# Patient Record
Sex: MALE | Race: WHITE | Employment: OTHER | URBAN - NONMETROPOLITAN AREA
[De-identification: names, ages, dates, MRNs, and addresses within clinical notes are randomized per-mention and may not be internally consistent; named-entity substitution may affect disease eponyms.]

---

## 2017-01-03 DIAGNOSIS — I10 ESSENTIAL HYPERTENSION: Primary | ICD-10-CM

## 2017-01-03 DIAGNOSIS — I48.19 PERSISTENT ATRIAL FIBRILLATION (HCC): ICD-10-CM

## 2017-01-03 RX ORDER — FUROSEMIDE 40 MG/1
40 TABLET ORAL DAILY
Qty: 30 TABLET | Refills: 3 | Status: SHIPPED | OUTPATIENT
Start: 2017-01-03 | End: 2017-05-19 | Stop reason: SDUPTHER

## 2017-01-03 RX ORDER — POTASSIUM CHLORIDE 20 MEQ/1
20 TABLET, EXTENDED RELEASE ORAL DAILY
Qty: 30 TABLET | Refills: 5 | Status: SHIPPED | OUTPATIENT
Start: 2017-01-03 | End: 2017-01-10 | Stop reason: ALTCHOICE

## 2017-01-03 RX ORDER — WARFARIN SODIUM 10 MG/1
10 TABLET ORAL DAILY
Qty: 30 TABLET | Refills: 5 | Status: SHIPPED | OUTPATIENT
Start: 2017-01-03 | End: 2017-09-05 | Stop reason: SDUPTHER

## 2017-01-03 RX ORDER — DIGOXIN 250 MCG
250 TABLET ORAL DAILY
Qty: 30 TABLET | Refills: 3 | Status: SHIPPED | OUTPATIENT
Start: 2017-01-03 | End: 2017-05-19 | Stop reason: SDUPTHER

## 2017-01-03 RX ORDER — LISINOPRIL 5 MG/1
5 TABLET ORAL 2 TIMES DAILY
Qty: 60 TABLET | Refills: 5 | Status: SHIPPED | OUTPATIENT
Start: 2017-01-03 | End: 2017-01-10

## 2017-01-05 ENCOUNTER — ANTI-COAG VISIT (OUTPATIENT)
Dept: CARDIOLOGY | Age: 62
End: 2017-01-05
Payer: MEDICAID

## 2017-01-05 DIAGNOSIS — I48.19 PERSISTENT ATRIAL FIBRILLATION (HCC): Primary | ICD-10-CM

## 2017-01-05 LAB
INTERNATIONAL NORMALIZATION RATIO, POC: 2
PROTHROMBIN TIME, POC: NORMAL

## 2017-01-05 PROCEDURE — 85610 PROTHROMBIN TIME: CPT | Performed by: CLINICAL NURSE SPECIALIST

## 2017-01-10 ENCOUNTER — OFFICE VISIT (OUTPATIENT)
Dept: CARDIOLOGY | Age: 62
End: 2017-01-10
Payer: MEDICAID

## 2017-01-10 ENCOUNTER — OFFICE VISIT (OUTPATIENT)
Dept: PRIMARY CARE CLINIC | Age: 62
End: 2017-01-10
Payer: MEDICAID

## 2017-01-10 VITALS
HEART RATE: 74 BPM | SYSTOLIC BLOOD PRESSURE: 132 MMHG | HEIGHT: 70 IN | WEIGHT: 240 LBS | BODY MASS INDEX: 34.36 KG/M2 | DIASTOLIC BLOOD PRESSURE: 82 MMHG

## 2017-01-10 VITALS
HEART RATE: 62 BPM | WEIGHT: 244 LBS | RESPIRATION RATE: 18 BRPM | OXYGEN SATURATION: 99 % | BODY MASS INDEX: 34.16 KG/M2 | HEIGHT: 71 IN | SYSTOLIC BLOOD PRESSURE: 140 MMHG | DIASTOLIC BLOOD PRESSURE: 76 MMHG

## 2017-01-10 DIAGNOSIS — I10 ESSENTIAL HYPERTENSION: ICD-10-CM

## 2017-01-10 DIAGNOSIS — I42.8 NON-ISCHEMIC CARDIOMYOPATHY (HCC): Primary | ICD-10-CM

## 2017-01-10 DIAGNOSIS — I48.19 PERSISTENT ATRIAL FIBRILLATION (HCC): Primary | ICD-10-CM

## 2017-01-10 DIAGNOSIS — I42.8 NON-ISCHEMIC CARDIOMYOPATHY (HCC): ICD-10-CM

## 2017-01-10 PROCEDURE — 99212 OFFICE O/P EST SF 10 MIN: CPT | Performed by: INTERNAL MEDICINE

## 2017-01-10 PROCEDURE — 99214 OFFICE O/P EST MOD 30 MIN: CPT | Performed by: INTERNAL MEDICINE

## 2017-01-10 RX ORDER — SPIRONOLACTONE 25 MG/1
25 TABLET ORAL DAILY
Qty: 90 TABLET | Refills: 3 | Status: SHIPPED | OUTPATIENT
Start: 2017-01-10 | End: 2018-02-18 | Stop reason: SDUPTHER

## 2017-01-10 RX ORDER — LEVOTHYROXINE SODIUM 0.05 MG/1
50 TABLET ORAL DAILY
Qty: 30 TABLET | Refills: 3 | Status: SHIPPED | OUTPATIENT
Start: 2017-01-10 | End: 2017-04-25 | Stop reason: DRUGHIGH

## 2017-01-10 ASSESSMENT — ENCOUNTER SYMPTOMS
SHORTNESS OF BREATH: 0
NAUSEA: 0
DIARRHEA: 0
VOMITING: 0
CONSTIPATION: 0
BACK PAIN: 0

## 2017-01-17 ENCOUNTER — TELEPHONE (OUTPATIENT)
Dept: CARDIOLOGY | Age: 62
End: 2017-01-17

## 2017-01-17 ENCOUNTER — OFFICE VISIT (OUTPATIENT)
Dept: CARDIOLOGY | Age: 62
End: 2017-01-17
Payer: MEDICAID

## 2017-01-17 VITALS
DIASTOLIC BLOOD PRESSURE: 78 MMHG | BODY MASS INDEX: 34.65 KG/M2 | WEIGHT: 242 LBS | HEART RATE: 60 BPM | HEIGHT: 70 IN | SYSTOLIC BLOOD PRESSURE: 116 MMHG

## 2017-01-17 DIAGNOSIS — I42.8 NON-ISCHEMIC CARDIOMYOPATHY (HCC): ICD-10-CM

## 2017-01-17 DIAGNOSIS — I10 ESSENTIAL HYPERTENSION: ICD-10-CM

## 2017-01-17 DIAGNOSIS — I48.19 PERSISTENT ATRIAL FIBRILLATION (HCC): Primary | ICD-10-CM

## 2017-01-17 PROCEDURE — 99212 OFFICE O/P EST SF 10 MIN: CPT | Performed by: INTERNAL MEDICINE

## 2017-01-24 ENCOUNTER — OFFICE VISIT (OUTPATIENT)
Dept: CARDIOLOGY | Age: 62
End: 2017-01-24
Payer: MEDICAID

## 2017-01-24 VITALS
BODY MASS INDEX: 34.65 KG/M2 | WEIGHT: 242 LBS | HEIGHT: 70 IN | DIASTOLIC BLOOD PRESSURE: 84 MMHG | HEART RATE: 74 BPM | SYSTOLIC BLOOD PRESSURE: 130 MMHG

## 2017-01-24 DIAGNOSIS — I48.19 PERSISTENT ATRIAL FIBRILLATION (HCC): Primary | ICD-10-CM

## 2017-01-24 DIAGNOSIS — I42.8 NON-ISCHEMIC CARDIOMYOPATHY (HCC): ICD-10-CM

## 2017-01-24 DIAGNOSIS — I10 ESSENTIAL HYPERTENSION: ICD-10-CM

## 2017-01-24 PROCEDURE — 99212 OFFICE O/P EST SF 10 MIN: CPT | Performed by: INTERNAL MEDICINE

## 2017-01-31 ENCOUNTER — OFFICE VISIT (OUTPATIENT)
Dept: CARDIOLOGY | Age: 62
End: 2017-01-31
Payer: MEDICAID

## 2017-01-31 VITALS
DIASTOLIC BLOOD PRESSURE: 78 MMHG | BODY MASS INDEX: 34.5 KG/M2 | SYSTOLIC BLOOD PRESSURE: 130 MMHG | HEIGHT: 70 IN | WEIGHT: 241 LBS | HEART RATE: 66 BPM

## 2017-01-31 DIAGNOSIS — I10 ESSENTIAL HYPERTENSION: ICD-10-CM

## 2017-01-31 DIAGNOSIS — I42.8 NON-ISCHEMIC CARDIOMYOPATHY (HCC): Primary | ICD-10-CM

## 2017-01-31 DIAGNOSIS — I48.19 PERSISTENT ATRIAL FIBRILLATION (HCC): ICD-10-CM

## 2017-01-31 PROCEDURE — 99212 OFFICE O/P EST SF 10 MIN: CPT | Performed by: INTERNAL MEDICINE

## 2017-02-07 ENCOUNTER — OFFICE VISIT (OUTPATIENT)
Dept: CARDIOLOGY | Age: 62
End: 2017-02-07
Payer: MEDICAID

## 2017-02-07 VITALS
BODY MASS INDEX: 33.79 KG/M2 | DIASTOLIC BLOOD PRESSURE: 72 MMHG | WEIGHT: 236 LBS | SYSTOLIC BLOOD PRESSURE: 124 MMHG | HEIGHT: 70 IN | HEART RATE: 68 BPM

## 2017-02-07 DIAGNOSIS — I48.19 PERSISTENT ATRIAL FIBRILLATION (HCC): Primary | ICD-10-CM

## 2017-02-07 LAB
INTERNATIONAL NORMALIZATION RATIO, POC: 1.6
PROTHROMBIN TIME, POC: NORMAL

## 2017-02-07 PROCEDURE — 85610 PROTHROMBIN TIME: CPT | Performed by: INTERNAL MEDICINE

## 2017-02-07 PROCEDURE — 99212 OFFICE O/P EST SF 10 MIN: CPT | Performed by: INTERNAL MEDICINE

## 2017-02-08 ENCOUNTER — TELEPHONE (OUTPATIENT)
Dept: CARDIOLOGY | Age: 62
End: 2017-02-08

## 2017-02-14 ENCOUNTER — OFFICE VISIT (OUTPATIENT)
Dept: CARDIOLOGY | Age: 62
End: 2017-02-14
Payer: MEDICAID

## 2017-02-14 VITALS
HEART RATE: 56 BPM | WEIGHT: 240 LBS | SYSTOLIC BLOOD PRESSURE: 106 MMHG | HEIGHT: 70 IN | DIASTOLIC BLOOD PRESSURE: 68 MMHG | BODY MASS INDEX: 34.36 KG/M2

## 2017-02-14 DIAGNOSIS — I10 ESSENTIAL HYPERTENSION: ICD-10-CM

## 2017-02-14 DIAGNOSIS — I48.19 PERSISTENT ATRIAL FIBRILLATION (HCC): Primary | ICD-10-CM

## 2017-02-14 DIAGNOSIS — I42.8 NON-ISCHEMIC CARDIOMYOPATHY (HCC): ICD-10-CM

## 2017-02-14 LAB
INTERNATIONAL NORMALIZATION RATIO, POC: 3.2
PROTHROMBIN TIME, POC: NORMAL

## 2017-02-14 PROCEDURE — 85610 PROTHROMBIN TIME: CPT | Performed by: INTERNAL MEDICINE

## 2017-02-14 PROCEDURE — 99212 OFFICE O/P EST SF 10 MIN: CPT | Performed by: INTERNAL MEDICINE

## 2017-02-17 ENCOUNTER — TELEPHONE (OUTPATIENT)
Dept: CARDIOLOGY | Age: 62
End: 2017-02-17

## 2017-02-21 ENCOUNTER — TELEPHONE (OUTPATIENT)
Dept: CARDIOLOGY | Age: 62
End: 2017-02-21

## 2017-03-08 ENCOUNTER — TELEPHONE (OUTPATIENT)
Dept: CARDIOLOGY | Age: 62
End: 2017-03-08

## 2017-03-08 DIAGNOSIS — I50.21 ACUTE SYSTOLIC CONGESTIVE HEART FAILURE (HCC): Primary | ICD-10-CM

## 2017-03-10 ENCOUNTER — TELEPHONE (OUTPATIENT)
Dept: CARDIOLOGY | Age: 62
End: 2017-03-10

## 2017-03-14 ENCOUNTER — OFFICE VISIT (OUTPATIENT)
Dept: CARDIOLOGY | Age: 62
End: 2017-03-14
Payer: MEDICAID

## 2017-03-14 VITALS
HEIGHT: 70 IN | BODY MASS INDEX: 35.07 KG/M2 | DIASTOLIC BLOOD PRESSURE: 80 MMHG | WEIGHT: 245 LBS | HEART RATE: 68 BPM | SYSTOLIC BLOOD PRESSURE: 130 MMHG

## 2017-03-14 DIAGNOSIS — I48.19 PERSISTENT ATRIAL FIBRILLATION (HCC): Primary | ICD-10-CM

## 2017-03-14 DIAGNOSIS — I42.8 NON-ISCHEMIC CARDIOMYOPATHY (HCC): ICD-10-CM

## 2017-03-14 DIAGNOSIS — I10 ESSENTIAL HYPERTENSION: ICD-10-CM

## 2017-03-14 LAB
INTERNATIONAL NORMALIZATION RATIO, POC: 3.8
PROTHROMBIN TIME, POC: NORMAL

## 2017-03-14 PROCEDURE — 99212 OFFICE O/P EST SF 10 MIN: CPT | Performed by: INTERNAL MEDICINE

## 2017-03-14 PROCEDURE — 85610 PROTHROMBIN TIME: CPT | Performed by: INTERNAL MEDICINE

## 2017-03-14 RX ORDER — CARVEDILOL 12.5 MG/1
12.5 TABLET ORAL 2 TIMES DAILY WITH MEALS
Qty: 60 TABLET | Refills: 3 | Status: SHIPPED | OUTPATIENT
Start: 2017-03-14 | End: 2017-07-25 | Stop reason: SDUPTHER

## 2017-03-20 ENCOUNTER — HOSPITAL ENCOUNTER (OUTPATIENT)
Dept: NON INVASIVE DIAGNOSTICS | Age: 62
Discharge: HOME | End: 2017-03-20
Payer: MEDICAID

## 2017-03-20 DIAGNOSIS — I10 ESSENTIAL HYPERTENSION: ICD-10-CM

## 2017-03-20 DIAGNOSIS — I42.8 NON-ISCHEMIC CARDIOMYOPATHY (HCC): ICD-10-CM

## 2017-03-20 DIAGNOSIS — I48.19 PERSISTENT ATRIAL FIBRILLATION (HCC): ICD-10-CM

## 2017-03-20 LAB
LV EF: 58 %
LVEF MODALITY: NORMAL

## 2017-03-20 PROCEDURE — 93306 TTE W/DOPPLER COMPLETE: CPT

## 2017-03-21 ENCOUNTER — ANTI-COAG VISIT (OUTPATIENT)
Dept: CARDIOLOGY | Age: 62
End: 2017-03-21
Payer: MEDICAID

## 2017-03-21 DIAGNOSIS — I48.19 PERSISTENT ATRIAL FIBRILLATION (HCC): Primary | ICD-10-CM

## 2017-03-21 LAB
INTERNATIONAL NORMALIZATION RATIO, POC: 2.3
PROTHROMBIN TIME, POC: NORMAL

## 2017-03-21 PROCEDURE — 85610 PROTHROMBIN TIME: CPT | Performed by: CLINICAL NURSE SPECIALIST

## 2017-03-24 ENCOUNTER — TELEPHONE (OUTPATIENT)
Dept: CARDIOLOGY | Age: 62
End: 2017-03-24

## 2017-03-28 ENCOUNTER — TELEPHONE (OUTPATIENT)
Dept: CARDIOLOGY | Age: 62
End: 2017-03-28

## 2017-04-10 ENCOUNTER — OFFICE VISIT (OUTPATIENT)
Dept: PRIMARY CARE CLINIC | Age: 62
End: 2017-04-10
Payer: MEDICAID

## 2017-04-10 VITALS
HEART RATE: 51 BPM | DIASTOLIC BLOOD PRESSURE: 76 MMHG | BODY MASS INDEX: 35.5 KG/M2 | SYSTOLIC BLOOD PRESSURE: 120 MMHG | RESPIRATION RATE: 18 BRPM | OXYGEN SATURATION: 97 % | HEIGHT: 70 IN | WEIGHT: 248 LBS

## 2017-04-10 DIAGNOSIS — I42.8 NON-ISCHEMIC CARDIOMYOPATHY (HCC): Primary | ICD-10-CM

## 2017-04-10 DIAGNOSIS — I10 ESSENTIAL HYPERTENSION: ICD-10-CM

## 2017-04-10 DIAGNOSIS — I42.8 NON-ISCHEMIC CARDIOMYOPATHY (HCC): ICD-10-CM

## 2017-04-10 LAB
ALBUMIN SERPL-MCNC: 3.9 G/DL (ref 3.5–5.2)
ALP BLD-CCNC: 63 U/L (ref 40–130)
ALT SERPL-CCNC: 117 U/L (ref 5–41)
ANION GAP SERPL CALCULATED.3IONS-SCNC: 13 MMOL/L (ref 7–19)
AST SERPL-CCNC: 80 U/L (ref 5–40)
BILIRUB SERPL-MCNC: 0.7 MG/DL (ref 0.2–1.2)
BILIRUBIN URINE: NEGATIVE
BLOOD, URINE: NEGATIVE
BUN BLDV-MCNC: 21 MG/DL (ref 8–23)
CALCIUM SERPL-MCNC: 9.1 MG/DL (ref 8.8–10.2)
CHLORIDE BLD-SCNC: 105 MMOL/L (ref 98–111)
CLARITY: CLEAR
CO2: 23 MMOL/L (ref 22–29)
COLOR: YELLOW
CREAT SERPL-MCNC: 0.8 MG/DL (ref 0.5–1.2)
CREATININE URINE: 20.3 MG/DL (ref 4.2–622)
GFR NON-AFRICAN AMERICAN: >60
GLOBULIN: 4 G/DL
GLUCOSE BLD-MCNC: 110 MG/DL (ref 74–109)
GLUCOSE URINE: NEGATIVE MG/DL
HCT VFR BLD CALC: 44.6 % (ref 42–52)
HEMOGLOBIN: 15.7 G/DL (ref 14–18)
KETONES, URINE: NEGATIVE MG/DL
LEUKOCYTE ESTERASE, URINE: NEGATIVE
MCH RBC QN AUTO: 32 PG (ref 27–31)
MCHC RBC AUTO-ENTMCNC: 35.2 G/DL (ref 33–37)
MCV RBC AUTO: 90.8 FL (ref 80–94)
NITRITE, URINE: NEGATIVE
PDW BLD-RTO: 13 % (ref 11.5–14.5)
PH UA: 5
PLATELET # BLD: 97 K/UL (ref 130–400)
PMV BLD AUTO: 11 FL (ref 7.4–10.4)
POTASSIUM SERPL-SCNC: 4.7 MMOL/L (ref 3.5–5)
PROTEIN PROTEIN: <4 MG/DL (ref 15–45)
PROTEIN UA: NEGATIVE MG/DL
RBC # BLD: 4.91 M/UL (ref 4.7–6.1)
SODIUM BLD-SCNC: 141 MMOL/L (ref 136–145)
SPECIFIC GRAVITY UA: 1.01
TOTAL PROTEIN: 7.9 G/DL (ref 6.6–8.7)
TSH SERPL DL<=0.05 MIU/L-ACNC: 7.51 UIU/ML (ref 0.27–4.2)
UROBILINOGEN, URINE: 0.2 E.U./DL
WBC # BLD: 4.6 K/UL (ref 4.8–10.8)

## 2017-04-10 PROCEDURE — 99214 OFFICE O/P EST MOD 30 MIN: CPT | Performed by: INTERNAL MEDICINE

## 2017-04-10 ASSESSMENT — ENCOUNTER SYMPTOMS
DIARRHEA: 0
BACK PAIN: 0
CONSTIPATION: 0
VOMITING: 0
NAUSEA: 0
SHORTNESS OF BREATH: 0

## 2017-04-11 ENCOUNTER — OFFICE VISIT (OUTPATIENT)
Dept: CARDIOLOGY | Age: 62
End: 2017-04-11
Payer: MEDICAID

## 2017-04-11 VITALS
WEIGHT: 240 LBS | HEIGHT: 70 IN | BODY MASS INDEX: 34.36 KG/M2 | DIASTOLIC BLOOD PRESSURE: 82 MMHG | SYSTOLIC BLOOD PRESSURE: 120 MMHG | HEART RATE: 70 BPM

## 2017-04-11 DIAGNOSIS — I42.8 NON-ISCHEMIC CARDIOMYOPATHY (HCC): ICD-10-CM

## 2017-04-11 DIAGNOSIS — I48.19 PERSISTENT ATRIAL FIBRILLATION (HCC): Primary | ICD-10-CM

## 2017-04-11 DIAGNOSIS — I10 ESSENTIAL HYPERTENSION: ICD-10-CM

## 2017-04-11 LAB
INTERNATIONAL NORMALIZATION RATIO, POC: 2.8
PROTHROMBIN TIME, POC: NORMAL

## 2017-04-11 PROCEDURE — 85610 PROTHROMBIN TIME: CPT | Performed by: INTERNAL MEDICINE

## 2017-04-11 PROCEDURE — 99212 OFFICE O/P EST SF 10 MIN: CPT | Performed by: INTERNAL MEDICINE

## 2017-04-12 ENCOUNTER — TELEPHONE (OUTPATIENT)
Dept: PRIMARY CARE CLINIC | Age: 62
End: 2017-04-12

## 2017-04-25 RX ORDER — LEVOTHYROXINE SODIUM 0.07 MG/1
75 TABLET ORAL DAILY
Qty: 30 TABLET | Refills: 3 | Status: SHIPPED | OUTPATIENT
Start: 2017-04-25 | End: 2017-07-11

## 2017-05-16 ENCOUNTER — TELEPHONE (OUTPATIENT)
Dept: CARDIOLOGY | Age: 62
End: 2017-05-16

## 2017-05-19 DIAGNOSIS — I48.19 PERSISTENT ATRIAL FIBRILLATION (HCC): ICD-10-CM

## 2017-05-19 RX ORDER — DIGOXIN 250 MCG
250 TABLET ORAL DAILY
Qty: 30 TABLET | Refills: 5 | Status: SHIPPED | OUTPATIENT
Start: 2017-05-19 | End: 2017-09-05

## 2017-05-19 RX ORDER — FUROSEMIDE 40 MG/1
40 TABLET ORAL DAILY
Qty: 30 TABLET | Refills: 5 | Status: SHIPPED | OUTPATIENT
Start: 2017-05-19 | End: 2017-12-03 | Stop reason: SDUPTHER

## 2017-06-06 ENCOUNTER — TELEPHONE (OUTPATIENT)
Dept: CARDIOLOGY | Age: 62
End: 2017-06-06

## 2017-06-08 DIAGNOSIS — I50.21 ACUTE SYSTOLIC CONGESTIVE HEART FAILURE (HCC): ICD-10-CM

## 2017-06-08 DIAGNOSIS — I42.8 NON-ISCHEMIC CARDIOMYOPATHY (HCC): ICD-10-CM

## 2017-06-08 DIAGNOSIS — I42.8 NON-ISCHEMIC CARDIOMYOPATHY (HCC): Primary | ICD-10-CM

## 2017-06-14 ENCOUNTER — TELEPHONE (OUTPATIENT)
Dept: CARDIOLOGY | Age: 62
End: 2017-06-14

## 2017-07-11 ENCOUNTER — OFFICE VISIT (OUTPATIENT)
Dept: PRIMARY CARE CLINIC | Age: 62
End: 2017-07-11
Payer: MEDICAID

## 2017-07-11 ENCOUNTER — TELEPHONE (OUTPATIENT)
Dept: PRIMARY CARE CLINIC | Age: 62
End: 2017-07-11

## 2017-07-11 VITALS
WEIGHT: 244 LBS | RESPIRATION RATE: 18 BRPM | HEIGHT: 70 IN | HEART RATE: 62 BPM | OXYGEN SATURATION: 97 % | SYSTOLIC BLOOD PRESSURE: 124 MMHG | BODY MASS INDEX: 34.93 KG/M2 | DIASTOLIC BLOOD PRESSURE: 76 MMHG

## 2017-07-11 DIAGNOSIS — E03.9 ACQUIRED HYPOTHYROIDISM: ICD-10-CM

## 2017-07-11 DIAGNOSIS — I42.8 NON-ISCHEMIC CARDIOMYOPATHY (HCC): Primary | ICD-10-CM

## 2017-07-11 DIAGNOSIS — R79.89 ELEVATED TSH: Primary | ICD-10-CM

## 2017-07-11 DIAGNOSIS — I10 ESSENTIAL HYPERTENSION: ICD-10-CM

## 2017-07-11 DIAGNOSIS — I42.8 NON-ISCHEMIC CARDIOMYOPATHY (HCC): ICD-10-CM

## 2017-07-11 LAB
ALBUMIN SERPL-MCNC: 3.8 G/DL (ref 3.5–5.2)
ALP BLD-CCNC: 57 U/L (ref 40–130)
ALT SERPL-CCNC: 76 U/L (ref 5–41)
ANION GAP SERPL CALCULATED.3IONS-SCNC: 19 MMOL/L (ref 7–19)
AST SERPL-CCNC: 66 U/L (ref 5–40)
BILIRUB SERPL-MCNC: 0.6 MG/DL (ref 0.2–1.2)
BUN BLDV-MCNC: 15 MG/DL (ref 8–23)
CALCIUM SERPL-MCNC: 8.8 MG/DL (ref 8.8–10.2)
CHLORIDE BLD-SCNC: 103 MMOL/L (ref 98–111)
CO2: 20 MMOL/L (ref 22–29)
CREAT SERPL-MCNC: 0.8 MG/DL (ref 0.5–1.2)
GFR NON-AFRICAN AMERICAN: >60
GLUCOSE BLD-MCNC: 124 MG/DL (ref 74–109)
POTASSIUM SERPL-SCNC: 5.1 MMOL/L (ref 3.5–5)
SODIUM BLD-SCNC: 142 MMOL/L (ref 136–145)
TOTAL PROTEIN: 8.1 G/DL (ref 6.6–8.7)
TSH SERPL DL<=0.05 MIU/L-ACNC: 4.83 UIU/ML (ref 0.27–4.2)

## 2017-07-11 PROCEDURE — 99214 OFFICE O/P EST MOD 30 MIN: CPT | Performed by: INTERNAL MEDICINE

## 2017-07-11 RX ORDER — LEVOTHYROXINE SODIUM 88 UG/1
88 TABLET ORAL DAILY
Qty: 30 TABLET | Refills: 3 | Status: SHIPPED | OUTPATIENT
Start: 2017-07-11 | End: 2017-12-08 | Stop reason: SDUPTHER

## 2017-07-11 ASSESSMENT — ENCOUNTER SYMPTOMS
BACK PAIN: 0
NAUSEA: 0
SHORTNESS OF BREATH: 0
VOMITING: 0
DIARRHEA: 0
CONSTIPATION: 0

## 2017-07-17 ENCOUNTER — TELEPHONE (OUTPATIENT)
Dept: CARDIOLOGY | Age: 62
End: 2017-07-17

## 2017-07-19 ENCOUNTER — TELEPHONE (OUTPATIENT)
Dept: CARDIOLOGY | Age: 62
End: 2017-07-19

## 2017-07-25 RX ORDER — CARVEDILOL 12.5 MG/1
TABLET ORAL
Qty: 60 TABLET | Refills: 5 | Status: SHIPPED | OUTPATIENT
Start: 2017-07-25 | End: 2018-06-05 | Stop reason: SDUPTHER

## 2017-09-05 ENCOUNTER — OFFICE VISIT (OUTPATIENT)
Dept: CARDIOLOGY | Age: 62
End: 2017-09-05
Payer: MEDICAID

## 2017-09-05 VITALS
BODY MASS INDEX: 35.79 KG/M2 | HEIGHT: 70 IN | DIASTOLIC BLOOD PRESSURE: 70 MMHG | WEIGHT: 250 LBS | HEART RATE: 62 BPM | SYSTOLIC BLOOD PRESSURE: 110 MMHG

## 2017-09-05 DIAGNOSIS — I48.19 PERSISTENT ATRIAL FIBRILLATION (HCC): Primary | ICD-10-CM

## 2017-09-05 DIAGNOSIS — I10 ESSENTIAL HYPERTENSION: ICD-10-CM

## 2017-09-05 DIAGNOSIS — I42.8 NON-ISCHEMIC CARDIOMYOPATHY (HCC): ICD-10-CM

## 2017-09-05 LAB
INTERNATIONAL NORMALIZATION RATIO, POC: 1.1
PROTHROMBIN TIME, POC: NORMAL

## 2017-09-05 PROCEDURE — 85610 PROTHROMBIN TIME: CPT | Performed by: INTERNAL MEDICINE

## 2017-09-05 PROCEDURE — 93000 ELECTROCARDIOGRAM COMPLETE: CPT | Performed by: INTERNAL MEDICINE

## 2017-09-05 PROCEDURE — 99214 OFFICE O/P EST MOD 30 MIN: CPT | Performed by: INTERNAL MEDICINE

## 2017-09-05 RX ORDER — WARFARIN SODIUM 10 MG/1
10 TABLET ORAL DAILY
Qty: 30 TABLET | Refills: 5 | Status: SHIPPED | OUTPATIENT
Start: 2017-09-05 | End: 2018-03-30 | Stop reason: SDUPTHER

## 2017-09-07 ENCOUNTER — ANTI-COAG VISIT (OUTPATIENT)
Dept: CARDIOLOGY | Age: 62
End: 2017-09-07
Payer: MEDICAID

## 2017-09-07 DIAGNOSIS — I48.19 PERSISTENT ATRIAL FIBRILLATION (HCC): Primary | ICD-10-CM

## 2017-09-07 LAB
INTERNATIONAL NORMALIZATION RATIO, POC: 2.1
PROTHROMBIN TIME, POC: NORMAL

## 2017-09-07 PROCEDURE — 85610 PROTHROMBIN TIME: CPT | Performed by: CLINICAL NURSE SPECIALIST

## 2017-09-21 ENCOUNTER — TELEPHONE (OUTPATIENT)
Dept: CARDIOLOGY | Age: 62
End: 2017-09-21

## 2017-12-04 ENCOUNTER — TELEPHONE (OUTPATIENT)
Dept: CARDIOLOGY | Age: 62
End: 2017-12-04

## 2017-12-04 RX ORDER — FUROSEMIDE 40 MG/1
TABLET ORAL
Qty: 90 TABLET | Refills: 3 | Status: SHIPPED | OUTPATIENT
Start: 2017-12-04 | End: 2018-12-17 | Stop reason: SDUPTHER

## 2017-12-05 ENCOUNTER — APPOINTMENT (OUTPATIENT)
Dept: CT IMAGING | Age: 62
End: 2017-12-05
Payer: MEDICAID

## 2017-12-05 ENCOUNTER — HOSPITAL ENCOUNTER (EMERGENCY)
Age: 62
Discharge: HOME | End: 2017-12-05
Attending: EMERGENCY MEDICINE
Payer: MEDICAID

## 2017-12-05 VITALS
BODY MASS INDEX: 35.79 KG/M2 | OXYGEN SATURATION: 94 % | HEIGHT: 70 IN | TEMPERATURE: 98.8 F | DIASTOLIC BLOOD PRESSURE: 77 MMHG | HEART RATE: 63 BPM | RESPIRATION RATE: 17 BRPM | WEIGHT: 250 LBS | SYSTOLIC BLOOD PRESSURE: 116 MMHG

## 2017-12-05 DIAGNOSIS — T22.292A PARTIAL THICKNESS BURN OF MULTIPLE SITES OF LEFT UPPER EXTREMITY, INITIAL ENCOUNTER: Primary | ICD-10-CM

## 2017-12-05 DIAGNOSIS — R55 SYNCOPE AND COLLAPSE: ICD-10-CM

## 2017-12-05 LAB
ALBUMIN SERPL-MCNC: 3.5 G/DL (ref 3.5–5.2)
ALP BLD-CCNC: 57 U/L (ref 40–130)
ALT SERPL-CCNC: 70 U/L (ref 5–41)
ANION GAP SERPL CALCULATED.3IONS-SCNC: 11 MMOL/L (ref 7–19)
AST SERPL-CCNC: 58 U/L (ref 5–40)
BASOPHILS ABSOLUTE: 0 K/UL (ref 0–0.2)
BASOPHILS RELATIVE PERCENT: 0.3 % (ref 0–1)
BILIRUB SERPL-MCNC: 1.5 MG/DL (ref 0.2–1.2)
BUN BLDV-MCNC: 19 MG/DL (ref 8–23)
CALCIUM SERPL-MCNC: 8.5 MG/DL (ref 8.8–10.2)
CHLORIDE BLD-SCNC: 99 MMOL/L (ref 98–111)
CO2: 23 MMOL/L (ref 22–29)
CREAT SERPL-MCNC: 0.7 MG/DL (ref 0.5–1.2)
EOSINOPHILS ABSOLUTE: 0 K/UL (ref 0–0.6)
EOSINOPHILS RELATIVE PERCENT: 0.3 % (ref 0–5)
GFR NON-AFRICAN AMERICAN: >60
GLUCOSE BLD-MCNC: 126 MG/DL (ref 74–109)
HCT VFR BLD CALC: 41.4 % (ref 42–52)
HEMOGLOBIN: 14.3 G/DL (ref 14–18)
LYMPHOCYTES ABSOLUTE: 1.1 K/UL (ref 1.1–4.5)
LYMPHOCYTES RELATIVE PERCENT: 17.9 % (ref 20–40)
MCH RBC QN AUTO: 31.8 PG (ref 27–31)
MCHC RBC AUTO-ENTMCNC: 34.5 G/DL (ref 33–37)
MCV RBC AUTO: 92 FL (ref 80–94)
MONOCYTES ABSOLUTE: 0.8 K/UL (ref 0–0.9)
MONOCYTES RELATIVE PERCENT: 12 % (ref 0–10)
NEUTROPHILS ABSOLUTE: 4.3 K/UL (ref 1.5–7.5)
NEUTROPHILS RELATIVE PERCENT: 69.2 % (ref 50–65)
PDW BLD-RTO: 13.2 % (ref 11.5–14.5)
PERFORMED ON: NORMAL
PLATELET # BLD: 99 K/UL (ref 130–400)
PMV BLD AUTO: 10 FL (ref 9.4–12.4)
POC TROPONIN I: 0.01 NG/ML (ref 0–0.08)
POTASSIUM SERPL-SCNC: 3.9 MMOL/L (ref 3.5–5)
RBC # BLD: 4.5 M/UL (ref 4.7–6.1)
SODIUM BLD-SCNC: 133 MMOL/L (ref 136–145)
TOTAL PROTEIN: 7.7 G/DL (ref 6.6–8.7)
WBC # BLD: 6.3 K/UL (ref 4.8–10.8)

## 2017-12-05 PROCEDURE — 6360000002 HC RX W HCPCS: Performed by: EMERGENCY MEDICINE

## 2017-12-05 PROCEDURE — 80053 COMPREHEN METABOLIC PANEL: CPT

## 2017-12-05 PROCEDURE — 85025 COMPLETE CBC W/AUTO DIFF WBC: CPT

## 2017-12-05 PROCEDURE — 90715 TDAP VACCINE 7 YRS/> IM: CPT | Performed by: EMERGENCY MEDICINE

## 2017-12-05 PROCEDURE — 90471 IMMUNIZATION ADMIN: CPT | Performed by: EMERGENCY MEDICINE

## 2017-12-05 PROCEDURE — 99283 EMERGENCY DEPT VISIT LOW MDM: CPT | Performed by: EMERGENCY MEDICINE

## 2017-12-05 PROCEDURE — 84484 ASSAY OF TROPONIN QUANT: CPT

## 2017-12-05 PROCEDURE — 70450 CT HEAD/BRAIN W/O DYE: CPT

## 2017-12-05 PROCEDURE — 2500000003 HC RX 250 WO HCPCS: Performed by: EMERGENCY MEDICINE

## 2017-12-05 PROCEDURE — 93005 ELECTROCARDIOGRAM TRACING: CPT

## 2017-12-05 PROCEDURE — 36415 COLL VENOUS BLD VENIPUNCTURE: CPT

## 2017-12-05 PROCEDURE — 99284 EMERGENCY DEPT VISIT MOD MDM: CPT

## 2017-12-05 RX ADMIN — SILVER SULFADIAZINE: 10 CREAM TOPICAL at 12:26

## 2017-12-05 RX ADMIN — TETANUS TOXOID, REDUCED DIPHTHERIA TOXOID AND ACELLULAR PERTUSSIS VACCINE, ADSORBED 0.5 ML: 5; 2.5; 8; 8; 2.5 SUSPENSION INTRAMUSCULAR at 12:25
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
## 2017-12-06 DIAGNOSIS — I50.21 ACUTE SYSTOLIC CONGESTIVE HEART FAILURE (HCC): ICD-10-CM

## 2017-12-06 DIAGNOSIS — I42.8 NON-ISCHEMIC CARDIOMYOPATHY (HCC): ICD-10-CM

## 2017-12-07 LAB
EKG P AXIS: 36 DEGREES
EKG P-R INTERVAL: 176 MS
EKG Q-T INTERVAL: 406 MS
EKG QRS DURATION: 86 MS
EKG QTC CALCULATION (BAZETT): 423 MS
EKG T AXIS: -5 DEGREES

## 2017-12-08 ENCOUNTER — OFFICE VISIT (OUTPATIENT)
Dept: PRIMARY CARE CLINIC | Age: 62
End: 2017-12-08
Payer: MEDICAID

## 2017-12-08 VITALS
DIASTOLIC BLOOD PRESSURE: 66 MMHG | RESPIRATION RATE: 18 BRPM | WEIGHT: 266 LBS | HEIGHT: 70 IN | TEMPERATURE: 97.5 F | HEART RATE: 68 BPM | OXYGEN SATURATION: 98 % | SYSTOLIC BLOOD PRESSURE: 100 MMHG | BODY MASS INDEX: 38.08 KG/M2

## 2017-12-08 DIAGNOSIS — R79.89 ELEVATED TSH: ICD-10-CM

## 2017-12-08 DIAGNOSIS — T31.0 BURN (ANY DEGREE) INVOLVING LESS THAN 10% OF BODY SURFACE: Primary | ICD-10-CM

## 2017-12-08 PROCEDURE — 1036F TOBACCO NON-USER: CPT | Performed by: INTERNAL MEDICINE

## 2017-12-08 PROCEDURE — 3017F COLORECTAL CA SCREEN DOC REV: CPT | Performed by: INTERNAL MEDICINE

## 2017-12-08 PROCEDURE — G8428 CUR MEDS NOT DOCUMENT: HCPCS | Performed by: INTERNAL MEDICINE

## 2017-12-08 PROCEDURE — 99213 OFFICE O/P EST LOW 20 MIN: CPT | Performed by: INTERNAL MEDICINE

## 2017-12-08 PROCEDURE — G8484 FLU IMMUNIZE NO ADMIN: HCPCS | Performed by: INTERNAL MEDICINE

## 2017-12-08 PROCEDURE — G8417 CALC BMI ABV UP PARAM F/U: HCPCS | Performed by: INTERNAL MEDICINE

## 2017-12-08 RX ORDER — LEVOTHYROXINE SODIUM 88 UG/1
88 TABLET ORAL DAILY
Qty: 30 TABLET | Refills: 5 | Status: SHIPPED | OUTPATIENT
Start: 2017-12-08 | End: 2018-08-17 | Stop reason: SDUPTHER

## 2017-12-11 ENCOUNTER — OFFICE VISIT (OUTPATIENT)
Dept: PRIMARY CARE CLINIC | Age: 62
End: 2017-12-11
Payer: MEDICAID

## 2017-12-11 VITALS
HEIGHT: 70 IN | OXYGEN SATURATION: 97 % | HEART RATE: 65 BPM | SYSTOLIC BLOOD PRESSURE: 136 MMHG | TEMPERATURE: 98.3 F | DIASTOLIC BLOOD PRESSURE: 74 MMHG | WEIGHT: 257 LBS | BODY MASS INDEX: 36.79 KG/M2

## 2017-12-11 DIAGNOSIS — T31.0 BURN (ANY DEGREE) INVOLVING LESS THAN 10% OF BODY SURFACE: Primary | ICD-10-CM

## 2017-12-11 PROCEDURE — 3017F COLORECTAL CA SCREEN DOC REV: CPT | Performed by: INTERNAL MEDICINE

## 2017-12-11 PROCEDURE — 99213 OFFICE O/P EST LOW 20 MIN: CPT | Performed by: INTERNAL MEDICINE

## 2017-12-11 PROCEDURE — 1036F TOBACCO NON-USER: CPT | Performed by: INTERNAL MEDICINE

## 2017-12-11 PROCEDURE — G8484 FLU IMMUNIZE NO ADMIN: HCPCS | Performed by: INTERNAL MEDICINE

## 2017-12-11 PROCEDURE — G8427 DOCREV CUR MEDS BY ELIG CLIN: HCPCS | Performed by: INTERNAL MEDICINE

## 2017-12-11 PROCEDURE — G8417 CALC BMI ABV UP PARAM F/U: HCPCS | Performed by: INTERNAL MEDICINE

## 2017-12-11 RX ORDER — ACETAMINOPHEN 500 MG
1 TABLET ORAL DAILY
Qty: 30 EACH | Refills: 0 | Status: SHIPPED | OUTPATIENT
Start: 2017-12-11 | End: 2018-10-18 | Stop reason: ALTCHOICE

## 2017-12-11 ASSESSMENT — ENCOUNTER SYMPTOMS: COLOR CHANGE: 1
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
## 2017-12-15 ENCOUNTER — OFFICE VISIT (OUTPATIENT)
Dept: PRIMARY CARE CLINIC | Age: 62
End: 2017-12-15
Payer: MEDICAID

## 2017-12-15 VITALS
SYSTOLIC BLOOD PRESSURE: 130 MMHG | WEIGHT: 265 LBS | BODY MASS INDEX: 37.94 KG/M2 | TEMPERATURE: 96.5 F | HEART RATE: 67 BPM | DIASTOLIC BLOOD PRESSURE: 78 MMHG | OXYGEN SATURATION: 97 % | HEIGHT: 70 IN

## 2017-12-15 DIAGNOSIS — T31.0 BURN (ANY DEGREE) INVOLVING LESS THAN 10% OF BODY SURFACE: Primary | ICD-10-CM

## 2017-12-15 PROCEDURE — G8427 DOCREV CUR MEDS BY ELIG CLIN: HCPCS | Performed by: INTERNAL MEDICINE

## 2017-12-15 PROCEDURE — G8417 CALC BMI ABV UP PARAM F/U: HCPCS | Performed by: INTERNAL MEDICINE

## 2017-12-15 PROCEDURE — G8484 FLU IMMUNIZE NO ADMIN: HCPCS | Performed by: INTERNAL MEDICINE

## 2017-12-15 PROCEDURE — 1036F TOBACCO NON-USER: CPT | Performed by: INTERNAL MEDICINE

## 2017-12-15 PROCEDURE — 3017F COLORECTAL CA SCREEN DOC REV: CPT | Performed by: INTERNAL MEDICINE

## 2017-12-15 PROCEDURE — 99213 OFFICE O/P EST LOW 20 MIN: CPT | Performed by: INTERNAL MEDICINE

## 2017-12-29 ENCOUNTER — OFFICE VISIT (OUTPATIENT)
Dept: PRIMARY CARE CLINIC | Age: 62
End: 2017-12-29
Payer: MEDICAID

## 2017-12-29 VITALS
WEIGHT: 254.4 LBS | TEMPERATURE: 97.8 F | OXYGEN SATURATION: 98 % | HEIGHT: 70 IN | DIASTOLIC BLOOD PRESSURE: 86 MMHG | SYSTOLIC BLOOD PRESSURE: 132 MMHG | BODY MASS INDEX: 36.42 KG/M2 | HEART RATE: 72 BPM

## 2017-12-29 DIAGNOSIS — I48.19 PERSISTENT ATRIAL FIBRILLATION (HCC): ICD-10-CM

## 2017-12-29 DIAGNOSIS — S30.22XA SCROTAL HEMATOMA: ICD-10-CM

## 2017-12-29 DIAGNOSIS — S30.22XA SCROTAL HEMATOMA: Primary | ICD-10-CM

## 2017-12-29 LAB
INR BLD: 1.7 (ref 0.88–1.18)
PROTHROMBIN TIME: 20 SEC (ref 12–14.6)

## 2017-12-29 PROCEDURE — 99213 OFFICE O/P EST LOW 20 MIN: CPT | Performed by: INTERNAL MEDICINE

## 2017-12-29 PROCEDURE — G8427 DOCREV CUR MEDS BY ELIG CLIN: HCPCS | Performed by: INTERNAL MEDICINE

## 2017-12-29 PROCEDURE — 1036F TOBACCO NON-USER: CPT | Performed by: INTERNAL MEDICINE

## 2017-12-29 PROCEDURE — G8417 CALC BMI ABV UP PARAM F/U: HCPCS | Performed by: INTERNAL MEDICINE

## 2017-12-29 PROCEDURE — G8484 FLU IMMUNIZE NO ADMIN: HCPCS | Performed by: INTERNAL MEDICINE

## 2017-12-29 PROCEDURE — 3017F COLORECTAL CA SCREEN DOC REV: CPT | Performed by: INTERNAL MEDICINE

## 2017-12-29 RX ORDER — HYDROCODONE BITARTRATE AND ACETAMINOPHEN 7.5; 325 MG/1; MG/1
1 TABLET ORAL EVERY 6 HOURS PRN
Qty: 25 TABLET | Refills: 0 | Status: SHIPPED | OUTPATIENT
Start: 2017-12-29 | End: 2018-01-03 | Stop reason: SDUPTHER

## 2018-01-02 ENCOUNTER — TELEPHONE (OUTPATIENT)
Dept: PRIMARY CARE CLINIC | Age: 63
End: 2018-01-02

## 2018-01-03 ENCOUNTER — HOSPITAL ENCOUNTER (OUTPATIENT)
Dept: ULTRASOUND IMAGING | Age: 63
Discharge: HOME | End: 2018-01-03
Payer: MEDICAID

## 2018-01-03 ENCOUNTER — OFFICE VISIT (OUTPATIENT)
Dept: UROLOGY | Age: 63
End: 2018-01-03
Payer: MEDICAID

## 2018-01-03 VITALS
HEART RATE: 86 BPM | TEMPERATURE: 96.7 F | HEIGHT: 70 IN | BODY MASS INDEX: 36.51 KG/M2 | DIASTOLIC BLOOD PRESSURE: 86 MMHG | SYSTOLIC BLOOD PRESSURE: 132 MMHG | WEIGHT: 255 LBS

## 2018-01-03 DIAGNOSIS — I48.19 PERSISTENT ATRIAL FIBRILLATION (HCC): ICD-10-CM

## 2018-01-03 DIAGNOSIS — S30.22XA SCROTAL HEMATOMA: ICD-10-CM

## 2018-01-03 DIAGNOSIS — S30.22XA SCROTAL HEMATOMA: Primary | ICD-10-CM

## 2018-01-03 PROCEDURE — G8484 FLU IMMUNIZE NO ADMIN: HCPCS | Performed by: UROLOGY

## 2018-01-03 PROCEDURE — G8417 CALC BMI ABV UP PARAM F/U: HCPCS | Performed by: UROLOGY

## 2018-01-03 PROCEDURE — G8427 DOCREV CUR MEDS BY ELIG CLIN: HCPCS | Performed by: UROLOGY

## 2018-01-03 PROCEDURE — 76870 US EXAM SCROTUM: CPT

## 2018-01-03 PROCEDURE — 99244 OFF/OP CNSLTJ NEW/EST MOD 40: CPT | Performed by: UROLOGY

## 2018-01-03 PROCEDURE — 3017F COLORECTAL CA SCREEN DOC REV: CPT | Performed by: UROLOGY

## 2018-01-03 RX ORDER — DIGOXIN 250 MCG
TABLET ORAL
COMMUNITY
Start: 2017-12-03 | End: 2018-01-03

## 2018-01-03 RX ORDER — HYDROCODONE BITARTRATE AND ACETAMINOPHEN 7.5; 325 MG/1; MG/1
1 TABLET ORAL EVERY 6 HOURS PRN
Qty: 21 TABLET | Refills: 0 | Status: SHIPPED | OUTPATIENT
Start: 2018-01-03 | End: 2018-01-06

## 2018-01-03 ASSESSMENT — ENCOUNTER SYMPTOMS
EYE DISCHARGE: 0
EYE REDNESS: 0
HEARTBURN: 0
SHORTNESS OF BREATH: 0
WHEEZING: 0
NAUSEA: 0

## 2018-01-03 NOTE — PROGRESS NOTES
Negra Hernandez is a 58 y.o. male who presents today   Chief Complaint   Patient presents with    New Patient     I am here today for a mass on my scrotum. Patient comes in with a chief complaint as I have swelling and pain in my scrotum. History patient is a 49-year-old gentleman who has an on anticoagulation with warfarin he says he thinks for the last year for atrial fibrillation. Though he states that when he last follow up with his cardiologist he was in sinus rhythm. He remains anticoagulated and approximate 1 week ago he says he woke up in the morning after sleeping all night with pain and swelling and ecchymosis in his right hemiscrotum. That day this progresses really gotten worse or he had significant swelling attempts filling scrotum and increasing pain. He did have his INR checked which was 1.7 on December 29. [place] [place] He relates the pain as severe this was improved after receiving a prescription for narcotic pain medicine for Dr. Asiya Calles. He denies any fevers chills or difficulty urinating. He denies any trauma he reports this is pretty much spontaneous. Past Medical History:   Diagnosis Date    Ex-cigarette smoker 10/3/2016    Hypertension     Non-ischemic cardiomyopathy ([facility] [address] 10/3/2016    9/28/2016  Echo  EF 25% 10/3/16  Cath  Mild CAD, EF 10%        Past Surgical History:   Procedure Laterality Date    FINGER AMPUTATION Left     partial left thumb amputation following trauma    TONSILLECTOMY         Current Outpatient Prescriptions   Medication Sig Dispense Refill    HYDROcodone-acetaminophen (NORCO) 7.5-325 MG per tablet Take 1 tablet by mouth every 6 hours as needed for Pain for up to 3 days. 21 tablet 0    Gauze Pads & Dressings (TELFA ADHESIVE DRESSING) 3\"X4\" PADS 1 Units by Does not apply route daily 30 each 0    Gauze Bandages (ROLLED GAUZE BANDAGE 3\"X2. 5YD) MISC 1 Units by Does not apply route daily 30 each 0    levothyroxine (LEVOTHROID) 88 MCG tablet Take 1 tablet by mouth and polydipsia. Psychiatric/Behavioral: Negative for depression and suicidal ideas. PHYSICAL EXAM:  /86   Pulse 86   Temp 96.7 °F (35.9 °C) (Temporal)   Ht 5' 10\" (1.778 m)   Wt 255 lb (115.7 kg)   BMI 36.59 kg/m²   Physical Exam   Constitutional: He is oriented to person, place, and time. He appears well-developed and well-nourished. HENT:   Head: Normocephalic and atraumatic. Eyes: Conjunctivae are normal. No scleral icterus. Neck: Normal range of motion. Cardiovascular: Normal rate, regular rhythm and intact distal pulses. Pulmonary/Chest: Effort normal and breath sounds normal. No respiratory distress. Abdominal: Soft. Bowel sounds are normal. He exhibits no distension and no mass. There is no tenderness. Hernia confirmed negative in the right inguinal area and confirmed negative in the left inguinal area. Genitourinary: Rectum normal and prostate normal. Prostate is not enlarged and not tender. Right testis shows swelling and tenderness. Right testis shows no mass. Left testis shows no mass, no swelling and no tenderness. No phimosis. No discharge found. Genitourinary Comments: He has severe and massive swelling of his scrotum with ecchymosis extending up into the infrapubic region and up to his penile skin his penis is buried but he says he can push it out. The ecchymosis also extends down to the perineum. Slightly warm to the touch but not erythematous the skin itself is intact there is no fluctuance or drainage. This is mostly confined to the right hemiscrotum but the whole scrotum is edematous and swollen but less so on the left the right testicle was not palpable   Musculoskeletal: Normal range of motion. He exhibits no edema or tenderness. Lymphadenopathy:     He has no cervical adenopathy. Right: No inguinal adenopathy present. Left: No inguinal adenopathy present. Neurological: He is alert and oriented to person, place, and time.    Skin: Skin is warm improved I did tell him that surgical evacuation at that point is still an option but regardless is can take him some time either way to recover in terms of the swelling. My preference would try to manage this nonsurgical because of the risk of infection, poor wound healing problems etc. need for a drain, and increased pain. he is agreeable with that plan he'll let us know if things acutely get worse or if progress in the short term    - HYDROcodone-acetaminophen (NORCO) 7.5-325 MG per tablet; Take 1 tablet by mouth every 6 hours as needed for Pain for up to 3 days. Dispense: 21 tablet; Refill: 0  - Protime-INR; Future      Orders Placed This Encounter   Procedures    Protime-INR     Standing Status:   Future     Standing Expiration Date:   1/3/2019     Order Specific Question:   Daily Coumadin Dose? Answer:   on coumadin        Return in about 1 week (around 1/10/2018).

## 2018-01-08 DIAGNOSIS — S30.22XA SCROTAL HEMATOMA: ICD-10-CM

## 2018-01-08 LAB
INR BLD: 1.14 (ref 0.88–1.18)
PROTHROMBIN TIME: 14.5 SEC (ref 12–14.6)

## 2018-01-10 ENCOUNTER — OFFICE VISIT (OUTPATIENT)
Dept: UROLOGY | Age: 63
End: 2018-01-10
Payer: MEDICAID

## 2018-01-10 ENCOUNTER — TELEPHONE (OUTPATIENT)
Dept: CARDIOLOGY | Age: 63
End: 2018-01-10

## 2018-01-10 VITALS
SYSTOLIC BLOOD PRESSURE: 118 MMHG | WEIGHT: 250 LBS | HEART RATE: 72 BPM | BODY MASS INDEX: 35.79 KG/M2 | HEIGHT: 70 IN | TEMPERATURE: 96.1 F | DIASTOLIC BLOOD PRESSURE: 76 MMHG

## 2018-01-10 DIAGNOSIS — S30.22XA SCROTAL HEMATOMA: Primary | ICD-10-CM

## 2018-01-10 PROCEDURE — G8417 CALC BMI ABV UP PARAM F/U: HCPCS | Performed by: UROLOGY

## 2018-01-10 PROCEDURE — 99213 OFFICE O/P EST LOW 20 MIN: CPT | Performed by: UROLOGY

## 2018-01-10 PROCEDURE — 1036F TOBACCO NON-USER: CPT | Performed by: UROLOGY

## 2018-01-10 PROCEDURE — 3017F COLORECTAL CA SCREEN DOC REV: CPT | Performed by: UROLOGY

## 2018-01-10 PROCEDURE — G8484 FLU IMMUNIZE NO ADMIN: HCPCS | Performed by: UROLOGY

## 2018-01-10 PROCEDURE — G8427 DOCREV CUR MEDS BY ELIG CLIN: HCPCS | Performed by: UROLOGY

## 2018-01-10 RX ORDER — HYDROCODONE BITARTRATE AND ACETAMINOPHEN 7.5; 325 MG/1; MG/1
TABLET ORAL
COMMUNITY
Start: 2018-01-07 | End: 2018-03-16 | Stop reason: ALTCHOICE

## 2018-01-10 ASSESSMENT — ENCOUNTER SYMPTOMS
EYE DISCHARGE: 0
WHEEZING: 0
NAUSEA: 0
SHORTNESS OF BREATH: 0
EYE REDNESS: 0
HEARTBURN: 0

## 2018-01-10 NOTE — PROGRESS NOTES
pain, frequency, hematuria and urgency. Musculoskeletal: Negative for myalgias and neck pain. Skin: Negative for itching and rash. Neurological: Negative for seizures, loss of consciousness and headaches. Endo/Heme/Allergies: Negative for environmental allergies and polydipsia. Psychiatric/Behavioral: Negative for depression and suicidal ideas. PHYSICAL EXAM:  /76   Pulse 72   Temp 96.1 °F (35.6 °C) (Temporal)   Ht 5' 10\" (1.778 m)   Wt 250 lb (113.4 kg)   BMI 35.87 kg/m²   Physical Exam   Constitutional: He is oriented to person, place, and time. He appears well-developed and well-nourished. HENT:   Head: Normocephalic and atraumatic. Eyes: Conjunctivae are normal. No scleral icterus. Neck: Normal range of motion. Cardiovascular: Normal rate, regular rhythm and intact distal pulses. Pulmonary/Chest: Effort normal and breath sounds normal. No respiratory distress. Abdominal: Soft. Bowel sounds are normal. He exhibits no distension and no mass. There is no tenderness. Hernia confirmed negative in the right inguinal area and confirmed negative in the left inguinal area. Genitourinary: Rectum normal and prostate normal. Prostate is not enlarged and not tender. Right testis shows swelling and tenderness. Right testis shows no mass. Left testis shows no mass, no swelling and no tenderness. No phimosis. No discharge found. Genitourinary Comments: The ecchymosis and skin edema has significantly improved almost resolved. He still has enlarged tense scrotum there is no erythema no crepitus no fluctuance. He has less tenderness as well. Musculoskeletal: Normal range of motion. He exhibits no edema or tenderness. Lymphadenopathy:     He has no cervical adenopathy. Right: No inguinal adenopathy present. Left: No inguinal adenopathy present. Neurological: He is alert and oriented to person, place, and time. Skin: Skin is warm and dry.    Psychiatric: He has a normal mood and affect. His behavior is normal.   Nursing note and vitals reviewed. 1. Scrotal hematoma  This is slightly improved. I told him to check with his doctor see if he can continue to hold the hematoma and he was told his anatomy very slow to resolve and that there is some increased risk for him developing a hydrocele as this resolved but I would avoid surgical treatment at this point and he is agreeable      No orders of the defined types were placed in this encounter. Return in about 1 month (around 2/10/2018) for F/U with Royer [facility] next visit.

## 2018-01-11 ENCOUNTER — OFFICE VISIT (OUTPATIENT)
Dept: PRIMARY CARE CLINIC | Age: 63
End: 2018-01-11
Payer: MEDICAID

## 2018-01-11 VITALS
WEIGHT: 253.8 LBS | HEART RATE: 60 BPM | BODY MASS INDEX: 36.33 KG/M2 | SYSTOLIC BLOOD PRESSURE: 120 MMHG | TEMPERATURE: 97.9 F | OXYGEN SATURATION: 97 % | DIASTOLIC BLOOD PRESSURE: 70 MMHG | HEIGHT: 70 IN

## 2018-01-11 DIAGNOSIS — S30.22XA SCROTAL HEMATOMA: Primary | ICD-10-CM

## 2018-01-11 PROCEDURE — 1036F TOBACCO NON-USER: CPT | Performed by: INTERNAL MEDICINE

## 2018-01-11 PROCEDURE — 99213 OFFICE O/P EST LOW 20 MIN: CPT | Performed by: INTERNAL MEDICINE

## 2018-01-11 PROCEDURE — 3017F COLORECTAL CA SCREEN DOC REV: CPT | Performed by: INTERNAL MEDICINE

## 2018-01-11 PROCEDURE — G8484 FLU IMMUNIZE NO ADMIN: HCPCS | Performed by: INTERNAL MEDICINE

## 2018-01-11 PROCEDURE — G8417 CALC BMI ABV UP PARAM F/U: HCPCS | Performed by: INTERNAL MEDICINE

## 2018-01-11 PROCEDURE — G8427 DOCREV CUR MEDS BY ELIG CLIN: HCPCS | Performed by: INTERNAL MEDICINE

## 2018-01-11 ASSESSMENT — ENCOUNTER SYMPTOMS
ABDOMINAL DISTENTION: 0
SHORTNESS OF BREATH: 0
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
## 2018-02-12 ENCOUNTER — OFFICE VISIT (OUTPATIENT)
Dept: UROLOGY | Age: 63
End: 2018-02-12
Payer: MEDICAID

## 2018-02-12 VITALS — HEIGHT: 68 IN | WEIGHT: 240 LBS | BODY MASS INDEX: 36.37 KG/M2 | TEMPERATURE: 97.6 F

## 2018-02-12 DIAGNOSIS — S30.22XA SCROTAL HEMATOMA: Primary | ICD-10-CM

## 2018-02-12 PROCEDURE — G8427 DOCREV CUR MEDS BY ELIG CLIN: HCPCS | Performed by: PHYSICIAN ASSISTANT

## 2018-02-12 PROCEDURE — 3017F COLORECTAL CA SCREEN DOC REV: CPT | Performed by: PHYSICIAN ASSISTANT

## 2018-02-12 PROCEDURE — 99213 OFFICE O/P EST LOW 20 MIN: CPT | Performed by: PHYSICIAN ASSISTANT

## 2018-02-12 PROCEDURE — G8484 FLU IMMUNIZE NO ADMIN: HCPCS | Performed by: PHYSICIAN ASSISTANT

## 2018-02-12 PROCEDURE — G8417 CALC BMI ABV UP PARAM F/U: HCPCS | Performed by: PHYSICIAN ASSISTANT

## 2018-02-12 PROCEDURE — 1036F TOBACCO NON-USER: CPT | Performed by: PHYSICIAN ASSISTANT

## 2018-02-12 ASSESSMENT — ENCOUNTER SYMPTOMS
NAUSEA: 0
HEARTBURN: 0
SORE THROAT: 0
WHEEZING: 0
DOUBLE VISION: 0
SHORTNESS OF BREATH: 0
BLURRED VISION: 0
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
## 2018-02-16 ENCOUNTER — ANTI-COAG VISIT (OUTPATIENT)
Dept: CARDIOLOGY | Age: 63
End: 2018-02-16
Payer: MEDICAID

## 2018-02-16 DIAGNOSIS — I48.19 PERSISTENT ATRIAL FIBRILLATION (HCC): Primary | ICD-10-CM

## 2018-02-16 LAB
INTERNATIONAL NORMALIZATION RATIO, POC: 2.4
PROTHROMBIN TIME, POC: NORMAL

## 2018-02-16 PROCEDURE — 85610 PROTHROMBIN TIME: CPT | Performed by: CLINICAL NURSE SPECIALIST

## 2018-02-18 RX ORDER — SPIRONOLACTONE 25 MG/1
TABLET ORAL
Qty: 30 TABLET | Refills: 11 | Status: SHIPPED | OUTPATIENT
Start: 2018-02-18 | End: 2022-03-09 | Stop reason: SDUPTHER

## 2018-03-16 ENCOUNTER — OFFICE VISIT (OUTPATIENT)
Dept: CARDIOLOGY | Age: 63
End: 2018-03-16
Payer: MEDICAID

## 2018-03-16 VITALS
WEIGHT: 258 LBS | DIASTOLIC BLOOD PRESSURE: 90 MMHG | SYSTOLIC BLOOD PRESSURE: 138 MMHG | HEIGHT: 70 IN | BODY MASS INDEX: 36.94 KG/M2 | HEART RATE: 60 BPM

## 2018-03-16 DIAGNOSIS — I48.0 PAROXYSMAL ATRIAL FIBRILLATION (HCC): ICD-10-CM

## 2018-03-16 DIAGNOSIS — I42.8 NON-ISCHEMIC CARDIOMYOPATHY (HCC): Primary | ICD-10-CM

## 2018-03-16 LAB
INTERNATIONAL NORMALIZATION RATIO, POC: 2
INTERNATIONAL NORMALIZATION RATIO, POC: NORMAL
PROTHROMBIN TIME, POC: 2

## 2018-03-16 PROCEDURE — 1036F TOBACCO NON-USER: CPT | Performed by: CLINICAL NURSE SPECIALIST

## 2018-03-16 PROCEDURE — 3017F COLORECTAL CA SCREEN DOC REV: CPT | Performed by: CLINICAL NURSE SPECIALIST

## 2018-03-16 PROCEDURE — G8417 CALC BMI ABV UP PARAM F/U: HCPCS | Performed by: CLINICAL NURSE SPECIALIST

## 2018-03-16 PROCEDURE — 99213 OFFICE O/P EST LOW 20 MIN: CPT | Performed by: CLINICAL NURSE SPECIALIST

## 2018-03-16 PROCEDURE — 85610 PROTHROMBIN TIME: CPT | Performed by: CLINICAL NURSE SPECIALIST

## 2018-03-16 PROCEDURE — G8427 DOCREV CUR MEDS BY ELIG CLIN: HCPCS | Performed by: CLINICAL NURSE SPECIALIST

## 2018-03-16 PROCEDURE — G8484 FLU IMMUNIZE NO ADMIN: HCPCS | Performed by: CLINICAL NURSE SPECIALIST

## 2018-03-16 ASSESSMENT — ENCOUNTER SYMPTOMS
BLURRED VISION: 0
ORTHOPNEA: 0
HEARTBURN: 0
NAUSEA: 0
VOMITING: 0
BLOOD IN STOOL: 0
SHORTNESS OF BREATH: 1
COUGH: 0

## 2018-03-16 NOTE — PATIENT INSTRUCTIONS
Followup With Dr. Marlyn Batista 6mo  Continue normal dosing Coumadin and recheck in 1 month    - Weigh daily and report weight gain of 3lbs or more in 24hrs or 5lbs in one week. - Call for increasing shortness of breath or increasing swelling in feet and legs. (This could mean you are retaining too much fluid)  - 2000mg low sodium diet  - Fluid restriction of 1500ml per day (about 6 cups of fluid per day)    Call with any questions or concerns  Follow up with Santy Fonseca, DO for non cardiac problems  Report any new problems  Cardiovascular Fitness-Exercise as tolerated. Strive for 15 minutes of exercise most days of the week. Cardiac / Healthy Diet  Continue current medications as directed  Continue plan of treatment  It is always recommended that you bring your medications bottles with you to each visit - this is for your safety! Patient Education        Heart Failure: Care Instructions  Your Care Instructions    Heart failure occurs when your heart does not pump as much blood as the body needs. Failure does not mean that the heart has stopped pumping but rather that it is not pumping as well as it should. Over time, this causes fluid buildup in your lungs and other parts of your body. Fluid buildup can cause shortness of breath, fatigue, swollen ankles, and other problems. By taking medicines regularly, reducing sodium (salt) in your diet, checking your weight every day, and making lifestyle changes, you can feel better and live longer. Follow-up care is a key part of your treatment and safety. Be sure to make and go to all appointments, and call your doctor if you are having problems. It's also a good idea to know your test results and keep a list of the medicines you take. How can you care for yourself at home? Medicines  ? · Be safe with medicines. Take your medicines exactly as prescribed. Call your doctor if you think you are having a problem with your medicine.    ? · Do not take any vitamins, over-the-counter medicine, or herbal products without talking to your doctor first. Do not take ibuprofen (Advil or Motrin) and naproxen (Aleve) without talking to your doctor first. They could make your heart failure worse. ? · You may be taking some of the following medicine. ¨ Beta-blockers can slow heart rate, decrease blood pressure, and improve your condition. Taking a beta-blocker may lower your chance of needing to be hospitalized. ¨ Angiotensin-converting enzyme inhibitors (ACEIs) reduce the heart's workload, lower blood pressure, and reduce swelling. Taking an ACEI may lower your chance of needing to be hospitalized again. ¨ Angiotensin II receptor blockers (ARBs) work like ACEIs. Your doctor may prescribe them instead of ACEIs. ¨ Diuretics, also called water pills, reduce swelling. ¨ Potassium supplements replace this important mineral, which is sometimes lost with diuretics. ¨ Aspirin and other blood thinners prevent blood clots, which can cause a stroke or heart attack. ? You will get more details on the specific medicines your doctor prescribes. Diet  ? · Your doctor may suggest that you limit sodium to 2,000 milligrams (mg) a day or less. That is less than 1 teaspoon of salt a day, including all the salt you eat in cooking or in packaged foods. People get most of their sodium from processed foods. Fast food and restaurant meals also tend to be very high in sodium. ? · Ask your doctor how much liquid you can drink each day. You may have to limit liquids. ?Weight  ? · Weigh yourself without clothing at the same time each day. Record your weight. Call your doctor if you have a sudden weight gain, such as more than 2 to 3 pounds in a day or 5 pounds in a week. (Your doctor may suggest a different range of weight gain.) A sudden weight gain may mean that your heart failure is getting worse. ? Activity level  ? · Start light exercise (if your doctor says it is okay).  Even if you can only do a small amount, exercise will help you get stronger, have more energy, and manage your weight and your stress. Walking is an easy way to get exercise. Start out by walking a little more than you did before. Bit by bit, increase the amount you walk. ? · When you exercise, watch for signs that your heart is working too hard. You are pushing yourself too hard if you cannot talk while you are exercising. If you become short of breath or dizzy or have chest pain, stop, sit down, and rest.   ? · If you feel \"wiped out\" the day after you exercise, walk slower or for a shorter distance until you can work up to a better pace. ? · Get enough rest at night. Sleeping with 1 or 2 pillows under your upper body and head may help you breathe easier. ? Lifestyle changes  ? · Do not smoke. Smoking can make a heart condition worse. If you need help quitting, talk to your doctor about stop-smoking programs and medicines. These can increase your chances of quitting for good. Quitting smoking may be the most important step you can take to protect your heart. ? · Limit alcohol to 2 drinks a day for men and 1 drink a day for women. Too much alcohol can cause health problems. ? · Avoid getting sick from colds and the flu. Get a pneumococcal vaccine shot. If you have had one before, ask your doctor whether you need another dose. Get a flu shot each year. If you must be around people with colds or the flu, wash your hands often. When should you call for help? Call 911 if you have symptoms of sudden heart failure such as:  ? · You have severe trouble breathing. ? · You cough up pink, foamy mucus. ? · You have a new irregular or rapid heartbeat. ?Call your doctor now or seek immediate medical care if:  ? · You have new or increased shortness of breath. ? · You are dizzy or lightheaded, or you feel like you may faint. ? · You have sudden weight gain, such as more than 2 to 3 pounds in a day or 5 pounds in a week.  (Your doctor

## 2018-03-16 NOTE — PROGRESS NOTES
Never Used      Comment: light smoking history - socially    Alcohol use Yes      Current Outpatient Prescriptions   Medication Sig Dispense Refill    spironolactone (ALDACTONE) 25 MG tablet TAKE ONE TABLET BY MOUTH ONCE DAILY 30 tablet 11    Gauze Pads & Dressings (TELFA ADHESIVE DRESSING) 3\"X4\" PADS 1 Units by Does not apply route daily 30 each 0    Gauze Bandages (ROLLED GAUZE BANDAGE 3\"X2. 5YD) MISC 1 Units by Does not apply route daily 30 each 0    levothyroxine (LEVOTHROID) 88 MCG tablet Take 1 tablet by mouth daily 30 tablet 5    sacubitril-valsartan (ENTRESTO)  MG per tablet Take 1 tablet by mouth 2 times daily 60 tablet 5    furosemide (LASIX) 40 MG tablet TAKE ONE TABLET BY MOUTH ONCE DAILY 90 tablet 3    warfarin (COUMADIN) 10 MG tablet Take 1 tablet by mouth daily As instructed 30 tablet 5    carvedilol (COREG) 12.5 MG tablet TAKE ONE TABLET BY MOUTH TWICE DAILY WITH MEALS 60 tablet 5     No current facility-administered medications for this visit. Allergies: Patient has no known allergies. Review of Systems  Review of Systems   Constitutional: Positive for malaise/fatigue. Negative for chills and fever. HENT: Negative for hearing loss. Eyes: Negative for blurred vision. Respiratory: Positive for shortness of breath (chronic). Negative for cough. Cardiovascular: Negative for chest pain, palpitations, orthopnea, leg swelling and PND. Gastrointestinal: Negative for blood in stool, heartburn, melena, nausea and vomiting. Musculoskeletal: Negative for falls and myalgias. Skin: Negative for rash. Neurological: Negative for dizziness, sensory change, speech change and focal weakness. Endo/Heme/Allergies: Does not bruise/bleed easily. Psychiatric/Behavioral: Negative for depression. The patient is not nervous/anxious.         Objective  Vital Signs - BP (!) 138/90   Pulse 60   Ht 5' 10\" (1.778 m)   Wt 258 lb (117 kg)   BMI 37.02 kg/m²   Physical Exam 6 cups of fluid per day)    Call with any questions or concerns  Follow up with Toby Nguyễn DO for non cardiac problems  Report any new problems  Cardiovascular Fitness-Exercise as tolerated. Strive for 15 minutes of exercise most days of the week. Cardiac / Healthy Diet  Continue current medications as directed  Continue plan of treatment  It is always recommended that you bring your medications bottles with you to each visit - this is for your safety!        ALLISON Gutierrez

## 2018-03-30 DIAGNOSIS — I48.19 PERSISTENT ATRIAL FIBRILLATION (HCC): ICD-10-CM

## 2018-03-30 RX ORDER — WARFARIN SODIUM 10 MG/1
TABLET ORAL
Qty: 90 TABLET | Refills: 3 | Status: SHIPPED | OUTPATIENT
Start: 2018-03-30 | End: 2019-05-20 | Stop reason: SDUPTHER

## 2018-04-19 ENCOUNTER — TELEPHONE (OUTPATIENT)
Dept: CARDIOLOGY | Age: 63
End: 2018-04-19

## 2018-04-20 ENCOUNTER — OFFICE VISIT (OUTPATIENT)
Dept: UROLOGY | Age: 63
End: 2018-04-20
Payer: MEDICAID

## 2018-04-20 ENCOUNTER — ANTI-COAG VISIT (OUTPATIENT)
Dept: CARDIOLOGY | Age: 63
End: 2018-04-20
Payer: MEDICAID

## 2018-04-20 DIAGNOSIS — I48.0 PAROXYSMAL ATRIAL FIBRILLATION (HCC): Primary | ICD-10-CM

## 2018-04-20 DIAGNOSIS — S30.22XA SCROTAL HEMATOMA: Primary | ICD-10-CM

## 2018-04-20 LAB
INTERNATIONAL NORMALIZATION RATIO, POC: 2.5
PROTHROMBIN TIME, POC: NORMAL

## 2018-04-20 PROCEDURE — 3017F COLORECTAL CA SCREEN DOC REV: CPT | Performed by: PHYSICIAN ASSISTANT

## 2018-04-20 PROCEDURE — G8427 DOCREV CUR MEDS BY ELIG CLIN: HCPCS | Performed by: PHYSICIAN ASSISTANT

## 2018-04-20 PROCEDURE — 1036F TOBACCO NON-USER: CPT | Performed by: PHYSICIAN ASSISTANT

## 2018-04-20 PROCEDURE — G8417 CALC BMI ABV UP PARAM F/U: HCPCS | Performed by: PHYSICIAN ASSISTANT

## 2018-04-20 PROCEDURE — 85610 PROTHROMBIN TIME: CPT | Performed by: CLINICAL NURSE SPECIALIST

## 2018-04-20 PROCEDURE — 99213 OFFICE O/P EST LOW 20 MIN: CPT | Performed by: PHYSICIAN ASSISTANT

## 2018-04-20 ASSESSMENT — ENCOUNTER SYMPTOMS
SINUS PAIN: 0
EYE PAIN: 0
VOMITING: 0
BLURRED VISION: 0
ABDOMINAL PAIN: 0
SHORTNESS OF BREATH: 0
BACK PAIN: 0
WHEEZING: 0

## 2018-04-24 ENCOUNTER — OFFICE VISIT (OUTPATIENT)
Dept: PRIMARY CARE CLINIC | Age: 63
End: 2018-04-24
Payer: MEDICAID

## 2018-04-24 VITALS
DIASTOLIC BLOOD PRESSURE: 74 MMHG | BODY MASS INDEX: 36.88 KG/M2 | HEART RATE: 63 BPM | SYSTOLIC BLOOD PRESSURE: 126 MMHG | WEIGHT: 257 LBS | OXYGEN SATURATION: 97 % | TEMPERATURE: 96.8 F

## 2018-04-24 DIAGNOSIS — I48.19 PERSISTENT ATRIAL FIBRILLATION (HCC): ICD-10-CM

## 2018-04-24 DIAGNOSIS — I10 ESSENTIAL HYPERTENSION: Primary | ICD-10-CM

## 2018-04-24 DIAGNOSIS — I10 ESSENTIAL HYPERTENSION: ICD-10-CM

## 2018-04-24 DIAGNOSIS — I42.8 NON-ISCHEMIC CARDIOMYOPATHY (HCC): ICD-10-CM

## 2018-04-24 DIAGNOSIS — E03.9 ACQUIRED HYPOTHYROIDISM: ICD-10-CM

## 2018-04-24 LAB
ALBUMIN SERPL-MCNC: 3.9 G/DL (ref 3.5–5.2)
ALP BLD-CCNC: 73 U/L (ref 40–130)
ALT SERPL-CCNC: 56 U/L (ref 5–41)
ANION GAP SERPL CALCULATED.3IONS-SCNC: 17 MMOL/L (ref 7–19)
AST SERPL-CCNC: 59 U/L (ref 5–40)
BILIRUB SERPL-MCNC: 0.5 MG/DL (ref 0.2–1.2)
BUN BLDV-MCNC: 19 MG/DL (ref 8–23)
CALCIUM SERPL-MCNC: 9.1 MG/DL (ref 8.8–10.2)
CHLORIDE BLD-SCNC: 104 MMOL/L (ref 98–111)
CO2: 21 MMOL/L (ref 22–29)
CREAT SERPL-MCNC: 0.8 MG/DL (ref 0.5–1.2)
GFR NON-AFRICAN AMERICAN: >60
GLUCOSE BLD-MCNC: 97 MG/DL (ref 74–109)
HCT VFR BLD CALC: 45.6 % (ref 42–52)
HEMOGLOBIN: 14.7 G/DL (ref 14–18)
MCH RBC QN AUTO: 28.9 PG (ref 27–31)
MCHC RBC AUTO-ENTMCNC: 32.2 G/DL (ref 33–37)
MCV RBC AUTO: 89.8 FL (ref 80–94)
PDW BLD-RTO: 15 % (ref 11.5–14.5)
PLATELET # BLD: 172 K/UL (ref 130–400)
PMV BLD AUTO: 9.5 FL (ref 9.4–12.4)
POTASSIUM SERPL-SCNC: 4.7 MMOL/L (ref 3.5–5)
RBC # BLD: 5.08 M/UL (ref 4.7–6.1)
SODIUM BLD-SCNC: 142 MMOL/L (ref 136–145)
TOTAL PROTEIN: 8.4 G/DL (ref 6.6–8.7)
TSH SERPL DL<=0.05 MIU/L-ACNC: 3.09 UIU/ML (ref 0.27–4.2)
WBC # BLD: 5.1 K/UL (ref 4.8–10.8)

## 2018-04-24 PROCEDURE — G8417 CALC BMI ABV UP PARAM F/U: HCPCS | Performed by: INTERNAL MEDICINE

## 2018-04-24 PROCEDURE — 3017F COLORECTAL CA SCREEN DOC REV: CPT | Performed by: INTERNAL MEDICINE

## 2018-04-24 PROCEDURE — G8427 DOCREV CUR MEDS BY ELIG CLIN: HCPCS | Performed by: INTERNAL MEDICINE

## 2018-04-24 PROCEDURE — 1036F TOBACCO NON-USER: CPT | Performed by: INTERNAL MEDICINE

## 2018-04-24 PROCEDURE — 99214 OFFICE O/P EST MOD 30 MIN: CPT | Performed by: INTERNAL MEDICINE

## 2018-04-24 ASSESSMENT — ENCOUNTER SYMPTOMS
VOMITING: 0
SHORTNESS OF BREATH: 0
DIARRHEA: 0
COUGH: 1
SINUS PAIN: 0
SINUS PRESSURE: 0
CONSTIPATION: 0
NAUSEA: 0
BACK PAIN: 0

## 2018-04-24 ASSESSMENT — PATIENT HEALTH QUESTIONNAIRE - PHQ9
SUM OF ALL RESPONSES TO PHQ QUESTIONS 1-9: 0
2. FEELING DOWN, DEPRESSED OR HOPELESS: 0
SUM OF ALL RESPONSES TO PHQ9 QUESTIONS 1 & 2: 0
1. LITTLE INTEREST OR PLEASURE IN DOING THINGS: 0

## 2018-05-25 DIAGNOSIS — I50.21 ACUTE SYSTOLIC CONGESTIVE HEART FAILURE (HCC): ICD-10-CM

## 2018-05-25 DIAGNOSIS — I42.8 NON-ISCHEMIC CARDIOMYOPATHY (HCC): ICD-10-CM

## 2018-05-25 RX ORDER — SACUBITRIL AND VALSARTAN 97; 103 MG/1; MG/1
TABLET, FILM COATED ORAL
Qty: 180 TABLET | Refills: 3 | Status: SHIPPED | OUTPATIENT
Start: 2018-05-25 | End: 2019-06-26 | Stop reason: SDUPTHER

## 2018-06-06 RX ORDER — CARVEDILOL 12.5 MG/1
TABLET ORAL
Qty: 180 TABLET | Refills: 3 | Status: SHIPPED | OUTPATIENT
Start: 2018-06-06 | End: 2019-07-17 | Stop reason: SDUPTHER

## 2018-07-23 ENCOUNTER — TELEPHONE (OUTPATIENT)
Dept: CARDIOLOGY | Age: 63
End: 2018-07-23

## 2018-07-24 ENCOUNTER — TELEPHONE (OUTPATIENT)
Dept: CARDIOLOGY | Age: 63
End: 2018-07-24

## 2018-07-24 NOTE — TELEPHONE ENCOUNTER
Left msg on pt phone in regards to Suresh Cantu being out of office on 10-. Pt appt is needing to be rescheduled w [facility]  If pt does not call back today 7-24-18 will try to contact pt on 7-25-18    rey

## 2018-07-25 ENCOUNTER — TELEPHONE (OUTPATIENT)
Dept: CARDIOLOGY | Age: 63
End: 2018-07-25

## 2018-08-17 DIAGNOSIS — R79.89 ELEVATED TSH: ICD-10-CM

## 2018-08-21 RX ORDER — LEVOTHYROXINE SODIUM 88 UG/1
TABLET ORAL
Qty: 30 TABLET | Refills: 5 | Status: SHIPPED | OUTPATIENT
Start: 2018-08-21 | End: 2022-03-10 | Stop reason: SDUPTHER

## 2018-10-18 ENCOUNTER — OFFICE VISIT (OUTPATIENT)
Dept: CARDIOLOGY | Age: 63
End: 2018-10-18
Payer: MEDICAID

## 2018-10-18 VITALS
BODY MASS INDEX: 37.8 KG/M2 | HEART RATE: 55 BPM | SYSTOLIC BLOOD PRESSURE: 138 MMHG | WEIGHT: 264 LBS | DIASTOLIC BLOOD PRESSURE: 88 MMHG | HEIGHT: 70 IN

## 2018-10-18 DIAGNOSIS — I10 ESSENTIAL HYPERTENSION: Primary | ICD-10-CM

## 2018-10-18 DIAGNOSIS — I42.8 NON-ISCHEMIC CARDIOMYOPATHY (HCC): ICD-10-CM

## 2018-10-18 DIAGNOSIS — I48.0 PAROXYSMAL ATRIAL FIBRILLATION (HCC): ICD-10-CM

## 2018-10-18 LAB
INTERNATIONAL NORMALIZATION RATIO, POC: 1.4
PROTHROMBIN TIME, POC: ABNORMAL

## 2018-10-18 PROCEDURE — G8417 CALC BMI ABV UP PARAM F/U: HCPCS | Performed by: CLINICAL NURSE SPECIALIST

## 2018-10-18 PROCEDURE — 85610 PROTHROMBIN TIME: CPT | Performed by: CLINICAL NURSE SPECIALIST

## 2018-10-18 PROCEDURE — 3017F COLORECTAL CA SCREEN DOC REV: CPT | Performed by: CLINICAL NURSE SPECIALIST

## 2018-10-18 PROCEDURE — 1036F TOBACCO NON-USER: CPT | Performed by: CLINICAL NURSE SPECIALIST

## 2018-10-18 PROCEDURE — G8484 FLU IMMUNIZE NO ADMIN: HCPCS | Performed by: CLINICAL NURSE SPECIALIST

## 2018-10-18 PROCEDURE — 93000 ELECTROCARDIOGRAM COMPLETE: CPT | Performed by: CLINICAL NURSE SPECIALIST

## 2018-10-18 PROCEDURE — G8427 DOCREV CUR MEDS BY ELIG CLIN: HCPCS | Performed by: CLINICAL NURSE SPECIALIST

## 2018-10-18 PROCEDURE — 99213 OFFICE O/P EST LOW 20 MIN: CPT | Performed by: CLINICAL NURSE SPECIALIST

## 2018-10-18 NOTE — PATIENT INSTRUCTIONS
Continue same coumadin and recheck in about 4 weeks  Follow up in 6 mos With Dr. Catalina Nichols   Call with any questions or concerns  Follow up with Kaitlynn Farias, DO for non cardiac problems  Report any new problems  Cardiovascular Fitness-Exercise as tolerated. Strive for 30 minutes of exercise most days of the week. Cardiac / Healthy Diet  Continue current medications as directed  Continue plan of treatment  It is always recommended that you bring your medications bottles with you to each visit - this is for your safety!

## 2018-10-18 NOTE — PROGRESS NOTES
disturbance, or myalgia. Skin - no color change or rash. No pallor. No new surgical incision. Neurologic - no speech difficulty, facial asymmetry or lateralizing weakness. No seizures, presyncope, syncope, or significant dizziness. Hematologic - no easy bruising or excessive bleeding. Psychiatric - no severe anxiety or insomnia. No confusion. All other review of systems are negative. Objective  Vital Signs - /88   Pulse 55   Ht 5' 10\" (1.778 m)   Wt 264 lb (119.7 kg)   BMI 37.88 kg/m²   General - Rambo is alert, cooperative, and pleasant. Well groomed. No acute distress. Body habitus is obese. HEENT - The head is normocephalic. No circumoral cyanosis. Dentition is normal.   EYES -  No Xanthelasma, no arcus senilis, no conjunctival hemorrhages or discharge. Neck - Supple, without increased jugular venous pressures. No carotid bruits. No mass. Respiratory - Lungs are clear bilaterally. No wheezes or rales. Normal effort without use of accessory muscles. Cardiovascular - Heart has regular rhythm and rate. No murmurs, rubs or gallops. + pedal pulses and no varicosities. Abdominal -  Soft, nontender, nondistended. Bowel sounds are intact. Extremities - No clubbing, cyanosis, or  edema. Musculoskeletal -  No clubbing . No Osler's nodes. Gait normal .  No kyphosis or scoliosis. Skin -  no statis ulcers or dermatitis. Neurological - No focal signs are identified. Oriented to person, place and time. Psychiatric -  Appropriate affect and mood. Assessment:     Diagnosis Orders   1. Essential hypertension  EKG 12 lead   2. Paroxysmal atrial fibrillation (HCC)  POCT INR   3.  Non-ischemic cardiomyopathy ([facility] [address]       Data:  BP Readings from Last 3 Encounters:   10/18/18 138/88   04/24/18 126/74   03/16/18 (!) 138/90    Pulse Readings from Last 3 Encounters:   10/18/18 55   04/24/18 63   03/16/18 60        EKG today shows sinus bradycardia with a rate of

## 2018-11-15 ENCOUNTER — ANTI-COAG VISIT (OUTPATIENT)
Dept: CARDIOLOGY | Age: 63
End: 2018-11-15
Payer: MEDICAID

## 2018-11-15 DIAGNOSIS — I48.0 PAROXYSMAL ATRIAL FIBRILLATION (HCC): Primary | ICD-10-CM

## 2018-11-15 LAB
INTERNATIONAL NORMALIZATION RATIO, POC: 3
PROTHROMBIN TIME, POC: NORMAL

## 2018-11-15 PROCEDURE — 85610 PROTHROMBIN TIME: CPT | Performed by: CLINICAL NURSE SPECIALIST

## 2018-12-17 RX ORDER — FUROSEMIDE 40 MG/1
TABLET ORAL
Qty: 30 TABLET | Refills: 11 | Status: SHIPPED | OUTPATIENT
Start: 2018-12-17 | End: 2020-04-10

## 2018-12-28 ENCOUNTER — ANTI-COAG VISIT (OUTPATIENT)
Dept: CARDIOLOGY | Age: 63
End: 2018-12-28
Payer: MEDICAID

## 2018-12-28 DIAGNOSIS — I48.0 PAROXYSMAL ATRIAL FIBRILLATION (HCC): Primary | ICD-10-CM

## 2018-12-28 LAB
INTERNATIONAL NORMALIZATION RATIO, POC: 1.6
PROTHROMBIN TIME, POC: NORMAL

## 2018-12-28 PROCEDURE — 85610 PROTHROMBIN TIME: CPT | Performed by: CLINICAL NURSE SPECIALIST

## 2018-12-31 ENCOUNTER — TELEPHONE (OUTPATIENT)
Dept: CARDIOLOGY | Age: 63
End: 2018-12-31

## 2019-03-26 RX ORDER — SPIRONOLACTONE 25 MG/1
TABLET ORAL
Qty: 30 TABLET | Refills: 11 | OUTPATIENT
Start: 2019-03-26

## 2019-05-07 ENCOUNTER — TELEPHONE (OUTPATIENT)
Dept: CARDIOLOGY | Age: 64
End: 2019-05-07

## 2019-05-08 ENCOUNTER — TELEPHONE (OUTPATIENT)
Dept: CARDIOLOGY | Age: 64
End: 2019-05-08

## 2019-05-09 ENCOUNTER — TELEPHONE (OUTPATIENT)
Dept: CARDIOLOGY | Age: 64
End: 2019-05-09

## 2019-05-09 NOTE — TELEPHONE ENCOUNTER
left msg on pt phone in regards to prov being out of office 6-11, have attempted to contact pt 3 different times on rescheduling.  Went on to rescheduling w Dinora Rodarte for same day but different time if pt calls back and wants to reshedule he can w np for different day

## 2019-05-20 DIAGNOSIS — I48.19 PERSISTENT ATRIAL FIBRILLATION (HCC): ICD-10-CM

## 2019-05-20 RX ORDER — WARFARIN SODIUM 10 MG/1
TABLET ORAL
Qty: 30 TABLET | Refills: 11 | Status: SHIPPED | OUTPATIENT
Start: 2019-05-20 | End: 2020-07-02

## 2019-06-11 ENCOUNTER — OFFICE VISIT (OUTPATIENT)
Dept: CARDIOLOGY | Age: 64
End: 2019-06-11
Payer: MEDICAID

## 2019-06-11 VITALS
BODY MASS INDEX: 35.93 KG/M2 | WEIGHT: 251 LBS | SYSTOLIC BLOOD PRESSURE: 152 MMHG | HEIGHT: 70 IN | DIASTOLIC BLOOD PRESSURE: 90 MMHG

## 2019-06-11 DIAGNOSIS — I42.8 NON-ISCHEMIC CARDIOMYOPATHY (HCC): Primary | ICD-10-CM

## 2019-06-11 DIAGNOSIS — I48.0 PAROXYSMAL ATRIAL FIBRILLATION (HCC): ICD-10-CM

## 2019-06-11 DIAGNOSIS — I10 ESSENTIAL HYPERTENSION: ICD-10-CM

## 2019-06-11 LAB
INTERNATIONAL NORMALIZATION RATIO, POC: 3.1
PROTHROMBIN TIME, POC: ABNORMAL

## 2019-06-11 PROCEDURE — G8417 CALC BMI ABV UP PARAM F/U: HCPCS | Performed by: CLINICAL NURSE SPECIALIST

## 2019-06-11 PROCEDURE — 1036F TOBACCO NON-USER: CPT | Performed by: CLINICAL NURSE SPECIALIST

## 2019-06-11 PROCEDURE — G8427 DOCREV CUR MEDS BY ELIG CLIN: HCPCS | Performed by: CLINICAL NURSE SPECIALIST

## 2019-06-11 PROCEDURE — 85610 PROTHROMBIN TIME: CPT | Performed by: CLINICAL NURSE SPECIALIST

## 2019-06-11 PROCEDURE — 3017F COLORECTAL CA SCREEN DOC REV: CPT | Performed by: CLINICAL NURSE SPECIALIST

## 2019-06-11 PROCEDURE — 99214 OFFICE O/P EST MOD 30 MIN: CPT | Performed by: CLINICAL NURSE SPECIALIST

## 2019-06-11 NOTE — PATIENT INSTRUCTIONS
Take 5 mg tonight, then resume normal dosing and recheck in 1 month  Monitor BP at home- goal is < 130/80  Follow up in 4 weeks for INR and BP   Call with any questions or concerns  Follow up with Maggie Cook, DO for non cardiac problems  Report any new problems  Cardiovascular Fitness-Exercise as tolerated. Strive for 30 minutes of exercise most days of the week. Cardiac / Healthy Diet  Continue current medications as directed  Continue plan of treatment  It is always recommended that you bring your medications bottles with you to each visit - this is for your safety!

## 2019-06-26 DIAGNOSIS — I42.8 NON-ISCHEMIC CARDIOMYOPATHY (HCC): ICD-10-CM

## 2019-06-26 DIAGNOSIS — I50.21 ACUTE SYSTOLIC CONGESTIVE HEART FAILURE (HCC): ICD-10-CM

## 2019-06-26 RX ORDER — SACUBITRIL AND VALSARTAN 97; 103 MG/1; MG/1
TABLET, FILM COATED ORAL
Qty: 180 TABLET | Refills: 1 | Status: SHIPPED | OUTPATIENT
Start: 2019-06-26 | End: 2020-02-27

## 2019-07-16 ENCOUNTER — TELEPHONE (OUTPATIENT)
Dept: CARDIOLOGY | Age: 64
End: 2019-07-16

## 2019-07-17 ENCOUNTER — OFFICE VISIT (OUTPATIENT)
Dept: CARDIOLOGY | Age: 64
End: 2019-07-17
Payer: MEDICAID

## 2019-07-17 VITALS
DIASTOLIC BLOOD PRESSURE: 88 MMHG | WEIGHT: 249 LBS | BODY MASS INDEX: 35.65 KG/M2 | HEIGHT: 70 IN | SYSTOLIC BLOOD PRESSURE: 132 MMHG | HEART RATE: 60 BPM

## 2019-07-17 DIAGNOSIS — I42.8 NON-ISCHEMIC CARDIOMYOPATHY (HCC): ICD-10-CM

## 2019-07-17 DIAGNOSIS — I48.0 PAROXYSMAL ATRIAL FIBRILLATION (HCC): Primary | ICD-10-CM

## 2019-07-17 DIAGNOSIS — I10 ESSENTIAL HYPERTENSION: ICD-10-CM

## 2019-07-17 LAB
INTERNATIONAL NORMALIZATION RATIO, POC: 1.8
PROTHROMBIN TIME, POC: ABNORMAL

## 2019-07-17 PROCEDURE — G8427 DOCREV CUR MEDS BY ELIG CLIN: HCPCS | Performed by: CLINICAL NURSE SPECIALIST

## 2019-07-17 PROCEDURE — 3017F COLORECTAL CA SCREEN DOC REV: CPT | Performed by: CLINICAL NURSE SPECIALIST

## 2019-07-17 PROCEDURE — 99213 OFFICE O/P EST LOW 20 MIN: CPT | Performed by: CLINICAL NURSE SPECIALIST

## 2019-07-17 PROCEDURE — 85610 PROTHROMBIN TIME: CPT | Performed by: CLINICAL NURSE SPECIALIST

## 2019-07-17 PROCEDURE — G8417 CALC BMI ABV UP PARAM F/U: HCPCS | Performed by: CLINICAL NURSE SPECIALIST

## 2019-07-17 PROCEDURE — 1036F TOBACCO NON-USER: CPT | Performed by: CLINICAL NURSE SPECIALIST

## 2019-07-17 RX ORDER — CARVEDILOL 12.5 MG/1
TABLET ORAL
Qty: 60 TABLET | Refills: 11 | Status: SHIPPED | OUTPATIENT
Start: 2019-07-17 | End: 2020-08-17

## 2019-07-17 NOTE — PATIENT INSTRUCTIONS
Take 15 mg today only then resume regular dosing. Recheck in 1 month  Follow up in 6 mos With Dr. Calzada Catching   Call with any questions or concerns  Follow up with Cruz Kumar, DO for non cardiac problems  Report any new problems  Cardiovascular Fitness-Exercise as tolerated. Strive for 30 minutes of exercise most days of the week. Cardiac / Healthy Diet  Continue current medications as directed  Continue plan of treatment  It is always recommended that you bring your medications bottles with you to each visit - this is for your safety!

## 2019-07-17 NOTE — PROGRESS NOTES
[address] Lesa Patria [address] [place] [address]  Phone: [phone]  Fax: [phone]    OFFICE VISIT:  2019    Alhajis Enedina - : 1955    Reason For Visit:  Hemalatha Newton is a 61 y.o. male who is here for 1 Month Follow-Up (FU for BP, PT/INR check.); Hypertension; and Atrial Fibrillation  History of ischemic cardiomyopathy with improvement of LV dysfunction. Routine office visit in  patient was hypertensive. States taking medications as appropriate. Anticoagulated on Coumadin and dose was adjusted elevated INR. He returns today for INR and blood pressure recheck    States overall he is doing well. He has not checked his blood pressure at home. He is taking his medications as directed    Subjective  Rambo denies exertional chest pain, shortness of breath, orthopnea, paroxysmal nocturnal dyspnea, syncope, presyncope, arrhythmia, edema and fatigue. The patient denies numbness or weakness to suggest cerebrovascular accident or transient ischemic attack. Mary Gusman DO is PCP.   Marcolb Mcconnell [facility] the [facility]:    Patient Active Problem List    Diagnosis Date Noted    Acute renal failure ([address] 10/02/2016     Priority: High    CKD (chronic kidney disease), stage III ([address] 10/02/2016     Priority: Medium    Shortness of breath 2016     Priority: Medium    Essential hypertension 2016     Priority: Medium    Hypothyroidism 2016     Priority: Medium    Paroxysmal atrial fibrillation ([address] 2018    Hypokalemia 10/04/2016    Non-ischemic cardiomyopathy ([address] 10/03/2016    Ex-cigarette smoker 10/03/2016    Obesity (BMI 35.0-39.9 without comorbidity) 2016     Past Medical History:   Diagnosis Date    Ex-cigarette smoker 10/3/2016    Hypertension     Non-ischemic cardiomyopathy ([address] 10/3/2016    2016  Echo  EF 25% 10/3/16  Cath  Mild CAD, EF 10%     Paroxysmal atrial fibrillation (HCC) 10/18/18 138/88    Pulse Readings from Last 3 Encounters:   07/17/19 60   10/18/18 55   04/24/18 63        Wt Readings from Last 3 Encounters:   07/17/19 249 lb (112.9 kg)   06/11/19 251 lb (113.9 kg)   10/18/18 264 lb (119.7 kg)     Lab Results   Component Value Date    INR 1.8 07/17/2019    INR 3.1 06/11/2019    INR 1.6 12/28/2018    PROTIME 2.0 03/16/2018    PROTIME 14.5 01/08/2018    PROTIME 20.0 (H) 12/29/2017     Improved blood pressure. Currently on Entresto, beta-blocker Aldactone and Lasix. Anticoagulated on Coumadin  INR subtherapeutic today-we will have him take extra today and resume regular dosing. Historically has been doing okay on 70mg weekly   States taking medications as prescribed  Stable cardiovascular status. No evidence of overt heart failure, angina or dysrhythmia. Plan  Take 15 mg today only then resume regular dosing. Recheck in 1 month  Follow up in 6 mos With Dr. Uma Winn   Call with any questions or concerns  Follow up with Oriana Dunlap DO for non cardiac problems  Report any new problems  Cardiovascular Fitness-Exercise as tolerated. Strive for 30 minutes of exercise most days of the week. Cardiac / Healthy Diet  Continue current medications as directed  Continue plan of treatment  It is always recommended that you bring your medications bottles with you to each visit - this is for your safety! ALLISON Joseph    EMR dragon/transcription disclaimer: Much of this encounter note is electronic transcription/translation of spoken language to printed tach. Electronic translation of spoken language may be erroneous, or at times, nonsensical words or phrases may be inadvertently transcribed.  Although, I have reviewed the note for such errors, some may still exist.

## 2019-08-14 ENCOUNTER — TELEPHONE (OUTPATIENT)
Dept: CARDIOLOGY | Age: 64
End: 2019-08-14

## 2019-08-15 ENCOUNTER — ANTI-COAG VISIT (OUTPATIENT)
Dept: CARDIOLOGY | Age: 64
End: 2019-08-15

## 2019-08-15 ENCOUNTER — TELEPHONE (OUTPATIENT)
Dept: CARDIOLOGY | Age: 64
End: 2019-08-15

## 2019-08-15 DIAGNOSIS — I48.0 PAROXYSMAL ATRIAL FIBRILLATION (HCC): Primary | ICD-10-CM

## 2019-08-15 LAB
INTERNATIONAL NORMALIZATION RATIO, POC: 3.1
PROTHROMBIN TIME, POC: NORMAL

## 2019-08-15 PROCEDURE — 85610 PROTHROMBIN TIME: CPT | Performed by: CLINICAL NURSE SPECIALIST

## 2020-02-27 RX ORDER — SACUBITRIL AND VALSARTAN 97; 103 MG/1; MG/1
TABLET, FILM COATED ORAL
Qty: 180 TABLET | Refills: 0 | Status: SHIPPED | OUTPATIENT
Start: 2020-02-27 | End: 2020-07-02

## 2020-03-10 ENCOUNTER — OFFICE VISIT (OUTPATIENT)
Dept: CARDIOLOGY | Age: 65
End: 2020-03-10
Payer: MEDICARE

## 2020-03-10 VITALS
DIASTOLIC BLOOD PRESSURE: 78 MMHG | HEIGHT: 70 IN | BODY MASS INDEX: 36.36 KG/M2 | OXYGEN SATURATION: 98 % | HEART RATE: 62 BPM | SYSTOLIC BLOOD PRESSURE: 132 MMHG | WEIGHT: 254 LBS

## 2020-03-10 LAB
INTERNATIONAL NORMALIZATION RATIO, POC: 1.4
PROTHROMBIN TIME, POC: NORMAL

## 2020-03-10 PROCEDURE — 99213 OFFICE O/P EST LOW 20 MIN: CPT | Performed by: CLINICAL NURSE SPECIALIST

## 2020-03-10 PROCEDURE — 1036F TOBACCO NON-USER: CPT | Performed by: CLINICAL NURSE SPECIALIST

## 2020-03-10 PROCEDURE — G8417 CALC BMI ABV UP PARAM F/U: HCPCS | Performed by: CLINICAL NURSE SPECIALIST

## 2020-03-10 PROCEDURE — 3017F COLORECTAL CA SCREEN DOC REV: CPT | Performed by: CLINICAL NURSE SPECIALIST

## 2020-03-10 PROCEDURE — 93000 ELECTROCARDIOGRAM COMPLETE: CPT | Performed by: CLINICAL NURSE SPECIALIST

## 2020-03-10 PROCEDURE — G8427 DOCREV CUR MEDS BY ELIG CLIN: HCPCS | Performed by: CLINICAL NURSE SPECIALIST

## 2020-03-10 PROCEDURE — 85610 PROTHROMBIN TIME: CPT | Performed by: CLINICAL NURSE SPECIALIST

## 2020-03-10 PROCEDURE — G8484 FLU IMMUNIZE NO ADMIN: HCPCS | Performed by: CLINICAL NURSE SPECIALIST

## 2020-03-10 ASSESSMENT — ENCOUNTER SYMPTOMS
COUGH: 0
WHEEZING: 0
EYE REDNESS: 0
FACIAL SWELLING: 0
NAUSEA: 0
CHEST TIGHTNESS: 0
SHORTNESS OF BREATH: 0
VOMITING: 0
ABDOMINAL PAIN: 0

## 2020-03-10 NOTE — PROGRESS NOTES
[facility], [address], [address] [facility]  Phone: [phone]  Fax: [phone]    OFFICE VISIT:  3/10/2020    Jemima Fraser - : 1955    Reason For Visit:  Emily Goldsmith is a 59 y.o. male who is here for 6 Month Follow-Up (Nocardiac promblems at this time)       Diagnosis Orders   1. Paroxysmal atrial fibrillation (HCC)     2. Essential hypertension     3. Non-ischemic cardiomyopathy ([address]       HPI  Patient is here for follow-up with history of paroxysmal atrial fibrillation, hypertension, nonischemic cardiomyopathy. He denies any chest pain, unusual dyspnea, orthopnea, PND, edema, palpitations, sudden weight gain. He has not been to our office since August to have his Coumadin checked     Josey Ellison DO is PCP.   Jemima Fraser has the [facility]:    Patient Active Problem List    Diagnosis Date Noted    Acute renal failure ([address] 10/02/2016     Priority: High    CKD (chronic kidney disease), stage III ([address] 10/02/2016     Priority: Medium    Shortness of breath 2016     Priority: Medium    Essential hypertension 2016     Priority: Medium    Hypothyroidism 2016     Priority: Medium    Paroxysmal atrial fibrillation ([address] 2018    Hypokalemia 10/04/2016    Non-ischemic cardiomyopathy ([address] 10/03/2016    Ex-cigarette smoker 10/03/2016    Obesity (BMI 35.0-39.9 without comorbidity) 2016     Past Medical History:   Diagnosis Date    Ex-cigarette smoker 10/3/2016    Hypertension     Non-ischemic cardiomyopathy ([address] 10/3/2016    2016  Echo  EF 25% 10/3/16  Cath  Mild CAD, EF 10%     Paroxysmal atrial fibrillation ([address] 3/16/2018     Past Surgical History:   Procedure Laterality Date    FINGER AMPUTATION Left     partial left thumb amputation following trauma    TONSILLECTOMY       Family History   Problem Relation Age of Onset    High Blood Pressure Mother     Cancer weakness and light-headedness. Hematological: Does not bruise/bleed easily. Psychiatric/Behavioral: Negative for agitation. The patient is not nervous/anxious. Objective  Vital Signs - /78   Pulse 62   Ht 5' 10\" (1.778 m)   Wt 254 lb (115.2 kg)   SpO2 98%   BMI 36.45 kg/m²    Wt Readings from Last 3 Encounters:   03/10/20 254 lb (115.2 kg)   07/17/19 249 lb (112.9 kg)   06/11/19 251 lb (113.9 kg)      Physical Exam  Vitals signs and nursing note reviewed. Constitutional:       General: He is not in acute distress. Appearance: Normal appearance. He is well-developed. He is not diaphoretic. HENT:      Head: Normocephalic and atraumatic. Right Ear: Hearing and external ear normal.      Left Ear: Hearing and external ear normal.      Nose: Nose normal.   Eyes:      General:         Right eye: No discharge. Left eye: No discharge. Pupils: Pupils are equal, round, and reactive to light. Neck:      Musculoskeletal: Neck supple. No muscular tenderness. Thyroid: No thyromegaly. Vascular: No carotid bruit or JVD. Trachea: No tracheal deviation. Cardiovascular:      Rate and Rhythm: Normal rate. Rhythm irregular. Heart sounds: Normal heart sounds. No murmur. No friction rub. No gallop. Comments: No carotid bruit  Pulmonary:      Effort: Pulmonary effort is normal. No respiratory distress. Breath sounds: Normal breath sounds. No wheezing or rales. Abdominal:      Palpations: Abdomen is soft. Tenderness: There is no abdominal tenderness. Musculoskeletal:         General: No swelling or deformity. Comments: Normal gait and station   Skin:     General: Skin is warm and dry. Findings: No rash. Neurological:      General: No focal deficit present. Mental Status: He is alert and oriented to person, place, and time. Cranial Nerves: No cranial nerve deficit.    Psychiatric:         Mood and Affect: Mood normal. Behavior: Behavior normal.         Judgment: Judgment normal.       Data:  Echo 2017  Summary   Normal left ventricular size with preserved LV function and an estimated   ejection fraction of approximately 55-60%. No evidence of left ventricular mass or thrombus noted. E/A flow reversal noted. Suggestive of diastolic dysfunction. Mild concentric left ventricular hypertrophy. EKG shows normal sinus rhythm rate 64 with frequent PACs  Assessment:     Diagnosis Orders   1. Paroxysmal atrial fibrillation (HCC)     2. Essential hypertension     3. Non-ischemic cardiomyopathy (HCC)       Paroxysmal atrial fibrillation- maintaining sinus rhythm today per EKG. He has adequate rate control with carvedilol and is anticoagulated with warfarin. INR today 1.4. He is not compliant with his INR testing and it has been about 6 months since his last INR check. We discussed the potential for bleeding issues and also inadequate protection from stroke when Coumadin is not therapeutic. We discussed options to Coumadin such as Xarelto and Eliquis that do not involve blood testing. Patient may be a better fit for one of these medications. I have asked him to contact his insurance company to check on coverage and to call the office if he would like to make a change. See Coumadin instructions below    Hypertension-well-controlled on current regimen    Nonischemic cardiomyopathy-patient appears euvolemic on guideline directed medical therapy. He does not weigh on a regular basis. Encouraged him to do so and to report weight gain of 3 pounds or more in 24 hours or 5 pounds in a week. Low sodium diet and fluid restrictions of 48 ounces daily encouraged    Stable cardiovascular status. No evidence of overt heart failure,angina or dysrhythmia.      Plan    Orders Placed This Encounter   Procedures    POCT INR    EKG 12 lead     Order Specific Question:   Reason for Exam?     Answer:   Irregular heart rate     Return in about

## 2020-03-19 ENCOUNTER — ANTI-COAG VISIT (OUTPATIENT)
Dept: CARDIOLOGY | Age: 65
End: 2020-03-19
Payer: MEDICARE

## 2020-03-19 LAB
INTERNATIONAL NORMALIZATION RATIO, POC: 4.8
PROTHROMBIN TIME, POC: NORMAL

## 2020-03-19 PROCEDURE — 85610 PROTHROMBIN TIME: CPT | Performed by: NURSE PRACTITIONER

## 2020-04-02 ENCOUNTER — ANTI-COAG VISIT (OUTPATIENT)
Dept: CARDIOLOGY | Age: 65
End: 2020-04-02
Payer: MEDICARE

## 2020-04-02 LAB
INTERNATIONAL NORMALIZATION RATIO, POC: 4.2
PROTHROMBIN TIME, POC: NORMAL

## 2020-04-02 PROCEDURE — 85610 PROTHROMBIN TIME: CPT | Performed by: NURSE PRACTITIONER

## 2020-04-10 RX ORDER — FUROSEMIDE 40 MG/1
TABLET ORAL
Qty: 30 TABLET | Refills: 0 | Status: SHIPPED | OUTPATIENT
Start: 2020-04-10 | End: 2022-03-09 | Stop reason: SDUPTHER

## 2020-05-01 ENCOUNTER — ANTI-COAG VISIT (OUTPATIENT)
Dept: CARDIOLOGY | Age: 65
End: 2020-05-01
Payer: MEDICARE

## 2020-05-01 LAB
INTERNATIONAL NORMALIZATION RATIO, POC: 1.6
PROTHROMBIN TIME, POC: NORMAL

## 2020-05-01 PROCEDURE — 85610 PROTHROMBIN TIME: CPT | Performed by: CLINICAL NURSE SPECIALIST

## 2020-05-15 ENCOUNTER — ANTI-COAG VISIT (OUTPATIENT)
Dept: CARDIOLOGY | Age: 65
End: 2020-05-15
Payer: MEDICARE

## 2020-05-15 LAB
INTERNATIONAL NORMALIZATION RATIO, POC: 2.3
PROTHROMBIN TIME, POC: NORMAL

## 2020-05-15 PROCEDURE — 85610 PROTHROMBIN TIME: CPT | Performed by: CLINICAL NURSE SPECIALIST

## 2020-06-26 ENCOUNTER — ANTI-COAG VISIT (OUTPATIENT)
Dept: CARDIOLOGY | Age: 65
End: 2020-06-26
Payer: MEDICARE

## 2020-06-26 LAB
INTERNATIONAL NORMALIZATION RATIO, POC: 2.9
PROTHROMBIN TIME, POC: NORMAL

## 2020-06-26 PROCEDURE — 85610 PROTHROMBIN TIME: CPT | Performed by: NURSE PRACTITIONER

## 2020-07-02 RX ORDER — WARFARIN SODIUM 10 MG/1
TABLET ORAL
Qty: 30 TABLET | Refills: 5 | Status: SHIPPED | OUTPATIENT
Start: 2020-07-02 | End: 2022-03-09 | Stop reason: ALTCHOICE

## 2020-07-02 RX ORDER — SACUBITRIL AND VALSARTAN 97; 103 MG/1; MG/1
TABLET, FILM COATED ORAL
Qty: 180 TABLET | Refills: 3 | Status: SHIPPED | OUTPATIENT
Start: 2020-07-02 | End: 2021-07-06

## 2020-08-07 ENCOUNTER — ANTI-COAG VISIT (OUTPATIENT)
Dept: CARDIOLOGY | Age: 65
End: 2020-08-07
Payer: MEDICARE

## 2020-08-07 LAB
INTERNATIONAL NORMALIZATION RATIO, POC: 2.2
PROTHROMBIN TIME, POC: NORMAL

## 2020-08-07 PROCEDURE — 85610 PROTHROMBIN TIME: CPT | Performed by: CLINICAL NURSE SPECIALIST

## 2020-08-17 RX ORDER — CARVEDILOL 12.5 MG/1
TABLET ORAL
Qty: 60 TABLET | Refills: 5 | Status: SHIPPED | OUTPATIENT
Start: 2020-08-17 | End: 2021-02-26

## 2020-09-10 ENCOUNTER — OFFICE VISIT (OUTPATIENT)
Dept: CARDIOLOGY | Age: 65
End: 2020-09-10
Payer: MEDICARE

## 2020-09-10 VITALS
OXYGEN SATURATION: 96 % | WEIGHT: 250 LBS | HEIGHT: 70 IN | HEART RATE: 58 BPM | DIASTOLIC BLOOD PRESSURE: 90 MMHG | BODY MASS INDEX: 35.79 KG/M2 | SYSTOLIC BLOOD PRESSURE: 136 MMHG

## 2020-09-10 LAB
INTERNATIONAL NORMALIZATION RATIO, POC: 2.6
PROTHROMBIN TIME, POC: NORMAL

## 2020-09-10 PROCEDURE — 1036F TOBACCO NON-USER: CPT | Performed by: CLINICAL NURSE SPECIALIST

## 2020-09-10 PROCEDURE — 99213 OFFICE O/P EST LOW 20 MIN: CPT | Performed by: CLINICAL NURSE SPECIALIST

## 2020-09-10 PROCEDURE — 3017F COLORECTAL CA SCREEN DOC REV: CPT | Performed by: CLINICAL NURSE SPECIALIST

## 2020-09-10 PROCEDURE — 85610 PROTHROMBIN TIME: CPT | Performed by: CLINICAL NURSE SPECIALIST

## 2020-09-10 PROCEDURE — G8427 DOCREV CUR MEDS BY ELIG CLIN: HCPCS | Performed by: CLINICAL NURSE SPECIALIST

## 2020-09-10 PROCEDURE — G8417 CALC BMI ABV UP PARAM F/U: HCPCS | Performed by: CLINICAL NURSE SPECIALIST

## 2020-09-10 ASSESSMENT — ENCOUNTER SYMPTOMS
NAUSEA: 0
EYE REDNESS: 0
ABDOMINAL PAIN: 0
WHEEZING: 0
FACIAL SWELLING: 0
COUGH: 0
SHORTNESS OF BREATH: 0
CHEST TIGHTNESS: 0
VOMITING: 0

## 2020-09-10 NOTE — PATIENT INSTRUCTIONS
Return in about 6 months (around 3/10/2021) for Dr Misael Rosa. Call for palpitations, fast heart rates, unusual shortness of breath, or chest pain. Continue same Coumadin dosage and recheck in 6 weeks. - Weigh daily and report weight gain of 3lbs or more in 24hrs or 5lbs in one week. - Call for increasing shortness of breath or increasing swelling in feet and legs.     (This could mean you are retaining too much fluid)  - 2000mg low sodium diet  - Fluid restriction of 1500ml per day (about 6 cups of fluid per day)

## 2020-09-10 NOTE — PROGRESS NOTES
[facility], [address] [place] [address], [address] [id number]  Phone: [phone]  Fax: [phone]    OFFICE VISIT:  9/10/2020    Vahid Irizarry - : 1955    Reason For Visit:  Akiko Pereira is a 59 y.o. male who is here for 6 Month Follow-Up (pt is not having any cardiac issues today)       Diagnosis Orders   1. Paroxysmal atrial fibrillation (HCC)  POCT INR   2. Non-ischemic cardiomyopathy ([address]     3. Essential hypertension          HPI  Patient is here for follow-up with history of paroxysmal atrial fibrillation, hypertension, nonischemic cardiomyopathy. He denies any chest pain, unusual dyspnea, orthopnea, PND, edema, palpitations, sudden weight gain. At his last visit he was not coming in regularly for his INR testing, he is now doing so     Salvador Delgadillo DO is PCP.   Vahid Irizarry has the following history as [facility] in [facility]:    Patient Active Problem List    Diagnosis Date Noted    Acute renal failure ([address] 10/02/2016     Priority: High    CKD (chronic kidney disease), stage III ([address] 10/02/2016     Priority: Medium    Shortness of breath 2016     Priority: Medium    Essential hypertension 2016     Priority: Medium    Hypothyroidism 2016     Priority: Medium    Paroxysmal atrial fibrillation ([address] 2018    Hypokalemia 10/04/2016    Non-ischemic cardiomyopathy ([address] 10/03/2016    Ex-cigarette smoker 10/03/2016    Obesity (BMI 35.0-39.9 without comorbidity) 2016     Past Medical History:   Diagnosis Date    Ex-cigarette smoker 10/3/2016    Hypertension     Non-ischemic cardiomyopathy ([address] 10/3/2016    2016  Echo  EF 25% 10/3/16  Cath  Mild CAD, EF 10%     Paroxysmal atrial fibrillation ([address] 3/16/2018     Past Surgical History:   Procedure Laterality Date    FINGER AMPUTATION Left     partial left thumb amputation following trauma    TONSILLECTOMY       Family History   Problem Relation Age of Onset    High seizures, syncope, weakness and light-headedness. Hematological: Does not bruise/bleed easily. Psychiatric/Behavioral: Negative for agitation. The patient is not nervous/anxious. Objective  Vital Signs - BP (!) 136/90   Pulse 58   Ht 5' 10\" (1.778 m)   Wt 250 lb (113.4 kg)   SpO2 96%   BMI 35.87 kg/m²    Wt Readings from Last 3 Encounters:   09/10/20 250 lb (113.4 kg)   03/10/20 254 lb (115.2 kg)   07/17/19 249 lb (112.9 kg)      Physical Exam  Vitals signs and nursing note reviewed. Constitutional:       General: He is not in acute distress. Appearance: Normal appearance. He is well-developed. He is not diaphoretic. HENT:      Head: Normocephalic and atraumatic. Right Ear: Hearing and external ear normal.      Left Ear: Hearing and external ear normal.      Nose: Nose normal.   Eyes:      General:         Right eye: No discharge. Left eye: No discharge. Pupils: Pupils are equal, round, and reactive to light. Neck:      Musculoskeletal: Neck supple. No muscular tenderness. Thyroid: No thyromegaly. Vascular: No carotid bruit or JVD. Trachea: No tracheal deviation. Cardiovascular:      Rate and Rhythm: Normal rate. Rhythm irregular. Heart sounds: Normal heart sounds. No murmur. No friction rub. No gallop. Comments: No carotid bruit  Pulmonary:      Effort: Pulmonary effort is normal. No respiratory distress. Breath sounds: Normal breath sounds. No wheezing or rales. Abdominal:      Palpations: Abdomen is soft. Tenderness: There is no abdominal tenderness. Musculoskeletal:         General: Swelling present. No deformity. Right lower leg: Edema (trace) present. Left lower leg: Edema (trace) present. Comments: Normal gait and station   Skin:     General: Skin is warm and dry. Findings: No rash. Neurological:      General: No focal deficit present.       Mental Status: He is alert and oriented to person, place, and 1500ml per day (about 6 cups of fluid per day)    Call with any questionsor concerns  Follow up with Jose L Myrick, DO for non cardiac problems  Report any new problems  Cardiovascular Fitness-Exercise as tolerated. Strive for 15 minutes of exercise most days of the week. Cardiac / HealthyDiet  Continue current medications as directed  Continue plan of treatment  It is always recommended that you bring your medicationsbottles with you to each visit - this is for your safety!        Ryan Wood, APRN

## 2021-02-26 RX ORDER — CARVEDILOL 12.5 MG/1
TABLET ORAL
Qty: 180 TABLET | Refills: 0 | Status: SHIPPED | OUTPATIENT
Start: 2021-02-26 | End: 2022-03-09 | Stop reason: SDUPTHER

## 2021-03-10 ENCOUNTER — TELEPHONE (OUTPATIENT)
Dept: CARDIOLOGY CLINIC | Age: 66
End: 2021-03-10

## 2021-07-05 DIAGNOSIS — I48.19 PERSISTENT ATRIAL FIBRILLATION (HCC): ICD-10-CM

## 2021-07-05 DIAGNOSIS — I42.8 NON-ISCHEMIC CARDIOMYOPATHY (HCC): ICD-10-CM

## 2021-07-05 DIAGNOSIS — I50.21 ACUTE SYSTOLIC CONGESTIVE HEART FAILURE (HCC): ICD-10-CM

## 2021-07-06 RX ORDER — SACUBITRIL AND VALSARTAN 97; 103 MG/1; MG/1
TABLET, FILM COATED ORAL
Qty: 60 TABLET | Refills: 2 | Status: SHIPPED | OUTPATIENT
Start: 2021-07-06 | End: 2022-03-09 | Stop reason: SDUPTHER

## 2021-07-06 RX ORDER — WARFARIN SODIUM 10 MG/1
TABLET ORAL
Qty: 30 TABLET | Refills: 0 | OUTPATIENT
Start: 2021-07-06

## 2022-01-01 ENCOUNTER — APPOINTMENT (OUTPATIENT)
Dept: GENERAL RADIOLOGY | Age: 67
DRG: 326 | End: 2022-01-01
Payer: MEDICARE

## 2022-01-01 ENCOUNTER — APPOINTMENT (OUTPATIENT)
Dept: ULTRASOUND IMAGING | Age: 67
DRG: 326 | End: 2022-01-01
Payer: MEDICARE

## 2022-01-01 ENCOUNTER — APPOINTMENT (OUTPATIENT)
Dept: CT IMAGING | Age: 67
DRG: 326 | End: 2022-01-01
Payer: MEDICARE

## 2022-01-01 ENCOUNTER — HOSPITAL ENCOUNTER (INPATIENT)
Age: 67
LOS: 14 days | Discharge: HOSPICE | DRG: 326 | End: 2022-11-03
Attending: EMERGENCY MEDICINE | Admitting: INTERNAL MEDICINE
Payer: MEDICARE

## 2022-01-01 ENCOUNTER — ANESTHESIA EVENT (OUTPATIENT)
Dept: OPERATING ROOM | Age: 67
DRG: 326 | End: 2022-01-01
Payer: MEDICARE

## 2022-01-01 ENCOUNTER — ANESTHESIA (OUTPATIENT)
Dept: OPERATING ROOM | Age: 67
DRG: 326 | End: 2022-01-01
Payer: MEDICARE

## 2022-01-01 VITALS
DIASTOLIC BLOOD PRESSURE: 75 MMHG | HEIGHT: 70 IN | TEMPERATURE: 98.5 F | BODY MASS INDEX: 27.72 KG/M2 | HEART RATE: 108 BPM | WEIGHT: 193.6 LBS | RESPIRATION RATE: 14 BRPM | OXYGEN SATURATION: 95 % | SYSTOLIC BLOOD PRESSURE: 105 MMHG

## 2022-01-01 DIAGNOSIS — K63.89 COLONIC MASS: ICD-10-CM

## 2022-01-01 DIAGNOSIS — K91.89 ILEUS, POSTOPERATIVE (HCC): ICD-10-CM

## 2022-01-01 DIAGNOSIS — K63.89 COLONIC MASS: Primary | ICD-10-CM

## 2022-01-01 DIAGNOSIS — K56.609 LARGE BOWEL OBSTRUCTION (HCC): ICD-10-CM

## 2022-01-01 DIAGNOSIS — K56.7 ILEUS, POSTOPERATIVE (HCC): ICD-10-CM

## 2022-01-01 LAB
ALBUMIN SERPL-MCNC: 1.1 G/DL (ref 3.5–5.2)
ALBUMIN SERPL-MCNC: 1.7 G/DL (ref 3.5–5.2)
ALBUMIN SERPL-MCNC: 1.9 G/DL (ref 3.5–5.2)
ALBUMIN SERPL-MCNC: 1.9 G/DL (ref 3.5–5.2)
ALBUMIN SERPL-MCNC: 2.1 G/DL (ref 3.5–5.2)
ALBUMIN SERPL-MCNC: 2.1 G/DL (ref 3.5–5.2)
ALBUMIN SERPL-MCNC: 2.3 G/DL (ref 3.5–5.2)
ALBUMIN SERPL-MCNC: 2.3 G/DL (ref 3.5–5.2)
ALBUMIN SERPL-MCNC: 2.5 G/DL (ref 3.5–5.2)
ALBUMIN SERPL-MCNC: 2.5 G/DL (ref 3.5–5.2)
ALBUMIN SERPL-MCNC: 2.6 G/DL (ref 3.5–5.2)
ALBUMIN SERPL-MCNC: 2.9 G/DL (ref 3.5–5.2)
ALBUMIN SERPL-MCNC: 2.9 G/DL (ref 3.5–5.2)
ALBUMIN SERPL-MCNC: 3.6 G/DL (ref 3.5–5.2)
ALBUMIN SERPL-MCNC: 3.7 G/DL (ref 3.5–5.2)
ALBUMIN SERPL-MCNC: 4.2 G/DL (ref 3.5–5.2)
ALP BLD-CCNC: 101 U/L (ref 40–130)
ALP BLD-CCNC: 44 U/L (ref 40–130)
ALP BLD-CCNC: 44 U/L (ref 40–130)
ALP BLD-CCNC: 45 U/L (ref 40–130)
ALP BLD-CCNC: 49 U/L (ref 40–130)
ALP BLD-CCNC: 49 U/L (ref 40–130)
ALP BLD-CCNC: 66 U/L (ref 40–130)
ALP BLD-CCNC: 72 U/L (ref 40–130)
ALP BLD-CCNC: 73 U/L (ref 40–130)
ALP BLD-CCNC: 73 U/L (ref 40–130)
ALP BLD-CCNC: 74 U/L (ref 40–130)
ALP BLD-CCNC: 75 U/L (ref 40–130)
ALP BLD-CCNC: 82 U/L (ref 40–130)
ALP BLD-CCNC: 87 U/L (ref 40–130)
ALT SERPL-CCNC: 122 U/L (ref 5–41)
ALT SERPL-CCNC: 142 U/L (ref 5–41)
ALT SERPL-CCNC: 22 U/L (ref 5–41)
ALT SERPL-CCNC: 30 U/L (ref 5–41)
ALT SERPL-CCNC: 34 U/L (ref 5–41)
ALT SERPL-CCNC: 34 U/L (ref 5–41)
ALT SERPL-CCNC: 35 U/L (ref 5–41)
ALT SERPL-CCNC: 43 U/L (ref 5–41)
ALT SERPL-CCNC: 47 U/L (ref 5–41)
ALT SERPL-CCNC: 53 U/L (ref 5–41)
ALT SERPL-CCNC: 54 U/L (ref 5–41)
ALT SERPL-CCNC: 55 U/L (ref 5–41)
ALT SERPL-CCNC: 61 U/L (ref 5–41)
ALT SERPL-CCNC: 67 U/L (ref 5–41)
ALT SERPL-CCNC: 90 U/L (ref 5–41)
ALT SERPL-CCNC: 92 U/L (ref 5–41)
AMMONIA: 23 UMOL/L (ref 16–60)
AMYLASE: 81 U/L (ref 28–100)
ANION GAP SERPL CALCULATED.3IONS-SCNC: 10 MMOL/L (ref 7–19)
ANION GAP SERPL CALCULATED.3IONS-SCNC: 11 MMOL/L (ref 7–19)
ANION GAP SERPL CALCULATED.3IONS-SCNC: 12 MMOL/L (ref 7–19)
ANION GAP SERPL CALCULATED.3IONS-SCNC: 16 MMOL/L (ref 7–19)
ANION GAP SERPL CALCULATED.3IONS-SCNC: 6 MMOL/L (ref 7–19)
ANION GAP SERPL CALCULATED.3IONS-SCNC: 7 MMOL/L (ref 7–19)
ANION GAP SERPL CALCULATED.3IONS-SCNC: 8 MMOL/L (ref 7–19)
ANION GAP SERPL CALCULATED.3IONS-SCNC: 8 MMOL/L (ref 7–19)
ANION GAP SERPL CALCULATED.3IONS-SCNC: 9 MMOL/L (ref 7–19)
ANION GAP SERPL CALCULATED.3IONS-SCNC: 9 MMOL/L (ref 7–19)
ANISOCYTOSIS: ABNORMAL
APTT: 36 SEC (ref 26–36.2)
AST SERPL-CCNC: 106 U/L (ref 5–40)
AST SERPL-CCNC: 108 U/L (ref 5–40)
AST SERPL-CCNC: 206 U/L (ref 5–40)
AST SERPL-CCNC: 232 U/L (ref 5–40)
AST SERPL-CCNC: 371 U/L (ref 5–40)
AST SERPL-CCNC: 426 U/L (ref 5–40)
AST SERPL-CCNC: 47 U/L (ref 5–40)
AST SERPL-CCNC: 52 U/L (ref 5–40)
AST SERPL-CCNC: 533 U/L (ref 5–40)
AST SERPL-CCNC: 54 U/L (ref 5–40)
AST SERPL-CCNC: 55 U/L (ref 5–40)
AST SERPL-CCNC: 62 U/L (ref 5–40)
AST SERPL-CCNC: 63 U/L (ref 5–40)
AST SERPL-CCNC: 79 U/L (ref 5–40)
AST SERPL-CCNC: 79 U/L (ref 5–40)
AST SERPL-CCNC: 91 U/L (ref 5–40)
BACTERIA: NEGATIVE /HPF
BANDED NEUTROPHILS RELATIVE PERCENT: 27 % (ref 0–5)
BANDED NEUTROPHILS RELATIVE PERCENT: 6 % (ref 0–5)
BASE EXCESS ARTERIAL: -2.3 MMOL/L (ref -2–2)
BASE EXCESS ARTERIAL: -4 MMOL/L (ref -2–2)
BASE EXCESS ARTERIAL: -5.9 MMOL/L (ref -2–2)
BASOPHILS ABSOLUTE: 0 K/UL (ref 0–0.2)
BASOPHILS RELATIVE PERCENT: 0 % (ref 0–1)
BASOPHILS RELATIVE PERCENT: 0 % (ref 0–1)
BASOPHILS RELATIVE PERCENT: 0.1 % (ref 0–1)
BASOPHILS RELATIVE PERCENT: 0.2 % (ref 0–1)
BILIRUB SERPL-MCNC: 1.3 MG/DL (ref 0.2–1.2)
BILIRUB SERPL-MCNC: 1.5 MG/DL (ref 0.2–1.2)
BILIRUB SERPL-MCNC: 1.9 MG/DL (ref 0.2–1.2)
BILIRUB SERPL-MCNC: 10 MG/DL (ref 0.2–1.2)
BILIRUB SERPL-MCNC: 11.7 MG/DL (ref 0.2–1.2)
BILIRUB SERPL-MCNC: 2.9 MG/DL (ref 0.2–1.2)
BILIRUB SERPL-MCNC: 4 MG/DL (ref 0.2–1.2)
BILIRUB SERPL-MCNC: 4.5 MG/DL (ref 0.2–1.2)
BILIRUB SERPL-MCNC: 4.6 MG/DL (ref 0.2–1.2)
BILIRUB SERPL-MCNC: 4.7 MG/DL (ref 0.2–1.2)
BILIRUB SERPL-MCNC: 4.9 MG/DL (ref 0.2–1.2)
BILIRUB SERPL-MCNC: 5 MG/DL (ref 0.2–1.2)
BILIRUB SERPL-MCNC: 6.3 MG/DL (ref 0.2–1.2)
BILIRUB SERPL-MCNC: 6.5 MG/DL (ref 0.2–1.2)
BILIRUB SERPL-MCNC: 7.2 MG/DL (ref 0.2–1.2)
BILIRUB SERPL-MCNC: 8.8 MG/DL (ref 0.2–1.2)
BILIRUB SERPL-MCNC: 9.7 MG/DL (ref 0.2–1.2)
BILIRUBIN DIRECT: 2.8 MG/DL (ref 0–0.3)
BILIRUBIN DIRECT: 3.8 MG/DL (ref 0–0.3)
BILIRUBIN DIRECT: 7.7 MG/DL (ref 0–0.3)
BILIRUBIN URINE: ABNORMAL
BILIRUBIN, INDIRECT: 1.2 MG/DL (ref 0.1–1)
BILIRUBIN, INDIRECT: 1.2 MG/DL (ref 0.1–1)
BILIRUBIN, INDIRECT: 2.3 MG/DL (ref 0.1–1)
BLOOD CULTURE, ROUTINE: NORMAL
BLOOD, URINE: ABNORMAL
BUN BLDV-MCNC: 22 MG/DL (ref 8–23)
BUN BLDV-MCNC: 23 MG/DL (ref 8–23)
BUN BLDV-MCNC: 29 MG/DL (ref 8–23)
BUN BLDV-MCNC: 33 MG/DL (ref 8–23)
BUN BLDV-MCNC: 33 MG/DL (ref 8–23)
BUN BLDV-MCNC: 35 MG/DL (ref 8–23)
BUN BLDV-MCNC: 38 MG/DL (ref 8–23)
BUN BLDV-MCNC: 42 MG/DL (ref 8–23)
BUN BLDV-MCNC: 42 MG/DL (ref 8–23)
BUN BLDV-MCNC: 44 MG/DL (ref 8–23)
BUN BLDV-MCNC: 44 MG/DL (ref 8–23)
BUN BLDV-MCNC: 46 MG/DL (ref 8–23)
BUN BLDV-MCNC: 51 MG/DL (ref 8–23)
BUN BLDV-MCNC: 51 MG/DL (ref 8–23)
BUN BLDV-MCNC: 54 MG/DL (ref 8–23)
BUN BLDV-MCNC: 59 MG/DL (ref 8–23)
CALCIUM IONIZED: 0.91 MMOL/L (ref 1.12–1.32)
CALCIUM IONIZED: 1.03 MMOL/L (ref 1.12–1.32)
CALCIUM SERPL-MCNC: 7 MG/DL (ref 8.8–10.2)
CALCIUM SERPL-MCNC: 7 MG/DL (ref 8.8–10.2)
CALCIUM SERPL-MCNC: 7.1 MG/DL (ref 8.8–10.2)
CALCIUM SERPL-MCNC: 7.3 MG/DL (ref 8.8–10.2)
CALCIUM SERPL-MCNC: 7.4 MG/DL (ref 8.8–10.2)
CALCIUM SERPL-MCNC: 7.5 MG/DL (ref 8.8–10.2)
CALCIUM SERPL-MCNC: 7.5 MG/DL (ref 8.8–10.2)
CALCIUM SERPL-MCNC: 7.7 MG/DL (ref 8.8–10.2)
CALCIUM SERPL-MCNC: 7.8 MG/DL (ref 8.8–10.2)
CALCIUM SERPL-MCNC: 8.1 MG/DL (ref 8.8–10.2)
CALCIUM SERPL-MCNC: 8.2 MG/DL (ref 8.8–10.2)
CALCIUM SERPL-MCNC: 8.2 MG/DL (ref 8.8–10.2)
CALCIUM SERPL-MCNC: 8.3 MG/DL (ref 8.8–10.2)
CALCIUM SERPL-MCNC: 8.6 MG/DL (ref 8.8–10.2)
CALCIUM SERPL-MCNC: 9.3 MG/DL (ref 8.8–10.2)
CARBOXYHEMOGLOBIN ARTERIAL: 0 % (ref 0–5)
CARBOXYHEMOGLOBIN ARTERIAL: 1.3 % (ref 0–5)
CARBOXYHEMOGLOBIN ARTERIAL: 1.3 % (ref 0–5)
CEA: 11 NG/ML (ref 0–4.7)
CEA: 6.9 NG/ML (ref 0–4.7)
CHLORIDE BLD-SCNC: 100 MMOL/L (ref 98–111)
CHLORIDE BLD-SCNC: 101 MMOL/L (ref 98–111)
CHLORIDE BLD-SCNC: 103 MMOL/L (ref 98–111)
CHLORIDE BLD-SCNC: 105 MMOL/L (ref 98–111)
CHLORIDE BLD-SCNC: 107 MMOL/L (ref 98–111)
CHLORIDE BLD-SCNC: 109 MMOL/L (ref 98–111)
CHLORIDE BLD-SCNC: 110 MMOL/L (ref 98–111)
CHLORIDE BLD-SCNC: 110 MMOL/L (ref 98–111)
CHLORIDE BLD-SCNC: 111 MMOL/L (ref 98–111)
CHLORIDE BLD-SCNC: 112 MMOL/L (ref 98–111)
CHLORIDE BLD-SCNC: 113 MMOL/L (ref 98–111)
CHLORIDE BLD-SCNC: 116 MMOL/L (ref 98–111)
CHLORIDE BLD-SCNC: 117 MMOL/L (ref 98–111)
CHLORIDE BLD-SCNC: 99 MMOL/L (ref 98–111)
CLARITY: ABNORMAL
CO2: 19 MMOL/L (ref 22–29)
CO2: 20 MMOL/L (ref 22–29)
CO2: 20 MMOL/L (ref 22–29)
CO2: 21 MMOL/L (ref 22–29)
CO2: 22 MMOL/L (ref 22–29)
CO2: 22 MMOL/L (ref 22–29)
CO2: 23 MMOL/L (ref 22–29)
CO2: 24 MMOL/L (ref 22–29)
CO2: 25 MMOL/L (ref 22–29)
CO2: 27 MMOL/L (ref 22–29)
COLOR: ABNORMAL
CREAT SERPL-MCNC: 0.3 MG/DL (ref 0.5–1.2)
CREAT SERPL-MCNC: 0.5 MG/DL (ref 0.5–1.2)
CREAT SERPL-MCNC: 0.6 MG/DL (ref 0.5–1.2)
CREAT SERPL-MCNC: 0.6 MG/DL (ref 0.5–1.2)
CREAT SERPL-MCNC: 0.7 MG/DL (ref 0.5–1.2)
CREAT SERPL-MCNC: 0.8 MG/DL (ref 0.5–1.2)
CREAT SERPL-MCNC: 0.9 MG/DL (ref 0.5–1.2)
CREAT SERPL-MCNC: 1 MG/DL (ref 0.5–1.2)
CREAT SERPL-MCNC: 1 MG/DL (ref 0.5–1.2)
CREAT SERPL-MCNC: 1.1 MG/DL (ref 0.5–1.2)
CREAT SERPL-MCNC: 1.3 MG/DL (ref 0.5–1.2)
CREAT SERPL-MCNC: 1.7 MG/DL (ref 0.5–1.2)
CREAT SERPL-MCNC: 1.8 MG/DL (ref 0.5–1.2)
CREAT SERPL-MCNC: 1.9 MG/DL (ref 0.5–1.2)
CRYSTALS, UA: ABNORMAL /HPF
CULTURE, BLOOD 2: NORMAL
D DIMER: 19.27 UG/ML FEU (ref 0–0.48)
DIGOXIN LEVEL: 1.2 NG/ML (ref 0.6–1.2)
DIGOXIN LEVEL: 1.5 NG/ML (ref 0.6–1.2)
DIGOXIN LEVEL: 2.3 NG/ML (ref 0.6–1.2)
DIGOXIN LEVEL: <0.3 NG/ML (ref 0.6–1.2)
EKG P AXIS: 75 DEGREES
EKG P AXIS: NORMAL DEGREES
EKG P-R INTERVAL: 188 MS
EKG P-R INTERVAL: NORMAL MS
EKG Q-T INTERVAL: 302 MS
EKG Q-T INTERVAL: 372 MS
EKG QRS DURATION: 106 MS
EKG QRS DURATION: 94 MS
EKG QTC CALCULATION (BAZETT): 417 MS
EKG QTC CALCULATION (BAZETT): 440 MS
EKG T AXIS: 125 DEGREES
EKG T AXIS: 77 DEGREES
EOSINOPHILS ABSOLUTE: 0 K/UL (ref 0–0.6)
EOSINOPHILS RELATIVE PERCENT: 0 % (ref 0–5)
EOSINOPHILS RELATIVE PERCENT: 0.1 % (ref 0–5)
EOSINOPHILS RELATIVE PERCENT: 0.2 % (ref 0–5)
EOSINOPHILS RELATIVE PERCENT: 0.3 % (ref 0–5)
EPITHELIAL CELLS, UA: 2 /HPF (ref 0–5)
FERRITIN: 1166 NG/ML (ref 30–400)
FIO2: 30 %
FOLATE: 8.4 NG/ML (ref 4.5–32.2)
GFR SERPL CREATININE-BSD FRML MDRD: 38 ML/MIN/{1.73_M2}
GFR SERPL CREATININE-BSD FRML MDRD: 41 ML/MIN/{1.73_M2}
GFR SERPL CREATININE-BSD FRML MDRD: 44 ML/MIN/{1.73_M2}
GFR SERPL CREATININE-BSD FRML MDRD: >60 ML/MIN/{1.73_M2}
GLUCOSE BLD-MCNC: 102 MG/DL (ref 70–99)
GLUCOSE BLD-MCNC: 102 MG/DL (ref 70–99)
GLUCOSE BLD-MCNC: 103 MG/DL (ref 70–99)
GLUCOSE BLD-MCNC: 104 MG/DL (ref 70–99)
GLUCOSE BLD-MCNC: 105 MG/DL (ref 74–109)
GLUCOSE BLD-MCNC: 106 MG/DL (ref 70–99)
GLUCOSE BLD-MCNC: 106 MG/DL (ref 70–99)
GLUCOSE BLD-MCNC: 107 MG/DL (ref 70–99)
GLUCOSE BLD-MCNC: 108 MG/DL (ref 70–99)
GLUCOSE BLD-MCNC: 110 MG/DL (ref 70–99)
GLUCOSE BLD-MCNC: 110 MG/DL (ref 74–109)
GLUCOSE BLD-MCNC: 111 MG/DL (ref 70–99)
GLUCOSE BLD-MCNC: 111 MG/DL (ref 74–109)
GLUCOSE BLD-MCNC: 112 MG/DL (ref 70–99)
GLUCOSE BLD-MCNC: 112 MG/DL (ref 70–99)
GLUCOSE BLD-MCNC: 114 MG/DL (ref 70–99)
GLUCOSE BLD-MCNC: 114 MG/DL (ref 74–109)
GLUCOSE BLD-MCNC: 116 MG/DL (ref 74–109)
GLUCOSE BLD-MCNC: 117 MG/DL (ref 70–99)
GLUCOSE BLD-MCNC: 117 MG/DL (ref 70–99)
GLUCOSE BLD-MCNC: 119 MG/DL (ref 70–99)
GLUCOSE BLD-MCNC: 119 MG/DL (ref 70–99)
GLUCOSE BLD-MCNC: 119 MG/DL (ref 74–109)
GLUCOSE BLD-MCNC: 121 MG/DL (ref 70–99)
GLUCOSE BLD-MCNC: 122 MG/DL (ref 74–109)
GLUCOSE BLD-MCNC: 123 MG/DL (ref 70–99)
GLUCOSE BLD-MCNC: 124 MG/DL (ref 70–99)
GLUCOSE BLD-MCNC: 125 MG/DL (ref 70–99)
GLUCOSE BLD-MCNC: 126 MG/DL (ref 70–99)
GLUCOSE BLD-MCNC: 128 MG/DL (ref 70–99)
GLUCOSE BLD-MCNC: 128 MG/DL (ref 70–99)
GLUCOSE BLD-MCNC: 130 MG/DL (ref 70–99)
GLUCOSE BLD-MCNC: 131 MG/DL (ref 70–99)
GLUCOSE BLD-MCNC: 132 MG/DL (ref 70–99)
GLUCOSE BLD-MCNC: 132 MG/DL (ref 70–99)
GLUCOSE BLD-MCNC: 133 MG/DL (ref 70–99)
GLUCOSE BLD-MCNC: 133 MG/DL (ref 74–109)
GLUCOSE BLD-MCNC: 134 MG/DL (ref 70–99)
GLUCOSE BLD-MCNC: 135 MG/DL (ref 74–109)
GLUCOSE BLD-MCNC: 140 MG/DL (ref 70–99)
GLUCOSE BLD-MCNC: 140 MG/DL (ref 74–109)
GLUCOSE BLD-MCNC: 143 MG/DL (ref 74–109)
GLUCOSE BLD-MCNC: 144 MG/DL (ref 70–99)
GLUCOSE BLD-MCNC: 145 MG/DL (ref 70–99)
GLUCOSE BLD-MCNC: 145 MG/DL (ref 74–109)
GLUCOSE BLD-MCNC: 147 MG/DL (ref 74–109)
GLUCOSE BLD-MCNC: 149 MG/DL (ref 70–99)
GLUCOSE BLD-MCNC: 152 MG/DL (ref 70–99)
GLUCOSE BLD-MCNC: 152 MG/DL (ref 74–109)
GLUCOSE BLD-MCNC: 154 MG/DL (ref 70–99)
GLUCOSE BLD-MCNC: 159 MG/DL (ref 70–99)
GLUCOSE BLD-MCNC: 160 MG/DL (ref 70–99)
GLUCOSE BLD-MCNC: 167 MG/DL (ref 70–99)
GLUCOSE BLD-MCNC: 180 MG/DL (ref 74–109)
GLUCOSE BLD-MCNC: 181 MG/DL (ref 74–109)
GLUCOSE BLD-MCNC: 56 MG/DL (ref 70–99)
GLUCOSE BLD-MCNC: 62 MG/DL (ref 70–99)
GLUCOSE BLD-MCNC: 64 MG/DL (ref 70–99)
GLUCOSE BLD-MCNC: 70 MG/DL (ref 70–99)
GLUCOSE BLD-MCNC: 72 MG/DL (ref 70–99)
GLUCOSE BLD-MCNC: 80 MG/DL (ref 70–99)
GLUCOSE BLD-MCNC: 81 MG/DL (ref 70–99)
GLUCOSE BLD-MCNC: 81 MG/DL (ref 70–99)
GLUCOSE BLD-MCNC: 82 MG/DL (ref 70–99)
GLUCOSE BLD-MCNC: 85 MG/DL (ref 70–99)
GLUCOSE BLD-MCNC: 85 MG/DL (ref 74–109)
GLUCOSE BLD-MCNC: 85 MG/DL (ref 74–109)
GLUCOSE BLD-MCNC: 88 MG/DL (ref 70–99)
GLUCOSE BLD-MCNC: 92 MG/DL (ref 70–99)
GLUCOSE BLD-MCNC: 92 MG/DL (ref 70–99)
GLUCOSE BLD-MCNC: 93 MG/DL (ref 70–99)
GLUCOSE BLD-MCNC: 94 MG/DL (ref 70–99)
GLUCOSE BLD-MCNC: 96 MG/DL (ref 70–99)
GLUCOSE BLD-MCNC: 98 MG/DL (ref 70–99)
GLUCOSE BLD-MCNC: 98 MG/DL (ref 70–99)
GLUCOSE BLD-MCNC: 99 MG/DL (ref 70–99)
GLUCOSE BLD-MCNC: 99 MG/DL (ref 70–99)
GLUCOSE URINE: 100 MG/DL
HAV IGM SER IA-ACNC: ABNORMAL
HBA1C MFR BLD: 5.6 % (ref 4–6)
HCO3 ARTERIAL: 17.2 MMOL/L (ref 22–26)
HCO3 ARTERIAL: 18.5 MMOL/L (ref 22–26)
HCO3 ARTERIAL: 20.1 MMOL/L (ref 22–26)
HCT VFR BLD CALC: 29.4 % (ref 42–52)
HCT VFR BLD CALC: 32.7 % (ref 42–52)
HCT VFR BLD CALC: 33.5 % (ref 42–52)
HCT VFR BLD CALC: 37.3 % (ref 42–52)
HCT VFR BLD CALC: 37.4 % (ref 42–52)
HCT VFR BLD CALC: 37.6 % (ref 42–52)
HCT VFR BLD CALC: 37.7 % (ref 42–52)
HCT VFR BLD CALC: 37.8 % (ref 42–52)
HCT VFR BLD CALC: 37.9 % (ref 42–52)
HCT VFR BLD CALC: 37.9 % (ref 42–52)
HCT VFR BLD CALC: 39 % (ref 42–52)
HCT VFR BLD CALC: 40 % (ref 42–52)
HCT VFR BLD CALC: 40 % (ref 42–52)
HCT VFR BLD CALC: 40.5 % (ref 42–52)
HCT VFR BLD CALC: 41.2 % (ref 42–52)
HCT VFR BLD CALC: 45.9 % (ref 42–52)
HCT VFR BLD CALC: 47.3 % (ref 42–52)
HCT VFR BLD CALC: 50.3 % (ref 42–52)
HEMOGLOBIN, ART, EXTENDED: 11.6 G/DL (ref 14–18)
HEMOGLOBIN, ART, EXTENDED: 12.1 G/DL (ref 14–18)
HEMOGLOBIN, ART, EXTENDED: 13.3 G/DL (ref 14–18)
HEMOGLOBIN: 10.1 G/DL (ref 14–18)
HEMOGLOBIN: 11.4 G/DL (ref 14–18)
HEMOGLOBIN: 11.6 G/DL (ref 14–18)
HEMOGLOBIN: 11.9 G/DL (ref 14–18)
HEMOGLOBIN: 12.3 G/DL (ref 14–18)
HEMOGLOBIN: 12.3 G/DL (ref 14–18)
HEMOGLOBIN: 12.7 G/DL (ref 14–18)
HEMOGLOBIN: 12.9 G/DL (ref 14–18)
HEMOGLOBIN: 13 G/DL (ref 14–18)
HEMOGLOBIN: 13.2 G/DL (ref 14–18)
HEMOGLOBIN: 13.6 G/DL (ref 14–18)
HEMOGLOBIN: 13.7 G/DL (ref 14–18)
HEMOGLOBIN: 15 G/DL (ref 14–18)
HEMOGLOBIN: 15.7 G/DL (ref 14–18)
HEMOGLOBIN: 16.9 G/DL (ref 14–18)
HEPATITIS B CORE IGM ANTIBODY: ABNORMAL
HEPATITIS B SURFACE ANTIGEN INTERPRETATION: ABNORMAL
HEPATITIS C ANTIBODY INTERPRETATION: REACTIVE
HYALINE CASTS: 10 /HPF (ref 0–8)
HYPOCHROMIA: ABNORMAL
IMMATURE GRANULOCYTES #: 0 K/UL
IMMATURE GRANULOCYTES #: 0.1 K/UL
IMMATURE GRANULOCYTES #: 0.2 K/UL
IMMATURE GRANULOCYTES #: 0.4 K/UL
IRON SATURATION: 25 % (ref 14–50)
IRON: 28 UG/DL (ref 59–158)
KETONES, URINE: ABNORMAL MG/DL
LACTIC ACID: 1.8 MMOL/L (ref 0.5–1.9)
LACTIC ACID: 2.4 MMOL/L (ref 0.5–1.9)
LACTIC ACID: 4.3 MMOL/L (ref 0.5–1.9)
LEUKOCYTE ESTERASE, URINE: ABNORMAL
LIPASE: 24 U/L (ref 13–60)
LIPASE: 37 U/L (ref 13–60)
LV EF: 30 %
LVEF MODALITY: NORMAL
LYMPHOCYTES ABSOLUTE: 0.5 K/UL (ref 1.1–4.5)
LYMPHOCYTES ABSOLUTE: 0.6 K/UL (ref 1.1–4.5)
LYMPHOCYTES ABSOLUTE: 0.7 K/UL (ref 1.1–4.5)
LYMPHOCYTES ABSOLUTE: 0.8 K/UL (ref 1.1–4.5)
LYMPHOCYTES ABSOLUTE: 0.9 K/UL (ref 1.1–4.5)
LYMPHOCYTES ABSOLUTE: 1 K/UL (ref 1.1–4.5)
LYMPHOCYTES ABSOLUTE: 1.1 K/UL (ref 1.1–4.5)
LYMPHOCYTES RELATIVE PERCENT: 12.5 % (ref 20–40)
LYMPHOCYTES RELATIVE PERCENT: 13.7 % (ref 20–40)
LYMPHOCYTES RELATIVE PERCENT: 15 % (ref 20–40)
LYMPHOCYTES RELATIVE PERCENT: 16.3 % (ref 20–40)
LYMPHOCYTES RELATIVE PERCENT: 18 % (ref 20–40)
LYMPHOCYTES RELATIVE PERCENT: 4.4 % (ref 20–40)
LYMPHOCYTES RELATIVE PERCENT: 4.4 % (ref 20–40)
LYMPHOCYTES RELATIVE PERCENT: 4.9 % (ref 20–40)
LYMPHOCYTES RELATIVE PERCENT: 5.8 % (ref 20–40)
LYMPHOCYTES RELATIVE PERCENT: 6.4 % (ref 20–40)
LYMPHOCYTES RELATIVE PERCENT: 6.5 % (ref 20–40)
LYMPHOCYTES RELATIVE PERCENT: 7.4 % (ref 20–40)
LYMPHOCYTES RELATIVE PERCENT: 8 % (ref 20–40)
LYMPHOCYTES RELATIVE PERCENT: 8.7 % (ref 20–40)
MACROCYTES: ABNORMAL
MACROCYTES: ABNORMAL
MAGNESIUM: 1.7 MG/DL (ref 1.6–2.4)
MAGNESIUM: 2 MG/DL (ref 1.6–2.4)
MAGNESIUM: 2.1 MG/DL (ref 1.6–2.4)
MAGNESIUM: 2.1 MG/DL (ref 1.6–2.4)
MAGNESIUM: 2.2 MG/DL (ref 1.6–2.4)
MAGNESIUM: 2.5 MG/DL (ref 1.6–2.4)
MAGNESIUM: 2.6 MG/DL (ref 1.6–2.4)
MCH RBC QN AUTO: 30.4 PG (ref 27–31)
MCH RBC QN AUTO: 30.6 PG (ref 27–31)
MCH RBC QN AUTO: 30.6 PG (ref 27–31)
MCH RBC QN AUTO: 30.7 PG (ref 27–31)
MCH RBC QN AUTO: 30.7 PG (ref 27–31)
MCH RBC QN AUTO: 30.8 PG (ref 27–31)
MCH RBC QN AUTO: 30.8 PG (ref 27–31)
MCH RBC QN AUTO: 30.9 PG (ref 27–31)
MCH RBC QN AUTO: 30.9 PG (ref 27–31)
MCH RBC QN AUTO: 31 PG (ref 27–31)
MCH RBC QN AUTO: 31.1 PG (ref 27–31)
MCH RBC QN AUTO: 31.3 PG (ref 27–31)
MCH RBC QN AUTO: 31.4 PG (ref 27–31)
MCH RBC QN AUTO: 31.7 PG (ref 27–31)
MCH RBC QN AUTO: 31.7 PG (ref 27–31)
MCHC RBC AUTO-ENTMCNC: 31.5 G/DL (ref 33–37)
MCHC RBC AUTO-ENTMCNC: 31.8 G/DL (ref 33–37)
MCHC RBC AUTO-ENTMCNC: 32 G/DL (ref 33–37)
MCHC RBC AUTO-ENTMCNC: 32.3 G/DL (ref 33–37)
MCHC RBC AUTO-ENTMCNC: 32.7 G/DL (ref 33–37)
MCHC RBC AUTO-ENTMCNC: 33.2 G/DL (ref 33–37)
MCHC RBC AUTO-ENTMCNC: 33.6 G/DL (ref 33–37)
MCHC RBC AUTO-ENTMCNC: 33.8 G/DL (ref 33–37)
MCHC RBC AUTO-ENTMCNC: 34 G/DL (ref 33–37)
MCHC RBC AUTO-ENTMCNC: 34 G/DL (ref 33–37)
MCHC RBC AUTO-ENTMCNC: 34.3 G/DL (ref 33–37)
MCHC RBC AUTO-ENTMCNC: 34.4 G/DL (ref 33–37)
MCHC RBC AUTO-ENTMCNC: 34.4 G/DL (ref 33–37)
MCHC RBC AUTO-ENTMCNC: 34.5 G/DL (ref 33–37)
MCHC RBC AUTO-ENTMCNC: 34.6 G/DL (ref 33–37)
MCHC RBC AUTO-ENTMCNC: 34.6 G/DL (ref 33–37)
MCHC RBC AUTO-ENTMCNC: 34.9 G/DL (ref 33–37)
MCV RBC AUTO: 89.6 FL (ref 80–94)
MCV RBC AUTO: 89.6 FL (ref 80–94)
MCV RBC AUTO: 89.8 FL (ref 80–94)
MCV RBC AUTO: 89.9 FL (ref 80–94)
MCV RBC AUTO: 90 FL (ref 80–94)
MCV RBC AUTO: 90.5 FL (ref 80–94)
MCV RBC AUTO: 90.5 FL (ref 80–94)
MCV RBC AUTO: 90.8 FL (ref 80–94)
MCV RBC AUTO: 91.2 FL (ref 80–94)
MCV RBC AUTO: 92.2 FL (ref 80–94)
MCV RBC AUTO: 92.2 FL (ref 80–94)
MCV RBC AUTO: 93.5 FL (ref 80–94)
MCV RBC AUTO: 94.1 FL (ref 80–94)
MCV RBC AUTO: 95.6 FL (ref 80–94)
MCV RBC AUTO: 95.7 FL (ref 80–94)
MCV RBC AUTO: 97.4 FL (ref 80–94)
MCV RBC AUTO: 97.5 FL (ref 80–94)
METHEMOGLOBIN ARTERIAL: 0.3 %
METHEMOGLOBIN ARTERIAL: 0.5 %
METHEMOGLOBIN ARTERIAL: 0.6 %
MODE: ABNORMAL
MONOCYTES ABSOLUTE: 0.2 K/UL (ref 0–0.9)
MONOCYTES ABSOLUTE: 0.4 K/UL (ref 0–0.9)
MONOCYTES ABSOLUTE: 0.5 K/UL (ref 0–0.9)
MONOCYTES ABSOLUTE: 0.5 K/UL (ref 0–0.9)
MONOCYTES ABSOLUTE: 0.6 K/UL (ref 0–0.9)
MONOCYTES ABSOLUTE: 0.7 K/UL (ref 0–0.9)
MONOCYTES ABSOLUTE: 0.7 K/UL (ref 0–0.9)
MONOCYTES ABSOLUTE: 0.8 K/UL (ref 0–0.9)
MONOCYTES ABSOLUTE: 0.9 K/UL (ref 0–0.9)
MONOCYTES ABSOLUTE: 1 K/UL (ref 0–0.9)
MONOCYTES RELATIVE PERCENT: 14.7 % (ref 0–10)
MONOCYTES RELATIVE PERCENT: 15.9 % (ref 0–10)
MONOCYTES RELATIVE PERCENT: 3 % (ref 0–10)
MONOCYTES RELATIVE PERCENT: 3.9 % (ref 0–10)
MONOCYTES RELATIVE PERCENT: 4 % (ref 0–10)
MONOCYTES RELATIVE PERCENT: 4 % (ref 0–10)
MONOCYTES RELATIVE PERCENT: 4.9 % (ref 0–10)
MONOCYTES RELATIVE PERCENT: 5.3 % (ref 0–10)
MONOCYTES RELATIVE PERCENT: 5.6 % (ref 0–10)
MONOCYTES RELATIVE PERCENT: 5.6 % (ref 0–10)
MONOCYTES RELATIVE PERCENT: 7 % (ref 0–10)
MONOCYTES RELATIVE PERCENT: 8 % (ref 0–10)
MONOCYTES RELATIVE PERCENT: 9 % (ref 0–10)
MONOCYTES RELATIVE PERCENT: 9.9 % (ref 0–10)
NEUTROPHILS ABSOLUTE: 10.5 K/UL (ref 1.5–7.5)
NEUTROPHILS ABSOLUTE: 11.5 K/UL (ref 1.5–7.5)
NEUTROPHILS ABSOLUTE: 11.5 K/UL (ref 1.5–7.5)
NEUTROPHILS ABSOLUTE: 11.8 K/UL (ref 1.5–7.5)
NEUTROPHILS ABSOLUTE: 12.8 K/UL (ref 1.5–7.5)
NEUTROPHILS ABSOLUTE: 3.1 K/UL (ref 1.5–7.5)
NEUTROPHILS ABSOLUTE: 4 K/UL (ref 1.5–7.5)
NEUTROPHILS ABSOLUTE: 4.3 K/UL (ref 1.5–7.5)
NEUTROPHILS ABSOLUTE: 4.3 K/UL (ref 1.5–7.5)
NEUTROPHILS ABSOLUTE: 4.8 K/UL (ref 1.5–7.5)
NEUTROPHILS ABSOLUTE: 8.2 K/UL (ref 1.5–7.5)
NEUTROPHILS ABSOLUTE: 8.5 K/UL (ref 1.5–7.5)
NEUTROPHILS ABSOLUTE: 9.7 K/UL (ref 1.5–7.5)
NEUTROPHILS ABSOLUTE: 9.8 K/UL (ref 1.5–7.5)
NEUTROPHILS RELATIVE PERCENT: 62 % (ref 50–65)
NEUTROPHILS RELATIVE PERCENT: 65.7 % (ref 50–65)
NEUTROPHILS RELATIVE PERCENT: 68.6 % (ref 50–65)
NEUTROPHILS RELATIVE PERCENT: 75 % (ref 50–65)
NEUTROPHILS RELATIVE PERCENT: 76.2 % (ref 50–65)
NEUTROPHILS RELATIVE PERCENT: 76.9 % (ref 50–65)
NEUTROPHILS RELATIVE PERCENT: 80.1 % (ref 50–65)
NEUTROPHILS RELATIVE PERCENT: 83.8 % (ref 50–65)
NEUTROPHILS RELATIVE PERCENT: 87.2 % (ref 50–65)
NEUTROPHILS RELATIVE PERCENT: 87.5 % (ref 50–65)
NEUTROPHILS RELATIVE PERCENT: 87.6 % (ref 50–65)
NEUTROPHILS RELATIVE PERCENT: 88 % (ref 50–65)
NEUTROPHILS RELATIVE PERCENT: 88.5 % (ref 50–65)
NEUTROPHILS RELATIVE PERCENT: 90.5 % (ref 50–65)
NITRITE, URINE: POSITIVE
O2 CONTENT ARTERIAL: 15.5 ML/DL
O2 CONTENT ARTERIAL: 16.4 ML/DL
O2 CONTENT ARTERIAL: 17 ML/DL
O2 DELIVERY DEVICE: ABNORMAL
O2 SAT, ARTERIAL: 91.2 %
O2 SAT, ARTERIAL: 94.6 %
O2 SAT, ARTERIAL: 95.9 %
O2 THERAPY: ABNORMAL
OXYGEN FLOW: 0
OXYGEN FLOW: 2
PCO2 ARTERIAL: 22 MMHG (ref 35–45)
PCO2 ARTERIAL: 27 MMHG (ref 35–45)
PCO2 ARTERIAL: 32 MMHG (ref 35–45)
PDW BLD-RTO: 14.4 % (ref 11.5–14.5)
PDW BLD-RTO: 14.5 % (ref 11.5–14.5)
PDW BLD-RTO: 14.5 % (ref 11.5–14.5)
PDW BLD-RTO: 14.6 % (ref 11.5–14.5)
PDW BLD-RTO: 14.7 % (ref 11.5–14.5)
PDW BLD-RTO: 15 % (ref 11.5–14.5)
PDW BLD-RTO: 16.1 % (ref 11.5–14.5)
PDW BLD-RTO: 16.1 % (ref 11.5–14.5)
PDW BLD-RTO: 16.8 % (ref 11.5–14.5)
PDW BLD-RTO: 17.2 % (ref 11.5–14.5)
PDW BLD-RTO: 17.3 % (ref 11.5–14.5)
PERFORMED ON: ABNORMAL
PERFORMED ON: NORMAL
PH ARTERIAL: 7.37 (ref 7.35–7.45)
PH ARTERIAL: 7.48 (ref 7.35–7.45)
PH ARTERIAL: 7.5 (ref 7.35–7.45)
PH UA: 5 (ref 5–8)
PHOSPHORUS: 1.8 MG/DL (ref 2.5–4.5)
PHOSPHORUS: 1.9 MG/DL (ref 2.5–4.5)
PHOSPHORUS: 2.3 MG/DL (ref 2.5–4.5)
PHOSPHORUS: 2.4 MG/DL (ref 2.5–4.5)
PHOSPHORUS: 2.9 MG/DL (ref 2.5–4.5)
PHOSPHORUS: 3.1 MG/DL (ref 2.5–4.5)
PHOSPHORUS: 3.7 MG/DL (ref 2.5–4.5)
PLATELET # BLD: 142 K/UL (ref 130–400)
PLATELET # BLD: 175 K/UL (ref 130–400)
PLATELET # BLD: 51 K/UL (ref 130–400)
PLATELET # BLD: 51 K/UL (ref 130–400)
PLATELET # BLD: 56 K/UL (ref 130–400)
PLATELET # BLD: 57 K/UL (ref 130–400)
PLATELET # BLD: 60 K/UL (ref 130–400)
PLATELET # BLD: 63 K/UL (ref 130–400)
PLATELET # BLD: 63 K/UL (ref 130–400)
PLATELET # BLD: 68 K/UL (ref 130–400)
PLATELET # BLD: 69 K/UL (ref 130–400)
PLATELET # BLD: 70 K/UL (ref 130–400)
PLATELET # BLD: 80 K/UL (ref 130–400)
PLATELET # BLD: 82 K/UL (ref 130–400)
PLATELET # BLD: 85 K/UL (ref 130–400)
PLATELET # BLD: 85 K/UL (ref 130–400)
PLATELET # BLD: 89 K/UL (ref 130–400)
PLATELET SLIDE REVIEW: ABNORMAL
PMV BLD AUTO: 10.5 FL (ref 9.4–12.4)
PMV BLD AUTO: 10.9 FL (ref 9.4–12.4)
PMV BLD AUTO: 11.3 FL (ref 9.4–12.4)
PMV BLD AUTO: 11.3 FL (ref 9.4–12.4)
PMV BLD AUTO: 11.9 FL (ref 9.4–12.4)
PMV BLD AUTO: 12.1 FL (ref 9.4–12.4)
PMV BLD AUTO: 12.2 FL (ref 9.4–12.4)
PMV BLD AUTO: 12.4 FL (ref 9.4–12.4)
PMV BLD AUTO: 12.6 FL (ref 9.4–12.4)
PMV BLD AUTO: 12.7 FL (ref 9.4–12.4)
PMV BLD AUTO: 12.7 FL (ref 9.4–12.4)
PMV BLD AUTO: 13.2 FL (ref 9.4–12.4)
PMV BLD AUTO: 13.3 FL (ref 9.4–12.4)
PMV BLD AUTO: 13.4 FL (ref 9.4–12.4)
PMV BLD AUTO: 14 FL (ref 9.4–12.4)
PO2 ARTERIAL: 58 MMHG (ref 80–100)
PO2 ARTERIAL: 72 MMHG (ref 80–100)
PO2 ARTERIAL: 91 MMHG (ref 80–100)
POSITIVE END EXP PRESS: 5
POTASSIUM REFLEX MAGNESIUM: 3.3 MMOL/L (ref 3.5–5)
POTASSIUM REFLEX MAGNESIUM: 3.5 MMOL/L (ref 3.5–5)
POTASSIUM REFLEX MAGNESIUM: 3.6 MMOL/L (ref 3.5–5)
POTASSIUM REFLEX MAGNESIUM: 3.6 MMOL/L (ref 3.5–5)
POTASSIUM REFLEX MAGNESIUM: 3.7 MMOL/L (ref 3.5–5)
POTASSIUM REFLEX MAGNESIUM: 3.8 MMOL/L (ref 3.5–5)
POTASSIUM REFLEX MAGNESIUM: 4 MMOL/L (ref 3.5–5)
POTASSIUM REFLEX MAGNESIUM: 4.1 MMOL/L (ref 3.5–5)
POTASSIUM REFLEX MAGNESIUM: 4.5 MMOL/L (ref 3.5–5)
POTASSIUM REFLEX MAGNESIUM: 5 MMOL/L (ref 3.5–5)
POTASSIUM SERPL-SCNC: 3.5 MMOL/L (ref 3.5–5)
POTASSIUM SERPL-SCNC: 3.5 MMOL/L (ref 3.5–5)
POTASSIUM SERPL-SCNC: 3.9 MMOL/L (ref 3.5–5)
POTASSIUM SERPL-SCNC: 4 MMOL/L (ref 3.5–5)
POTASSIUM SERPL-SCNC: 4.1 MMOL/L (ref 3.5–5)
POTASSIUM SERPL-SCNC: 4.2 MMOL/L (ref 3.5–5)
POTASSIUM SERPL-SCNC: 4.4 MMOL/L (ref 3.5–5)
POTASSIUM SERPL-SCNC: 5.1 MMOL/L (ref 3.5–5)
POTASSIUM, WHOLE BLOOD: 3.5
POTASSIUM, WHOLE BLOOD: 4.9
POTASSIUM, WHOLE BLOOD: 5.1
PRO-BNP: 4134 PG/ML (ref 0–900)
PRO-BNP: 8932 PG/ML (ref 0–900)
PRO-BNP: ABNORMAL PG/ML (ref 0–900)
PROTEIN UA: 30 MG/DL
RBC # BLD: 3.19 M/UL (ref 4.7–6.1)
RBC # BLD: 3.6 M/UL (ref 4.7–6.1)
RBC # BLD: 3.7 M/UL (ref 4.7–6.1)
RBC # BLD: 3.84 M/UL (ref 4.7–6.1)
RBC # BLD: 4 M/UL (ref 4.7–6.1)
RBC # BLD: 4.15 M/UL (ref 4.7–6.1)
RBC # BLD: 4.18 M/UL (ref 4.7–6.1)
RBC # BLD: 4.18 M/UL (ref 4.7–6.1)
RBC # BLD: 4.2 M/UL (ref 4.7–6.1)
RBC # BLD: 4.22 M/UL (ref 4.7–6.1)
RBC # BLD: 4.23 M/UL (ref 4.7–6.1)
RBC # BLD: 4.31 M/UL (ref 4.7–6.1)
RBC # BLD: 4.34 M/UL (ref 4.7–6.1)
RBC # BLD: 4.44 M/UL (ref 4.7–6.1)
RBC # BLD: 4.88 M/UL (ref 4.7–6.1)
RBC # BLD: 5.06 M/UL (ref 4.7–6.1)
RBC # BLD: 5.56 M/UL (ref 4.7–6.1)
RBC UA: 26 /HPF (ref 0–4)
REASON FOR REJECTION: NORMAL
REASON FOR REJECTION: NORMAL
REJECTED TEST: NORMAL
REJECTED TEST: NORMAL
SAMPLE SOURCE: ABNORMAL
SARS-COV-2, NAAT: NOT DETECTED
SARS-COV-2, NAAT: NOT DETECTED
SODIUM BLD-SCNC: 130 MMOL/L (ref 136–145)
SODIUM BLD-SCNC: 131 MMOL/L (ref 136–145)
SODIUM BLD-SCNC: 134 MMOL/L (ref 136–145)
SODIUM BLD-SCNC: 135 MMOL/L (ref 136–145)
SODIUM BLD-SCNC: 137 MMOL/L (ref 136–145)
SODIUM BLD-SCNC: 139 MMOL/L (ref 136–145)
SODIUM BLD-SCNC: 140 MMOL/L (ref 136–145)
SODIUM BLD-SCNC: 141 MMOL/L (ref 136–145)
SODIUM BLD-SCNC: 141 MMOL/L (ref 136–145)
SODIUM BLD-SCNC: 142 MMOL/L (ref 136–145)
SODIUM BLD-SCNC: 143 MMOL/L (ref 136–145)
SODIUM BLD-SCNC: 144 MMOL/L (ref 136–145)
SODIUM BLD-SCNC: 145 MMOL/L (ref 136–145)
SODIUM BLD-SCNC: 148 MMOL/L (ref 136–145)
SPECIFIC GRAVITY UA: 1.02 (ref 1–1.03)
T4 FREE: 1.1 NG/DL (ref 0.93–1.7)
TOTAL IRON BINDING CAPACITY: 113 UG/DL (ref 250–400)
TOTAL PROTEIN: 4.5 G/DL (ref 6.6–8.7)
TOTAL PROTEIN: 4.7 G/DL (ref 6.6–8.7)
TOTAL PROTEIN: 4.8 G/DL (ref 6.6–8.7)
TOTAL PROTEIN: 4.9 G/DL (ref 6.6–8.7)
TOTAL PROTEIN: 5.1 G/DL (ref 6.6–8.7)
TOTAL PROTEIN: 5.2 G/DL (ref 6.6–8.7)
TOTAL PROTEIN: 5.3 G/DL (ref 6.6–8.7)
TOTAL PROTEIN: 5.4 G/DL (ref 6.6–8.7)
TOTAL PROTEIN: 5.6 G/DL (ref 6.6–8.7)
TOTAL PROTEIN: 7.4 G/DL (ref 6.6–8.7)
TOTAL PROTEIN: 8.9 G/DL (ref 6.6–8.7)
TOXIC GRANULATION: ABNORMAL
TRIGL SERPL-MCNC: 184 MG/DL (ref 0–149)
TSH REFLEX FT4: 2.39 UIU/ML (ref 0.35–5.5)
TSH SERPL DL<=0.05 MIU/L-ACNC: 17.9 UIU/ML (ref 0.27–4.2)
URINE CULTURE, ROUTINE: NORMAL
UROBILINOGEN, URINE: 1 E.U./DL
VACUOLATED NEUTROPHILS: ABNORMAL
VITAMIN B-12: >2000 PG/ML (ref 211–946)
VITAMIN D 25-HYDROXY: 13.1 NG/ML
WBC # BLD: 10.1 K/UL (ref 4.8–10.8)
WBC # BLD: 10.2 K/UL (ref 4.8–10.8)
WBC # BLD: 11.1 K/UL (ref 4.8–10.8)
WBC # BLD: 11.2 K/UL (ref 4.8–10.8)
WBC # BLD: 12.9 K/UL (ref 4.8–10.8)
WBC # BLD: 13.1 K/UL (ref 4.8–10.8)
WBC # BLD: 13.2 K/UL (ref 4.8–10.8)
WBC # BLD: 13.4 K/UL (ref 4.8–10.8)
WBC # BLD: 13.7 K/UL (ref 4.8–10.8)
WBC # BLD: 14.2 K/UL (ref 4.8–10.8)
WBC # BLD: 20.9 K/UL (ref 4.8–10.8)
WBC # BLD: 4.7 K/UL (ref 4.8–10.8)
WBC # BLD: 4.9 K/UL (ref 4.8–10.8)
WBC # BLD: 5.5 K/UL (ref 4.8–10.8)
WBC # BLD: 6.2 K/UL (ref 4.8–10.8)
WBC # BLD: 6.3 K/UL (ref 4.8–10.8)
WBC # BLD: 9.3 K/UL (ref 4.8–10.8)
WBC UA: 10 /HPF (ref 0–5)

## 2022-01-01 PROCEDURE — 71045 X-RAY EXAM CHEST 1 VIEW: CPT

## 2022-01-01 PROCEDURE — 6360000002 HC RX W HCPCS: Performed by: INTERNAL MEDICINE

## 2022-01-01 PROCEDURE — 36415 COLL VENOUS BLD VENIPUNCTURE: CPT

## 2022-01-01 PROCEDURE — 83735 ASSAY OF MAGNESIUM: CPT

## 2022-01-01 PROCEDURE — 2500000003 HC RX 250 WO HCPCS: Performed by: STUDENT IN AN ORGANIZED HEALTH CARE EDUCATION/TRAINING PROGRAM

## 2022-01-01 PROCEDURE — 2000000000 HC ICU R&B

## 2022-01-01 PROCEDURE — 87086 URINE CULTURE/COLONY COUNT: CPT

## 2022-01-01 PROCEDURE — 31231 NASAL ENDOSCOPY DX: CPT | Performed by: OTOLARYNGOLOGY

## 2022-01-01 PROCEDURE — 2500000003 HC RX 250 WO HCPCS: Performed by: INTERNAL MEDICINE

## 2022-01-01 PROCEDURE — 82378 CARCINOEMBRYONIC ANTIGEN: CPT

## 2022-01-01 PROCEDURE — 2580000003 HC RX 258: Performed by: INTERNAL MEDICINE

## 2022-01-01 PROCEDURE — 83690 ASSAY OF LIPASE: CPT

## 2022-01-01 PROCEDURE — 85025 COMPLETE CBC W/AUTO DIFF WBC: CPT

## 2022-01-01 PROCEDURE — 93005 ELECTROCARDIOGRAM TRACING: CPT | Performed by: INTERNAL MEDICINE

## 2022-01-01 PROCEDURE — 6360000002 HC RX W HCPCS: Performed by: STUDENT IN AN ORGANIZED HEALTH CARE EDUCATION/TRAINING PROGRAM

## 2022-01-01 PROCEDURE — 6360000002 HC RX W HCPCS: Performed by: SURGERY

## 2022-01-01 PROCEDURE — 99497 ADVNCD CARE PLAN 30 MIN: CPT

## 2022-01-01 PROCEDURE — 80053 COMPREHEN METABOLIC PANEL: CPT

## 2022-01-01 PROCEDURE — 99024 POSTOP FOLLOW-UP VISIT: CPT | Performed by: SURGERY

## 2022-01-01 PROCEDURE — 83880 ASSAY OF NATRIURETIC PEPTIDE: CPT

## 2022-01-01 PROCEDURE — 99233 SBSQ HOSP IP/OBS HIGH 50: CPT | Performed by: INTERNAL MEDICINE

## 2022-01-01 PROCEDURE — 3700000001 HC ADD 15 MINUTES (ANESTHESIA): Performed by: SURGERY

## 2022-01-01 PROCEDURE — 99024 POSTOP FOLLOW-UP VISIT: CPT | Performed by: INTERNAL MEDICINE

## 2022-01-01 PROCEDURE — 2580000003 HC RX 258: Performed by: SURGERY

## 2022-01-01 PROCEDURE — 6360000002 HC RX W HCPCS: Performed by: ANESTHESIOLOGY

## 2022-01-01 PROCEDURE — 2500000003 HC RX 250 WO HCPCS: Performed by: NURSE ANESTHETIST, CERTIFIED REGISTERED

## 2022-01-01 PROCEDURE — 2500000003 HC RX 250 WO HCPCS: Performed by: HOSPITALIST

## 2022-01-01 PROCEDURE — 2700000000 HC OXYGEN THERAPY PER DAY

## 2022-01-01 PROCEDURE — 82803 BLOOD GASES ANY COMBINATION: CPT

## 2022-01-01 PROCEDURE — 99232 SBSQ HOSP IP/OBS MODERATE 35: CPT | Performed by: INTERNAL MEDICINE

## 2022-01-01 PROCEDURE — 83550 IRON BINDING TEST: CPT

## 2022-01-01 PROCEDURE — 0BH17EZ INSERTION OF ENDOTRACHEAL AIRWAY INTO TRACHEA, VIA NATURAL OR ARTIFICIAL OPENING: ICD-10-PCS | Performed by: SURGERY

## 2022-01-01 PROCEDURE — 6370000000 HC RX 637 (ALT 250 FOR IP): Performed by: INTERNAL MEDICINE

## 2022-01-01 PROCEDURE — 94003 VENT MGMT INPAT SUBQ DAY: CPT

## 2022-01-01 PROCEDURE — 85027 COMPLETE CBC AUTOMATED: CPT

## 2022-01-01 PROCEDURE — 99232 SBSQ HOSP IP/OBS MODERATE 35: CPT

## 2022-01-01 PROCEDURE — 2100000000 HC CCU R&B

## 2022-01-01 PROCEDURE — 51798 US URINE CAPACITY MEASURE: CPT

## 2022-01-01 PROCEDURE — 02HV33Z INSERTION OF INFUSION DEVICE INTO SUPERIOR VENA CAVA, PERCUTANEOUS APPROACH: ICD-10-PCS | Performed by: STUDENT IN AN ORGANIZED HEALTH CARE EDUCATION/TRAINING PROGRAM

## 2022-01-01 PROCEDURE — 82607 VITAMIN B-12: CPT

## 2022-01-01 PROCEDURE — 81479 UNLISTED MOLECULAR PATHOLOGY: CPT

## 2022-01-01 PROCEDURE — 82947 ASSAY GLUCOSE BLOOD QUANT: CPT

## 2022-01-01 PROCEDURE — 76705 ECHO EXAM OF ABDOMEN: CPT

## 2022-01-01 PROCEDURE — P9045 ALBUMIN (HUMAN), 5%, 250 ML: HCPCS

## 2022-01-01 PROCEDURE — 2709999900 HC NON-CHARGEABLE SUPPLY: Performed by: INTERNAL MEDICINE

## 2022-01-01 PROCEDURE — 82150 ASSAY OF AMYLASE: CPT

## 2022-01-01 PROCEDURE — 6360000002 HC RX W HCPCS: Performed by: NURSE ANESTHETIST, CERTIFIED REGISTERED

## 2022-01-01 PROCEDURE — 1210000000 HC MED SURG R&B

## 2022-01-01 PROCEDURE — 99231 SBSQ HOSP IP/OBS SF/LOW 25: CPT

## 2022-01-01 PROCEDURE — 6370000000 HC RX 637 (ALT 250 FOR IP): Performed by: SURGERY

## 2022-01-01 PROCEDURE — 3E0G76Z INTRODUCTION OF NUTRITIONAL SUBSTANCE INTO UPPER GI, VIA NATURAL OR ARTIFICIAL OPENING: ICD-10-PCS | Performed by: SURGERY

## 2022-01-01 PROCEDURE — C1751 CATH, INF, PER/CENT/MIDLINE: HCPCS

## 2022-01-01 PROCEDURE — 0DTF0ZZ RESECTION OF RIGHT LARGE INTESTINE, OPEN APPROACH: ICD-10-PCS | Performed by: SURGERY

## 2022-01-01 PROCEDURE — 0CJY8ZZ INSPECTION OF MOUTH AND THROAT, VIA NATURAL OR ARTIFICIAL OPENING ENDOSCOPIC: ICD-10-PCS | Performed by: OTOLARYNGOLOGY

## 2022-01-01 PROCEDURE — 82330 ASSAY OF CALCIUM: CPT

## 2022-01-01 PROCEDURE — 2580000003 HC RX 258: Performed by: HOSPITALIST

## 2022-01-01 PROCEDURE — 2580000003 HC RX 258: Performed by: STUDENT IN AN ORGANIZED HEALTH CARE EDUCATION/TRAINING PROGRAM

## 2022-01-01 PROCEDURE — 74177 CT ABD & PELVIS W/CONTRAST: CPT | Performed by: RADIOLOGY

## 2022-01-01 PROCEDURE — 96361 HYDRATE IV INFUSION ADD-ON: CPT

## 2022-01-01 PROCEDURE — 76937 US GUIDE VASCULAR ACCESS: CPT

## 2022-01-01 PROCEDURE — 6360000002 HC RX W HCPCS: Performed by: HOSPITALIST

## 2022-01-01 PROCEDURE — 0D1B0Z4 BYPASS ILEUM TO CUTANEOUS, OPEN APPROACH: ICD-10-PCS | Performed by: SURGERY

## 2022-01-01 PROCEDURE — 82248 BILIRUBIN DIRECT: CPT

## 2022-01-01 PROCEDURE — 3700000000 HC ANESTHESIA ATTENDED CARE: Performed by: SURGERY

## 2022-01-01 PROCEDURE — 80162 ASSAY OF DIGOXIN TOTAL: CPT

## 2022-01-01 PROCEDURE — 81001 URINALYSIS AUTO W/SCOPE: CPT

## 2022-01-01 PROCEDURE — 6360000002 HC RX W HCPCS: Performed by: EMERGENCY MEDICINE

## 2022-01-01 PROCEDURE — 83605 ASSAY OF LACTIC ACID: CPT

## 2022-01-01 PROCEDURE — P9047 ALBUMIN (HUMAN), 25%, 50ML: HCPCS | Performed by: INTERNAL MEDICINE

## 2022-01-01 PROCEDURE — 2500000003 HC RX 250 WO HCPCS: Performed by: SURGERY

## 2022-01-01 PROCEDURE — 5A1935Z RESPIRATORY VENTILATION, LESS THAN 24 CONSECUTIVE HOURS: ICD-10-PCS | Performed by: SURGERY

## 2022-01-01 PROCEDURE — P9045 ALBUMIN (HUMAN), 5%, 250 ML: HCPCS | Performed by: NURSE ANESTHETIST, CERTIFIED REGISTERED

## 2022-01-01 PROCEDURE — 87635 SARS-COV-2 COVID-19 AMP PRB: CPT

## 2022-01-01 PROCEDURE — 3600000005 HC SURGERY LEVEL 5 BASE: Performed by: SURGERY

## 2022-01-01 PROCEDURE — 3600000014 HC SURGERY LEVEL 4 ADDTL 15MIN: Performed by: SURGERY

## 2022-01-01 PROCEDURE — 99222 1ST HOSP IP/OBS MODERATE 55: CPT | Performed by: INTERNAL MEDICINE

## 2022-01-01 PROCEDURE — 99232 SBSQ HOSP IP/OBS MODERATE 35: CPT | Performed by: SURGERY

## 2022-01-01 PROCEDURE — 43246 EGD PLACE GASTROSTOMY TUBE: CPT | Performed by: INTERNAL MEDICINE

## 2022-01-01 PROCEDURE — C1725 CATH, TRANSLUMIN NON-LASER: HCPCS | Performed by: SURGERY

## 2022-01-01 PROCEDURE — 97162 PT EVAL MOD COMPLEX 30 MIN: CPT

## 2022-01-01 PROCEDURE — 97530 THERAPEUTIC ACTIVITIES: CPT

## 2022-01-01 PROCEDURE — 80048 BASIC METABOLIC PNL TOTAL CA: CPT

## 2022-01-01 PROCEDURE — 99233 SBSQ HOSP IP/OBS HIGH 50: CPT

## 2022-01-01 PROCEDURE — 99285 EMERGENCY DEPT VISIT HI MDM: CPT

## 2022-01-01 PROCEDURE — C8929 TTE W OR WO FOL WCON,DOPPLER: HCPCS

## 2022-01-01 PROCEDURE — 2580000003 HC RX 258: Performed by: NURSE ANESTHETIST, CERTIFIED REGISTERED

## 2022-01-01 PROCEDURE — 71045 X-RAY EXAM CHEST 1 VIEW: CPT | Performed by: RADIOLOGY

## 2022-01-01 PROCEDURE — 3600000015 HC SURGERY LEVEL 5 ADDTL 15MIN: Performed by: SURGERY

## 2022-01-01 PROCEDURE — 6360000002 HC RX W HCPCS

## 2022-01-01 PROCEDURE — 85018 HEMOGLOBIN: CPT

## 2022-01-01 PROCEDURE — 94002 VENT MGMT INPAT INIT DAY: CPT

## 2022-01-01 PROCEDURE — 96375 TX/PRO/DX INJ NEW DRUG ADDON: CPT

## 2022-01-01 PROCEDURE — 97535 SELF CARE MNGMENT TRAINING: CPT

## 2022-01-01 PROCEDURE — 84100 ASSAY OF PHOSPHORUS: CPT

## 2022-01-01 PROCEDURE — 84443 ASSAY THYROID STIM HORMONE: CPT

## 2022-01-01 PROCEDURE — 84439 ASSAY OF FREE THYROXINE: CPT

## 2022-01-01 PROCEDURE — 44602 SUTURE SMALL INTESTINE: CPT | Performed by: SURGERY

## 2022-01-01 PROCEDURE — 7100000001 HC PACU RECOVERY - ADDTL 15 MIN: Performed by: SURGERY

## 2022-01-01 PROCEDURE — 0D9600Z DRAINAGE OF STOMACH WITH DRAINAGE DEVICE, OPEN APPROACH: ICD-10-PCS | Performed by: SURGERY

## 2022-01-01 PROCEDURE — 7100000000 HC PACU RECOVERY - FIRST 15 MIN: Performed by: SURGERY

## 2022-01-01 PROCEDURE — 85379 FIBRIN DEGRADATION QUANT: CPT

## 2022-01-01 PROCEDURE — 0DQV0ZZ REPAIR MESENTERY, OPEN APPROACH: ICD-10-PCS | Performed by: SURGERY

## 2022-01-01 PROCEDURE — 99223 1ST HOSP IP/OBS HIGH 75: CPT

## 2022-01-01 PROCEDURE — 37799 UNLISTED PX VASCULAR SURGERY: CPT

## 2022-01-01 PROCEDURE — 0D9680Z DRAINAGE OF STOMACH WITH DRAINAGE DEVICE, VIA NATURAL OR ARTIFICIAL OPENING ENDOSCOPIC: ICD-10-PCS | Performed by: INTERNAL MEDICINE

## 2022-01-01 PROCEDURE — 49002 REOPENING OF ABDOMEN: CPT | Performed by: SURGERY

## 2022-01-01 PROCEDURE — 85014 HEMATOCRIT: CPT

## 2022-01-01 PROCEDURE — 99222 1ST HOSP IP/OBS MODERATE 55: CPT | Performed by: OTOLARYNGOLOGY

## 2022-01-01 PROCEDURE — 6360000004 HC RX CONTRAST MEDICATION: Performed by: EMERGENCY MEDICINE

## 2022-01-01 PROCEDURE — 6370000000 HC RX 637 (ALT 250 FOR IP): Performed by: HOSPITALIST

## 2022-01-01 PROCEDURE — 6370000000 HC RX 637 (ALT 250 FOR IP)

## 2022-01-01 PROCEDURE — 43762 RPLC GTUBE NO REVJ TRC: CPT

## 2022-01-01 PROCEDURE — 96374 THER/PROPH/DIAG INJ IV PUSH: CPT

## 2022-01-01 PROCEDURE — 82728 ASSAY OF FERRITIN: CPT

## 2022-01-01 PROCEDURE — 76705 ECHO EXAM OF ABDOMEN: CPT | Performed by: RADIOLOGY

## 2022-01-01 PROCEDURE — 2580000003 HC RX 258: Performed by: EMERGENCY MEDICINE

## 2022-01-01 PROCEDURE — 85730 THROMBOPLASTIN TIME PARTIAL: CPT

## 2022-01-01 PROCEDURE — 88361 TUMOR IMMUNOHISTOCHEM/COMPUT: CPT

## 2022-01-01 PROCEDURE — 83036 HEMOGLOBIN GLYCOSYLATED A1C: CPT

## 2022-01-01 PROCEDURE — 99231 SBSQ HOSP IP/OBS SF/LOW 25: CPT | Performed by: INTERNAL MEDICINE

## 2022-01-01 PROCEDURE — 80074 ACUTE HEPATITIS PANEL: CPT

## 2022-01-01 PROCEDURE — 2580000003 HC RX 258: Performed by: ANESTHESIOLOGY

## 2022-01-01 PROCEDURE — 2709999900 HC NON-CHARGEABLE SUPPLY: Performed by: SURGERY

## 2022-01-01 PROCEDURE — 82306 VITAMIN D 25 HYDROXY: CPT

## 2022-01-01 PROCEDURE — 36600 WITHDRAWAL OF ARTERIAL BLOOD: CPT

## 2022-01-01 PROCEDURE — 74177 CT ABD & PELVIS W/CONTRAST: CPT

## 2022-01-01 PROCEDURE — P9047 ALBUMIN (HUMAN), 25%, 50ML: HCPCS

## 2022-01-01 PROCEDURE — 93010 ELECTROCARDIOGRAM REPORT: CPT | Performed by: INTERNAL MEDICINE

## 2022-01-01 PROCEDURE — 93005 ELECTROCARDIOGRAM TRACING: CPT | Performed by: EMERGENCY MEDICINE

## 2022-01-01 PROCEDURE — 99221 1ST HOSP IP/OBS SF/LOW 40: CPT | Performed by: SURGERY

## 2022-01-01 PROCEDURE — 96376 TX/PRO/DX INJ SAME DRUG ADON: CPT

## 2022-01-01 PROCEDURE — 97166 OT EVAL MOD COMPLEX 45 MIN: CPT

## 2022-01-01 PROCEDURE — 99223 1ST HOSP IP/OBS HIGH 75: CPT | Performed by: INTERNAL MEDICINE

## 2022-01-01 PROCEDURE — 36569 INSJ PICC 5 YR+ W/O IMAGING: CPT

## 2022-01-01 PROCEDURE — 3700000000 HC ANESTHESIA ATTENDED CARE: Performed by: INTERNAL MEDICINE

## 2022-01-01 PROCEDURE — 6360000004 HC RX CONTRAST MEDICATION: Performed by: INTERNAL MEDICINE

## 2022-01-01 PROCEDURE — 3609013300 HC EGD TUBE PLACEMENT: Performed by: INTERNAL MEDICINE

## 2022-01-01 PROCEDURE — 83540 ASSAY OF IRON: CPT

## 2022-01-01 PROCEDURE — 82746 ASSAY OF FOLIC ACID SERUM: CPT

## 2022-01-01 PROCEDURE — 2500000003 HC RX 250 WO HCPCS

## 2022-01-01 PROCEDURE — A4216 STERILE WATER/SALINE, 10 ML: HCPCS | Performed by: SURGERY

## 2022-01-01 PROCEDURE — 3600000004 HC SURGERY LEVEL 4 BASE: Performed by: SURGERY

## 2022-01-01 PROCEDURE — 3700000001 HC ADD 15 MINUTES (ANESTHESIA): Performed by: INTERNAL MEDICINE

## 2022-01-01 PROCEDURE — 80076 HEPATIC FUNCTION PANEL: CPT

## 2022-01-01 PROCEDURE — P9047 ALBUMIN (HUMAN), 25%, 50ML: HCPCS | Performed by: HOSPITALIST

## 2022-01-01 PROCEDURE — 6370000000 HC RX 637 (ALT 250 FOR IP): Performed by: EMERGENCY MEDICINE

## 2022-01-01 PROCEDURE — 81288 MLH1 GENE: CPT

## 2022-01-01 PROCEDURE — 82140 ASSAY OF AMMONIA: CPT

## 2022-01-01 PROCEDURE — 88309 TISSUE EXAM BY PATHOLOGIST: CPT

## 2022-01-01 PROCEDURE — 43830 GSTRST OPEN WO CONSTJ TUBE: CPT | Performed by: SURGERY

## 2022-01-01 PROCEDURE — 87040 BLOOD CULTURE FOR BACTERIA: CPT

## 2022-01-01 PROCEDURE — 84478 ASSAY OF TRIGLYCERIDES: CPT

## 2022-01-01 PROCEDURE — 44160 REMOVAL OF COLON: CPT | Performed by: SURGERY

## 2022-01-01 PROCEDURE — 6360000004 HC RX CONTRAST MEDICATION: Performed by: SURGERY

## 2022-01-01 RX ORDER — CALCIUM GLUCONATE 20 MG/ML
1000 INJECTION, SOLUTION INTRAVENOUS ONCE
Status: COMPLETED | OUTPATIENT
Start: 2022-01-01 | End: 2022-01-01

## 2022-01-01 RX ORDER — SODIUM CHLORIDE, SODIUM LACTATE, POTASSIUM CHLORIDE, CALCIUM CHLORIDE 600; 310; 30; 20 MG/100ML; MG/100ML; MG/100ML; MG/100ML
INJECTION, SOLUTION INTRAVENOUS CONTINUOUS
Status: DISCONTINUED | OUTPATIENT
Start: 2022-01-01 | End: 2022-01-01

## 2022-01-01 RX ORDER — CARVEDILOL 12.5 MG/1
25 TABLET ORAL 2 TIMES DAILY
Status: DISCONTINUED | OUTPATIENT
Start: 2022-01-01 | End: 2022-01-01

## 2022-01-01 RX ORDER — SUCCINYLCHOLINE CHLORIDE 20 MG/ML
INJECTION INTRAMUSCULAR; INTRAVENOUS PRN
Status: DISCONTINUED | OUTPATIENT
Start: 2022-01-01 | End: 2022-01-01 | Stop reason: SDUPTHER

## 2022-01-01 RX ORDER — 0.9 % SODIUM CHLORIDE 0.9 %
500 INTRAVENOUS SOLUTION INTRAVENOUS ONCE
Status: COMPLETED | OUTPATIENT
Start: 2022-01-01 | End: 2022-01-01

## 2022-01-01 RX ORDER — FAMOTIDINE 20 MG/1
20 TABLET, FILM COATED ORAL 2 TIMES DAILY
Status: DISCONTINUED | OUTPATIENT
Start: 2022-01-01 | End: 2022-01-01

## 2022-01-01 RX ORDER — ALBUMIN (HUMAN) 12.5 G/50ML
SOLUTION INTRAVENOUS
Status: DISPENSED
Start: 2022-01-01 | End: 2022-01-01

## 2022-01-01 RX ORDER — PROPOFOL 10 MG/ML
50 INJECTION, EMULSION INTRAVENOUS CONTINUOUS
Status: DISCONTINUED | OUTPATIENT
Start: 2022-01-01 | End: 2022-01-01

## 2022-01-01 RX ORDER — HEPARIN SODIUM 10000 [USP'U]/100ML
5-30 INJECTION, SOLUTION INTRAVENOUS CONTINUOUS
Status: DISCONTINUED | OUTPATIENT
Start: 2022-01-01 | End: 2022-01-01

## 2022-01-01 RX ORDER — SODIUM CHLORIDE 0.9 % (FLUSH) 0.9 %
5-40 SYRINGE (ML) INJECTION PRN
OUTPATIENT
Start: 2022-01-01

## 2022-01-01 RX ORDER — ERGOCALCIFEROL 1.25 MG/1
50000 CAPSULE ORAL WEEKLY
Status: DISCONTINUED | OUTPATIENT
Start: 2022-01-01 | End: 2022-01-01 | Stop reason: HOSPADM

## 2022-01-01 RX ORDER — SODIUM CHLORIDE 0.9 % (FLUSH) 0.9 %
5-40 SYRINGE (ML) INJECTION EVERY 12 HOURS SCHEDULED
OUTPATIENT
Start: 2022-01-01

## 2022-01-01 RX ORDER — LORAZEPAM 2 MG/ML
0.5 INJECTION INTRAMUSCULAR EVERY 6 HOURS PRN
Status: DISCONTINUED | OUTPATIENT
Start: 2022-01-01 | End: 2022-01-01

## 2022-01-01 RX ORDER — 0.9 % SODIUM CHLORIDE 0.9 %
1000 INTRAVENOUS SOLUTION INTRAVENOUS ONCE
Status: COMPLETED | OUTPATIENT
Start: 2022-01-01 | End: 2022-01-01

## 2022-01-01 RX ORDER — METOCLOPRAMIDE HYDROCHLORIDE 5 MG/ML
5 INJECTION INTRAMUSCULAR; INTRAVENOUS EVERY 6 HOURS
Status: DISCONTINUED | OUTPATIENT
Start: 2022-01-01 | End: 2022-01-01

## 2022-01-01 RX ORDER — PROPOFOL 10 MG/ML
INJECTION, EMULSION INTRAVENOUS
Status: COMPLETED
Start: 2022-01-01 | End: 2022-01-01

## 2022-01-01 RX ORDER — DIGOXIN 0.25 MG/ML
125 INJECTION INTRAMUSCULAR; INTRAVENOUS EVERY OTHER DAY
OUTPATIENT
Start: 2022-01-01

## 2022-01-01 RX ORDER — INSULIN LISPRO 100 [IU]/ML
0-4 INJECTION, SOLUTION INTRAVENOUS; SUBCUTANEOUS NIGHTLY
Status: DISCONTINUED | OUTPATIENT
Start: 2022-01-01 | End: 2022-01-01

## 2022-01-01 RX ORDER — ONDANSETRON 2 MG/ML
4 INJECTION INTRAMUSCULAR; INTRAVENOUS ONCE
Status: COMPLETED | OUTPATIENT
Start: 2022-01-01 | End: 2022-01-01

## 2022-01-01 RX ORDER — LORAZEPAM 2 MG/1
4 TABLET ORAL
Status: DISCONTINUED | OUTPATIENT
Start: 2022-01-01 | End: 2022-01-01

## 2022-01-01 RX ORDER — BUMETANIDE 0.25 MG/ML
INJECTION, SOLUTION INTRAMUSCULAR; INTRAVENOUS
Status: COMPLETED
Start: 2022-01-01 | End: 2022-01-01

## 2022-01-01 RX ORDER — SODIUM CHLORIDE 9 MG/ML
INJECTION, SOLUTION INTRAVENOUS PRN
Status: DISCONTINUED | OUTPATIENT
Start: 2022-01-01 | End: 2022-01-01 | Stop reason: HOSPADM

## 2022-01-01 RX ORDER — MIDAZOLAM HYDROCHLORIDE 1 MG/ML
INJECTION INTRAMUSCULAR; INTRAVENOUS PRN
Status: DISCONTINUED | OUTPATIENT
Start: 2022-01-01 | End: 2022-01-01 | Stop reason: SDUPTHER

## 2022-01-01 RX ORDER — GAUZE BANDAGE 2" X 2"
100 BANDAGE TOPICAL DAILY
Status: DISCONTINUED | OUTPATIENT
Start: 2022-01-01 | End: 2022-01-01 | Stop reason: HOSPADM

## 2022-01-01 RX ORDER — MORPHINE SULFATE 2 MG/ML
1 INJECTION, SOLUTION INTRAMUSCULAR; INTRAVENOUS
Status: DISCONTINUED | OUTPATIENT
Start: 2022-01-01 | End: 2022-01-01

## 2022-01-01 RX ORDER — DILTIAZEM HYDROCHLORIDE 5 MG/ML
10 INJECTION INTRAVENOUS ONCE
Status: COMPLETED | OUTPATIENT
Start: 2022-01-01 | End: 2022-01-01

## 2022-01-01 RX ORDER — METOCLOPRAMIDE HYDROCHLORIDE 5 MG/ML
10 INJECTION INTRAMUSCULAR; INTRAVENOUS EVERY 6 HOURS
Status: COMPLETED | OUTPATIENT
Start: 2022-01-01 | End: 2022-01-01

## 2022-01-01 RX ORDER — DEXTROSE, SODIUM CHLORIDE, SODIUM LACTATE, POTASSIUM CHLORIDE, AND CALCIUM CHLORIDE 5; .6; .31; .03; .02 G/100ML; G/100ML; G/100ML; G/100ML; G/100ML
INJECTION, SOLUTION INTRAVENOUS CONTINUOUS
Status: DISCONTINUED | OUTPATIENT
Start: 2022-01-01 | End: 2022-01-01 | Stop reason: HOSPADM

## 2022-01-01 RX ORDER — HYDROMORPHONE HYDROCHLORIDE 1 MG/ML
1 INJECTION, SOLUTION INTRAMUSCULAR; INTRAVENOUS; SUBCUTANEOUS EVERY 4 HOURS PRN
OUTPATIENT
Start: 2022-01-01

## 2022-01-01 RX ORDER — SODIUM CHLORIDE 9 MG/ML
25 INJECTION, SOLUTION INTRAVENOUS PRN
OUTPATIENT
Start: 2022-01-01

## 2022-01-01 RX ORDER — PROPOFOL 10 MG/ML
INJECTION, EMULSION INTRAVENOUS PRN
Status: DISCONTINUED | OUTPATIENT
Start: 2022-01-01 | End: 2022-01-01 | Stop reason: SDUPTHER

## 2022-01-01 RX ORDER — GAUZE BANDAGE 2" X 2"
100 BANDAGE TOPICAL DAILY
OUTPATIENT
Start: 2022-01-01

## 2022-01-01 RX ORDER — THIAMINE HYDROCHLORIDE 100 MG/ML
100 INJECTION, SOLUTION INTRAMUSCULAR; INTRAVENOUS DAILY
Status: DISCONTINUED | OUTPATIENT
Start: 2022-01-01 | End: 2022-01-01

## 2022-01-01 RX ORDER — DIPHENHYDRAMINE HYDROCHLORIDE 50 MG/ML
12.5 INJECTION INTRAMUSCULAR; INTRAVENOUS
Status: DISCONTINUED | OUTPATIENT
Start: 2022-01-01 | End: 2022-01-01 | Stop reason: HOSPADM

## 2022-01-01 RX ORDER — SODIUM CHLORIDE, SODIUM LACTATE, POTASSIUM CHLORIDE, CALCIUM CHLORIDE 600; 310; 30; 20 MG/100ML; MG/100ML; MG/100ML; MG/100ML
INJECTION, SOLUTION INTRAVENOUS CONTINUOUS PRN
Status: DISCONTINUED | OUTPATIENT
Start: 2022-01-01 | End: 2022-01-01 | Stop reason: SDUPTHER

## 2022-01-01 RX ORDER — LIDOCAINE HYDROCHLORIDE 10 MG/ML
INJECTION, SOLUTION INFILTRATION; PERINEURAL PRN
Status: DISCONTINUED | OUTPATIENT
Start: 2022-01-01 | End: 2022-01-01 | Stop reason: SDUPTHER

## 2022-01-01 RX ORDER — SODIUM CHLORIDE, SODIUM LACTATE, POTASSIUM CHLORIDE, AND CALCIUM CHLORIDE .6; .31; .03; .02 G/100ML; G/100ML; G/100ML; G/100ML
1000 INJECTION, SOLUTION INTRAVENOUS ONCE
Status: COMPLETED | OUTPATIENT
Start: 2022-01-01 | End: 2022-01-01

## 2022-01-01 RX ORDER — DEXMEDETOMIDINE HYDROCHLORIDE 4 UG/ML
.1-1.5 INJECTION, SOLUTION INTRAVENOUS CONTINUOUS
Status: DISCONTINUED | OUTPATIENT
Start: 2022-01-01 | End: 2022-01-01

## 2022-01-01 RX ORDER — DILTIAZEM HCL IN NACL,ISO-OSM 125 MG/125
2.5-15 PLASTIC BAG, INJECTION (ML) INTRAVENOUS CONTINUOUS
Status: DISCONTINUED | OUTPATIENT
Start: 2022-01-01 | End: 2022-01-01

## 2022-01-01 RX ORDER — LIDOCAINE HYDROCHLORIDE 10 MG/ML
5 INJECTION, SOLUTION EPIDURAL; INFILTRATION; INTRACAUDAL; PERINEURAL ONCE
Status: DISCONTINUED | OUTPATIENT
Start: 2022-01-01 | End: 2022-01-01

## 2022-01-01 RX ORDER — ALBUMIN, HUMAN INJ 5% 5 %
SOLUTION INTRAVENOUS PRN
Status: DISCONTINUED | OUTPATIENT
Start: 2022-01-01 | End: 2022-01-01 | Stop reason: SDUPTHER

## 2022-01-01 RX ORDER — HYDROMORPHONE HYDROCHLORIDE 1 MG/ML
2 INJECTION, SOLUTION INTRAMUSCULAR; INTRAVENOUS; SUBCUTANEOUS
Status: DISCONTINUED | OUTPATIENT
Start: 2022-01-01 | End: 2022-01-01

## 2022-01-01 RX ORDER — INSULIN LISPRO 100 [IU]/ML
0-4 INJECTION, SOLUTION INTRAVENOUS; SUBCUTANEOUS
Status: DISCONTINUED | OUTPATIENT
Start: 2022-01-01 | End: 2022-01-01

## 2022-01-01 RX ORDER — ETOMIDATE 2 MG/ML
INJECTION INTRAVENOUS PRN
Status: DISCONTINUED | OUTPATIENT
Start: 2022-01-01 | End: 2022-01-01 | Stop reason: SDUPTHER

## 2022-01-01 RX ORDER — HYDROMORPHONE HYDROCHLORIDE 1 MG/ML
0.5 INJECTION, SOLUTION INTRAMUSCULAR; INTRAVENOUS; SUBCUTANEOUS EVERY 5 MIN PRN
Status: DISCONTINUED | OUTPATIENT
Start: 2022-01-01 | End: 2022-01-01 | Stop reason: HOSPADM

## 2022-01-01 RX ORDER — FENTANYL CITRATE 50 UG/ML
INJECTION, SOLUTION INTRAMUSCULAR; INTRAVENOUS PRN
Status: DISCONTINUED | OUTPATIENT
Start: 2022-01-01 | End: 2022-01-01 | Stop reason: SDUPTHER

## 2022-01-01 RX ORDER — DEXTROSE, SODIUM CHLORIDE, SODIUM LACTATE, POTASSIUM CHLORIDE, AND CALCIUM CHLORIDE 5; .6; .31; .03; .02 G/100ML; G/100ML; G/100ML; G/100ML; G/100ML
INJECTION, SOLUTION INTRAVENOUS CONTINUOUS
OUTPATIENT
Start: 2022-01-01

## 2022-01-01 RX ORDER — METRONIDAZOLE 500 MG/100ML
500 INJECTION, SOLUTION INTRAVENOUS EVERY 8 HOURS
Status: DISCONTINUED | OUTPATIENT
Start: 2022-01-01 | End: 2022-01-01 | Stop reason: ALTCHOICE

## 2022-01-01 RX ORDER — POTASSIUM CHLORIDE 7.45 MG/ML
10 INJECTION INTRAVENOUS PRN
Status: DISCONTINUED | OUTPATIENT
Start: 2022-01-01 | End: 2022-01-01 | Stop reason: HOSPADM

## 2022-01-01 RX ORDER — MEPERIDINE HYDROCHLORIDE 25 MG/ML
12.5 INJECTION INTRAMUSCULAR; INTRAVENOUS; SUBCUTANEOUS EVERY 5 MIN PRN
Status: DISCONTINUED | OUTPATIENT
Start: 2022-01-01 | End: 2022-01-01 | Stop reason: HOSPADM

## 2022-01-01 RX ORDER — HEPARIN SODIUM 1000 [USP'U]/ML
2000 INJECTION, SOLUTION INTRAVENOUS; SUBCUTANEOUS PRN
Status: DISCONTINUED | OUTPATIENT
Start: 2022-01-01 | End: 2022-01-01

## 2022-01-01 RX ORDER — LORAZEPAM 1 MG/1
1 TABLET ORAL
OUTPATIENT
Start: 2022-01-01

## 2022-01-01 RX ORDER — SODIUM CHLORIDE 0.9 % (FLUSH) 0.9 %
5-40 SYRINGE (ML) INJECTION PRN
Status: DISCONTINUED | OUTPATIENT
Start: 2022-01-01 | End: 2022-01-01 | Stop reason: HOSPADM

## 2022-01-01 RX ORDER — METOPROLOL TARTRATE 5 MG/5ML
5 INJECTION INTRAVENOUS EVERY 12 HOURS
Status: DISCONTINUED | OUTPATIENT
Start: 2022-01-01 | End: 2022-01-01

## 2022-01-01 RX ORDER — ALBUMIN, HUMAN INJ 5% 5 %
SOLUTION INTRAVENOUS
Status: COMPLETED
Start: 2022-01-01 | End: 2022-01-01

## 2022-01-01 RX ORDER — VASOPRESSIN 20 [USP'U]/ML
INJECTION, SOLUTION INTRAMUSCULAR; SUBCUTANEOUS PRN
Status: DISCONTINUED | OUTPATIENT
Start: 2022-01-01 | End: 2022-01-01 | Stop reason: SDUPTHER

## 2022-01-01 RX ORDER — FUROSEMIDE 10 MG/ML
20 INJECTION INTRAMUSCULAR; INTRAVENOUS 2 TIMES DAILY
OUTPATIENT
Start: 2022-01-01

## 2022-01-01 RX ORDER — SODIUM CHLORIDE 0.9 % (FLUSH) 0.9 %
5-40 SYRINGE (ML) INJECTION EVERY 12 HOURS SCHEDULED
Status: DISCONTINUED | OUTPATIENT
Start: 2022-01-01 | End: 2022-01-01 | Stop reason: HOSPADM

## 2022-01-01 RX ORDER — METOCLOPRAMIDE HYDROCHLORIDE 5 MG/ML
10 INJECTION INTRAMUSCULAR; INTRAVENOUS EVERY 6 HOURS
Status: DISCONTINUED | OUTPATIENT
Start: 2022-01-01 | End: 2022-01-01 | Stop reason: HOSPADM

## 2022-01-01 RX ORDER — OXYMETAZOLINE HYDROCHLORIDE 0.05 G/100ML
2 SPRAY NASAL ONCE
Status: COMPLETED | OUTPATIENT
Start: 2022-01-01 | End: 2022-01-01

## 2022-01-01 RX ORDER — DIGOXIN 0.25 MG/ML
125 INJECTION INTRAMUSCULAR; INTRAVENOUS EVERY OTHER DAY
Status: DISCONTINUED | OUTPATIENT
Start: 2022-01-01 | End: 2022-01-01 | Stop reason: HOSPADM

## 2022-01-01 RX ORDER — DEXTROSE MONOHYDRATE 100 MG/ML
INJECTION, SOLUTION INTRAVENOUS CONTINUOUS PRN
Status: DISCONTINUED | OUTPATIENT
Start: 2022-01-01 | End: 2022-01-01 | Stop reason: HOSPADM

## 2022-01-01 RX ORDER — MEROPENEM AND SODIUM CHLORIDE 1 G/50ML
1000 INJECTION, SOLUTION INTRAVENOUS EVERY 12 HOURS
Status: COMPLETED | OUTPATIENT
Start: 2022-01-01 | End: 2022-01-01

## 2022-01-01 RX ORDER — DIGOXIN 0.25 MG/ML
250 INJECTION INTRAMUSCULAR; INTRAVENOUS ONCE
Status: COMPLETED | OUTPATIENT
Start: 2022-01-01 | End: 2022-01-01

## 2022-01-01 RX ORDER — POTASSIUM CHLORIDE 7.45 MG/ML
10 INJECTION INTRAVENOUS PRN
OUTPATIENT
Start: 2022-01-01

## 2022-01-01 RX ORDER — MEROPENEM AND SODIUM CHLORIDE 1 G/50ML
1000 INJECTION, SOLUTION INTRAVENOUS EVERY 8 HOURS
Status: DISCONTINUED | OUTPATIENT
Start: 2022-01-01 | End: 2022-01-01

## 2022-01-01 RX ORDER — ALBUMIN (HUMAN) 12.5 G/50ML
50 SOLUTION INTRAVENOUS ONCE
Status: COMPLETED | OUTPATIENT
Start: 2022-01-01 | End: 2022-01-01

## 2022-01-01 RX ORDER — LORAZEPAM 2 MG/ML
1 INJECTION INTRAMUSCULAR
Status: DISCONTINUED | OUTPATIENT
Start: 2022-01-01 | End: 2022-01-01 | Stop reason: HOSPADM

## 2022-01-01 RX ORDER — SODIUM CHLORIDE, SODIUM LACTATE, POTASSIUM CHLORIDE, CALCIUM CHLORIDE 600; 310; 30; 20 MG/100ML; MG/100ML; MG/100ML; MG/100ML
INJECTION, SOLUTION INTRAVENOUS CONTINUOUS
Status: DISCONTINUED | OUTPATIENT
Start: 2022-01-01 | End: 2022-01-01 | Stop reason: HOSPADM

## 2022-01-01 RX ORDER — METOPROLOL TARTRATE 5 MG/5ML
5 INJECTION INTRAVENOUS EVERY 6 HOURS
Status: DISCONTINUED | OUTPATIENT
Start: 2022-01-01 | End: 2022-01-01 | Stop reason: HOSPADM

## 2022-01-01 RX ORDER — DEXMEDETOMIDINE HYDROCHLORIDE 4 UG/ML
.1-1.5 INJECTION INTRAVENOUS CONTINUOUS
Status: DISCONTINUED | OUTPATIENT
Start: 2022-01-01 | End: 2022-01-01 | Stop reason: SDUPTHER

## 2022-01-01 RX ORDER — NALOXONE HYDROCHLORIDE 0.4 MG/ML
INJECTION, SOLUTION INTRAMUSCULAR; INTRAVENOUS; SUBCUTANEOUS PRN
Status: DISCONTINUED | OUTPATIENT
Start: 2022-01-01 | End: 2022-01-01

## 2022-01-01 RX ORDER — BUMETANIDE 0.25 MG/ML
1 INJECTION, SOLUTION INTRAMUSCULAR; INTRAVENOUS ONCE
Status: COMPLETED | OUTPATIENT
Start: 2022-01-01 | End: 2022-01-01

## 2022-01-01 RX ORDER — SODIUM CHLORIDE 9 MG/ML
INJECTION, SOLUTION INTRAVENOUS PRN
OUTPATIENT
Start: 2022-01-01

## 2022-01-01 RX ORDER — ERGOCALCIFEROL 1.25 MG/1
50000 CAPSULE ORAL WEEKLY
OUTPATIENT
Start: 2022-11-08

## 2022-01-01 RX ORDER — BALSAM PERU/CASTOR OIL
1 OINTMENT (GRAM) TOPICAL 2 TIMES DAILY
Status: DISCONTINUED | OUTPATIENT
Start: 2022-01-01 | End: 2022-01-01 | Stop reason: HOSPADM

## 2022-01-01 RX ORDER — POLYETHYLENE GLYCOL 3350 17 G/17G
17 POWDER, FOR SOLUTION ORAL DAILY PRN
Status: DISCONTINUED | OUTPATIENT
Start: 2022-01-01 | End: 2022-01-01

## 2022-01-01 RX ORDER — LORAZEPAM 2 MG/ML
INJECTION INTRAMUSCULAR
Status: COMPLETED
Start: 2022-01-01 | End: 2022-01-01

## 2022-01-01 RX ORDER — ONDANSETRON 2 MG/ML
4 INJECTION INTRAMUSCULAR; INTRAVENOUS EVERY 6 HOURS PRN
Status: DISCONTINUED | OUTPATIENT
Start: 2022-01-01 | End: 2022-01-01 | Stop reason: HOSPADM

## 2022-01-01 RX ORDER — POTASSIUM CHLORIDE 20 MEQ/1
40 TABLET, EXTENDED RELEASE ORAL PRN
OUTPATIENT
Start: 2022-01-01

## 2022-01-01 RX ORDER — SODIUM CHLORIDE 9 MG/ML
25 INJECTION, SOLUTION INTRAVENOUS PRN
Status: DISCONTINUED | OUTPATIENT
Start: 2022-01-01 | End: 2022-01-01 | Stop reason: HOSPADM

## 2022-01-01 RX ORDER — EPINEPHRINE 1 MG/ML
INJECTION, SOLUTION, CONCENTRATE INTRAVENOUS PRN
Status: DISCONTINUED | OUTPATIENT
Start: 2022-01-01 | End: 2022-01-01 | Stop reason: SDUPTHER

## 2022-01-01 RX ORDER — ACETAMINOPHEN 325 MG/1
650 TABLET ORAL EVERY 6 HOURS PRN
Status: DISCONTINUED | OUTPATIENT
Start: 2022-01-01 | End: 2022-01-01

## 2022-01-01 RX ORDER — METOCLOPRAMIDE HYDROCHLORIDE 5 MG/ML
10 INJECTION INTRAMUSCULAR; INTRAVENOUS EVERY 6 HOURS
Status: DISCONTINUED | OUTPATIENT
Start: 2022-01-01 | End: 2022-01-01

## 2022-01-01 RX ORDER — BALSAM PERU/CASTOR OIL
1 OINTMENT (GRAM) TOPICAL 2 TIMES DAILY
OUTPATIENT
Start: 2022-01-01

## 2022-01-01 RX ORDER — HEPARIN SODIUM 1000 [USP'U]/ML
4000 INJECTION, SOLUTION INTRAVENOUS; SUBCUTANEOUS PRN
Status: DISCONTINUED | OUTPATIENT
Start: 2022-01-01 | End: 2022-01-01

## 2022-01-01 RX ORDER — FAMOTIDINE 20 MG/1
20 TABLET, FILM COATED ORAL 2 TIMES DAILY
Status: DISCONTINUED | OUTPATIENT
Start: 2022-01-01 | End: 2022-01-01 | Stop reason: HOSPADM

## 2022-01-01 RX ORDER — HYDROMORPHONE HYDROCHLORIDE 1 MG/ML
1 INJECTION, SOLUTION INTRAMUSCULAR; INTRAVENOUS; SUBCUTANEOUS
Status: DISCONTINUED | OUTPATIENT
Start: 2022-01-01 | End: 2022-01-01

## 2022-01-01 RX ORDER — LORAZEPAM 2 MG/ML
4 INJECTION INTRAMUSCULAR
Status: DISCONTINUED | OUTPATIENT
Start: 2022-01-01 | End: 2022-01-01

## 2022-01-01 RX ORDER — METOCLOPRAMIDE HYDROCHLORIDE 5 MG/ML
10 INJECTION INTRAMUSCULAR; INTRAVENOUS EVERY 6 HOURS
OUTPATIENT
Start: 2022-01-01

## 2022-01-01 RX ORDER — ENOXAPARIN SODIUM 100 MG/ML
40 INJECTION SUBCUTANEOUS DAILY
Status: DISCONTINUED | OUTPATIENT
Start: 2022-01-01 | End: 2022-01-01 | Stop reason: HOSPADM

## 2022-01-01 RX ORDER — LORAZEPAM 2 MG/ML
2 INJECTION INTRAMUSCULAR
Status: DISCONTINUED | OUTPATIENT
Start: 2022-01-01 | End: 2022-01-01

## 2022-01-01 RX ORDER — METOPROLOL TARTRATE 5 MG/5ML
5 INJECTION INTRAVENOUS EVERY 6 HOURS PRN
Status: DISCONTINUED | OUTPATIENT
Start: 2022-01-01 | End: 2022-01-01 | Stop reason: HOSPADM

## 2022-01-01 RX ORDER — SODIUM CHLORIDE 9 MG/ML
INJECTION, SOLUTION INTRAVENOUS PRN
Status: DISCONTINUED | OUTPATIENT
Start: 2022-01-01 | End: 2022-01-01 | Stop reason: SDUPTHER

## 2022-01-01 RX ORDER — ALBUMIN (HUMAN) 12.5 G/50ML
SOLUTION INTRAVENOUS
Status: COMPLETED
Start: 2022-01-01 | End: 2022-01-01

## 2022-01-01 RX ORDER — METOPROLOL TARTRATE 5 MG/5ML
5 INJECTION INTRAVENOUS EVERY 6 HOURS PRN
OUTPATIENT
Start: 2022-01-01

## 2022-01-01 RX ORDER — METOCLOPRAMIDE HYDROCHLORIDE 5 MG/ML
10 INJECTION INTRAMUSCULAR; INTRAVENOUS
Status: DISCONTINUED | OUTPATIENT
Start: 2022-01-01 | End: 2022-01-01 | Stop reason: HOSPADM

## 2022-01-01 RX ORDER — LABETALOL HYDROCHLORIDE 5 MG/ML
10 INJECTION, SOLUTION INTRAVENOUS EVERY 4 HOURS PRN
Status: DISCONTINUED | OUTPATIENT
Start: 2022-01-01 | End: 2022-01-01

## 2022-01-01 RX ORDER — DIGOXIN 0.25 MG/ML
125 INJECTION INTRAMUSCULAR; INTRAVENOUS DAILY
Status: DISCONTINUED | OUTPATIENT
Start: 2022-01-01 | End: 2022-01-01

## 2022-01-01 RX ORDER — HYDROMORPHONE HYDROCHLORIDE 1 MG/ML
0.5 INJECTION, SOLUTION INTRAMUSCULAR; INTRAVENOUS; SUBCUTANEOUS
Status: DISCONTINUED | OUTPATIENT
Start: 2022-01-01 | End: 2022-01-01

## 2022-01-01 RX ORDER — SODIUM CHLORIDE 9 MG/ML
INJECTION, SOLUTION INTRAVENOUS CONTINUOUS PRN
Status: DISCONTINUED | OUTPATIENT
Start: 2022-01-01 | End: 2022-01-01 | Stop reason: SDUPTHER

## 2022-01-01 RX ORDER — ONDANSETRON 4 MG/1
4 TABLET, ORALLY DISINTEGRATING ORAL EVERY 8 HOURS PRN
Status: DISCONTINUED | OUTPATIENT
Start: 2022-01-01 | End: 2022-01-01 | Stop reason: HOSPADM

## 2022-01-01 RX ORDER — DEXTROSE MONOHYDRATE 100 MG/ML
INJECTION, SOLUTION INTRAVENOUS CONTINUOUS PRN
OUTPATIENT
Start: 2022-01-01

## 2022-01-01 RX ORDER — ONDANSETRON 2 MG/ML
4 INJECTION INTRAMUSCULAR; INTRAVENOUS EVERY 6 HOURS PRN
OUTPATIENT
Start: 2022-01-01

## 2022-01-01 RX ORDER — ALBUMIN, HUMAN INJ 5% 5 %
25 SOLUTION INTRAVENOUS ONCE
Status: COMPLETED | OUTPATIENT
Start: 2022-01-01 | End: 2022-01-01

## 2022-01-01 RX ORDER — ONDANSETRON 4 MG/1
4 TABLET, ORALLY DISINTEGRATING ORAL EVERY 8 HOURS PRN
OUTPATIENT
Start: 2022-01-01

## 2022-01-01 RX ORDER — DEXMEDETOMIDINE HYDROCHLORIDE 4 UG/ML
.2-1.4 INJECTION, SOLUTION INTRAVENOUS CONTINUOUS
Status: DISCONTINUED | OUTPATIENT
Start: 2022-01-01 | End: 2022-01-01

## 2022-01-01 RX ORDER — ENOXAPARIN SODIUM 100 MG/ML
40 INJECTION SUBCUTANEOUS DAILY
OUTPATIENT
Start: 2022-01-01

## 2022-01-01 RX ORDER — METOPROLOL TARTRATE 5 MG/5ML
5 INJECTION INTRAVENOUS EVERY 4 HOURS
Status: DISCONTINUED | OUTPATIENT
Start: 2022-01-01 | End: 2022-01-01

## 2022-01-01 RX ORDER — HYDROMORPHONE HYDROCHLORIDE 1 MG/ML
0.5 INJECTION, SOLUTION INTRAMUSCULAR; INTRAVENOUS; SUBCUTANEOUS EVERY 4 HOURS PRN
Status: DISCONTINUED | OUTPATIENT
Start: 2022-01-01 | End: 2022-01-01 | Stop reason: HOSPADM

## 2022-01-01 RX ORDER — POTASSIUM CHLORIDE 20 MEQ/1
40 TABLET, EXTENDED RELEASE ORAL PRN
Status: DISCONTINUED | OUTPATIENT
Start: 2022-01-01 | End: 2022-01-01 | Stop reason: HOSPADM

## 2022-01-01 RX ORDER — LORAZEPAM 2 MG/ML
3 INJECTION INTRAMUSCULAR
Status: DISCONTINUED | OUTPATIENT
Start: 2022-01-01 | End: 2022-01-01

## 2022-01-01 RX ORDER — MAGNESIUM SULFATE IN WATER 40 MG/ML
2000 INJECTION, SOLUTION INTRAVENOUS ONCE
Status: COMPLETED | OUTPATIENT
Start: 2022-01-01 | End: 2022-01-01

## 2022-01-01 RX ORDER — LIDOCAINE HYDROCHLORIDE 20 MG/ML
JELLY TOPICAL ONCE
Status: COMPLETED | OUTPATIENT
Start: 2022-01-01 | End: 2022-01-01

## 2022-01-01 RX ORDER — SODIUM CHLORIDE 9 MG/ML
INJECTION, SOLUTION INTRAVENOUS CONTINUOUS
Status: DISCONTINUED | OUTPATIENT
Start: 2022-01-01 | End: 2022-01-01

## 2022-01-01 RX ORDER — OXYCODONE HYDROCHLORIDE 5 MG/1
5 TABLET ORAL EVERY 4 HOURS PRN
Status: DISCONTINUED | OUTPATIENT
Start: 2022-01-01 | End: 2022-01-01 | Stop reason: HOSPADM

## 2022-01-01 RX ORDER — HYDROMORPHONE HYDROCHLORIDE 1 MG/ML
0.5 INJECTION, SOLUTION INTRAMUSCULAR; INTRAVENOUS; SUBCUTANEOUS EVERY 30 MIN PRN
Status: COMPLETED | OUTPATIENT
Start: 2022-01-01 | End: 2022-01-01

## 2022-01-01 RX ORDER — LORAZEPAM 2 MG/ML
1 INJECTION INTRAMUSCULAR
Status: DISCONTINUED | OUTPATIENT
Start: 2022-01-01 | End: 2022-01-01

## 2022-01-01 RX ORDER — 0.9 % SODIUM CHLORIDE 0.9 %
500 INTRAVENOUS SOLUTION INTRAVENOUS ONCE
Status: DISCONTINUED | OUTPATIENT
Start: 2022-01-01 | End: 2022-01-01

## 2022-01-01 RX ORDER — LORAZEPAM 2 MG/ML
1 INJECTION INTRAMUSCULAR
OUTPATIENT
Start: 2022-01-01

## 2022-01-01 RX ORDER — MEROPENEM AND SODIUM CHLORIDE 1 G/50ML
1000 INJECTION, SOLUTION INTRAVENOUS EVERY 12 HOURS
Status: DISCONTINUED | OUTPATIENT
Start: 2022-01-01 | End: 2022-01-01

## 2022-01-01 RX ORDER — LORAZEPAM 2 MG/1
2 TABLET ORAL
Status: DISCONTINUED | OUTPATIENT
Start: 2022-01-01 | End: 2022-01-01

## 2022-01-01 RX ORDER — LEVOTHYROXINE SODIUM 88 UG/1
88 TABLET ORAL DAILY
Status: DISCONTINUED | OUTPATIENT
Start: 2022-01-01 | End: 2022-01-01

## 2022-01-01 RX ORDER — ACETAMINOPHEN 650 MG/1
650 SUPPOSITORY RECTAL EVERY 6 HOURS PRN
Status: DISCONTINUED | OUTPATIENT
Start: 2022-01-01 | End: 2022-01-01

## 2022-01-01 RX ORDER — METOPROLOL TARTRATE 5 MG/5ML
5 INJECTION INTRAVENOUS EVERY 6 HOURS
Status: DISCONTINUED | OUTPATIENT
Start: 2022-01-01 | End: 2022-01-01

## 2022-01-01 RX ORDER — SODIUM CHLORIDE 0.9 % (FLUSH) 0.9 %
10 SYRINGE (ML) INJECTION PRN
Status: DISCONTINUED | OUTPATIENT
Start: 2022-01-01 | End: 2022-01-01 | Stop reason: SDUPTHER

## 2022-01-01 RX ORDER — ALBUMIN (HUMAN) 12.5 G/50ML
50 SOLUTION INTRAVENOUS EVERY 6 HOURS
Status: COMPLETED | OUTPATIENT
Start: 2022-01-01 | End: 2022-01-01

## 2022-01-01 RX ORDER — HYDROMORPHONE HYDROCHLORIDE 1 MG/ML
2 INJECTION, SOLUTION INTRAMUSCULAR; INTRAVENOUS; SUBCUTANEOUS ONCE
Status: DISCONTINUED | OUTPATIENT
Start: 2022-01-01 | End: 2022-01-01

## 2022-01-01 RX ORDER — HYDROMORPHONE HYDROCHLORIDE 1 MG/ML
0.5 INJECTION, SOLUTION INTRAMUSCULAR; INTRAVENOUS; SUBCUTANEOUS EVERY 4 HOURS PRN
OUTPATIENT
Start: 2022-01-01

## 2022-01-01 RX ORDER — LORAZEPAM 1 MG/1
1 TABLET ORAL
Status: DISCONTINUED | OUTPATIENT
Start: 2022-01-01 | End: 2022-01-01 | Stop reason: HOSPADM

## 2022-01-01 RX ORDER — HYDROMORPHONE HYDROCHLORIDE 1 MG/ML
1 INJECTION, SOLUTION INTRAMUSCULAR; INTRAVENOUS; SUBCUTANEOUS EVERY 4 HOURS PRN
Status: DISCONTINUED | OUTPATIENT
Start: 2022-01-01 | End: 2022-01-01 | Stop reason: HOSPADM

## 2022-01-01 RX ORDER — MORPHINE SULFATE 4 MG/ML
4 INJECTION, SOLUTION INTRAMUSCULAR; INTRAVENOUS
Status: DISCONTINUED | OUTPATIENT
Start: 2022-01-01 | End: 2022-01-01

## 2022-01-01 RX ORDER — METOPROLOL TARTRATE 5 MG/5ML
5 INJECTION INTRAVENOUS EVERY 6 HOURS
OUTPATIENT
Start: 2022-01-01

## 2022-01-01 RX ORDER — FAMOTIDINE 20 MG/1
20 TABLET, FILM COATED ORAL 2 TIMES DAILY
OUTPATIENT
Start: 2022-01-01

## 2022-01-01 RX ORDER — SODIUM CHLORIDE, SODIUM LACTATE, POTASSIUM CHLORIDE, CALCIUM CHLORIDE 600; 310; 30; 20 MG/100ML; MG/100ML; MG/100ML; MG/100ML
INJECTION, SOLUTION INTRAVENOUS ONCE
Status: COMPLETED | OUTPATIENT
Start: 2022-01-01 | End: 2022-01-01

## 2022-01-01 RX ORDER — FAMOTIDINE 20 MG/1
20 TABLET, FILM COATED ORAL DAILY
Status: DISCONTINUED | OUTPATIENT
Start: 2022-01-01 | End: 2022-01-01

## 2022-01-01 RX ORDER — HYDROMORPHONE HYDROCHLORIDE 1 MG/ML
0.25 INJECTION, SOLUTION INTRAMUSCULAR; INTRAVENOUS; SUBCUTANEOUS EVERY 5 MIN PRN
Status: DISCONTINUED | OUTPATIENT
Start: 2022-01-01 | End: 2022-01-01 | Stop reason: HOSPADM

## 2022-01-01 RX ORDER — ROCURONIUM BROMIDE 10 MG/ML
INJECTION, SOLUTION INTRAVENOUS PRN
Status: DISCONTINUED | OUTPATIENT
Start: 2022-01-01 | End: 2022-01-01 | Stop reason: SDUPTHER

## 2022-01-01 RX ORDER — HEPARIN SODIUM 1000 [USP'U]/ML
4000 INJECTION, SOLUTION INTRAVENOUS; SUBCUTANEOUS ONCE
Status: COMPLETED | OUTPATIENT
Start: 2022-01-01 | End: 2022-01-01

## 2022-01-01 RX ORDER — FUROSEMIDE 10 MG/ML
20 INJECTION INTRAMUSCULAR; INTRAVENOUS 2 TIMES DAILY
Status: DISCONTINUED | OUTPATIENT
Start: 2022-01-01 | End: 2022-01-01 | Stop reason: HOSPADM

## 2022-01-01 RX ORDER — CHLORHEXIDINE GLUCONATE 0.12 MG/ML
15 RINSE ORAL 2 TIMES DAILY
Status: DISCONTINUED | OUTPATIENT
Start: 2022-01-01 | End: 2022-01-01

## 2022-01-01 RX ORDER — SODIUM CHLORIDE 0.9 % (FLUSH) 0.9 %
10 SYRINGE (ML) INJECTION EVERY 12 HOURS SCHEDULED
Status: DISCONTINUED | OUTPATIENT
Start: 2022-01-01 | End: 2022-01-01 | Stop reason: SDUPTHER

## 2022-01-01 RX ORDER — MORPHINE SULFATE 2 MG/ML
2 INJECTION, SOLUTION INTRAMUSCULAR; INTRAVENOUS
Status: DISCONTINUED | OUTPATIENT
Start: 2022-01-01 | End: 2022-01-01

## 2022-01-01 RX ADMIN — ACETAMINOPHEN 650 MG: 650 SUPPOSITORY RECTAL at 17:46

## 2022-01-01 RX ADMIN — SODIUM CHLORIDE, PRESERVATIVE FREE 10 ML: 5 INJECTION INTRAVENOUS at 09:00

## 2022-01-01 RX ADMIN — DIGOXIN 125 MCG: 0.25 INJECTION INTRAMUSCULAR; INTRAVENOUS at 08:49

## 2022-01-01 RX ADMIN — SODIUM CHLORIDE, PRESERVATIVE FREE 20 MG: 5 INJECTION INTRAVENOUS at 19:27

## 2022-01-01 RX ADMIN — HYDROMORPHONE HYDROCHLORIDE 0.5 MG: 1 INJECTION, SOLUTION INTRAMUSCULAR; INTRAVENOUS; SUBCUTANEOUS at 21:30

## 2022-01-01 RX ADMIN — CEFAZOLIN 2000 MG: 2 INJECTION, POWDER, FOR SOLUTION INTRAMUSCULAR; INTRAVENOUS at 15:15

## 2022-01-01 RX ADMIN — Medication 1 DOSE: at 07:56

## 2022-01-01 RX ADMIN — MEROPENEM AND SODIUM CHLORIDE 1000 MG: 1 INJECTION, SOLUTION INTRAVENOUS at 11:39

## 2022-01-01 RX ADMIN — SODIUM CHLORIDE, PRESERVATIVE FREE 20 MG: 5 INJECTION INTRAVENOUS at 08:12

## 2022-01-01 RX ADMIN — SODIUM CHLORIDE, PRESERVATIVE FREE 10 ML: 5 INJECTION INTRAVENOUS at 20:29

## 2022-01-01 RX ADMIN — ALBUMIN (HUMAN) 50 G: 0.25 INJECTION, SOLUTION INTRAVENOUS at 16:00

## 2022-01-01 RX ADMIN — DILTIAZEM HYDROCHLORIDE 10 MG: 5 INJECTION INTRAVENOUS at 14:06

## 2022-01-01 RX ADMIN — MEROPENEM AND SODIUM CHLORIDE 1000 MG: 1 INJECTION, SOLUTION INTRAVENOUS at 20:54

## 2022-01-01 RX ADMIN — SODIUM CHLORIDE 1000 ML: 9 INJECTION, SOLUTION INTRAVENOUS at 14:15

## 2022-01-01 RX ADMIN — ASCORBIC ACID, VITAMIN A PALMITATE, CHOLECALCIFEROL, THIAMINE HYDROCHLORIDE, RIBOFLAVIN-5 PHOSPHATE SODIUM, PYRIDOXINE HYDROCHLORIDE, NIACINAMIDE, DEXPANTHENOL, ALPHA-TOCOPHEROL ACETATE, VITAMIN K1, FOLIC ACID, BIOTIN, CYANOCOBALAMIN: 200; 3300; 200; 6; 3.6; 6; 40; 15; 10; 150; 600; 60; 5 INJECTION, SOLUTION INTRAVENOUS at 20:04

## 2022-01-01 RX ADMIN — SODIUM CHLORIDE, POTASSIUM CHLORIDE, SODIUM LACTATE AND CALCIUM CHLORIDE: 600; 310; 30; 20 INJECTION, SOLUTION INTRAVENOUS at 15:22

## 2022-01-01 RX ADMIN — PROPOFOL 50 MCG/KG/MIN: 10 INJECTION, EMULSION INTRAVENOUS at 18:55

## 2022-01-01 RX ADMIN — ETOMIDATE 10 MG: 2 INJECTION, SOLUTION INTRAVENOUS at 23:05

## 2022-01-01 RX ADMIN — Medication 1 DOSE: at 07:38

## 2022-01-01 RX ADMIN — DEXTROSE MONOHYDRATE 125 ML: 10 INJECTION, SOLUTION INTRAVENOUS at 19:37

## 2022-01-01 RX ADMIN — METOPROLOL TARTRATE 5 MG: 5 INJECTION INTRAVENOUS at 19:34

## 2022-01-01 RX ADMIN — POTASSIUM BICARBONATE 40 MEQ: 782 TABLET, EFFERVESCENT ORAL at 16:30

## 2022-01-01 RX ADMIN — Medication 1 DOSE: at 20:33

## 2022-01-01 RX ADMIN — CHLORHEXIDINE GLUCONATE 15 ML: 1.2 RINSE ORAL at 08:57

## 2022-01-01 RX ADMIN — HYDROMORPHONE HYDROCHLORIDE 2 MG: 1 INJECTION, SOLUTION INTRAMUSCULAR; INTRAVENOUS; SUBCUTANEOUS at 13:33

## 2022-01-01 RX ADMIN — FAMOTIDINE 20 MG: 20 TABLET ORAL at 07:38

## 2022-01-01 RX ADMIN — METOCLOPRAMIDE 10 MG: 5 INJECTION, SOLUTION INTRAMUSCULAR; INTRAVENOUS at 22:06

## 2022-01-01 RX ADMIN — SODIUM CHLORIDE: 9 INJECTION, SOLUTION INTRAVENOUS at 05:11

## 2022-01-01 RX ADMIN — LEVOTHYROXINE SODIUM 88 MCG: 0.09 TABLET ORAL at 09:24

## 2022-01-01 RX ADMIN — SODIUM CHLORIDE, PRESERVATIVE FREE 10 ML: 5 INJECTION INTRAVENOUS at 08:01

## 2022-01-01 RX ADMIN — ROCURONIUM BROMIDE 30 MG: 10 INJECTION, SOLUTION INTRAVENOUS at 00:12

## 2022-01-01 RX ADMIN — SODIUM CHLORIDE, PRESERVATIVE FREE 10 ML: 5 INJECTION INTRAVENOUS at 07:57

## 2022-01-01 RX ADMIN — ALBUMIN (HUMAN) 50 G: 0.25 INJECTION, SOLUTION INTRAVENOUS at 09:35

## 2022-01-01 RX ADMIN — LIDOCAINE HYDROCHLORIDE 50 MG: 10 INJECTION, SOLUTION INFILTRATION; PERINEURAL at 23:05

## 2022-01-01 RX ADMIN — Medication 1 DOSE: at 08:56

## 2022-01-01 RX ADMIN — DEXTROSE MONOHYDRATE: 100 INJECTION, SOLUTION INTRAVENOUS at 22:25

## 2022-01-01 RX ADMIN — METOPROLOL TARTRATE 5 MG: 5 INJECTION INTRAVENOUS at 14:21

## 2022-01-01 RX ADMIN — METOCLOPRAMIDE 10 MG: 5 INJECTION, SOLUTION INTRAMUSCULAR; INTRAVENOUS at 04:52

## 2022-01-01 RX ADMIN — DIGOXIN 125 MCG: 0.25 INJECTION INTRAMUSCULAR; INTRAVENOUS at 08:06

## 2022-01-01 RX ADMIN — SODIUM CHLORIDE, PRESERVATIVE FREE 10 ML: 5 INJECTION INTRAVENOUS at 20:08

## 2022-01-01 RX ADMIN — HYDROMORPHONE HYDROCHLORIDE 0.5 MG: 1 INJECTION, SOLUTION INTRAMUSCULAR; INTRAVENOUS; SUBCUTANEOUS at 00:25

## 2022-01-01 RX ADMIN — HYDROMORPHONE HYDROCHLORIDE 1 MG: 1 INJECTION, SOLUTION INTRAMUSCULAR; INTRAVENOUS; SUBCUTANEOUS at 10:21

## 2022-01-01 RX ADMIN — HYDROMORPHONE HYDROCHLORIDE 1 MG: 1 INJECTION, SOLUTION INTRAMUSCULAR; INTRAVENOUS; SUBCUTANEOUS at 17:22

## 2022-01-01 RX ADMIN — SODIUM CHLORIDE, PRESERVATIVE FREE 20 MG: 5 INJECTION INTRAVENOUS at 09:16

## 2022-01-01 RX ADMIN — MEROPENEM AND SODIUM CHLORIDE 1000 MG: 1 INJECTION, SOLUTION INTRAVENOUS at 20:37

## 2022-01-01 RX ADMIN — SODIUM BICARBONATE: 84 INJECTION, SOLUTION INTRAVENOUS at 05:15

## 2022-01-01 RX ADMIN — HYDROMORPHONE HYDROCHLORIDE 1 MG: 1 INJECTION, SOLUTION INTRAMUSCULAR; INTRAVENOUS; SUBCUTANEOUS at 11:21

## 2022-01-01 RX ADMIN — DIGOXIN 125 MCG: 0.25 INJECTION INTRAMUSCULAR; INTRAVENOUS at 10:01

## 2022-01-01 RX ADMIN — PROPOFOL 50 MCG/KG/MIN: 10 INJECTION, EMULSION INTRAVENOUS at 01:15

## 2022-01-01 RX ADMIN — BUMETANIDE 1 MG: 0.25 INJECTION INTRAMUSCULAR; INTRAVENOUS at 16:09

## 2022-01-01 RX ADMIN — THIAMINE HYDROCHLORIDE 100 MG: 100 INJECTION, SOLUTION INTRAMUSCULAR; INTRAVENOUS at 08:02

## 2022-01-01 RX ADMIN — MEROPENEM AND SODIUM CHLORIDE 1000 MG: 1 INJECTION, SOLUTION INTRAVENOUS at 20:27

## 2022-01-01 RX ADMIN — METOCLOPRAMIDE 10 MG: 5 INJECTION, SOLUTION INTRAMUSCULAR; INTRAVENOUS at 23:07

## 2022-01-01 RX ADMIN — SODIUM BICARBONATE: 84 INJECTION, SOLUTION INTRAVENOUS at 21:14

## 2022-01-01 RX ADMIN — HYDROMORPHONE HYDROCHLORIDE 0.5 MG: 1 INJECTION, SOLUTION INTRAMUSCULAR; INTRAVENOUS; SUBCUTANEOUS at 09:23

## 2022-01-01 RX ADMIN — MEROPENEM AND SODIUM CHLORIDE 1000 MG: 1 INJECTION, SOLUTION INTRAVENOUS at 09:18

## 2022-01-01 RX ADMIN — METOCLOPRAMIDE HYDROCHLORIDE 5 MG: 5 INJECTION INTRAMUSCULAR; INTRAVENOUS at 00:04

## 2022-01-01 RX ADMIN — FAMOTIDINE 20 MG: 20 TABLET ORAL at 09:17

## 2022-01-01 RX ADMIN — LEVOTHYROXINE SODIUM 88 MCG: 0.09 TABLET ORAL at 07:45

## 2022-01-01 RX ADMIN — HYDROMORPHONE HYDROCHLORIDE 2 MG: 1 INJECTION, SOLUTION INTRAMUSCULAR; INTRAVENOUS; SUBCUTANEOUS at 16:21

## 2022-01-01 RX ADMIN — PROPOFOL 50 MCG/KG/MIN: 10 INJECTION, EMULSION INTRAVENOUS at 03:56

## 2022-01-01 RX ADMIN — ALBUMIN (HUMAN) 50 G: 0.25 INJECTION, SOLUTION INTRAVENOUS at 06:26

## 2022-01-01 RX ADMIN — MEROPENEM AND SODIUM CHLORIDE 1000 MG: 1 INJECTION, SOLUTION INTRAVENOUS at 04:52

## 2022-01-01 RX ADMIN — SODIUM CHLORIDE: 4.5 INJECTION, SOLUTION INTRAVENOUS at 00:55

## 2022-01-01 RX ADMIN — PHENYLEPHRINE HYDROCHLORIDE 80 MCG/MIN: 10 INJECTION INTRAVENOUS at 11:14

## 2022-01-01 RX ADMIN — METOPROLOL TARTRATE 5 MG: 5 INJECTION INTRAVENOUS at 23:07

## 2022-01-01 RX ADMIN — METOCLOPRAMIDE HYDROCHLORIDE 5 MG: 5 INJECTION INTRAMUSCULAR; INTRAVENOUS at 06:36

## 2022-01-01 RX ADMIN — METOPROLOL TARTRATE 5 MG: 1 INJECTION, SOLUTION INTRAVENOUS at 04:52

## 2022-01-01 RX ADMIN — CALCIUM GLUCONATE 1000 MG: 20 INJECTION, SOLUTION INTRAVENOUS at 22:49

## 2022-01-01 RX ADMIN — METOCLOPRAMIDE HYDROCHLORIDE 5 MG: 5 INJECTION INTRAMUSCULAR; INTRAVENOUS at 19:08

## 2022-01-01 RX ADMIN — PROPOFOL 50 MCG/KG/MIN: 10 INJECTION, EMULSION INTRAVENOUS at 06:44

## 2022-01-01 RX ADMIN — LORAZEPAM 1 MG: 2 INJECTION INTRAMUSCULAR; INTRAVENOUS at 16:42

## 2022-01-01 RX ADMIN — PERFLUTREN 1.5 ML: 6.52 INJECTION, SUSPENSION INTRAVENOUS at 14:23

## 2022-01-01 RX ADMIN — DIGOXIN 125 MCG: 0.25 INJECTION INTRAMUSCULAR; INTRAVENOUS at 08:04

## 2022-01-01 RX ADMIN — CHLORHEXIDINE GLUCONATE 15 ML: 1.2 RINSE ORAL at 09:25

## 2022-01-01 RX ADMIN — POTASSIUM CHLORIDE 40 MEQ: 20 TABLET, EXTENDED RELEASE ORAL at 03:20

## 2022-01-01 RX ADMIN — CHLORHEXIDINE GLUCONATE 15 ML: 1.2 RINSE ORAL at 02:42

## 2022-01-01 RX ADMIN — CEFAZOLIN 2000 MG: 2 INJECTION, POWDER, FOR SOLUTION INTRAMUSCULAR; INTRAVENOUS at 06:45

## 2022-01-01 RX ADMIN — HYDROMORPHONE HYDROCHLORIDE 0.5 MG: 1 INJECTION, SOLUTION INTRAMUSCULAR; INTRAVENOUS; SUBCUTANEOUS at 06:27

## 2022-01-01 RX ADMIN — CARVEDILOL 25 MG: 25 TABLET, FILM COATED ORAL at 09:46

## 2022-01-01 RX ADMIN — MEROPENEM AND SODIUM CHLORIDE 1000 MG: 1 INJECTION, SOLUTION INTRAVENOUS at 10:18

## 2022-01-01 RX ADMIN — Medication 2 SPRAY: at 15:47

## 2022-01-01 RX ADMIN — MORPHINE SULFATE 2 MG: 2 INJECTION, SOLUTION INTRAMUSCULAR; INTRAVENOUS at 15:15

## 2022-01-01 RX ADMIN — Medication 10 MG/HR: at 14:08

## 2022-01-01 RX ADMIN — Medication 100 MG: at 10:01

## 2022-01-01 RX ADMIN — METOCLOPRAMIDE HYDROCHLORIDE 5 MG: 5 INJECTION INTRAMUSCULAR; INTRAVENOUS at 11:47

## 2022-01-01 RX ADMIN — SODIUM BICARBONATE: 84 INJECTION, SOLUTION INTRAVENOUS at 19:42

## 2022-01-01 RX ADMIN — ASCORBIC ACID, VITAMIN A PALMITATE, CHOLECALCIFEROL, THIAMINE HYDROCHLORIDE, RIBOFLAVIN-5 PHOSPHATE SODIUM, PYRIDOXINE HYDROCHLORIDE, NIACINAMIDE, DEXPANTHENOL, ALPHA-TOCOPHEROL ACETATE, VITAMIN K1, FOLIC ACID, BIOTIN, CYANOCOBALAMIN: 200; 3300; 200; 6; 3.6; 6; 40; 15; 10; 150; 600; 60; 5 INJECTION, SOLUTION INTRAVENOUS at 20:49

## 2022-01-01 RX ADMIN — METOPROLOL TARTRATE 5 MG: 1 INJECTION, SOLUTION INTRAVENOUS at 09:27

## 2022-01-01 RX ADMIN — SODIUM CHLORIDE, SODIUM LACTATE, POTASSIUM CHLORIDE, AND CALCIUM CHLORIDE: 600; 310; 30; 20 INJECTION, SOLUTION INTRAVENOUS at 13:46

## 2022-01-01 RX ADMIN — METOCLOPRAMIDE 10 MG: 5 INJECTION, SOLUTION INTRAMUSCULAR; INTRAVENOUS at 13:15

## 2022-01-01 RX ADMIN — ALBUMIN (HUMAN) 50 G: 0.25 INJECTION, SOLUTION INTRAVENOUS at 23:48

## 2022-01-01 RX ADMIN — PHENYLEPHRINE HYDROCHLORIDE 30 MCG/MIN: 10 INJECTION INTRAVENOUS at 22:53

## 2022-01-01 RX ADMIN — METOCLOPRAMIDE HYDROCHLORIDE 5 MG: 5 INJECTION INTRAMUSCULAR; INTRAVENOUS at 12:19

## 2022-01-01 RX ADMIN — FUROSEMIDE 20 MG: 10 INJECTION, SOLUTION INTRAMUSCULAR; INTRAVENOUS at 10:22

## 2022-01-01 RX ADMIN — HYDROMORPHONE HYDROCHLORIDE 1 MG: 1 INJECTION, SOLUTION INTRAMUSCULAR; INTRAVENOUS; SUBCUTANEOUS at 13:25

## 2022-01-01 RX ADMIN — Medication 100 MG: at 08:04

## 2022-01-01 RX ADMIN — ALBUMIN (HUMAN) 250 ML: 12.5 INJECTION, SOLUTION INTRAVENOUS at 01:05

## 2022-01-01 RX ADMIN — METOCLOPRAMIDE HYDROCHLORIDE 10 MG: 5 INJECTION INTRAMUSCULAR; INTRAVENOUS at 17:42

## 2022-01-01 RX ADMIN — THIAMINE HYDROCHLORIDE 100 MG: 100 INJECTION, SOLUTION INTRAMUSCULAR; INTRAVENOUS at 09:00

## 2022-01-01 RX ADMIN — THIAMINE HYDROCHLORIDE 100 MG: 100 INJECTION, SOLUTION INTRAMUSCULAR; INTRAVENOUS at 07:55

## 2022-01-01 RX ADMIN — METOCLOPRAMIDE 10 MG: 5 INJECTION, SOLUTION INTRAMUSCULAR; INTRAVENOUS at 17:19

## 2022-01-01 RX ADMIN — METOPROLOL TARTRATE 5 MG: 5 INJECTION INTRAVENOUS at 17:43

## 2022-01-01 RX ADMIN — ENOXAPARIN SODIUM 40 MG: 100 INJECTION SUBCUTANEOUS at 07:44

## 2022-01-01 RX ADMIN — METOCLOPRAMIDE HYDROCHLORIDE 5 MG: 5 INJECTION INTRAMUSCULAR; INTRAVENOUS at 06:28

## 2022-01-01 RX ADMIN — ALBUMIN (HUMAN) 50 G: 0.25 INJECTION, SOLUTION INTRAVENOUS at 20:47

## 2022-01-01 RX ADMIN — SODIUM CHLORIDE, POTASSIUM CHLORIDE, SODIUM LACTATE AND CALCIUM CHLORIDE: 600; 310; 30; 20 INJECTION, SOLUTION INTRAVENOUS at 15:52

## 2022-01-01 RX ADMIN — CEFAZOLIN 2000 MG: 2 INJECTION, POWDER, FOR SOLUTION INTRAMUSCULAR; INTRAVENOUS at 02:18

## 2022-01-01 RX ADMIN — Medication 1 DOSE: at 19:34

## 2022-01-01 RX ADMIN — MEROPENEM AND SODIUM CHLORIDE 1000 MG: 1 INJECTION, SOLUTION INTRAVENOUS at 04:11

## 2022-01-01 RX ADMIN — SODIUM CHLORIDE, PRESERVATIVE FREE 10 ML: 5 INJECTION INTRAVENOUS at 10:01

## 2022-01-01 RX ADMIN — METOPROLOL TARTRATE 5 MG: 5 INJECTION INTRAVENOUS at 16:15

## 2022-01-01 RX ADMIN — HYDROMORPHONE HYDROCHLORIDE 0.5 MG: 1 INJECTION, SOLUTION INTRAMUSCULAR; INTRAVENOUS; SUBCUTANEOUS at 15:44

## 2022-01-01 RX ADMIN — SODIUM CHLORIDE: 9 INJECTION, SOLUTION INTRAVENOUS at 00:08

## 2022-01-01 RX ADMIN — SODIUM CHLORIDE, PRESERVATIVE FREE 10 ML: 5 INJECTION INTRAVENOUS at 20:27

## 2022-01-01 RX ADMIN — METOCLOPRAMIDE HYDROCHLORIDE 5 MG: 5 INJECTION INTRAMUSCULAR; INTRAVENOUS at 00:24

## 2022-01-01 RX ADMIN — SODIUM CHLORIDE, SODIUM LACTATE, POTASSIUM CHLORIDE, AND CALCIUM CHLORIDE: 600; 310; 30; 20 INJECTION, SOLUTION INTRAVENOUS at 22:50

## 2022-01-01 RX ADMIN — DIGOXIN 125 MCG: 0.25 INJECTION INTRAMUSCULAR; INTRAVENOUS at 08:13

## 2022-01-01 RX ADMIN — PHENYLEPHRINE HYDROCHLORIDE 50 MCG/MIN: 10 INJECTION INTRAVENOUS at 23:21

## 2022-01-01 RX ADMIN — MEROPENEM AND SODIUM CHLORIDE 1000 MG: 1 INJECTION, SOLUTION INTRAVENOUS at 17:00

## 2022-01-01 RX ADMIN — HYDROMORPHONE HYDROCHLORIDE 0.5 MG: 1 INJECTION, SOLUTION INTRAMUSCULAR; INTRAVENOUS; SUBCUTANEOUS at 04:50

## 2022-01-01 RX ADMIN — METOCLOPRAMIDE HYDROCHLORIDE 5 MG: 5 INJECTION INTRAMUSCULAR; INTRAVENOUS at 13:30

## 2022-01-01 RX ADMIN — METOPROLOL TARTRATE 5 MG: 1 INJECTION, SOLUTION INTRAVENOUS at 09:00

## 2022-01-01 RX ADMIN — CHLORHEXIDINE GLUCONATE 15 ML: 1.2 RINSE ORAL at 20:28

## 2022-01-01 RX ADMIN — METOPROLOL TARTRATE 5 MG: 1 INJECTION, SOLUTION INTRAVENOUS at 16:49

## 2022-01-01 RX ADMIN — SODIUM CHLORIDE: 9 INJECTION, SOLUTION INTRAVENOUS at 11:47

## 2022-01-01 RX ADMIN — METOCLOPRAMIDE HYDROCHLORIDE 5 MG: 5 INJECTION INTRAMUSCULAR; INTRAVENOUS at 17:41

## 2022-01-01 RX ADMIN — SODIUM CHLORIDE, PRESERVATIVE FREE 10 ML: 5 INJECTION INTRAVENOUS at 08:57

## 2022-01-01 RX ADMIN — METOPROLOL TARTRATE 5 MG: 1 INJECTION, SOLUTION INTRAVENOUS at 12:41

## 2022-01-01 RX ADMIN — Medication 1 DOSE: at 08:34

## 2022-01-01 RX ADMIN — METOCLOPRAMIDE 10 MG: 5 INJECTION, SOLUTION INTRAMUSCULAR; INTRAVENOUS at 05:53

## 2022-01-01 RX ADMIN — CHLORHEXIDINE GLUCONATE 15 ML: 1.2 RINSE ORAL at 08:03

## 2022-01-01 RX ADMIN — MEROPENEM AND SODIUM CHLORIDE 1000 MG: 1 INJECTION, SOLUTION INTRAVENOUS at 04:59

## 2022-01-01 RX ADMIN — SODIUM CHLORIDE, PRESERVATIVE FREE 20 MG: 5 INJECTION INTRAVENOUS at 21:25

## 2022-01-01 RX ADMIN — HYDROMORPHONE HYDROCHLORIDE 2 MG: 1 INJECTION, SOLUTION INTRAMUSCULAR; INTRAVENOUS; SUBCUTANEOUS at 04:44

## 2022-01-01 RX ADMIN — Medication 1 DOSE: at 20:08

## 2022-01-01 RX ADMIN — SUCCINYLCHOLINE CHLORIDE 100 MG: 20 INJECTION, SOLUTION INTRAMUSCULAR; INTRAVENOUS at 23:05

## 2022-01-01 RX ADMIN — HYDROMORPHONE HYDROCHLORIDE 0.5 MG: 1 INJECTION, SOLUTION INTRAMUSCULAR; INTRAVENOUS; SUBCUTANEOUS at 23:06

## 2022-01-01 RX ADMIN — VANCOMYCIN HYDROCHLORIDE 2000 MG: 10 INJECTION, POWDER, LYOPHILIZED, FOR SOLUTION INTRAVENOUS at 02:24

## 2022-01-01 RX ADMIN — PHENYLEPHRINE HYDROCHLORIDE 30 MCG/MIN: 10 INJECTION INTRAVENOUS at 18:57

## 2022-01-01 RX ADMIN — PIPERACILLIN AND TAZOBACTAM 4500 MG: 4; .5 INJECTION, POWDER, FOR SOLUTION INTRAVENOUS at 17:27

## 2022-01-01 RX ADMIN — VASOPRESSIN 2 UNITS: 20 INJECTION INTRAVENOUS at 23:15

## 2022-01-01 RX ADMIN — SODIUM CHLORIDE: 9 INJECTION, SOLUTION INTRAVENOUS at 22:47

## 2022-01-01 RX ADMIN — METOPROLOL TARTRATE 5 MG: 5 INJECTION INTRAVENOUS at 17:08

## 2022-01-01 RX ADMIN — FUROSEMIDE 20 MG: 10 INJECTION, SOLUTION INTRAMUSCULAR; INTRAVENOUS at 17:19

## 2022-01-01 RX ADMIN — METOPROLOL TARTRATE 5 MG: 5 INJECTION INTRAVENOUS at 17:30

## 2022-01-01 RX ADMIN — ENOXAPARIN SODIUM 40 MG: 100 INJECTION SUBCUTANEOUS at 20:27

## 2022-01-01 RX ADMIN — SODIUM BICARBONATE: 84 INJECTION, SOLUTION INTRAVENOUS at 00:27

## 2022-01-01 RX ADMIN — SODIUM CHLORIDE, PRESERVATIVE FREE 20 MG: 5 INJECTION INTRAVENOUS at 07:55

## 2022-01-01 RX ADMIN — Medication 1 DOSE: at 08:19

## 2022-01-01 RX ADMIN — DIGOXIN 125 MCG: 0.25 INJECTION INTRAMUSCULAR; INTRAVENOUS at 08:55

## 2022-01-01 RX ADMIN — SODIUM CHLORIDE 500 ML: 9 INJECTION, SOLUTION INTRAVENOUS at 11:14

## 2022-01-01 RX ADMIN — METOCLOPRAMIDE HYDROCHLORIDE 5 MG: 5 INJECTION INTRAMUSCULAR; INTRAVENOUS at 16:50

## 2022-01-01 RX ADMIN — Medication 10 MG: at 14:08

## 2022-01-01 RX ADMIN — SODIUM CHLORIDE, PRESERVATIVE FREE 10 ML: 5 INJECTION INTRAVENOUS at 19:27

## 2022-01-01 RX ADMIN — LIDOCAINE HYDROCHLORIDE: 20 JELLY TOPICAL at 15:47

## 2022-01-01 RX ADMIN — FAMOTIDINE 20 MG: 20 TABLET ORAL at 19:48

## 2022-01-01 RX ADMIN — METOCLOPRAMIDE 10 MG: 5 INJECTION, SOLUTION INTRAMUSCULAR; INTRAVENOUS at 12:14

## 2022-01-01 RX ADMIN — FAMOTIDINE 20 MG: 20 TABLET ORAL at 19:34

## 2022-01-01 RX ADMIN — METOPROLOL TARTRATE 5 MG: 1 INJECTION, SOLUTION INTRAVENOUS at 16:05

## 2022-01-01 RX ADMIN — HYDROMORPHONE HYDROCHLORIDE 2 MG: 1 INJECTION, SOLUTION INTRAMUSCULAR; INTRAVENOUS; SUBCUTANEOUS at 21:10

## 2022-01-01 RX ADMIN — METOCLOPRAMIDE HYDROCHLORIDE 10 MG: 5 INJECTION INTRAMUSCULAR; INTRAVENOUS at 18:12

## 2022-01-01 RX ADMIN — HYDROMORPHONE HYDROCHLORIDE 0.5 MG: 1 INJECTION, SOLUTION INTRAMUSCULAR; INTRAVENOUS; SUBCUTANEOUS at 16:49

## 2022-01-01 RX ADMIN — ENOXAPARIN SODIUM 40 MG: 100 INJECTION SUBCUTANEOUS at 18:48

## 2022-01-01 RX ADMIN — SODIUM CHLORIDE, PRESERVATIVE FREE 20 MG: 5 INJECTION INTRAVENOUS at 07:45

## 2022-01-01 RX ADMIN — MIDAZOLAM 2 MG: 1 INJECTION INTRAMUSCULAR; INTRAVENOUS at 01:20

## 2022-01-01 RX ADMIN — SODIUM CHLORIDE, SODIUM LACTATE, POTASSIUM CHLORIDE, CALCIUM CHLORIDE AND DEXTROSE MONOHYDRATE: 5; 600; 310; 30; 20 INJECTION, SOLUTION INTRAVENOUS at 20:10

## 2022-01-01 RX ADMIN — METOPROLOL TARTRATE 5 MG: 5 INJECTION INTRAVENOUS at 06:02

## 2022-01-01 RX ADMIN — SODIUM CHLORIDE 1000 ML: 9 INJECTION, SOLUTION INTRAVENOUS at 18:59

## 2022-01-01 RX ADMIN — MEROPENEM 2000 MG: 1 INJECTION, POWDER, FOR SOLUTION INTRAVENOUS at 21:21

## 2022-01-01 RX ADMIN — HYDROMORPHONE HYDROCHLORIDE 1 MG: 1 INJECTION, SOLUTION INTRAMUSCULAR; INTRAVENOUS; SUBCUTANEOUS at 13:39

## 2022-01-01 RX ADMIN — METOPROLOL TARTRATE 5 MG: 5 INJECTION INTRAVENOUS at 01:52

## 2022-01-01 RX ADMIN — Medication: at 17:06

## 2022-01-01 RX ADMIN — Medication 100 MG: at 09:17

## 2022-01-01 RX ADMIN — HYDROMORPHONE HYDROCHLORIDE 3 MG: 1 INJECTION, SOLUTION INTRAMUSCULAR; INTRAVENOUS; SUBCUTANEOUS at 18:05

## 2022-01-01 RX ADMIN — METOCLOPRAMIDE HYDROCHLORIDE 5 MG: 5 INJECTION INTRAMUSCULAR; INTRAVENOUS at 00:41

## 2022-01-01 RX ADMIN — HYDROMORPHONE HYDROCHLORIDE 1 MG: 1 INJECTION, SOLUTION INTRAMUSCULAR; INTRAVENOUS; SUBCUTANEOUS at 02:49

## 2022-01-01 RX ADMIN — METOCLOPRAMIDE HYDROCHLORIDE 10 MG: 5 INJECTION INTRAMUSCULAR; INTRAVENOUS at 11:45

## 2022-01-01 RX ADMIN — METOPROLOL TARTRATE 5 MG: 5 INJECTION INTRAVENOUS at 11:30

## 2022-01-01 RX ADMIN — SODIUM CHLORIDE, SODIUM LACTATE, POTASSIUM CHLORIDE, CALCIUM CHLORIDE AND DEXTROSE MONOHYDRATE: 5; 600; 310; 30; 20 INJECTION, SOLUTION INTRAVENOUS at 12:30

## 2022-01-01 RX ADMIN — METOPROLOL TARTRATE 5 MG: 1 INJECTION, SOLUTION INTRAVENOUS at 12:20

## 2022-01-01 RX ADMIN — ONDANSETRON 4 MG: 2 INJECTION INTRAMUSCULAR; INTRAVENOUS at 14:14

## 2022-01-01 RX ADMIN — METOCLOPRAMIDE HYDROCHLORIDE 5 MG: 5 INJECTION INTRAMUSCULAR; INTRAVENOUS at 05:40

## 2022-01-01 RX ADMIN — METOPROLOL TARTRATE 5 MG: 1 INJECTION, SOLUTION INTRAVENOUS at 05:07

## 2022-01-01 RX ADMIN — CEFAZOLIN 2000 MG: 2 INJECTION, POWDER, FOR SOLUTION INTRAMUSCULAR; INTRAVENOUS at 07:45

## 2022-01-01 RX ADMIN — HYDROMORPHONE HYDROCHLORIDE 0.5 MG: 1 INJECTION, SOLUTION INTRAMUSCULAR; INTRAVENOUS; SUBCUTANEOUS at 22:15

## 2022-01-01 RX ADMIN — METOCLOPRAMIDE HYDROCHLORIDE 5 MG: 5 INJECTION INTRAMUSCULAR; INTRAVENOUS at 11:39

## 2022-01-01 RX ADMIN — SODIUM CHLORIDE 1000 ML: 9 INJECTION, SOLUTION INTRAVENOUS at 12:28

## 2022-01-01 RX ADMIN — HYDROMORPHONE HYDROCHLORIDE 1 MG: 1 INJECTION, SOLUTION INTRAMUSCULAR; INTRAVENOUS; SUBCUTANEOUS at 14:57

## 2022-01-01 RX ADMIN — PROPOFOL 40 MCG/KG/MIN: 10 INJECTION, EMULSION INTRAVENOUS at 12:19

## 2022-01-01 RX ADMIN — EPINEPHRINE 100 MCG: 1 INJECTION, SOLUTION, CONCENTRATE INTRAVENOUS at 23:14

## 2022-01-01 RX ADMIN — Medication 1 DOSE: at 09:00

## 2022-01-01 RX ADMIN — METOPROLOL TARTRATE 5 MG: 1 INJECTION, SOLUTION INTRAVENOUS at 17:57

## 2022-01-01 RX ADMIN — METOPROLOL TARTRATE 5 MG: 1 INJECTION, SOLUTION INTRAVENOUS at 20:32

## 2022-01-01 RX ADMIN — Medication 1 DOSE: at 19:49

## 2022-01-01 RX ADMIN — DEXMEDETOMIDINE HYDROCHLORIDE 0.2 MCG/KG/HR: 4 INJECTION, SOLUTION INTRAVENOUS at 02:24

## 2022-01-01 RX ADMIN — SODIUM CHLORIDE 1000 ML: 9 INJECTION, SOLUTION INTRAVENOUS at 11:32

## 2022-01-01 RX ADMIN — SODIUM CHLORIDE, PRESERVATIVE FREE 10 ML: 5 INJECTION INTRAVENOUS at 09:23

## 2022-01-01 RX ADMIN — HYDROMORPHONE HYDROCHLORIDE 2 MG: 1 INJECTION, SOLUTION INTRAMUSCULAR; INTRAVENOUS; SUBCUTANEOUS at 18:56

## 2022-01-01 RX ADMIN — METOCLOPRAMIDE 10 MG: 5 INJECTION, SOLUTION INTRAMUSCULAR; INTRAVENOUS at 17:44

## 2022-01-01 RX ADMIN — Medication 1 DOSE: at 20:27

## 2022-01-01 RX ADMIN — METOPROLOL TARTRATE 5 MG: 1 INJECTION, SOLUTION INTRAVENOUS at 16:42

## 2022-01-01 RX ADMIN — SODIUM CHLORIDE, PRESERVATIVE FREE 20 MG: 5 INJECTION INTRAVENOUS at 08:33

## 2022-01-01 RX ADMIN — IOPAMIDOL 75 ML: 755 INJECTION, SOLUTION INTRAVENOUS at 14:43

## 2022-01-01 RX ADMIN — METOPROLOL TARTRATE 5 MG: 1 INJECTION, SOLUTION INTRAVENOUS at 05:30

## 2022-01-01 RX ADMIN — HYDROMORPHONE HYDROCHLORIDE 2 MG: 1 INJECTION, SOLUTION INTRAMUSCULAR; INTRAVENOUS; SUBCUTANEOUS at 02:14

## 2022-01-01 RX ADMIN — SODIUM CHLORIDE, PRESERVATIVE FREE 10 ML: 5 INJECTION INTRAVENOUS at 08:35

## 2022-01-01 RX ADMIN — METOCLOPRAMIDE HYDROCHLORIDE 5 MG: 5 INJECTION INTRAMUSCULAR; INTRAVENOUS at 06:14

## 2022-01-01 RX ADMIN — LORAZEPAM 0.5 MG: 2 INJECTION, SOLUTION INTRAMUSCULAR; INTRAVENOUS at 08:48

## 2022-01-01 RX ADMIN — Medication 100 MG: at 07:37

## 2022-01-01 RX ADMIN — BUMETANIDE 1 MG: 0.25 INJECTION, SOLUTION INTRAMUSCULAR; INTRAVENOUS at 16:09

## 2022-01-01 RX ADMIN — SODIUM CHLORIDE, PRESERVATIVE FREE 10 ML: 5 INJECTION INTRAVENOUS at 07:46

## 2022-01-01 RX ADMIN — MEROPENEM AND SODIUM CHLORIDE 1000 MG: 1 INJECTION, SOLUTION INTRAVENOUS at 13:53

## 2022-01-01 RX ADMIN — HYDROMORPHONE HYDROCHLORIDE 1 MG: 1 INJECTION, SOLUTION INTRAMUSCULAR; INTRAVENOUS; SUBCUTANEOUS at 08:33

## 2022-01-01 RX ADMIN — HYDROMORPHONE HYDROCHLORIDE 1 MG: 1 INJECTION, SOLUTION INTRAMUSCULAR; INTRAVENOUS; SUBCUTANEOUS at 13:14

## 2022-01-01 RX ADMIN — SODIUM BICARBONATE: 84 INJECTION, SOLUTION INTRAVENOUS at 11:10

## 2022-01-01 RX ADMIN — FENTANYL CITRATE 50 MCG: 50 INJECTION, SOLUTION INTRAMUSCULAR; INTRAVENOUS at 23:05

## 2022-01-01 RX ADMIN — ASCORBIC ACID, VITAMIN A PALMITATE, CHOLECALCIFEROL, THIAMINE HYDROCHLORIDE, RIBOFLAVIN-5 PHOSPHATE SODIUM, PYRIDOXINE HYDROCHLORIDE, NIACINAMIDE, DEXPANTHENOL, ALPHA-TOCOPHEROL ACETATE, VITAMIN K1, FOLIC ACID, BIOTIN, CYANOCOBALAMIN: 200; 3300; 200; 6; 3.6; 6; 40; 15; 10; 150; 600; 60; 5 INJECTION, SOLUTION INTRAVENOUS at 18:13

## 2022-01-01 RX ADMIN — SODIUM CHLORIDE, POTASSIUM CHLORIDE, SODIUM LACTATE AND CALCIUM CHLORIDE 1000 ML: 600; 310; 30; 20 INJECTION, SOLUTION INTRAVENOUS at 14:14

## 2022-01-01 RX ADMIN — DEXMEDETOMIDINE HYDROCHLORIDE 0.5 MCG/KG/HR: 4 INJECTION, SOLUTION INTRAVENOUS at 05:10

## 2022-01-01 RX ADMIN — HYDROMORPHONE HYDROCHLORIDE 2 MG: 1 INJECTION, SOLUTION INTRAMUSCULAR; INTRAVENOUS; SUBCUTANEOUS at 09:52

## 2022-01-01 RX ADMIN — SODIUM CHLORIDE, PRESERVATIVE FREE 10 ML: 5 INJECTION INTRAVENOUS at 20:44

## 2022-01-01 RX ADMIN — METOCLOPRAMIDE HYDROCHLORIDE 10 MG: 5 INJECTION INTRAMUSCULAR; INTRAVENOUS at 00:19

## 2022-01-01 RX ADMIN — METRONIDAZOLE 500 MG: 500 INJECTION, SOLUTION INTRAVENOUS at 09:50

## 2022-01-01 RX ADMIN — MIDAZOLAM 2 MG: 1 INJECTION INTRAMUSCULAR; INTRAVENOUS at 23:42

## 2022-01-01 RX ADMIN — FAMOTIDINE 20 MG: 20 TABLET ORAL at 08:04

## 2022-01-01 RX ADMIN — METOPROLOL TARTRATE 5 MG: 1 INJECTION, SOLUTION INTRAVENOUS at 00:20

## 2022-01-01 RX ADMIN — METOPROLOL TARTRATE 5 MG: 5 INJECTION INTRAVENOUS at 05:53

## 2022-01-01 RX ADMIN — IOPAMIDOL 70 ML: 755 INJECTION, SOLUTION INTRAVENOUS at 18:20

## 2022-01-01 RX ADMIN — SODIUM CHLORIDE, PRESERVATIVE FREE 10 ML: 5 INJECTION INTRAVENOUS at 10:02

## 2022-01-01 RX ADMIN — LORAZEPAM 1 MG: 2 INJECTION INTRAMUSCULAR; INTRAVENOUS at 17:42

## 2022-01-01 RX ADMIN — ROCURONIUM BROMIDE 45 MG: 10 INJECTION, SOLUTION INTRAVENOUS at 23:12

## 2022-01-01 RX ADMIN — METOCLOPRAMIDE HYDROCHLORIDE 5 MG: 5 INJECTION INTRAMUSCULAR; INTRAVENOUS at 13:55

## 2022-01-01 RX ADMIN — MEROPENEM AND SODIUM CHLORIDE 1000 MG: 1 INJECTION, SOLUTION INTRAVENOUS at 19:38

## 2022-01-01 RX ADMIN — HYDROMORPHONE HYDROCHLORIDE 2 MG: 1 INJECTION, SOLUTION INTRAMUSCULAR; INTRAVENOUS; SUBCUTANEOUS at 10:10

## 2022-01-01 RX ADMIN — PROPOFOL 50 MCG/KG/MIN: 10 INJECTION, EMULSION INTRAVENOUS at 22:30

## 2022-01-01 RX ADMIN — Medication 1 DOSE: at 10:01

## 2022-01-01 RX ADMIN — SODIUM CHLORIDE 500 ML: 9 INJECTION, SOLUTION INTRAVENOUS at 08:14

## 2022-01-01 RX ADMIN — DILTIAZEM HYDROCHLORIDE 10 MG: 5 INJECTION, SOLUTION INTRAVENOUS at 18:12

## 2022-01-01 RX ADMIN — METOCLOPRAMIDE HYDROCHLORIDE 10 MG: 5 INJECTION INTRAMUSCULAR; INTRAVENOUS at 05:07

## 2022-01-01 RX ADMIN — METRONIDAZOLE 500 MG: 500 INJECTION, SOLUTION INTRAVENOUS at 16:42

## 2022-01-01 RX ADMIN — FAMOTIDINE 20 MG: 20 TABLET ORAL at 21:14

## 2022-01-01 RX ADMIN — MEROPENEM AND SODIUM CHLORIDE 1000 MG: 1 INJECTION, SOLUTION INTRAVENOUS at 13:25

## 2022-01-01 RX ADMIN — LORAZEPAM 0.5 MG: 2 INJECTION, SOLUTION INTRAMUSCULAR; INTRAVENOUS at 19:39

## 2022-01-01 RX ADMIN — HYDROMORPHONE HYDROCHLORIDE 2 MG: 1 INJECTION, SOLUTION INTRAMUSCULAR; INTRAVENOUS; SUBCUTANEOUS at 13:13

## 2022-01-01 RX ADMIN — FAMOTIDINE 20 MG: 20 TABLET ORAL at 10:01

## 2022-01-01 RX ADMIN — ALBUMIN (HUMAN) 50 G: 0.25 INJECTION, SOLUTION INTRAVENOUS at 02:59

## 2022-01-01 RX ADMIN — Medication 1 DOSE: at 11:45

## 2022-01-01 RX ADMIN — METOCLOPRAMIDE HYDROCHLORIDE 5 MG: 5 INJECTION INTRAMUSCULAR; INTRAVENOUS at 00:25

## 2022-01-01 RX ADMIN — SODIUM CHLORIDE, PRESERVATIVE FREE 10 ML: 5 INJECTION INTRAVENOUS at 08:14

## 2022-01-01 RX ADMIN — DIGOXIN 250 MCG: 0.25 INJECTION INTRAMUSCULAR; INTRAVENOUS at 17:58

## 2022-01-01 RX ADMIN — METOPROLOL TARTRATE 5 MG: 5 INJECTION INTRAVENOUS at 17:37

## 2022-01-01 RX ADMIN — CEFAZOLIN 2000 MG: 2 INJECTION, POWDER, FOR SOLUTION INTRAMUSCULAR; INTRAVENOUS at 22:25

## 2022-01-01 RX ADMIN — SODIUM CHLORIDE, POTASSIUM CHLORIDE, SODIUM LACTATE AND CALCIUM CHLORIDE: 600; 310; 30; 20 INJECTION, SOLUTION INTRAVENOUS at 16:42

## 2022-01-01 RX ADMIN — METOPROLOL TARTRATE 5 MG: 1 INJECTION, SOLUTION INTRAVENOUS at 14:04

## 2022-01-01 RX ADMIN — LORAZEPAM 1 MG: 2 INJECTION INTRAMUSCULAR; INTRAVENOUS at 07:45

## 2022-01-01 RX ADMIN — CHLORHEXIDINE GLUCONATE 15 ML: 1.2 RINSE ORAL at 20:08

## 2022-01-01 RX ADMIN — Medication 1 DOSE: at 21:42

## 2022-01-01 RX ADMIN — METOCLOPRAMIDE HYDROCHLORIDE 10 MG: 5 INJECTION INTRAMUSCULAR; INTRAVENOUS at 04:43

## 2022-01-01 RX ADMIN — SODIUM CHLORIDE, POTASSIUM CHLORIDE, SODIUM LACTATE AND CALCIUM CHLORIDE: 600; 310; 30; 20 INJECTION, SOLUTION INTRAVENOUS at 14:47

## 2022-01-01 RX ADMIN — PHENYLEPHRINE HYDROCHLORIDE 90 MCG/MIN: 10 INJECTION INTRAVENOUS at 01:34

## 2022-01-01 RX ADMIN — THIAMINE HYDROCHLORIDE 100 MG: 100 INJECTION, SOLUTION INTRAMUSCULAR; INTRAVENOUS at 08:31

## 2022-01-01 RX ADMIN — ALBUMIN (HUMAN) 25 G: 12.5 INJECTION, SOLUTION INTRAVENOUS at 19:41

## 2022-01-01 RX ADMIN — THIAMINE HYDROCHLORIDE 100 MG: 100 INJECTION, SOLUTION INTRAMUSCULAR; INTRAVENOUS at 08:13

## 2022-01-01 RX ADMIN — DEXTROSE MONOHYDRATE 125 ML: 10 INJECTION, SOLUTION INTRAVENOUS at 10:11

## 2022-01-01 RX ADMIN — HYDROMORPHONE HYDROCHLORIDE 0.5 MG: 1 INJECTION, SOLUTION INTRAMUSCULAR; INTRAVENOUS; SUBCUTANEOUS at 14:14

## 2022-01-01 RX ADMIN — SODIUM CHLORIDE: 4.5 INJECTION, SOLUTION INTRAVENOUS at 09:15

## 2022-01-01 RX ADMIN — METOCLOPRAMIDE HYDROCHLORIDE 5 MG: 5 INJECTION INTRAMUSCULAR; INTRAVENOUS at 18:28

## 2022-01-01 RX ADMIN — Medication 1 DOSE: at 08:04

## 2022-01-01 RX ADMIN — SODIUM CHLORIDE, POTASSIUM CHLORIDE, SODIUM LACTATE AND CALCIUM CHLORIDE 1000 ML: 600; 310; 30; 20 INJECTION, SOLUTION INTRAVENOUS at 09:30

## 2022-01-01 RX ADMIN — THIAMINE HYDROCHLORIDE 100 MG: 100 INJECTION, SOLUTION INTRAMUSCULAR; INTRAVENOUS at 16:48

## 2022-01-01 RX ADMIN — HYDROMORPHONE HYDROCHLORIDE 2 MG: 1 INJECTION, SOLUTION INTRAMUSCULAR; INTRAVENOUS; SUBCUTANEOUS at 07:38

## 2022-01-01 RX ADMIN — SODIUM BICARBONATE: 84 INJECTION, SOLUTION INTRAVENOUS at 11:35

## 2022-01-01 RX ADMIN — Medication 1 DOSE: at 20:32

## 2022-01-01 RX ADMIN — HYDROMORPHONE HYDROCHLORIDE 0.5 MG: 1 INJECTION, SOLUTION INTRAMUSCULAR; INTRAVENOUS; SUBCUTANEOUS at 12:14

## 2022-01-01 RX ADMIN — METOPROLOL TARTRATE 5 MG: 5 INJECTION INTRAVENOUS at 13:38

## 2022-01-01 RX ADMIN — SODIUM CHLORIDE, SODIUM LACTATE, POTASSIUM CHLORIDE, CALCIUM CHLORIDE AND DEXTROSE MONOHYDRATE: 5; 600; 310; 30; 20 INJECTION, SOLUTION INTRAVENOUS at 03:26

## 2022-01-01 RX ADMIN — THIAMINE HYDROCHLORIDE 100 MG: 100 INJECTION, SOLUTION INTRAMUSCULAR; INTRAVENOUS at 07:45

## 2022-01-01 RX ADMIN — MEROPENEM AND SODIUM CHLORIDE 1000 MG: 1 INJECTION, SOLUTION INTRAVENOUS at 11:49

## 2022-01-01 RX ADMIN — Medication 1 DOSE: at 20:48

## 2022-01-01 RX ADMIN — PHENYLEPHRINE HYDROCHLORIDE 115 MCG/MIN: 10 INJECTION INTRAVENOUS at 20:58

## 2022-01-01 RX ADMIN — METOPROLOL TARTRATE 5 MG: 5 INJECTION INTRAVENOUS at 21:20

## 2022-01-01 RX ADMIN — SODIUM CHLORIDE 500 ML: 9 INJECTION, SOLUTION INTRAVENOUS at 23:43

## 2022-01-01 RX ADMIN — DIGOXIN 125 MCG: 0.25 INJECTION INTRAMUSCULAR; INTRAVENOUS at 07:56

## 2022-01-01 RX ADMIN — SODIUM CHLORIDE, PRESERVATIVE FREE 20 MG: 5 INJECTION INTRAVENOUS at 08:56

## 2022-01-01 RX ADMIN — LEVOTHYROXINE SODIUM 88 MCG: 0.09 TABLET ORAL at 07:37

## 2022-01-01 RX ADMIN — HYDROMORPHONE HYDROCHLORIDE 0.5 MG: 1 INJECTION, SOLUTION INTRAMUSCULAR; INTRAVENOUS; SUBCUTANEOUS at 15:34

## 2022-01-01 RX ADMIN — METOCLOPRAMIDE HYDROCHLORIDE 5 MG: 5 INJECTION INTRAMUSCULAR; INTRAVENOUS at 00:49

## 2022-01-01 RX ADMIN — HYDROMORPHONE HYDROCHLORIDE 0.5 MG: 1 INJECTION, SOLUTION INTRAMUSCULAR; INTRAVENOUS; SUBCUTANEOUS at 15:23

## 2022-01-01 RX ADMIN — SODIUM BICARBONATE: 84 INJECTION, SOLUTION INTRAVENOUS at 09:46

## 2022-01-01 RX ADMIN — FAMOTIDINE 20 MG: 20 TABLET ORAL at 20:48

## 2022-01-01 RX ADMIN — Medication 10 MG: at 07:55

## 2022-01-01 RX ADMIN — CALCIUM GLUCONATE 1000 MG: 20 INJECTION, SOLUTION INTRAVENOUS at 16:45

## 2022-01-01 RX ADMIN — HYDROMORPHONE HYDROCHLORIDE 0.5 MG: 1 INJECTION, SOLUTION INTRAMUSCULAR; INTRAVENOUS; SUBCUTANEOUS at 08:01

## 2022-01-01 RX ADMIN — ERGOCALCIFEROL 50000 UNITS: 1.25 CAPSULE ORAL at 08:05

## 2022-01-01 RX ADMIN — HYDROMORPHONE HYDROCHLORIDE 0.5 MG: 1 INJECTION, SOLUTION INTRAMUSCULAR; INTRAVENOUS; SUBCUTANEOUS at 00:40

## 2022-01-01 RX ADMIN — POTASSIUM PHOSPHATE, MONOBASIC POTASSIUM PHOSPHATE, DIBASIC 15 MMOL: 224; 236 INJECTION, SOLUTION, CONCENTRATE INTRAVENOUS at 16:56

## 2022-01-01 RX ADMIN — METRONIDAZOLE 500 MG: 500 INJECTION, SOLUTION INTRAVENOUS at 10:45

## 2022-01-01 RX ADMIN — I.V. FAT EMULSION 250 ML: 20 EMULSION INTRAVENOUS at 18:31

## 2022-01-01 RX ADMIN — DEXMEDETOMIDINE HYDROCHLORIDE 0.2 MCG/KG/HR: 4 INJECTION, SOLUTION INTRAVENOUS at 18:36

## 2022-01-01 RX ADMIN — METOCLOPRAMIDE HYDROCHLORIDE 5 MG: 5 INJECTION INTRAMUSCULAR; INTRAVENOUS at 12:00

## 2022-01-01 RX ADMIN — SODIUM CHLORIDE, PRESERVATIVE FREE 20 MG: 5 INJECTION INTRAVENOUS at 08:02

## 2022-01-01 RX ADMIN — METOPROLOL TARTRATE 5 MG: 1 INJECTION, SOLUTION INTRAVENOUS at 21:44

## 2022-01-01 RX ADMIN — PROPOFOL 50 MCG/KG/MIN: 10 INJECTION, EMULSION INTRAVENOUS at 08:59

## 2022-01-01 RX ADMIN — HYDROMORPHONE HYDROCHLORIDE 2 MG: 1 INJECTION, SOLUTION INTRAMUSCULAR; INTRAVENOUS; SUBCUTANEOUS at 03:11

## 2022-01-01 RX ADMIN — MEROPENEM AND SODIUM CHLORIDE 1000 MG: 1 INJECTION, SOLUTION INTRAVENOUS at 19:48

## 2022-01-01 RX ADMIN — ALBUMIN, HUMAN INJ 5% 25 G: 5 SOLUTION at 19:41

## 2022-01-01 RX ADMIN — METOPROLOL TARTRATE 5 MG: 1 INJECTION, SOLUTION INTRAVENOUS at 08:02

## 2022-01-01 RX ADMIN — CALCIUM GLUCONATE 1000 MG: 20 INJECTION, SOLUTION INTRAVENOUS at 13:40

## 2022-01-01 RX ADMIN — METOPROLOL TARTRATE 5 MG: 1 INJECTION, SOLUTION INTRAVENOUS at 17:41

## 2022-01-01 RX ADMIN — HYDROMORPHONE HYDROCHLORIDE 2 MG: 1 INJECTION, SOLUTION INTRAMUSCULAR; INTRAVENOUS; SUBCUTANEOUS at 00:03

## 2022-01-01 RX ADMIN — HYDROMORPHONE HYDROCHLORIDE 2 MG: 1 INJECTION, SOLUTION INTRAMUSCULAR; INTRAVENOUS; SUBCUTANEOUS at 20:41

## 2022-01-01 RX ADMIN — MEROPENEM AND SODIUM CHLORIDE 1000 MG: 1 INJECTION, SOLUTION INTRAVENOUS at 21:34

## 2022-01-01 RX ADMIN — ROCURONIUM BROMIDE 5 MG: 10 INJECTION, SOLUTION INTRAVENOUS at 23:05

## 2022-01-01 RX ADMIN — HYDROMORPHONE HYDROCHLORIDE 2 MG: 1 INJECTION, SOLUTION INTRAMUSCULAR; INTRAVENOUS; SUBCUTANEOUS at 22:31

## 2022-01-01 RX ADMIN — CEFAZOLIN 2000 MG: 2 INJECTION, POWDER, FOR SOLUTION INTRAMUSCULAR; INTRAVENOUS at 22:30

## 2022-01-01 RX ADMIN — SODIUM CHLORIDE, PRESERVATIVE FREE 20 MG: 5 INJECTION INTRAVENOUS at 20:32

## 2022-01-01 RX ADMIN — SODIUM CHLORIDE, PRESERVATIVE FREE 10 ML: 5 INJECTION INTRAVENOUS at 09:25

## 2022-01-01 RX ADMIN — THIAMINE HYDROCHLORIDE 100 MG: 100 INJECTION, SOLUTION INTRAMUSCULAR; INTRAVENOUS at 08:56

## 2022-01-01 RX ADMIN — METOPROLOL TARTRATE 5 MG: 1 INJECTION, SOLUTION INTRAVENOUS at 19:41

## 2022-01-01 RX ADMIN — IRON SUCROSE 500 MG: 20 INJECTION, SOLUTION INTRAVENOUS at 09:46

## 2022-01-01 RX ADMIN — SODIUM CHLORIDE, PRESERVATIVE FREE 10 ML: 5 INJECTION INTRAVENOUS at 19:34

## 2022-01-01 RX ADMIN — MEROPENEM AND SODIUM CHLORIDE 1000 MG: 1 INJECTION, SOLUTION INTRAVENOUS at 04:39

## 2022-01-01 RX ADMIN — HEPARIN SODIUM 12 UNITS/KG/HR: 10000 INJECTION, SOLUTION INTRAVENOUS at 20:38

## 2022-01-01 RX ADMIN — OXYCODONE 5 MG: 5 TABLET ORAL at 16:16

## 2022-01-01 RX ADMIN — HYDROMORPHONE HYDROCHLORIDE 1 MG: 1 INJECTION, SOLUTION INTRAMUSCULAR; INTRAVENOUS; SUBCUTANEOUS at 03:00

## 2022-01-01 RX ADMIN — SODIUM CHLORIDE, PRESERVATIVE FREE 10 ML: 5 INJECTION INTRAVENOUS at 20:28

## 2022-01-01 RX ADMIN — LORAZEPAM 1 MG: 2 INJECTION, SOLUTION INTRAMUSCULAR; INTRAVENOUS at 16:42

## 2022-01-01 RX ADMIN — METRONIDAZOLE 500 MG: 500 INJECTION, SOLUTION INTRAVENOUS at 02:14

## 2022-01-01 RX ADMIN — MEROPENEM AND SODIUM CHLORIDE 1000 MG: 1 INJECTION, SOLUTION INTRAVENOUS at 05:14

## 2022-01-01 RX ADMIN — Medication 1 DOSE: at 09:16

## 2022-01-01 RX ADMIN — METOCLOPRAMIDE HYDROCHLORIDE 5 MG: 5 INJECTION INTRAMUSCULAR; INTRAVENOUS at 17:57

## 2022-01-01 RX ADMIN — HYDROMORPHONE HYDROCHLORIDE 0.5 MG: 1 INJECTION, SOLUTION INTRAMUSCULAR; INTRAVENOUS; SUBCUTANEOUS at 15:45

## 2022-01-01 RX ADMIN — PHENYLEPHRINE HYDROCHLORIDE 10 MCG/MIN: 10 INJECTION INTRAVENOUS at 04:46

## 2022-01-01 RX ADMIN — MAGNESIUM SULFATE HEPTAHYDRATE 2000 MG: 40 INJECTION, SOLUTION INTRAVENOUS at 15:27

## 2022-01-01 RX ADMIN — DEXTROSE MONOHYDRATE 125 ML: 10 INJECTION, SOLUTION INTRAVENOUS at 21:19

## 2022-01-01 RX ADMIN — METOPROLOL TARTRATE 5 MG: 5 INJECTION INTRAVENOUS at 12:13

## 2022-01-01 RX ADMIN — MEROPENEM AND SODIUM CHLORIDE 1000 MG: 1 INJECTION, SOLUTION INTRAVENOUS at 17:53

## 2022-01-01 RX ADMIN — ALBUMIN (HUMAN) 50 G: 12.5 SOLUTION INTRAVENOUS at 23:48

## 2022-01-01 RX ADMIN — SODIUM CHLORIDE, PRESERVATIVE FREE 20 MG: 5 INJECTION INTRAVENOUS at 02:18

## 2022-01-01 RX ADMIN — MAGNESIUM HYDROXIDE 30 ML: 400 SUSPENSION ORAL at 16:09

## 2022-01-01 RX ADMIN — SODIUM BICARBONATE: 84 INJECTION, SOLUTION INTRAVENOUS at 18:11

## 2022-01-01 RX ADMIN — CHLORHEXIDINE GLUCONATE 15 ML: 1.2 RINSE ORAL at 08:34

## 2022-01-01 RX ADMIN — METOPROLOL TARTRATE 5 MG: 5 INJECTION INTRAVENOUS at 10:15

## 2022-01-01 RX ADMIN — SODIUM CHLORIDE, PRESERVATIVE FREE 20 MG: 5 INJECTION INTRAVENOUS at 19:45

## 2022-01-01 RX ADMIN — PROPOFOL 40 MG: 10 INJECTION, EMULSION INTRAVENOUS at 23:05

## 2022-01-01 RX ADMIN — HYDROMORPHONE HYDROCHLORIDE 1 MG: 1 INJECTION, SOLUTION INTRAMUSCULAR; INTRAVENOUS; SUBCUTANEOUS at 08:49

## 2022-01-01 RX ADMIN — METOCLOPRAMIDE 10 MG: 5 INJECTION, SOLUTION INTRAMUSCULAR; INTRAVENOUS at 16:48

## 2022-01-01 RX ADMIN — METOPROLOL TARTRATE 5 MG: 1 INJECTION, SOLUTION INTRAVENOUS at 10:44

## 2022-01-01 RX ADMIN — SODIUM CHLORIDE, PRESERVATIVE FREE 20 MG: 5 INJECTION INTRAVENOUS at 07:36

## 2022-01-01 RX ADMIN — HYDROMORPHONE HYDROCHLORIDE 0.5 MG: 1 INJECTION, SOLUTION INTRAMUSCULAR; INTRAVENOUS; SUBCUTANEOUS at 05:17

## 2022-01-01 RX ADMIN — SODIUM CHLORIDE, SODIUM LACTATE, POTASSIUM CHLORIDE, CALCIUM CHLORIDE AND DEXTROSE MONOHYDRATE: 5; 600; 310; 30; 20 INJECTION, SOLUTION INTRAVENOUS at 22:44

## 2022-01-01 RX ADMIN — HEPARIN SODIUM 4000 UNITS: 1000 INJECTION INTRAVENOUS; SUBCUTANEOUS at 20:34

## 2022-01-01 RX ADMIN — ALBUMIN (HUMAN) 250 ML: 12.5 INJECTION, SOLUTION INTRAVENOUS at 00:47

## 2022-01-01 RX ADMIN — SODIUM CHLORIDE, PRESERVATIVE FREE 10 ML: 5 INJECTION INTRAVENOUS at 19:45

## 2022-01-01 RX ADMIN — CEFAZOLIN 2000 MG: 2 INJECTION, POWDER, FOR SOLUTION INTRAMUSCULAR; INTRAVENOUS at 06:33

## 2022-01-01 RX ADMIN — MEROPENEM AND SODIUM CHLORIDE 1000 MG: 1 INJECTION, SOLUTION INTRAVENOUS at 04:25

## 2022-01-01 ASSESSMENT — PAIN DESCRIPTION - ORIENTATION
ORIENTATION: MID
ORIENTATION: MID
ORIENTATION: RIGHT
ORIENTATION: LEFT
ORIENTATION: MID
ORIENTATION: MID;LOWER
ORIENTATION: MID
ORIENTATION: MID;LOWER
ORIENTATION: MID
ORIENTATION: PROXIMAL;MID;LOWER
ORIENTATION: MID
ORIENTATION: MID;LOWER
ORIENTATION: LOWER;MID
ORIENTATION: MID
ORIENTATION: RIGHT
ORIENTATION: MID
ORIENTATION: MID
ORIENTATION: RIGHT
ORIENTATION: MID
ORIENTATION: LEFT
ORIENTATION: MID
ORIENTATION: MID

## 2022-01-01 ASSESSMENT — PAIN SCALES - WONG BAKER
WONGBAKER_NUMERICALRESPONSE: 0
WONGBAKER_NUMERICALRESPONSE: 4
WONGBAKER_NUMERICALRESPONSE: 0
WONGBAKER_NUMERICALRESPONSE: 0

## 2022-01-01 ASSESSMENT — PAIN SCALES - GENERAL
PAINLEVEL_OUTOF10: 6
PAINLEVEL_OUTOF10: 0
PAINLEVEL_OUTOF10: 10
PAINLEVEL_OUTOF10: 5
PAINLEVEL_OUTOF10: 0
PAINLEVEL_OUTOF10: 10
PAINLEVEL_OUTOF10: 4
PAINLEVEL_OUTOF10: 3
PAINLEVEL_OUTOF10: 0
PAINLEVEL_OUTOF10: 0
PAINLEVEL_OUTOF10: 5
PAINLEVEL_OUTOF10: 6
PAINLEVEL_OUTOF10: 3
PAINLEVEL_OUTOF10: 10
PAINLEVEL_OUTOF10: 10
PAINLEVEL_OUTOF10: 0
PAINLEVEL_OUTOF10: 2
PAINLEVEL_OUTOF10: 10
PAINLEVEL_OUTOF10: 0
PAINLEVEL_OUTOF10: 1
PAINLEVEL_OUTOF10: 0
PAINLEVEL_OUTOF10: 3
PAINLEVEL_OUTOF10: 6
PAINLEVEL_OUTOF10: 1
PAINLEVEL_OUTOF10: 4
PAINLEVEL_OUTOF10: 10
PAINLEVEL_OUTOF10: 0
PAINLEVEL_OUTOF10: 0
PAINLEVEL_OUTOF10: 8
PAINLEVEL_OUTOF10: 2
PAINLEVEL_OUTOF10: 3
PAINLEVEL_OUTOF10: 8
PAINLEVEL_OUTOF10: 0
PAINLEVEL_OUTOF10: 6
PAINLEVEL_OUTOF10: 8
PAINLEVEL_OUTOF10: 0
PAINLEVEL_OUTOF10: 0
PAINLEVEL_OUTOF10: 6
PAINLEVEL_OUTOF10: 0
PAINLEVEL_OUTOF10: 8
PAINLEVEL_OUTOF10: 5
PAINLEVEL_OUTOF10: 5
PAINLEVEL_OUTOF10: 3
PAINLEVEL_OUTOF10: 10
PAINLEVEL_OUTOF10: 0
PAINLEVEL_OUTOF10: 1
PAINLEVEL_OUTOF10: 9
PAINLEVEL_OUTOF10: 8
PAINLEVEL_OUTOF10: 8
PAINLEVEL_OUTOF10: 5
PAINLEVEL_OUTOF10: 7
PAINLEVEL_OUTOF10: 0
PAINLEVEL_OUTOF10: 7
PAINLEVEL_OUTOF10: 0
PAINLEVEL_OUTOF10: 0
PAINLEVEL_OUTOF10: 8
PAINLEVEL_OUTOF10: 4
PAINLEVEL_OUTOF10: 2
PAINLEVEL_OUTOF10: 10
PAINLEVEL_OUTOF10: 7
PAINLEVEL_OUTOF10: 7
PAINLEVEL_OUTOF10: 0
PAINLEVEL_OUTOF10: 9
PAINLEVEL_OUTOF10: 0
PAINLEVEL_OUTOF10: 0
PAINLEVEL_OUTOF10: 2
PAINLEVEL_OUTOF10: 6
PAINLEVEL_OUTOF10: 10
PAINLEVEL_OUTOF10: 0
PAINLEVEL_OUTOF10: 0
PAINLEVEL_OUTOF10: 7
PAINLEVEL_OUTOF10: 8
PAINLEVEL_OUTOF10: 0
PAINLEVEL_OUTOF10: 5
PAINLEVEL_OUTOF10: 0
PAINLEVEL_OUTOF10: 10
PAINLEVEL_OUTOF10: 6
PAINLEVEL_OUTOF10: 0
PAINLEVEL_OUTOF10: 9
PAINLEVEL_OUTOF10: 3
PAINLEVEL_OUTOF10: 8
PAINLEVEL_OUTOF10: 8
PAINLEVEL_OUTOF10: 10

## 2022-01-01 ASSESSMENT — PAIN DESCRIPTION - LOCATION
LOCATION: ABDOMEN
LOCATION: ABDOMEN;INCISION
LOCATION: ABDOMEN
LOCATION: INCISION;ABDOMEN
LOCATION: ABDOMEN;BACK
LOCATION: ABDOMEN;BACK
LOCATION: ABDOMEN
LOCATION: ABDOMEN

## 2022-01-01 ASSESSMENT — PULMONARY FUNCTION TESTS
PIF_VALUE: 15
PIF_VALUE: 16
PIF_VALUE: 29
PIF_VALUE: 27
PIF_VALUE: 29
PIF_VALUE: 27
PIF_VALUE: 27
PIF_VALUE: 16
PIF_VALUE: 16
PIF_VALUE: 11
PIF_VALUE: 27
PIF_VALUE: 16
PIF_VALUE: 31
PIF_VALUE: 27
PIF_VALUE: 18
PIF_VALUE: 27
PIF_VALUE: 27
PIF_VALUE: 28
PIF_VALUE: 16
PIF_VALUE: 30
PIF_VALUE: 29
PIF_VALUE: 26
PIF_VALUE: 34
PIF_VALUE: 28
PIF_VALUE: 17
PIF_VALUE: 29
PIF_VALUE: 32
PIF_VALUE: 14
PIF_VALUE: 29
PIF_VALUE: 11
PIF_VALUE: 30
PIF_VALUE: 40
PIF_VALUE: 28
PIF_VALUE: 31

## 2022-01-01 ASSESSMENT — ENCOUNTER SYMPTOMS
RESPIRATORY NEGATIVE: 1
VOICE CHANGE: 0
VOMITING: 0
DIARRHEA: 0
SORE THROAT: 0
CONSTIPATION: 0
SHORTNESS OF BREATH: 0
NAUSEA: 0
VOMITING: 1
BACK PAIN: 0
SHORTNESS OF BREATH: 1
ABDOMINAL PAIN: 1
CONSTIPATION: 0
SINUS PRESSURE: 0
NAUSEA: 0
SHORTNESS OF BREATH: 0
EYES NEGATIVE: 1
COUGH: 0
GASTROINTESTINAL NEGATIVE: 1
DIARRHEA: 0
CHOKING: 0
EYE REDNESS: 0
APNEA: 0
DIARRHEA: 1
BLOOD IN STOOL: 0
VOMITING: 0
BACK PAIN: 0
FACIAL SWELLING: 0
SHORTNESS OF BREATH: 0
EYE PAIN: 0
NAUSEA: 1
EYE DISCHARGE: 0
ABDOMINAL DISTENTION: 1
COLOR CHANGE: 0
VOICE CHANGE: 0

## 2022-01-01 ASSESSMENT — PAIN - FUNCTIONAL ASSESSMENT
PAIN_FUNCTIONAL_ASSESSMENT: ACTIVITIES ARE NOT PREVENTED
PAIN_FUNCTIONAL_ASSESSMENT: PREVENTS OR INTERFERES SOME ACTIVE ACTIVITIES AND ADLS
PAIN_FUNCTIONAL_ASSESSMENT: ACTIVITIES ARE NOT PREVENTED
PAIN_FUNCTIONAL_ASSESSMENT: ACTIVITIES ARE NOT PREVENTED
PAIN_FUNCTIONAL_ASSESSMENT: PREVENTS OR INTERFERES SOME ACTIVE ACTIVITIES AND ADLS
PAIN_FUNCTIONAL_ASSESSMENT: PREVENTS OR INTERFERES SOME ACTIVE ACTIVITIES AND ADLS
PAIN_FUNCTIONAL_ASSESSMENT: ACTIVITIES ARE NOT PREVENTED
PAIN_FUNCTIONAL_ASSESSMENT: PREVENTS OR INTERFERES WITH ALL ACTIVE AND SOME PASSIVE ACTIVITIES
PAIN_FUNCTIONAL_ASSESSMENT: PREVENTS OR INTERFERES SOME ACTIVE ACTIVITIES AND ADLS
PAIN_FUNCTIONAL_ASSESSMENT: PREVENTS OR INTERFERES SOME ACTIVE ACTIVITIES AND ADLS
PAIN_FUNCTIONAL_ASSESSMENT: ACTIVITIES ARE NOT PREVENTED
PAIN_FUNCTIONAL_ASSESSMENT: ACTIVITIES ARE NOT PREVENTED
PAIN_FUNCTIONAL_ASSESSMENT: PREVENTS OR INTERFERES SOME ACTIVE ACTIVITIES AND ADLS
PAIN_FUNCTIONAL_ASSESSMENT: ACTIVITIES ARE NOT PREVENTED
PAIN_FUNCTIONAL_ASSESSMENT: ACTIVITIES ARE NOT PREVENTED
PAIN_FUNCTIONAL_ASSESSMENT: 0-10
PAIN_FUNCTIONAL_ASSESSMENT: ACTIVITIES ARE NOT PREVENTED
PAIN_FUNCTIONAL_ASSESSMENT: PREVENTS OR INTERFERES SOME ACTIVE ACTIVITIES AND ADLS

## 2022-01-01 ASSESSMENT — PAIN DESCRIPTION - DESCRIPTORS
DESCRIPTORS: DISCOMFORT
DESCRIPTORS: ACHING
DESCRIPTORS: ACHING;DISCOMFORT
DESCRIPTORS: SHARP
DESCRIPTORS: STABBING
DESCRIPTORS: ACHING
DESCRIPTORS: ACHING
DESCRIPTORS: SHARP
DESCRIPTORS: BURNING;DISCOMFORT;ACHING
DESCRIPTORS: STABBING
DESCRIPTORS: ACHING;CRUSHING;DISCOMFORT
DESCRIPTORS: ACHING
DESCRIPTORS: ACHING;DISCOMFORT
DESCRIPTORS: ACHING
DESCRIPTORS: CRAMPING;DISCOMFORT;TENDER
DESCRIPTORS: ACHING
DESCRIPTORS: DISCOMFORT
DESCRIPTORS: DISCOMFORT
DESCRIPTORS: SORE
DESCRIPTORS: DISCOMFORT
DESCRIPTORS: STABBING
DESCRIPTORS: STABBING
DESCRIPTORS: SHARP
DESCRIPTORS: ACHING
DESCRIPTORS: ACHING
DESCRIPTORS: STABBING
DESCRIPTORS: SHOOTING;SHARP
DESCRIPTORS: SHARP
DESCRIPTORS: DISCOMFORT;CRUSHING;BURNING
DESCRIPTORS: SHARP

## 2022-01-01 ASSESSMENT — PAIN DESCRIPTION - FREQUENCY
FREQUENCY: CONTINUOUS

## 2022-01-01 ASSESSMENT — PAIN DESCRIPTION - PAIN TYPE
TYPE: SURGICAL PAIN
TYPE: SURGICAL PAIN
TYPE: ACUTE PAIN

## 2022-01-01 ASSESSMENT — LIFESTYLE VARIABLES: SMOKING_STATUS: 0

## 2022-02-04 RX ORDER — CARVEDILOL 12.5 MG/1
TABLET ORAL
Qty: 180 TABLET | Refills: 0 | OUTPATIENT
Start: 2022-02-04

## 2022-03-09 ENCOUNTER — OFFICE VISIT (OUTPATIENT)
Dept: CARDIOLOGY CLINIC | Age: 67
End: 2022-03-09
Payer: MEDICARE

## 2022-03-09 VITALS
HEART RATE: 138 BPM | SYSTOLIC BLOOD PRESSURE: 160 MMHG | HEIGHT: 70 IN | WEIGHT: 257 LBS | BODY MASS INDEX: 36.79 KG/M2 | DIASTOLIC BLOOD PRESSURE: 110 MMHG

## 2022-03-09 DIAGNOSIS — I48.19 PERSISTENT ATRIAL FIBRILLATION (HCC): ICD-10-CM

## 2022-03-09 DIAGNOSIS — I50.21 ACUTE SYSTOLIC CONGESTIVE HEART FAILURE (HCC): ICD-10-CM

## 2022-03-09 DIAGNOSIS — R79.89 ELEVATED TSH: ICD-10-CM

## 2022-03-09 DIAGNOSIS — I10 ESSENTIAL HYPERTENSION: ICD-10-CM

## 2022-03-09 DIAGNOSIS — I42.8 NON-ISCHEMIC CARDIOMYOPATHY (HCC): Primary | ICD-10-CM

## 2022-03-09 LAB
ALBUMIN SERPL-MCNC: 3.8 G/DL (ref 3.5–5.2)
ALP BLD-CCNC: 65 U/L (ref 40–130)
ALT SERPL-CCNC: 38 U/L (ref 5–41)
ANION GAP SERPL CALCULATED.3IONS-SCNC: 12 MMOL/L (ref 7–19)
AST SERPL-CCNC: 50 U/L (ref 5–40)
BASOPHILS ABSOLUTE: 0 K/UL (ref 0–0.2)
BASOPHILS RELATIVE PERCENT: 0.5 % (ref 0–1)
BILIRUB SERPL-MCNC: 1.2 MG/DL (ref 0.2–1.2)
BUN BLDV-MCNC: 29 MG/DL (ref 8–23)
CALCIUM SERPL-MCNC: 9.3 MG/DL (ref 8.8–10.2)
CHLORIDE BLD-SCNC: 104 MMOL/L (ref 98–111)
CO2: 26 MMOL/L (ref 22–29)
CREAT SERPL-MCNC: 0.9 MG/DL (ref 0.5–1.2)
EOSINOPHILS ABSOLUTE: 0 K/UL (ref 0–0.6)
EOSINOPHILS RELATIVE PERCENT: 0.7 % (ref 0–5)
GFR AFRICAN AMERICAN: >59
GFR NON-AFRICAN AMERICAN: >60
GLUCOSE BLD-MCNC: 113 MG/DL (ref 74–109)
HCT VFR BLD CALC: 46.3 % (ref 42–52)
HEMOGLOBIN: 14.7 G/DL (ref 14–18)
IMMATURE GRANULOCYTES #: 0 K/UL
LYMPHOCYTES ABSOLUTE: 1.1 K/UL (ref 1.1–4.5)
LYMPHOCYTES RELATIVE PERCENT: 24.6 % (ref 20–40)
MCH RBC QN AUTO: 30.2 PG (ref 27–31)
MCHC RBC AUTO-ENTMCNC: 31.7 G/DL (ref 33–37)
MCV RBC AUTO: 95.3 FL (ref 80–94)
MONOCYTES ABSOLUTE: 0.4 K/UL (ref 0–0.9)
MONOCYTES RELATIVE PERCENT: 10.3 % (ref 0–10)
NEUTROPHILS ABSOLUTE: 2.7 K/UL (ref 1.5–7.5)
NEUTROPHILS RELATIVE PERCENT: 63.7 % (ref 50–65)
PDW BLD-RTO: 14.2 % (ref 11.5–14.5)
PLATELET # BLD: 93 K/UL (ref 130–400)
PMV BLD AUTO: 11.1 FL (ref 9.4–12.4)
POTASSIUM SERPL-SCNC: 4.3 MMOL/L (ref 3.5–5)
RBC # BLD: 4.86 M/UL (ref 4.7–6.1)
SODIUM BLD-SCNC: 142 MMOL/L (ref 136–145)
T4 FREE: 1.24 NG/DL (ref 0.93–1.7)
TOTAL PROTEIN: 7.9 G/DL (ref 6.6–8.7)
TSH SERPL DL<=0.05 MIU/L-ACNC: 9.42 UIU/ML (ref 0.27–4.2)
WBC # BLD: 4.3 K/UL (ref 4.8–10.8)

## 2022-03-09 PROCEDURE — G8417 CALC BMI ABV UP PARAM F/U: HCPCS | Performed by: CLINICAL NURSE SPECIALIST

## 2022-03-09 PROCEDURE — 1123F ACP DISCUSS/DSCN MKR DOCD: CPT | Performed by: CLINICAL NURSE SPECIALIST

## 2022-03-09 PROCEDURE — G8484 FLU IMMUNIZE NO ADMIN: HCPCS | Performed by: CLINICAL NURSE SPECIALIST

## 2022-03-09 PROCEDURE — 3017F COLORECTAL CA SCREEN DOC REV: CPT | Performed by: CLINICAL NURSE SPECIALIST

## 2022-03-09 PROCEDURE — 4040F PNEUMOC VAC/ADMIN/RCVD: CPT | Performed by: CLINICAL NURSE SPECIALIST

## 2022-03-09 PROCEDURE — G8427 DOCREV CUR MEDS BY ELIG CLIN: HCPCS | Performed by: CLINICAL NURSE SPECIALIST

## 2022-03-09 PROCEDURE — 93000 ELECTROCARDIOGRAM COMPLETE: CPT | Performed by: CLINICAL NURSE SPECIALIST

## 2022-03-09 PROCEDURE — 1036F TOBACCO NON-USER: CPT | Performed by: CLINICAL NURSE SPECIALIST

## 2022-03-09 PROCEDURE — 99214 OFFICE O/P EST MOD 30 MIN: CPT | Performed by: CLINICAL NURSE SPECIALIST

## 2022-03-09 RX ORDER — SPIRONOLACTONE 25 MG/1
TABLET ORAL
Qty: 90 TABLET | Refills: 3 | Status: ON HOLD | OUTPATIENT
Start: 2022-03-09

## 2022-03-09 RX ORDER — CARVEDILOL 25 MG/1
25 TABLET ORAL 2 TIMES DAILY
Qty: 180 TABLET | Refills: 3 | Status: ON HOLD | OUTPATIENT
Start: 2022-03-09

## 2022-03-09 RX ORDER — FUROSEMIDE 40 MG/1
TABLET ORAL
Qty: 90 TABLET | Refills: 3 | Status: ON HOLD | OUTPATIENT
Start: 2022-03-09

## 2022-03-09 RX ORDER — SACUBITRIL AND VALSARTAN 97; 103 MG/1; MG/1
TABLET, FILM COATED ORAL
Qty: 180 TABLET | Refills: 3 | Status: ON HOLD | OUTPATIENT
Start: 2022-03-09

## 2022-03-09 NOTE — PROGRESS NOTES
breath 2016    Essential hypertension 2016    Hypothyroidism 2016    Paroxysmal atrial fibrillation ([facility] 2018    Hypokalemia 10/04/2016    Non-ischemic cardiomyopathy ([facility] 10/03/2016    Ex-cigarette smoker 10/03/2016    Obesity (BMI 35.0-39.9 without comorbidity) 2016     Past Medical History:   Diagnosis Date    Ex-cigarette smoker 10/3/2016    Hypertension     Non-ischemic cardiomyopathy ([facility] 10/3/2016    2016  Echo  EF 25% 10/3/16  Cath  Mild CAD, EF 10%     Paroxysmal atrial fibrillation ([facility] 3/16/2018     Past Surgical History:   Procedure Laterality Date    FINGER AMPUTATION Left     partial left thumb amputation following trauma    TONSILLECTOMY       Family History   Problem Relation Age of Onset    High Blood Pressure Mother     Cancer Father         Stomach    Heart Disease Maternal Grandmother     Diabetes Maternal Uncle      Social History     Tobacco Use    Smoking status: Former Smoker     Packs/day: 1.00     Years: 2.00     Pack years: 2.00     Types: Cigarettes     Start date:      Quit date:      Years since quittin.2    Smokeless tobacco: Never Used    Tobacco comment: light smoking history - socially   Substance Use Topics    Alcohol use: Yes      Current Outpatient Medications   Medication Sig Dispense Refill    levothyroxine (SYNTHROID) 88 MCG tablet TAKE ONE TABLET BY MOUTH ONCE DAILY 30 tablet 5    carvedilol (COREG) 25 MG tablet Take 1 tablet by mouth 2 times daily 180 tablet 3    sacubitril-valsartan (ENTRESTO)  MG per tablet Take 1 tablet by mouth twice daily 180 tablet 3    furosemide (LASIX) 40 MG tablet Take 1 tablet by mouth once daily 90 tablet 3    spironolactone (ALDACTONE) 25 MG tablet TAKE ONE TABLET BY MOUTH ONCE DAILY 90 tablet 3    apixaban (ELIQUIS) 5 MG TABS tablet Take 1 tablet by mouth 2 times daily 60 tablet 3     No current facility-administered medications for this visit.      Allergies: Patient has no known allergies. Review of Systems  Constitutional - no significant activity change, appetite change, or unexpected weight change. No fever, chills or diaphoresis. No fatigue. HEENT - no significant rhinorrhea or epistaxis. No tinnitus or significant hearing loss. Eyes - no sudden vision change or amaurosis. Respiratory - no significant wheezing, stridor, apnea or cough. + dyspnea on exertion no resting shortness of breath. Cardiovascular - no exertional chest pain, orthopnea or PND. No sensation of arrhythmia or slow heart rate. No claudication + leg edema. Gastrointestinal - no abdominal swelling or pain. No blood in stool. No severe constipation, diarrhea, nausea, or vomiting. Genitourinary - no difficulty urinating, dysuria, frequency, or urgency. No flank pain or hematuria. Musculoskeletal - no back pain, gait disturbance, or myalgia. Skin - no color change or rash. No pallor. No new surgical incision. Neurologic - no speech difficulty, facial asymmetry or lateralizing weakness. No seizures, presyncope, syncope, or significant dizziness. Hematologic - no easy bruising or excessive bleeding. Psychiatric - no severe anxiety or insomnia. No confusion. All other review of systems are negative. Objective  Vital Signs - BP (!) 160/110   Pulse 138   Ht 5' 10\" (1.778 m)   Wt 257 lb (116.6 kg)   BMI 36.88 kg/m²   General - Rambo is alert, cooperative, and pleasant. Well groomed. No acute distress. Body habitus is obese. HEENT - The head is normocephalic. No circumoral cyanosis. Dentition is normal.   EYES -  No Xanthelasma, no arcus senilis, no conjunctival hemorrhages or discharge. Neck - Supple, without increased jugular venous pressures. No carotid bruits. No mass. Respiratory - Lungs are clear bilaterally. No wheezes or rales. Normal effort without use of accessory muscles. Cardiovascular - Heart has regular rhythm and rate.   No murmurs, rubs or gallops. + pedal pulses and no varicosities. Abdominal -  Soft, nontender, nondistended. Bowel sounds are intact. Extremities - No clubbing, cyanosis, 2+ BLE edema up to knees . Musculoskeletal -  No clubbing . No Osler's nodes. Gait normal .  No kyphosis or scoliosis. Skin -  no statis ulcers or dermatitis. Neurological - No focal signs are identified. Oriented to person, place and time. Psychiatric -  Appropriate affect and mood. Assessment:     Diagnosis Orders   1. Non-ischemic cardiomyopathy (HCC)  sacubitril-valsartan (ENTRESTO)  MG per tablet   2. Acute systolic congestive heart failure (HCC)  Comprehensive Metabolic Panel    CBC with Auto Differential    TSH    T4, Free    proBNP, N-TERMINAL    sacubitril-valsartan (ENTRESTO)  MG per tablet    Brain Natriuretic Peptide   3. Persistent atrial fibrillation (HCC)  EKG 12 lead    Comprehensive Metabolic Panel    CBC with Auto Differential    TSH    T4, Free    proBNP, N-TERMINAL   4. Essential hypertension     5. Elevated TSH  levothyroxine (SYNTHROID) 88 MCG tablet     Data:  BP Readings from Last 3 Encounters:   03/09/22 (!) 160/110   09/10/20 (!) 136/90   03/10/20 132/78    Pulse Readings from Last 3 Encounters:   03/09/22 138   09/10/20 58   03/10/20 62        Wt Readings from Last 3 Encounters:   03/09/22 257 lb (116.6 kg)   09/10/20 250 lb (113.4 kg)   03/10/20 254 lb (115.2 kg)   EKG today shows atrial fibrillation with a rate of 138    Patient was previously anticoagulated on warfarin but is not been taking. Due to risk for stroke with uncontrolled A. fib will start him on some Eliquis. Will give him samples so he will become therapeutic quickly.   If cost prohibitive we will can transfer him over to Coumadin in a month or so  [id number] Score for Atrial Fibrillation Stroke Risk   Risk   Factors  Component Value   C CHF Yes 1   H HTN Yes 1   A2 Age >= 75 No,  (68 y.o.) 0   D DM No 0   S2 Prior Stroke/TIA No 0   V Vascular Disease No 0   A Age 74-69 Yes,  (68 y.o.) 1   Sc Sex male 0    [id number]  Score  3   Score last updated 3/10/22 9:17 AM CST      Blood pressure is uncontrolled-and patient has been out of most of his medications for about 2 weeks. Has been taking some Lasix he found    We will get him back on his heart failure medications and get his labs today to see where we are. Discussed the importance of taking his medications regularly. Will refill Lasix and have him take daily. We will have follow-up next week to see how he is doing    Lab Results   Component Value Date     03/09/2022    K 4.3 03/09/2022     03/09/2022    CO2 26 03/09/2022    BUN 29 (H) 03/09/2022    CREATININE 0.9 03/09/2022    GLUCOSE 113 (H) 03/09/2022    CALCIUM 9.3 03/09/2022    PROT 7.9 03/09/2022    LABALBU 3.8 03/09/2022    BILITOT 1.2 03/09/2022    ALKPHOS 65 03/09/2022    AST 50 (H) 03/09/2022    ALT 38 03/09/2022    LABGLOM >60 03/09/2022    GFRAA >59 03/09/2022    GLOB 4.0 04/10/2017       Lab Results   Component Value Date    WBC 4.3 (L) 03/09/2022    HGB 14.7 03/09/2022    HCT 46.3 03/09/2022    MCV 95.3 (H) 03/09/2022    PLT 93 (L) 03/09/2022     Lab Results   Component Value Date    TSH 9.420 (H) 03/09/2022    T4FREE 1.24 03/09/2022     proBNP 3884    Previous primary care provider is no hospitalist.  Discussed with him and his daughter the need to establish with a primary care for noncardiac problems  30 minutes were spent preparing, reviewing and seeing patient. All questions answered    Plan  Lab work today  Restart all heart failure medications    Restart Entresto at 97/103 mg twice a day  Restart carvedilol but going to increase to 25 mg twice a day to help with heart rate  Resume spironolactone 25 mg daily. This for heart failure and fluid retention  Start Lasix/furosemide 40 mg daily    We will stay off the warfarin/Coumadin for now. Work on a start blood thinner of Eliquis 5 mg twice a day.   This will not require frequent blood checks. If cost prohibitive we will transition you over to warfarin again but will use this for now due to recurrent atrial fibrillation    After reviewing labs we will look to see if you need to be on thyroid medication  Follow up in 1 week   Obtain scale. Recommend daily weights. In the future if you see weight gain of 2 to 3 pounds overnight or 5 to 6 pounds over a week that is fluid retention. Call office for directions  Call with any questions or concerns  Follow up with primary care provider for non cardiac problems  Report any new problems  Cardiovascular Fitness-Exercise as tolerated. Strive for 30 minutes of exercise most days of the week. Cardiac / Healthy Diet-low-sodium diet  Continue current medications as directed  Continue plan of treatment  It is always recommended that you bring your medications bottles with you to each visit - this is for your safety! Ellan Nissen, APRN EMR dragon/transcription disclaimer: Much of this encounter note is electronic transcription/translation of spoken language to printed tach. Electronic translation of spoken language may be erroneous, or at times, nonsensical words or phrases may be inadvertently transcribed.  Although, I have reviewed the note for such errors, some may still exist.

## 2022-03-09 NOTE — PATIENT INSTRUCTIONS
Plan  Lab work today  Restart all heart failure medications    Restart Entresto at 97/103 mg twice a day  Restart carvedilol but going to increase to 25 mg twice a day to help with heart rate  Resume spironolactone 25 mg daily. This for heart failure and fluid retention  Start Lasix/furosemide 40 mg daily    We will stay off the warfarin/Coumadin for now. Work on a start blood thinner of Eliquis 5 mg twice a day. This will not require frequent blood checks. If cost prohibitive we will transition you over to warfarin again but will use this for now due to recurrent atrial fibrillation    After reviewing labs we will look to see if you need to be on thyroid medication  Follow up in 1 week   Obtain scale. Recommend daily weights. In the future if you see weight gain of 2 to 3 pounds overnight or 5 to 6 pounds over a week that is fluid retention. Call office for directions  Call with any questions or concerns  Follow up with primary care provider for non cardiac problems  Report any new problems  Cardiovascular Fitness-Exercise as tolerated. Strive for 30 minutes of exercise most days of the week. Cardiac / Healthy Diet-low-sodium diet  Continue current medications as directed  Continue plan of treatment  It is always recommended that you bring your medications bottles with you to each visit - this is for your safety! Patient Education        Learning About Heart Failure Zones  What are heart failure zones? Heart failure zones give you an easy way to see changes in your heart failure symptoms. They also tell you when you need to get help. Check every day to see which zone you are in. Green zone. You are doing well. This is where you want to be. · Your weight is stable. It's not going up or down. · You breathe easily. · You are sleeping well. You are able to lie flat without shortness of breath. · You can do your usual activities. Yellow zone. Be careful. Your symptoms are changing.  Call your doctor. · You have new or increased shortness of breath. · You are dizzy or lightheaded, or you feel like you may faint. · You have sudden weight gain, such as more than 2 to 3 pounds in a day or 5 pounds in a week. (Your doctor may suggest a different range of weight gain.)  · You have increased swelling in your legs, ankles, or feet. · You are so tired or weak that you can't do your usual activities. · You are not sleeping well. Shortness of breath wakes you up at night. You need extra pillows. Red zone. This is an emergency. Call 911. You have symptoms of sudden heart failure. For example:  · You have severe trouble breathing. · You cough up pink, foamy mucus. · You have a new irregular or fast heartbeat. You have symptoms of a heart attack. These may include:  · Chest pain or pressure, or a strange feeling in the chest.  · Sweating. · Shortness of breath. · Nausea or vomiting. · Pain, pressure, or a strange feeling in the back, neck, jaw, or upper belly or in one or both shoulders or arms. · Lightheadedness or sudden weakness. · A fast or irregular heartbeat. If you have symptoms of a heart attack: After you call 911, the  may tell you to chew 1 adult-strength or 2 to 4 low-dose aspirin. Wait for an ambulance. Do not try to drive yourself. Follow-up care is a key part of your treatment and safety. Be sure to make and go to all appointments, and call your doctor if you are having problems. It's also a good idea to know your test results and keep a list of the medicines you take. Where can you learn more? Go to https://WeplaypeKAYAKewAtlas Local.[employer]ista. org and sign in to your Epic[employer] account. Enter T174 in the [employer] box to learn more about \"Learning About Heart Failure Zones. \"     If you do not have an account, please click on the \"Sign Up Now\" link. Current as of: April 29, 2021               Content Version: 13.1  © 6286-6335 Healthwise, Incorporated.    Care instructions for good.     · Eat a heart-healthy diet.     · Stay at a healthy weight. Lose weight if you need to.     · Limit alcohol to 2 drinks a day for men and 1 drink a day for women. Too much alcohol can cause health problems.     · Avoid colds and flu. Get a pneumococcal vaccine shot. If you have had one before, ask your doctor whether you need another dose. Get a flu shot every year. If you must be around people with colds or flu, wash your hands often. Activity    · If your doctor recommends it, get more exercise. Walking is a good choice. Bit by bit, increase the amount you walk every day. Try for at least 30 minutes on most days of the week. You also may want to swim, bike, or do other activities. Your doctor may suggest that you join a cardiac rehabilitation program so that you can have help increasing your physical activity safely.     · Start light exercise if your doctor says it is okay. Even a small amount will help you get stronger, have more energy, and manage stress. Walking is an easy way to get exercise. Start out by walking a little more than you did in the hospital. Gradually increase the amount you walk.     · When you exercise, watch for signs that your heart is working too hard. You are pushing too hard if you cannot talk while you are exercising. If you become short of breath or dizzy or have chest pain, sit down and rest immediately.     · Check your pulse regularly. Place two fingers on the artery at the palm side of your wrist, in line with your thumb. If your heartbeat seems uneven or fast, talk to your doctor. When should you call for help? Call 911 anytime you think you may need emergency care. For example, call if:    · You have symptoms of a heart attack. These may include:  ? Chest pain or pressure, or a strange feeling in the chest.  ? Sweating. ? Shortness of breath. ? Nausea or vomiting.   ? Pain, pressure, or a strange feeling in the back, neck, jaw, or upper belly or in one or both shoulders or arms. ? Lightheadedness or sudden weakness. ? A fast or irregular heartbeat. After you call 911, the  may tell you to chew 1 adult-strength or 2 to 4 low-dose aspirin. Wait for an ambulance. Do not try to drive yourself.     · You have symptoms of a stroke. These may include:  ? Sudden numbness, tingling, weakness, or loss of movement in your face, arm, or leg, especially on only one side of your body. ? Sudden vision changes. ? Sudden trouble speaking. ? Sudden confusion or trouble understanding simple statements. ? Sudden problems with walking or balance. ? A sudden, severe headache that is different from past headaches.     · You passed out (lost consciousness). Call your doctor now or seek immediate medical care if:    · You have new or increased shortness of breath.     · You feel dizzy or lightheaded, or you feel like you may faint.     · Your heart rate becomes irregular.     · You can feel your heart flutter in your chest or skip heartbeats. Tell your doctor if these symptoms are new or worse. Watch closely for changes in your health, and be sure to contact your doctor if you have any problems. Where can you learn more? Go to https://ZYB.Arsanis. org and sign in to your [place] account. Enter U020 in the [facility] box to learn more about \"Atrial Fibrillation: Care Instructions. \"     If you do not have an account, please click on the \"Sign Up Now\" link. Current as of: April 29, 2021               Content Version: 13.1  © 2006-2021 Healthwise, Incorporated. Care instructions adapted under license by [facility] ([facility]). If you have questions about a medical condition or this instruction, always ask your healthcare professional. [place] [address] any warranty or liability for your use of this information.

## 2022-03-10 DIAGNOSIS — I50.21 ACUTE SYSTOLIC CONGESTIVE HEART FAILURE (HCC): ICD-10-CM

## 2022-03-10 LAB — PRO-BNP: 3884 PG/ML (ref 0–900)

## 2022-03-10 RX ORDER — LEVOTHYROXINE SODIUM 88 UG/1
TABLET ORAL
Qty: 30 TABLET | Refills: 5 | Status: ON HOLD | OUTPATIENT
Start: 2022-03-10

## 2022-03-17 ENCOUNTER — TELEPHONE (OUTPATIENT)
Dept: CARDIOLOGY CLINIC | Age: 67
End: 2022-03-17

## 2022-03-18 ENCOUNTER — OFFICE VISIT (OUTPATIENT)
Dept: CARDIOLOGY CLINIC | Age: 67
End: 2022-03-18
Payer: MEDICARE

## 2022-03-18 VITALS
HEART RATE: 106 BPM | SYSTOLIC BLOOD PRESSURE: 118 MMHG | HEIGHT: 70 IN | DIASTOLIC BLOOD PRESSURE: 78 MMHG | WEIGHT: 251 LBS | BODY MASS INDEX: 35.93 KG/M2

## 2022-03-18 DIAGNOSIS — I42.8 NON-ISCHEMIC CARDIOMYOPATHY (HCC): ICD-10-CM

## 2022-03-18 DIAGNOSIS — I48.19 PERSISTENT ATRIAL FIBRILLATION (HCC): ICD-10-CM

## 2022-03-18 DIAGNOSIS — R79.89 ELEVATED TSH: ICD-10-CM

## 2022-03-18 DIAGNOSIS — I50.22 CHF (CONGESTIVE HEART FAILURE), NYHA CLASS II, CHRONIC, SYSTOLIC (HCC): Primary | ICD-10-CM

## 2022-03-18 DIAGNOSIS — I10 ESSENTIAL HYPERTENSION: ICD-10-CM

## 2022-03-18 PROCEDURE — 1036F TOBACCO NON-USER: CPT | Performed by: CLINICAL NURSE SPECIALIST

## 2022-03-18 PROCEDURE — 4040F PNEUMOC VAC/ADMIN/RCVD: CPT | Performed by: CLINICAL NURSE SPECIALIST

## 2022-03-18 PROCEDURE — 1123F ACP DISCUSS/DSCN MKR DOCD: CPT | Performed by: CLINICAL NURSE SPECIALIST

## 2022-03-18 PROCEDURE — 3017F COLORECTAL CA SCREEN DOC REV: CPT | Performed by: CLINICAL NURSE SPECIALIST

## 2022-03-18 PROCEDURE — G8427 DOCREV CUR MEDS BY ELIG CLIN: HCPCS | Performed by: CLINICAL NURSE SPECIALIST

## 2022-03-18 PROCEDURE — G8417 CALC BMI ABV UP PARAM F/U: HCPCS | Performed by: CLINICAL NURSE SPECIALIST

## 2022-03-18 PROCEDURE — G8484 FLU IMMUNIZE NO ADMIN: HCPCS | Performed by: CLINICAL NURSE SPECIALIST

## 2022-03-18 PROCEDURE — 99214 OFFICE O/P EST MOD 30 MIN: CPT | Performed by: CLINICAL NURSE SPECIALIST

## 2022-03-18 NOTE — PATIENT INSTRUCTIONS
Take your furosemide twice a day for 3 days- take the 2nd dose in the afternoon   Follow up in 4 weeks and will repeat labs   Obtain scale. Recommend daily weights. In the future if you see weight gain of 2 to 3 pounds overnight or 5 to 6 pounds over a week that is fluid retention. Call office for directions  Call with any questions or concerns  Follow up with primary care provider for non cardiac problems  Report any new problems  Cardiovascular Fitness-Exercise as tolerated. Strive for 30 minutes of exercise most days of the week. Cardiac / Healthy Diet-low-sodium diet  Continue current medications as directed  Continue plan of treatment  It is always recommended that you bring your medications bottles with you to each visit - this is for your safety!

## 2022-03-18 NOTE — PROGRESS NOTES
[facility] Cardiology  [facility]  [address]  Phone: [phone]  Fax: [phone]    OFFICE VISIT:  3/18/2022    Lissa [address] [facility] [address]: 1955    Reason For Visit:  Samantha Jones is a 77 y.o. male who is here for Follow-up (1 wk fu  still has some edema and soa), Cardiomyopathy, and Atrial Fibrillation  Patient has history of nonischemic cardiomyopathy with heart catheterization 2016 showing mild nonocclusive coronary disease in EF 10%. Repeat 2D echo showed improvement of LV function in 2017. Has had, paroxysmal atrial fibrillation maintained on rate control and anticoagulation on Coumadin. He has not been seen in the office since September 2020. Patient had canceled appointments and no-show to a few appointments last year    Patient was seen last week with worsening shortness of breath and fluid retention. He was with his daughter who noticed significant swelling. He quit taking all his medications  All his medications were refilled. Labs were checked as expected proBNP was elevated to 3884. However CMP and CBC were basically unremarkable  Previously had been on Synthroid. His TSH was elevated at 9.4 so resumed his previous dose. He will need to follow-up with primary care for further dosing    Returns today in follow-up. He has been back on his carvedilol, Entresto, Aldactone and Eliquis for the last week  States he is feeling better. Legs are still swollen      Subjective  Rambo denies exertional chest pain. Has LOVETT. Usually no resting shortness of breath, orthopnea, paroxysmal nocturnal dyspnea, syncope, presyncope, arrhythmia, The patient denies numbness or weakness to suggest cerebrovascular accident or transient ischemic attack. Siva Pena DO was PCP but he is now a hospitalist.  He has not been to see anybody else.   Ann-Marie Rollins has the following history as recorded in [facility]:    Patient Active Problem List    Diagnosis Date Noted    Patient calling regarding: that she hasn't heard about her xray that she had at the end of November.     Please call Agustina at [phone].    Acute renal failure ([facility] 10/02/2016    CKD (chronic kidney disease), stage III ([facility] 10/02/2016    Shortness of breath 2016    Essential hypertension 2016    Hypothyroidism 2016    Paroxysmal atrial fibrillation ([facility] 2018    Hypokalemia 10/04/2016    Non-ischemic cardiomyopathy ([facility] 10/03/2016    Ex-cigarette smoker 10/03/2016    Obesity (BMI 35.0-39.9 without comorbidity) 2016     Past Medical History:   Diagnosis Date    Ex-cigarette smoker 10/3/2016    Hypertension     Non-ischemic cardiomyopathy ([facility] 10/3/2016    2016  Echo  EF 25% 10/3/16  Cath  Mild CAD, EF 10%     Paroxysmal atrial fibrillation ([facility] 3/16/2018     Past Surgical History:   Procedure Laterality Date    FINGER AMPUTATION Left     partial left thumb amputation following trauma    TONSILLECTOMY       Family History   Problem Relation Age of Onset    High Blood Pressure Mother     Cancer Father         Stomach    Heart Disease Maternal Grandmother     Diabetes Maternal Uncle      Social History     Tobacco Use    Smoking status: Former Smoker     Packs/day: 1.00     Years: 2.00     Pack years: 2.00     Types: Cigarettes     Start date:      Quit date:      Years since quittin.2    Smokeless tobacco: Never Used    Tobacco comment: light smoking history - socially   Substance Use Topics    Alcohol use: Yes      Current Outpatient Medications   Medication Sig Dispense Refill    levothyroxine (SYNTHROID) 88 MCG tablet TAKE ONE TABLET BY MOUTH ONCE DAILY 30 tablet 5    carvedilol (COREG) 25 MG tablet Take 1 tablet by mouth 2 times daily 180 tablet 3    sacubitril-valsartan (ENTRESTO)  MG per tablet Take 1 tablet by mouth twice daily 180 tablet 3    furosemide (LASIX) 40 MG tablet Take 1 tablet by mouth once daily 90 tablet 3    spironolactone (ALDACTONE) 25 MG tablet TAKE ONE TABLET BY MOUTH ONCE DAILY 90 tablet 3    apixaban (ELIQUIS) 5 MG TABS tablet Take 1 tablet by mouth 2 times daily 60 tablet 3     No current facility-administered medications for this visit. Allergies: Patient has no known allergies. Review of Systems  Constitutional - no significant activity change, appetite change, or unexpected weight change. No fever, chills or diaphoresis. No fatigue. HEENT - no significant rhinorrhea or epistaxis. No tinnitus or significant hearing loss. Eyes - no sudden vision change or amaurosis. Respiratory - no significant wheezing, stridor, apnea or cough. + dyspnea on exertion no resting shortness of breath. Cardiovascular - no exertional chest pain, orthopnea or PND. No sensation of arrhythmia or slow heart rate. No claudication + leg edema. Gastrointestinal - no abdominal swelling or pain. No blood in stool. No severe constipation, diarrhea, nausea, or vomiting. Genitourinary - no difficulty urinating, dysuria, frequency, or urgency. No flank pain or hematuria. Musculoskeletal - no back pain, gait disturbance, or myalgia. Skin - no color change or rash. No pallor. No new surgical incision. Neurologic - no speech difficulty, facial asymmetry or lateralizing weakness. No seizures, presyncope, syncope, or significant dizziness. Hematologic - no easy bruising or excessive bleeding. Psychiatric - no severe anxiety or insomnia. No confusion. All other review of systems are negative. Objective  Vital Signs - /78   Pulse 106   Ht 5' 10\" (1.778 m)   Wt 251 lb (113.9 kg)   BMI 36.01 kg/m²   General - Rambo is alert, cooperative, and pleasant. Well groomed. No acute distress. Body habitus is obese. HEENT - The head is normocephalic. No circumoral cyanosis. Dentition is normal.   EYES -  No Xanthelasma, no arcus senilis, no conjunctival hemorrhages or discharge. Neck - Supple, without increased jugular venous pressures. No carotid bruits. No mass. Respiratory - Lungs are clear bilaterally. No wheezes or rales.   Normal effort without use of accessory muscles. Cardiovascular - Heart has regular rhythm and rate. No murmurs, rubs or gallops. + pedal pulses and no varicosities. Abdominal -  Soft, nontender, nondistended. Bowel sounds are intact. Extremities - No clubbing, cyanosis, 2+ BLE edema up to knees . Musculoskeletal -  No clubbing . No Osler's nodes. Gait normal .  No kyphosis or scoliosis. Skin -  no statis ulcers or dermatitis. Neurological - No focal signs are identified. Oriented to person, place and time. Psychiatric -  Appropriate affect and mood. Assessment:     Diagnosis Orders   1. CHF (congestive heart failure), NYHA class II, chronic, systolic ([facility]     2. Non-ischemic cardiomyopathy (HCC)     3. Persistent atrial fibrillation (HCC)     4. Elevated TSH     5. Essential hypertension       Data:  BP Readings from Last 3 Encounters:   03/18/22 118/78   03/09/22 (!) 160/110   09/10/20 (!) 136/90    Pulse Readings from Last 3 Encounters:   03/18/22 106   03/09/22 138   09/10/20 58        Wt Readings from Last 3 Encounters:   03/18/22 251 lb (113.9 kg)   03/09/22 257 lb (116.6 kg)   09/10/20 250 lb (113.4 kg)   EKG today shows atrial fibrillation with a rate of 138    Patient was previously anticoagulated on warfarin but is not been taking. Due to risk for stroke with uncontrolled A. fib will start him on some Eliquis. Will give him samples so he will become therapeutic quickly.   If cost prohibitive we will can transfer him over to Coumadin in a month or so  [id number] Score for Atrial Fibrillation Stroke Risk   Risk   Factors  Component Value   C CHF Yes 1   H HTN Yes 1   A2 Age >= 76 No,  (68 y.o.) 0   D DM No 0   S2 Prior Stroke/TIA No 0   V Vascular Disease No 0   A Age 74-69 Yes,  (68 y.o.) 1   Sc Sex male 0    [id number]  Score  3   Score last updated 3/10/22 9:17 AM CST        Lab Results   Component Value Date     03/09/2022    K 4.3 03/09/2022     03/09/2022    CO2 26 03/09/2022    BUN 29 (H) 03/09/2022    CREATININE 0.9 03/09/2022    GLUCOSE 113 (H) 03/09/2022    CALCIUM 9.3 03/09/2022    PROT 7.9 03/09/2022    LABALBU 3.8 03/09/2022    BILITOT 1.2 03/09/2022    ALKPHOS 65 03/09/2022    AST 50 (H) 03/09/2022    ALT 38 03/09/2022    LABGLOM >60 03/09/2022    GFRAA >59 03/09/2022    GLOB 4.0 04/10/2017       Lab Results   Component Value Date    WBC 4.3 (L) 03/09/2022    HGB 14.7 03/09/2022    HCT 46.3 03/09/2022    MCV 95.3 (H) 03/09/2022    PLT 93 (L) 03/09/2022     Lab Results   Component Value Date    TSH 9.420 (H) 03/09/2022    T4FREE 1.24 03/09/2022     proBNP 3884    Blood pressure and heart rate much improved with being on his guideline directed medical therapy. Still has significant fluid retention and little weight loss but overall is feeling much better    We will have him increase his Lasix for 2 to 3 days to help with the peripheral edema. We discussed daily weights and when and how to take extra Lasix in the future    Again stressed the importance of getting primary care provider  We will recheck his labs at next follow-up in about 6 weeks    We will continue anticoagulant with Eliquis for now. If becomes cost prohibitive can transition him back to Coumadin    Plan    Take your furosemide twice a day for 3 days- take the 2nd dose in the afternoon   Follow up in 4 weeks and will repeat labs   Obtain scale. Recommend daily weights. In the future if you see weight gain of 2 to 3 pounds overnight or 5 to 6 pounds over a week that is fluid retention. Call office for directions  Call with any questions or concerns  Follow up with primary care provider for non cardiac problems  Report any new problems  Cardiovascular Fitness-Exercise as tolerated. Strive for 30 minutes of exercise most days of the week.     Cardiac / Healthy Diet-low-sodium diet  Continue current medications as directed  Continue plan of treatment  It is always recommended that you bring your medications bottles with you to each visit - this is for your safety! ALLISON Becerra dragon/transcription disclaimer: Much of this encounter note is electronic transcription/translation of spoken language to printed tach. Electronic translation of spoken language may be erroneous, or at times, nonsensical words or phrases may be inadvertently transcribed.  Although, I have reviewed the note for such errors, some may still exist.

## 2022-04-14 ENCOUNTER — TELEPHONE (OUTPATIENT)
Dept: CARDIOLOGY CLINIC | Age: 67
End: 2022-04-14

## 2022-04-15 ENCOUNTER — OFFICE VISIT (OUTPATIENT)
Dept: CARDIOLOGY CLINIC | Age: 67
End: 2022-04-15
Payer: MEDICARE

## 2022-04-15 VITALS
HEIGHT: 70 IN | HEART RATE: 85 BPM | SYSTOLIC BLOOD PRESSURE: 118 MMHG | BODY MASS INDEX: 34.07 KG/M2 | DIASTOLIC BLOOD PRESSURE: 78 MMHG | WEIGHT: 238 LBS

## 2022-04-15 DIAGNOSIS — I48.19 PERSISTENT ATRIAL FIBRILLATION (HCC): ICD-10-CM

## 2022-04-15 DIAGNOSIS — R79.89 ELEVATED TSH: ICD-10-CM

## 2022-04-15 DIAGNOSIS — I50.22 CHF (CONGESTIVE HEART FAILURE), NYHA CLASS II, CHRONIC, SYSTOLIC (HCC): ICD-10-CM

## 2022-04-15 DIAGNOSIS — I42.8 NON-ISCHEMIC CARDIOMYOPATHY (HCC): ICD-10-CM

## 2022-04-15 DIAGNOSIS — I42.8 NON-ISCHEMIC CARDIOMYOPATHY (HCC): Primary | ICD-10-CM

## 2022-04-15 LAB
ANION GAP SERPL CALCULATED.3IONS-SCNC: 14 MMOL/L (ref 7–19)
BUN BLDV-MCNC: 25 MG/DL (ref 8–23)
CALCIUM SERPL-MCNC: 9 MG/DL (ref 8.8–10.2)
CHLORIDE BLD-SCNC: 105 MMOL/L (ref 98–111)
CO2: 24 MMOL/L (ref 22–29)
CREAT SERPL-MCNC: 0.7 MG/DL (ref 0.5–1.2)
GFR AFRICAN AMERICAN: >59
GFR NON-AFRICAN AMERICAN: >60
GLUCOSE BLD-MCNC: 101 MG/DL (ref 74–109)
POTASSIUM SERPL-SCNC: 4.7 MMOL/L (ref 3.5–5)
PRO-BNP: 2582 PG/ML (ref 0–900)
SODIUM BLD-SCNC: 143 MMOL/L (ref 136–145)
TSH SERPL DL<=0.05 MIU/L-ACNC: 3.93 UIU/ML (ref 0.27–4.2)

## 2022-04-15 PROCEDURE — G8417 CALC BMI ABV UP PARAM F/U: HCPCS | Performed by: CLINICAL NURSE SPECIALIST

## 2022-04-15 PROCEDURE — 99214 OFFICE O/P EST MOD 30 MIN: CPT | Performed by: CLINICAL NURSE SPECIALIST

## 2022-04-15 PROCEDURE — G8427 DOCREV CUR MEDS BY ELIG CLIN: HCPCS | Performed by: CLINICAL NURSE SPECIALIST

## 2022-04-15 PROCEDURE — 1036F TOBACCO NON-USER: CPT | Performed by: CLINICAL NURSE SPECIALIST

## 2022-04-15 PROCEDURE — 4040F PNEUMOC VAC/ADMIN/RCVD: CPT | Performed by: CLINICAL NURSE SPECIALIST

## 2022-04-15 PROCEDURE — 3017F COLORECTAL CA SCREEN DOC REV: CPT | Performed by: CLINICAL NURSE SPECIALIST

## 2022-04-15 PROCEDURE — 1123F ACP DISCUSS/DSCN MKR DOCD: CPT | Performed by: CLINICAL NURSE SPECIALIST

## 2022-04-15 NOTE — PATIENT INSTRUCTIONS
Repeat labs today  Follow up in 6 mos   Call with any questions or concerns  Follow up with PCP for for non cardiac problems  Report any new problems  Cardiovascular Fitness-Exercise as tolerated. Strive for 30 minutes of exercise most days of the week. Cardiac / Healthy Diet - low salt diet   Continue current medications as directed  Continue plan of treatment  It is always recommended that you bring your medications bottles with you to each visit - this is for your safety!

## 2022-04-15 NOTE — PROGRESS NOTES
[facility] Cardiology  [facility] [place]  [address]  Phone: [phone]  Fax: [phone]    OFFICE VISIT:  4/15/2022    Rach Sotelo - : 1955    Reason For Visit:  Malvina Gosselin is a 77 y.o. male who is here for Follow-up (no cardiac symptoms), Congestive Heart Failure, Atrial Fibrillation, and Cardiomyopathy    Patient has history of nonischemic cardiomyopathy with heart catheterization  showing mild nonocclusive coronary disease in EF 10%. Repeat 2D echo showed improvement of LV function in 2017. Has had, paroxysmal atrial fibrillation maintained on rate control and anticoagulation on Coumadin    Patient was seen 3/9/2022 after not being seen in the office for couple years. Worsening shortness of breath and fluid retention. He had been out of his medications for quite some time. Elevated BNP and abnormal TSH. Medications were not restarted. Anticoagulation was switched to Eliquis for better compliance    He was seen back 3/18/2022 with minimal improvement in his swelling but feeling better. Increase Lasix for 3 days and discussed daily weights    Returns today in follow-up. He states he is feeling better. He is taking his medications regularly. He is not noticed any abnormal bleeding with the Eliquis. His weight is down on his home scales as well    Subjective  Rambo denies exertional chest pain, shortness of breath, orthopnea, paroxysmal nocturnal dyspnea, syncope, presyncope, arrhythmia, and fatigue. The patient denies numbness or weakness to suggest cerebrovascular accident or transient ischemic attack. He has not gotten in with a new primary care as of yet.   Rach Sotelo has the following history as recorded in [facility]:    Patient Active Problem List    Diagnosis Date Noted    Acute renal failure ([facility] 10/02/2016    CKD (chronic kidney disease), stage III ([facility] 10/02/2016    Shortness of breath 2016    Essential hypertension 2016  Hypothyroidism 2016    Paroxysmal atrial fibrillation ([facility] 2018    Hypokalemia 10/04/2016    Non-ischemic cardiomyopathy ([facility] 10/03/2016    Ex-cigarette smoker 10/03/2016    Obesity (BMI 35.0-39.9 without comorbidity) 2016     Past Medical History:   Diagnosis Date    Ex-cigarette smoker 10/3/2016    Hypertension     Non-ischemic cardiomyopathy ([facility] 10/3/2016    2016  Echo  EF 25% 10/3/16  Cath  Mild CAD, EF 10%     Paroxysmal atrial fibrillation ([facility] 3/16/2018     Past Surgical History:   Procedure Laterality Date    FINGER AMPUTATION Left     partial left thumb amputation following trauma    TONSILLECTOMY       Family History   Problem Relation Age of Onset    High Blood Pressure Mother     Cancer Father         Stomach    Heart Disease Maternal Grandmother     Diabetes Maternal Uncle      Social History     Tobacco Use    Smoking status: Former Smoker     Packs/day: 1.00     Years: 2.00     Pack years: 2.00     Types: Cigarettes     Start date:      Quit date:      Years since quittin.3    Smokeless tobacco: Never Used    Tobacco comment: light smoking history - socially   Substance Use Topics    Alcohol use: Yes      Current Outpatient Medications   Medication Sig Dispense Refill    levothyroxine (SYNTHROID) 88 MCG tablet TAKE ONE TABLET BY MOUTH ONCE DAILY 30 tablet 5    carvedilol (COREG) 25 MG tablet Take 1 tablet by mouth 2 times daily 180 tablet 3    sacubitril-valsartan (ENTRESTO)  MG per tablet Take 1 tablet by mouth twice daily 180 tablet 3    furosemide (LASIX) 40 MG tablet Take 1 tablet by mouth once daily 90 tablet 3    spironolactone (ALDACTONE) 25 MG tablet TAKE ONE TABLET BY MOUTH ONCE DAILY 90 tablet 3    apixaban (ELIQUIS) 5 MG TABS tablet Take 1 tablet by mouth 2 times daily 60 tablet 3     No current facility-administered medications for this visit. Allergies: Patient has no known allergies.     Review of Systems  Constitutional - no significant activity change, appetite change, or unexpected weight change. No fever, chills or diaphoresis. No fatigue. HEENT - no significant rhinorrhea or epistaxis. No tinnitus or significant hearing loss. Eyes - no sudden vision change or amaurosis. Respiratory - no significant wheezing, stridor, apnea or cough. No dyspnea on exertion or shortness of breath. Cardiovascular - no exertional chest pain, orthopnea or PND. No sensation of arrhythmia or slow heart rate. No claudication or leg edema. Gastrointestinal - no abdominal swelling or pain. No blood in stool. No severe constipation, diarrhea, nausea, or vomiting. Genitourinary - no difficulty urinating, dysuria, frequency, or urgency. No flank pain or hematuria. Musculoskeletal - no back pain, gait disturbance, or myalgia. Skin - no color change or rash. No pallor. No new surgical incision. Neurologic - no speech difficulty, facial asymmetry or lateralizing weakness. No seizures, presyncope, syncope, or significant dizziness. Hematologic - no easy bruising or excessive bleeding. Psychiatric - no severe anxiety or insomnia. No confusion. All other review of systems are negative. Objective  Vital Signs - /78   Pulse 85   Ht 5' 10\" (1.778 m)   Wt 238 lb (108 kg)   BMI 34.15 kg/m²   General - Rambo is alert, cooperative, and pleasant. Well groomed. No acute distress. Body habitus is overweight. HEENT - The head is normocephalic. No circumoral cyanosis. Dentition is normal.   EYES -  No Xanthelasma, no arcus senilis, no conjunctival hemorrhages or discharge. Neck - Supple, without increased jugular venous pressures. No carotid bruits. No mass. Respiratory - Lungs are clear bilaterally. No wheezes or rales. Normal effort without use of accessory muscles. Cardiovascular - Heart has irregular rhythm and controlled rate. No murmurs, rubs or gallops.     + pedal pulses and no varicosities. Abdominal -  Soft, nontender, nondistended. Bowel sounds are intact. Extremities - No clubbing, cyanosis, 1 + pitting BLE edema. Musculoskeletal -  No clubbing . No Osler's nodes. Gait normal .  No kyphosis or scoliosis. Skin -  no statis ulcers or dermatitis. Neurological - No focal signs are identified. Oriented to person, place and time. Psychiatric -  Appropriate affect and mood. Assessment:     Diagnosis Orders   1. Non-ischemic cardiomyopathy (HCC)  proBNP, N-TERMINAL    Basic Metabolic Panel    TSH   2. Persistent atrial fibrillation (HCC)  proBNP, N-TERMINAL    Basic Metabolic Panel    TSH   3. Elevated TSH  proBNP, N-TERMINAL    Basic Metabolic Panel    TSH   4. CHF (congestive heart failure), NYHA class II, chronic, systolic (HCC)       Data:  BP Readings from Last 3 Encounters:   04/15/22 118/78   03/18/22 118/78   03/09/22 (!) 160/110    Pulse Readings from Last 3 Encounters:   04/15/22 85   03/18/22 106   03/09/22 138        Wt Readings from Last 3 Encounters:   04/15/22 238 lb (108 kg)   03/18/22 251 lb (113.9 kg)   03/09/22 257 lb (116.6 kg)       Improvement in blood pressure and heart rate  And weight 13 pounds   states he is taking medications regularly. On GDMT with Entresto, carvedilol and Aldactone. Taking Lasix daily remains anticoagulated on Eliquis    We discussed signs and symptoms report. We discussed watching for any abnormal bleeding and letting us know if he has any significant change in activity tolerance.   We will have him recheck labs today as he has been back on his medication now for about 6 weeks    Repeat labs today:  Lab Results   Component Value Date     04/15/2022    K 4.7 04/15/2022     04/15/2022    CO2 24 04/15/2022    BUN 25 04/15/2022    CREATININE 0.7 04/15/2022    GLUCOSE 101 04/15/2022    CALCIUM 9.0 04/15/2022      Lab Results   Component Value Date    TSH 3.930 04/15/2022     BNP 2582 (improved from 3884) States taking medications as prescribed  Stable cardiovascular status. No evidence of overt heart failure, angina. 30 minutes were spent preparing, reviewing and seeing patient. All questions answered    Plan    Repeat labs today  Follow up in 6 mos   Call with any questions or concerns  Follow up with PCP for for non cardiac problems  Report any new problems  Cardiovascular Fitness-Exercise as tolerated. Strive for 30 minutes of exercise most days of the week. Cardiac / Healthy Diet - low salt diet   Continue current medications as directed  Continue plan of treatment  It is always recommended that you bring your medications bottles with you to each visit - this is for your safety! ALLISON Grover    EMR dragon/transcription disclaimer: Much of this encounter note is electronic transcription/translation of spoken language to printed tach. Electronic translation of spoken language may be erroneous, or at times, nonsensical words or phrases may be inadvertently transcribed.  Although, I have reviewed the note for such errors, some may still exist.

## 2022-04-18 ENCOUNTER — TELEPHONE (OUTPATIENT)
Dept: CARDIOLOGY CLINIC | Age: 67
End: 2022-04-18

## 2022-04-18 NOTE — TELEPHONE ENCOUNTER
----- Message from ALLISON Mitchell sent at 4/15/2022  2:21 PM CDT -----  Please let him know that his thyroid labs have normalized. His chemistry is stable. BNP the heart failure marker still a little elevated however is much better than it was before.   Continue same medications

## 2022-04-18 NOTE — TELEPHONE ENCOUNTER
----- Message from ALLISON Begum Asp sent at 4/15/2022  2:21 PM CDT -----  Please let him know that his thyroid labs have normalized. His chemistry is stable. BNP the heart failure marker still a little elevated however is much better than it was before.   Continue same medications

## 2022-10-18 ENCOUNTER — OFFICE VISIT (OUTPATIENT)
Dept: CARDIOLOGY CLINIC | Age: 67
End: 2022-10-18
Payer: MEDICARE

## 2022-10-18 VITALS
HEIGHT: 70 IN | DIASTOLIC BLOOD PRESSURE: 88 MMHG | BODY MASS INDEX: 32.5 KG/M2 | HEART RATE: 62 BPM | WEIGHT: 227 LBS | SYSTOLIC BLOOD PRESSURE: 124 MMHG

## 2022-10-18 DIAGNOSIS — I10 ESSENTIAL HYPERTENSION: ICD-10-CM

## 2022-10-18 DIAGNOSIS — I50.22 CHF (CONGESTIVE HEART FAILURE), NYHA CLASS II, CHRONIC, SYSTOLIC (HCC): ICD-10-CM

## 2022-10-18 DIAGNOSIS — I48.19 PERSISTENT ATRIAL FIBRILLATION (HCC): ICD-10-CM

## 2022-10-18 DIAGNOSIS — I42.8 NON-ISCHEMIC CARDIOMYOPATHY (HCC): Primary | ICD-10-CM

## 2022-10-18 PROCEDURE — 1123F ACP DISCUSS/DSCN MKR DOCD: CPT | Performed by: CLINICAL NURSE SPECIALIST

## 2022-10-18 PROCEDURE — G8484 FLU IMMUNIZE NO ADMIN: HCPCS | Performed by: CLINICAL NURSE SPECIALIST

## 2022-10-18 PROCEDURE — G8417 CALC BMI ABV UP PARAM F/U: HCPCS | Performed by: CLINICAL NURSE SPECIALIST

## 2022-10-18 PROCEDURE — 1036F TOBACCO NON-USER: CPT | Performed by: CLINICAL NURSE SPECIALIST

## 2022-10-18 PROCEDURE — 3017F COLORECTAL CA SCREEN DOC REV: CPT | Performed by: CLINICAL NURSE SPECIALIST

## 2022-10-18 PROCEDURE — G8427 DOCREV CUR MEDS BY ELIG CLIN: HCPCS | Performed by: CLINICAL NURSE SPECIALIST

## 2022-10-18 PROCEDURE — 99214 OFFICE O/P EST MOD 30 MIN: CPT | Performed by: CLINICAL NURSE SPECIALIST

## 2022-10-18 NOTE — PATIENT INSTRUCTIONS
Follow up in 6-9 mos    Call with any questions or concerns  Follow up with Toby Leal, DO for non cardiac problems  Report any new problems  Cardiovascular Fitness-Exercise as tolerated. Strive for 30 minutes of exercise most days of the week. Cardiac / Healthy Diet  Continue current medications as directed  Continue plan of treatment  It is always recommended that you bring your medications bottles with you to each visit - this is for your safety!

## 2022-10-18 NOTE — PROGRESS NOTES
[facility] Cardiology  [facility]  [id number]  Phone: [phone]  Fax: [phone]    OFFICE VISIT:  10/18/2022    [address] [place] [address] St: 1955    Reason For Visit:  Yahaira Jacobs is a 77 y.o. male who is here for 6 Month Follow-Up (No cardiac symptoms), Cardiomyopathy, Congestive Heart Failure, and Atrial Fibrillation  Patient has history of nonischemic cardiomyopathy with heart catheterization 2016 showing mild nonocclusive coronary disease in EF 10%. Repeat 2D echo showed improvement of LV function in 2017. Has had, paroxysmal atrial fibrillation maintained on rate control and anticoagulation on Coumadin  Earlier this year patient was seen after not being followed for a few years. Had worsening shortness of breath and fluid retention. Medications were reinitiated and anticoagulation was switched to Eliquis for better compliance from his Coumadin    Returns today in follow-up. He states he is active and doing well. He is not noticed any cardiac symptoms and no swelling        Subjective  Rambo denies exertional chest pain, shortness of breath, orthopnea, paroxysmal nocturnal dyspnea, syncope, presyncope, arrhythmia, edema and fatigue. The patient denies numbness or weakness to suggest cerebrovascular accident or transient ischemic attack. Yaquelin Belle DO was PCP.   No recent primary care visit  Donald Dallas has the following history as recorded in [facility]:    Patient Active Problem List    Diagnosis Date Noted    Acute renal failure ([address] 10/02/2016    CKD (chronic kidney disease), stage III ([address] 10/02/2016    Shortness of breath 09/29/2016    Essential hypertension 09/29/2016    Hypothyroidism 09/29/2016    Paroxysmal atrial fibrillation ([address] 03/16/2018    Hypokalemia 10/04/2016    Non-ischemic cardiomyopathy ([address] 10/03/2016    Ex-cigarette smoker 10/03/2016    Obesity (BMI 35.0-39.9 without comorbidity) 09/29/2016     Past Medical History: Diagnosis Date    Ex-cigarette smoker 10/3/2016    Hypertension     Non-ischemic cardiomyopathy ([facility] 10/3/2016    2016  Echo  EF 25% 10/3/16  Cath  Mild CAD, EF 10%     Paroxysmal atrial fibrillation ([facility] 3/16/2018     Past Surgical History:   Procedure Laterality Date    FINGER AMPUTATION Left     partial left thumb amputation following trauma    TONSILLECTOMY       Family History   Problem Relation Age of Onset    High Blood Pressure Mother     Cancer Father         Stomach    Heart Disease Maternal Grandmother     Diabetes Maternal Uncle      Social History     Tobacco Use    Smoking status: Former     Packs/day: 1.00     Years: 2.00     Pack years: 2.00     Types: Cigarettes     Start date:      Quit date:      Years since quittin.8    Smokeless tobacco: Never    Tobacco comments:     light smoking history - socially   Substance Use Topics    Alcohol use: Yes      Current Outpatient Medications   Medication Sig Dispense Refill    levothyroxine (SYNTHROID) 88 MCG tablet TAKE ONE TABLET BY MOUTH ONCE DAILY 30 tablet 5    carvedilol (COREG) 25 MG tablet Take 1 tablet by mouth 2 times daily 180 tablet 3    sacubitril-valsartan (ENTRESTO)  MG per tablet Take 1 tablet by mouth twice daily 180 tablet 3    furosemide (LASIX) 40 MG tablet Take 1 tablet by mouth once daily 90 tablet 3    spironolactone (ALDACTONE) 25 MG tablet TAKE ONE TABLET BY MOUTH ONCE DAILY 90 tablet 3    apixaban (ELIQUIS) 5 MG TABS tablet Take 1 tablet by mouth 2 times daily 60 tablet 3     No current facility-administered medications for this visit. Allergies: Patient has no known allergies. Review of Systems  Constitutional - no significant activity change, appetite change, or unexpected weight change. No fever, chills or diaphoresis. No fatigue. HEENT - no significant rhinorrhea or epistaxis. No tinnitus or significant hearing loss. Eyes - no sudden vision change or amaurosis.    Respiratory - no significant wheezing, stridor, apnea or cough. No dyspnea on exertion or shortness of breath. Cardiovascular - no exertional chest pain, orthopnea or PND. No sensation of arrhythmia or slow heart rate. No claudication or leg edema. Gastrointestinal - no abdominal swelling or pain. No blood in stool. No severe constipation, diarrhea, nausea, or vomiting. Genitourinary - no difficulty urinating, dysuria, frequency, or urgency. No flank pain or hematuria. Musculoskeletal - no back pain, gait disturbance, or myalgia. Skin - no color change or rash. No pallor. No new surgical incision. Neurologic - no speech difficulty, facial asymmetry or lateralizing weakness. No seizures, presyncope, syncope, or significant dizziness. Hematologic - no easy bruising or excessive bleeding. Psychiatric - no severe anxiety or insomnia. No confusion. All other review of systems are negative. Objective  Vital Signs - /88   Pulse 62   Ht 5' 10\" (1.778 m)   Wt 227 lb (103 kg)   BMI 32.57 kg/m²   General - Rambo is alert, cooperative, and pleasant. Well groomed. No acute distress. Body habitus is overweight. HEENT - The head is normocephalic. No circumoral cyanosis. Dentition is normal.   EYES -  No Xanthelasma, no arcus senilis, no conjunctival hemorrhages or discharge. Neck - Supple, without increased jugular venous pressures. No carotid bruits. No mass. Respiratory - Lungs are clear bilaterally. No wheezes or rales. Normal effort without use of accessory muscles. Cardiovascular - Heart has regular rhythm and rate. No murmurs, rubs or gallops. + pedal pulses and no varicosities. Abdominal -  Soft, nontender, nondistended. Bowel sounds are intact. Extremities - No clubbing, cyanosis, or  edema. Musculoskeletal -  No clubbing . No Osler's nodes. Gait normal .  No kyphosis or scoliosis. Skin -  no statis ulcers or dermatitis. Neurological - No focal signs are identified. Oriented to person, place and time. Psychiatric -  Appropriate affect and mood. Assessment:     Diagnosis Orders   1. Non-ischemic cardiomyopathy ([facility]        2. Persistent atrial fibrillation ([facility]        3. Essential hypertension        4. CHF (congestive heart failure), NYHA class II, chronic, systolic (HCC)          Data:  BP Readings from Last 3 Encounters:   10/18/22 124/88   04/15/22 118/78   03/18/22 118/78    Pulse Readings from Last 3 Encounters:   10/18/22 62   04/15/22 85   03/18/22 106        Wt Readings from Last 3 Encounters:   10/18/22 227 lb (103 kg)   04/15/22 238 lb (108 kg)   03/18/22 251 lb (113.9 kg)     Blood pressure and heart rate controlled. Medical management occludes Entresto, carvedilol and Aldactone. Remains anticoagulated on Eliquis and also takes Lasix. Weight is down   CP recent notes   Reviewed recent labs    Patient doing better back on his medications. He appears euvolemic and active. We discussed signs and symptoms to report. States taking medications as prescribed  Stable cardiovascular status. No evidence of overt heart failure, angina or dysrhythmia. 30 minutes were spent preparing, reviewing and seeing patient. All questions answered    Plan    Follow up in 6-9 mos    Call with any questions or concerns  Follow up with Rupa Gavni DO for non cardiac problems  Report any new problems  Cardiovascular Fitness-Exercise as tolerated. Strive for 30 minutes of exercise most days of the week. Cardiac / Healthy Diet  Continue current medications as directed  Continue plan of treatment  It is always recommended that you bring your medications bottles with you to each visit - this is for your safety! ALLISON Estrada dragon/transcription disclaimer: Much of this encounter note is electronic transcription/translation of spoken language to printed tach.  Electronic translation of spoken language may be erroneous, or at times, nonsensical words or phrases may be inadvertently transcribed.  Although, I have reviewed the note for such errors, some may still exist.

## 2022-10-20 PROBLEM — K56.609 SBO (SMALL BOWEL OBSTRUCTION) (HCC): Status: ACTIVE | Noted: 2022-01-01

## 2022-10-20 PROBLEM — R73.9 HYPERGLYCEMIA: Status: ACTIVE | Noted: 2022-01-01

## 2022-10-20 NOTE — H&P
[facility]      History & Physical    12/12  PCP: Lesly Montalvo DO    Date of Admission:10/20/2022    Patient:  Ying Stanford  MRN: [id number]    Date of Service: Pt seen/examined on 10/20/2022 and Admitted to Inpatient with expected LOS greater than two midnights due to medical therapy. CHIEF COMPLAINT:     Chief Complaint   Patient presents with    Abdominal Pain     Pt arrived to the ed with c/o \"I think I have food poisoning. \" Onset Tuesday afternoon. Pt c/o abdominal pain and excess bloating and gas. History Obtained From:  patient, electronic medical record  Primary Care Physician: Lesly Montalvo DO    HISTORY OF PRESENT ILLNESS:    Mr. Ct Pretty, a 77 y.o. male with a history of paroxysmal atrial fibrillation, HTN, presenting to [facility] ED (10/20/2022), on account of a 2-day history of progressively worsening abdominal pain, distention, with associated nausea and vomiting as well as diarrhea. GI work-up significant for;  CT Abd/Pelvis (10/20/2022): Irregular mass at the hepatic flexure of the colon resulting in high-grade small bowel and proximal large bowel obstruction. Severe fluid and gaseous distention of the entire stomach and small bowel including the distal esophagus. Findings are overall concerning for colonic neoplasm    General surgery consulted from the ED    Patient admitted to [facility] (10/20/2022), further work-up and management.        PAST MEDICAL & SURGICAL HISTORY    Past Medical History:      Diagnosis Date    Ex-cigarette smoker 10/3/2016    Hypertension     Non-ischemic cardiomyopathy ([facility] [address] [address] 10/3/2016    9/28/2016  Echo  EF 25% 10/3/16  Cath  Mild CAD, EF 10%     Paroxysmal atrial fibrillation ([facility] [address] [address] 3/16/2018         Past Surgical History:      Procedure Laterality Date    FINGER AMPUTATION Left     partial left thumb amputation following trauma    TONSILLECTOMY            SOCIAL & FAMILY HISTORY:    Social History:   TOBACCO:   reports that he quit smoking about 30 years ago. His smoking use included cigarettes. He started smoking about 32 years ago. He has a 2.00 pack-year smoking history. He has never used smokeless tobacco.  ETOH:   reports current alcohol use. Family History:       Problem Relation Age of Onset    High Blood Pressure Mother     Cancer Father         Stomach    Heart Disease Maternal Grandmother     Diabetes Maternal Uncle         MEDICATIONS:    Medications Prior to Admission:    Prior to Admission medications    Medication Sig Start Date End Date Taking? Authorizing Provider   levothyroxine (SYNTHROID) 88 MCG tablet TAKE ONE TABLET BY MOUTH ONCE DAILY 3/10/22   Shreya Segal, ALLISON   carvedilol (COREG) 25 MG tablet Take 1 tablet by mouth 2 times daily 3/9/22   Shreya Segal, ALLISON   sacubitril-valsartan (ENTRESTO)  MG per tablet Take 1 tablet by mouth twice daily 3/9/22   Shreya Merritte, APRN   furosemide (LASIX) 40 MG tablet Take 1 tablet by mouth once daily 3/9/22   Shreya Segal, APRN   spironolactone (ALDACTONE) 25 MG tablet TAKE ONE TABLET BY MOUTH ONCE DAILY 3/9/22   Shreya Merritte, APRN   apixaban (ELIQUIS) 5 MG TABS tablet Take 1 tablet by mouth 2 times daily 3/9/22 10/18/22  ALLISON Matute        ALLERGIES:    Allergies:  Patient has no known allergies. REVIEW OF SYSTEMS :    Review of Systems  14 point review of systems is negative except as specifically addressed above. PHYSICAL EXAM:    Physical Exam:    Vitals:   /84   Pulse 76   Temp 97 °F (36.1 °C) (Oral)   Resp 18   Ht 5' 10\" (1.778 m)   Wt 230 lb (104.3 kg)   SpO2 94%   BMI 33.00 kg/m²     Physical Exam  Vitals and nursing note reviewed. Constitutional:       General: He is not in acute distress. Appearance: Normal appearance. He is obese. He is not ill-appearing, toxic-appearing or diaphoretic. HENT:      Head: Normocephalic and atraumatic.       Right Ear: External ear normal.      Left Ear: External ear normal.      Nose: Nose normal. No congestion or rhinorrhea. Mouth/Throat:      Mouth: Mucous membranes are moist.      Pharynx: Oropharynx is clear. No oropharyngeal exudate or posterior oropharyngeal erythema. Eyes:      General: No scleral icterus. Right eye: No discharge. Left eye: No discharge. Extraocular Movements: Extraocular movements intact. Conjunctiva/sclera: Conjunctivae normal.      Pupils: Pupils are equal, round, and reactive to light. Cardiovascular:      Rate and Rhythm: Normal rate and regular rhythm. Pulses: Normal pulses. Heart sounds: Normal heart sounds. No murmur heard. No friction rub. No gallop. Pulmonary:      Effort: Pulmonary effort is normal. No respiratory distress. Breath sounds: Normal breath sounds. No stridor. No wheezing, rhonchi or rales. Chest:      Chest wall: No tenderness. Abdominal:      General: Bowel sounds are normal. There is distension. Palpations: Abdomen is soft. Tenderness: There is abdominal tenderness (Diffuse tenderness palpation. No guarding, rigidity, rebound tenderness noted. ). There is no guarding or rebound. Musculoskeletal:         General: No swelling, tenderness, deformity or signs of injury. Normal range of motion. Cervical back: Normal range of motion and neck supple. No rigidity. No muscular tenderness. Right lower leg: No edema. Left lower leg: No edema. Skin:     General: Skin is warm and dry. Capillary Refill: Capillary refill takes less than 2 seconds. Coloration: Skin is not jaundiced or pale. Findings: No bruising, erythema, lesion or rash. Neurological:      General: No focal deficit present. Mental Status: He is alert and oriented to person, place, and time. Cranial Nerves: No cranial nerve deficit. Sensory: No sensory deficit. Motor: No weakness.       Coordination: Coordination normal.   Psychiatric:         Mood and Affect: Mood normal. identified. Marked distention of the gallbladder containing gallstones. No CT evidence of cholecystitis however. No biliary ductal dilatation is evident. Pancreas: The pancreatic parenchyma is unremarkable and there is no main duct dilatation. Spleen: The spleen is nonenlarged. Calcified granulomas throughout the spleen. Adrenals: No adrenal glands are symmetric. Genitourinary: The kidneys are symmetric and enhance appropriately. No suspicious focal parenchymal abnormality is identified in either kidney. There is no collecting system dilatation. The bladder is unremarkable, as imaged. The prostate gland appears unremarkable. Gastrointestinal: Diffuse fluid and air distention of the the entire small bowel and proximal large bowel measuring up to 4.3 cm in the left abdomen and up to 7.6 cm of the proximal colon. Transitional point seen at a site of masslike thickening of the hepatic flexure measuring 3.2 x 3.9 cm (series 2 image 35). Colonic diverticulosis without diverticulitis. The appendix appears unremarkable. Lymphatics: Multiple prominent and enlarged retroperitoneal lymph nodes. Largest is 1.5 cm in the left periaortic region (series 2 image 33). Vascular: Moderate calcific atherosclerosis. The abdominal aorta is normal in caliber. MSK/Body Wall: No concerning bony lesion is identified. Small fat-containing right inguinal hernia. Peritoneum/Other: There is no free fluid or abnormal collection. No free gas. 1.Irregular mass at the hepatic flexure of the colon resulting in high-grade small bowel and proximal large bowel obstruction. Severe fluid and gaseous distention of the entire stomach and small bowel including the distal esophagus. Findings are overall concerning for colonic neoplasm. Recommend GI and surgical consultation. 2.Scattered enlarged retroperitoneal lymph nodes. These are presumably metastatic if the colon mass proves to be malignant. 3.Marked hydropic distention of the gallbladder containing gallstones. No CT evidence of cholecystitis. Recommend correlation with LFTs. XR CHEST PORTABLE    Result Date: 10/20/2022  NO PRIOR REPORT AVAILABLE [facility] Clinical data:Abdominal pain, vomiting. Technique:Single view of the chest. Prior studies: No prior studies submitted. Findings: The trachea is midline. The heart is enlarged. Mediastinal and pulmonary vascular shadows are normal.  There is no focal consolidation or effusion. The osseous structures are intact. Cardiomegaly. Recommendation: Follow up as clinically indicated. Electronically Signed by Sariah Hurd MD at 20-Oct-2022 03:26:36 PM EST                             ASSESSMENT / IMPRESSION & PLAN:          Hospital Problems             Last Modified POA    * (Principal) SBO (small bowel obstruction) ([address] 10/20/2022 Yes    Essential hypertension 10/20/2022 Yes    Overview Signed 9/29/2016  6:23 PM by Mela Damico MD     9/28/2016  Echo  EF 20%         Hypothyroidism 10/20/2022 Yes    Paroxysmal atrial fibrillation ([address] 10/20/2022 Yes    Hyperglycemia 10/20/2022 Yes    Non-ischemic cardiomyopathy ([address] 10/20/2022 Yes    Overview Addendum 9/1/2017 10:17 AM by Felix Currie MA     9/28/2016  Echo  EF 25%, AUC indication 24, AUC score 8  10/3/16  Cath  Mild CAD, EF 10%  3/20/17 Echo normal LVFX, EF 55-60%              Principal Problem:    SBO (small bowel obstruction) (HCC)  Active Problems:    Essential hypertension    Hypothyroidism    Paroxysmal atrial fibrillation (HCC)    Hyperglycemia    Non-ischemic cardiomyopathy ([address]  Resolved Problems:    * No resolved hospital problems. *            Plan  Admit to: MedSurg  Serial vitals, telemetry,  Diet: NPO,   Activity: As tolerated. IVF: Normal saline  Follow labs: AM labs, including CBC, CMP, mag, phosphorus  Consults: Oncology, GI and general surgery  Start home medications after reconciliation      SBO  ? Colonic Neoplasm  CT Abd/Pelvis (10/20/2022):  Irregular mass at the hepatic flexure of the colon resulting in high-grade small bowel and proximal large bowel obstruction. Severe fluid and gaseous distention of the entire stomach and small bowel including the distal esophagus. Findings are overall concerning for colonic neoplasm. Recommend GI and surgical consultation. Scattered enlarged retroperitoneal lymph nodes. These are presumably metastatic if the colon mass proves to be malignant. Marked hydropic distention of the gallbladder containing gallstones. No CT evidence of cholecystitis. Recommend correlation with LFTs  General surgery and GI consult  NG Tube placed in the ED (10/20/2022)  Continue symptomatic and supportive management    Paroxysmal atrial fibrillation  Currently rate controlled  Continue home regimen  Apixaban 5 mg p.o. twice daily    Hypothyroidism  Levothyroxine 88 mcg p.o. daily  TSH level    Hyperglycemia  Initial glucose of 181 (10/20/2022)  No documented history of diabetes. Hemoglobin A1C   Insulin Lispro (Humalog) on a Low dose sliding scale  Monitor POC glucose, and adjust insulin regimen accordingly based on daily insulin requirement. Continue management of other chronic medical conditions - Please see orders above         CONSULTS:    IP CONSULT TO GENERAL SURGERY  IP CONSULT TO GI  IP CONSULT TO ONCOLOGY  IP CONSULT TO SOCIAL WORK        INPATIENT CHECKLIST:      Nutrition: No diet orders on file    Prophylaxis Orders:   VTE - Apixaban     CODE STATUS: Full Code    ISOLATION:       DISCHARGE PLAN: tbd     Total face-to-face time spent with this patient, time spent reviewing medical records, and in coordination of care with the emergency department physician, nursing staff, in the examination, evaluation/assessment, counseling, review of medications and plan, was   70 mins  .      Electronically signed by   Charlotte Maguire MD, MPH, MD  Internal Medicine Hospitalist   10/20/2022 6:16 PM      EMR Dragon/Transcription disclaimer:   Much of this encounter note is an electronic transcription/translation of spoken language to printed text.  The electronic translation of spoken language may permit erroneous, or at times, nonsensical words or phrases to be inadvertently transcribed; although attempts have made to review the note for such errors, some may still exist.

## 2022-10-20 NOTE — ED PROVIDER NOTES
Negative for behavioral problems, hallucinations and suicidal ideas. All other systems reviewed and are negative. A complete review of systems was performed and is negative except as noted above in the HPI. PAST MEDICAL HISTORY     Past Medical History:   Diagnosis Date    Ex-cigarette smoker 10/3/2016    Hypertension     Non-ischemic cardiomyopathy ([facility] [address] 10/3/2016    2016  Echo  EF 25% 10/3/16  Cath  Mild CAD, EF 10%     Paroxysmal atrial fibrillation ([facility] [address] 3/16/2018         SURGICAL HISTORY       Past Surgical History:   Procedure Laterality Date    FINGER AMPUTATION Left     partial left thumb amputation following trauma    TONSILLECTOMY           CURRENT MEDICATIONS       Previous Medications    APIXABAN (ELIQUIS) 5 MG TABS TABLET    Take 1 tablet by mouth 2 times daily    CARVEDILOL (COREG) 25 MG TABLET    Take 1 tablet by mouth 2 times daily    FUROSEMIDE (LASIX) 40 MG TABLET    Take 1 tablet by mouth once daily    LEVOTHYROXINE (SYNTHROID) 88 MCG TABLET    TAKE ONE TABLET BY MOUTH ONCE DAILY    SACUBITRIL-VALSARTAN (ENTRESTO)  MG PER TABLET    Take 1 tablet by mouth twice daily    SPIRONOLACTONE (ALDACTONE) 25 MG TABLET    TAKE ONE TABLET BY MOUTH ONCE DAILY       ALLERGIES     Patient has no known allergies.     FAMILY HISTORY       Family History   Problem Relation Age of Onset    High Blood Pressure Mother     Cancer Father         Stomach    Heart Disease Maternal Grandmother     Diabetes Maternal Uncle           SOCIAL HISTORY       Social History     Socioeconomic History    Marital status:      Spouse name: None    Number of children: None    Years of education: None    Highest education level: None   Tobacco Use    Smoking status: Former     Packs/day: 1.00     Years: 2.00     Pack years: 2.00     Types: Cigarettes     Start date:      Quit date:      Years since quittin.8    Smokeless tobacco: Never    Tobacco comments:     light smoking history - socially Vaping Use    Vaping Use: Never used   Substance and Sexual Activity    Alcohol use: Yes    Drug use: No       SCREENINGS    [place] Coma Scale  Eye Opening: Spontaneous  Best Verbal Response: Oriented  Best Motor Response: Obeys commands  Nancy Coma Scale Score: 15        PHYSICAL EXAM    (up to 7 for level 4, 8 or more for level 5)     ED Triage Vitals [10/20/22 1227]   BP Temp Temp Source Heart Rate Resp SpO2 Height Weight   (!) 132/91 97 °F (36.1 °C) Oral 81 20 99 % 5' 10\" (1.778 m) 230 lb (104.3 kg)       Physical Exam  Vitals and nursing note reviewed. Constitutional:       Appearance: He is well-developed. Comments: He looks uncomfortable. HENT:      Head: Normocephalic and atraumatic. Right Ear: External ear normal.      Left Ear: External ear normal.      Nose: Nose normal.      Mouth/Throat:      Mouth: Mucous membranes are dry. Eyes:      General: Scleral icterus present. Conjunctiva/sclera: Conjunctivae normal.      Pupils: Pupils are equal, round, and reactive to light. Cardiovascular:      Rate and Rhythm: Normal rate and regular rhythm. Pulses: Normal pulses. Heart sounds: Normal heart sounds. No murmur heard. Pulmonary:      Effort: Pulmonary effort is normal. No respiratory distress. Breath sounds: Normal breath sounds. Abdominal:      General: Bowel sounds are normal. There is distension. Palpations: Abdomen is soft. There is no mass. Tenderness: There is abdominal tenderness. There is no guarding or rebound. Hernia: No hernia is present. Musculoskeletal:         General: Normal range of motion. Cervical back: Normal range of motion and neck supple. Skin:     General: Skin is warm and dry. Coloration: Skin is not jaundiced or pale. Neurological:      General: No focal deficit present. Mental Status: He is alert and oriented to person, place, and time.    Psychiatric:         Mood and Affect: Mood normal. Behavior: Behavior normal.       DIAGNOSTIC RESULTS     EKG: All EKG's are interpreted by the Emergency Department Physician who either signs or Co-signs this chart in the absence of a cardiologist.    Atrial fibrillation rate 107. CO interval not calculable. QTc 502. RADIOLOGY:   Non-plain film images such as CT, Ultrasound and MRI are read by the radiologist. Plainradiographic images are visualized and preliminarily interpreted by the emergency physician with the below findings:    I have reviewed the images and results. Mild cardiomegaly on the chest x-ray official report is pending. CT shows irregular mass at the hepatic flexure of the colon. This is where the obstruction begins the proximal colon is dilated and is the entire small bowel and stomach. Interpretation per the Radiologist below, if available at the time of this note:    CT ABDOMEN PELVIS W IV CONTRAST Additional Contrast? None   Final Result      XR CHEST PORTABLE   Final Result   Cardiomegaly. Recommendation: Follow up as clinically indicated. Electronically Signed by Kelsey Thao MD at 20-Oct-2022 03:26:36 PM EST               XR CHEST PORTABLE    (Results Pending)         ED BEDSIDE ULTRASOUND:   Performed by ED Physician - none    LABS:  Labs Reviewed   CBC WITH AUTO DIFFERENTIAL - Abnormal; Notable for the following components:       Result Value    Neutrophils % 68.6 (*)     Lymphocytes % 16.3 (*)     Monocytes % 14.7 (*)     Lymphocytes Absolute 1.0 (*)     All other components within normal limits   COMPREHENSIVE METABOLIC PANEL - Abnormal; Notable for the following components:    Glucose 181 (*)     BUN 51 (*)     Total Protein 8.9 (*)     Total Bilirubin 1.5 (*)     ALT 53 (*)     AST 52 (*)     All other components within normal limits   COVID-19, RAPID   LIPASE   AMYLASE   URINALYSIS WITH REFLEX TO CULTURE       All other labs were within normal range or not returned as of this dictation.     EMERGENCY DEPARTMENT COURSE and DIFFERENTIALDIAGNOSIS/MDM:   Vitals:    Vitals:    10/20/22 1430 10/20/22 1500 10/20/22 1530 10/20/22 1600   BP: 96/73 90/69 (!) 125/102 (!) 108/96   Pulse: 80 84 80 74   Resp: 18 18 18 18   Temp:       TempSrc:       SpO2: 92% (!) 89% 93% 93%   Weight:       Height:           MDM  Number of Diagnoses or Management Options  Colonic mass  Large bowel obstruction (HCC)  Diagnosis management comments: Patient has what looks like a colonic mass. He is never had a colonoscopy before. Causing a large bowel obstruction all the way up to the small bowel and stomach. I discussed the case with Dr. Lennette Ahumada. Okay to admit to medicine. I will place an NG. We are getting copious amounts of material back. I discussed the case with Dr. Dontae Santillan Of the hospitalist service. CONSULTS:  IP CONSULT TO GENERAL SURGERY  IP CONSULT TO GI    PROCEDURES:  Unless otherwise notedbelow, none     Procedures    FINAL IMPRESSION     1. Colonic mass    2.  Large bowel obstruction [facility])          DISPOSITION/PLAN   DISPOSITION Decision To Admit 10/20/2022 04:15:38 PM      PATIENT REFERRED TO:  @FUP@    DISCHARGE MEDICATIONS:  New Prescriptions    No medications on file          (Please note that portions of this note were completed with a voice recognition program.  Efforts were made to edit the dictations butoccasionally words are mis-transcribed.)    Eliazar Chadwick MD (electronically signed)  AttendingEmergency Physician          Theresa Munson MD  10/20/22 5671

## 2022-10-20 NOTE — CONSULTS
Mr. Kim Byrne is a 77year old male who presented to the ER with a complaint of abdominal pain, nausea, and vomiting. The patient reports that 2 days ago he just 'swole up,' and his abdomen started hurting. He had a lot of nausea and vomiting. The pain was located int he mid abdomen and was cramping and sharp. He continues having abdominal pain, nausea, and vomiting, and came in to the ER today. He states that he last BM was yesterday and was diarrhea. He had not noticed any diarrhea. In the ER, a Ct was done showing a colon mass at the hepatic flexure causing obstruction. The patient has never had a colonoscopy, he does not know of any family history of colon cancer. He denies any blood in his stool. Feeling much better since NGT placement removed large volume.     Past Medical History:   Diagnosis Date    Ex-cigarette smoker 10/3/2016    Hypertension     Non-ischemic cardiomyopathy ([facility] [address] 10/3/2016    9/28/2016  Echo  EF 25% 10/3/16  Cath  Mild CAD, EF 10%     Paroxysmal atrial fibrillation ([facility] [address] 3/16/2018     Past Surgical History:   Procedure Laterality Date    FINGER AMPUTATION Left     partial left thumb amputation following trauma    TONSILLECTOMY       Current Facility-Administered Medications   Medication Dose Route Frequency Provider Last Rate Last Admin    HYDROmorphone HCl PF (DILAUDID) injection 0.5 mg  0.5 mg IntraVENous Q30 Min PRN Juan C Lezama MD   0.5 mg at 10/20/22 1545     Current Outpatient Medications   Medication Sig Dispense Refill    levothyroxine (SYNTHROID) 88 MCG tablet TAKE ONE TABLET BY MOUTH ONCE DAILY 30 tablet 5    carvedilol (COREG) 25 MG tablet Take 1 tablet by mouth 2 times daily 180 tablet 3    sacubitril-valsartan (ENTRESTO)  MG per tablet Take 1 tablet by mouth twice daily 180 tablet 3    furosemide (LASIX) 40 MG tablet Take 1 tablet by mouth once daily 90 tablet 3    spironolactone (ALDACTONE) 25 MG tablet TAKE ONE TABLET BY MOUTH ONCE DAILY 90 tablet 3    apixaban (ELIQUIS) 5 MG TABS tablet Take 1 tablet by mouth 2 times daily 60 tablet 3     Allergies: Patient has no known allergies. Family History   Problem Relation Age of Onset    High Blood Pressure Mother     Cancer Father         Stomach    Heart Disease Maternal Grandmother     Diabetes Maternal Uncle        Social History     Tobacco Use    Smoking status: Former     Packs/day: 1.00     Years: 2.00     Pack years: 2.00     Types: Cigarettes     Start date: [facility]     Quit date:      Years since quittin.8    Smokeless tobacco: Never    Tobacco comments:     light smoking history - socially   Substance Use Topics    Alcohol use: Yes       Review of Systems   Constitutional:  Negative for chills and fever. HENT:  Negative for congestion and sore throat. Eyes:  Negative for pain and redness. Respiratory:  Negative for cough and shortness of breath. Cardiovascular:  Negative for chest pain and palpitations. Gastrointestinal:  Positive for abdominal distention, abdominal pain, nausea and vomiting. Negative for blood in stool and constipation. Endocrine: Negative for polydipsia and polyphagia. Genitourinary:  Negative for dysuria and hematuria. Musculoskeletal:  Negative for back pain and gait problem. Skin:  Negative for rash and wound. Neurological:  Positive for dizziness. Negative for headaches. Psychiatric/Behavioral:  Negative for confusion and dysphoric mood. Physical Exam  Vitals reviewed. Constitutional:       General: He is not in acute distress. Appearance: He is obese. HENT:      Head: Normocephalic and atraumatic. Nose: Nose normal.   Eyes:      General: No scleral icterus. Pupils: Pupils are equal, round, and reactive to light. Cardiovascular:      Rate and Rhythm: Normal rate and regular rhythm. Pulmonary:      Effort: Pulmonary effort is normal. No respiratory distress. Abdominal:      General: There is distension. Palpations: Abdomen is soft. Tenderness: There is abdominal tenderness. There is no guarding or rebound. Hernia: No hernia is present. Musculoskeletal:         General: No swelling. Normal range of motion. Cervical back: Neck supple. No rigidity. Skin:     General: Skin is warm and dry. Neurological:      General: No focal deficit present. Mental Status: He is alert. Mental status is at baseline. Psychiatric:         Mood and Affect: Mood normal.         Behavior: Behavior normal.         CBC:   Lab Results   Component Value Date/Time    WBC 6.2 10/20/2022 01:46 PM    RBC 5.56 10/20/2022 01:46 PM    HGB 16.9 10/20/2022 01:46 PM    HCT 50.3 10/20/2022 01:46 PM    MCV 90.5 10/20/2022 01:46 PM    MCH 30.4 10/20/2022 01:46 PM    MCHC 33.6 10/20/2022 01:46 PM    RDW 14.4 10/20/2022 01:46 PM     10/20/2022 01:46 PM    MPV 10.9 10/20/2022 01:46 PM     BMP:    Lab Results   Component Value Date/Time     10/20/2022 01:46 PM    K 4.2 10/20/2022 01:46 PM     10/20/2022 01:46 PM    CO2 24 10/20/2022 01:46 PM    BUN 51 10/20/2022 01:46 PM    LABALBU 4.2 10/20/2022 01:46 PM    CREATININE 0.8 10/20/2022 01:46 PM    CALCIUM 9.3 10/20/2022 01:46 PM    GFRAA >59 04/15/2022 11:37 AM    LABGLOM >60 10/20/2022 01:46 PM    GLUCOSE 181 10/20/2022 01:46 PM     Impression: 1. Irregular mass at the hepatic flexure of the colon resulting in high-grade   small bowel and proximal large bowel obstruction. Severe fluid and gaseous   distention of the entire stomach and small bowel including the distal esophagus. Findings are overall concerning for colonic neoplasm. Recommend GI and surgical   consultation. 2.Scattered enlarged retroperitoneal lymph nodes. These are presumably   metastatic if the colon mass proves to be malignant. 3.Marked hydropic distention of the gallbladder containing gallstones. No CT   evidence of cholecystitis. Recommend correlation with LFTs.        Assessment and plan:  77year old male with colon

## 2022-10-21 ENCOUNTER — ANESTHESIA EVENT (OUTPATIENT)
Dept: OPERATING ROOM | Age: 67
End: 2022-10-21
Payer: MEDICARE

## 2022-10-21 ENCOUNTER — ANESTHESIA (OUTPATIENT)
Dept: OPERATING ROOM | Age: 67
End: 2022-10-21
Payer: MEDICARE

## 2022-10-21 PROBLEM — K63.89 COLONIC MASS: Status: ACTIVE | Noted: 2022-10-20

## 2022-10-21 PROCEDURE — 2500000003 HC RX 250 WO HCPCS: Performed by: NURSE ANESTHETIST, CERTIFIED REGISTERED

## 2022-10-21 PROCEDURE — 2580000003 HC RX 258: Performed by: NURSE ANESTHETIST, CERTIFIED REGISTERED

## 2022-10-21 PROCEDURE — 6360000002 HC RX W HCPCS: Performed by: NURSE ANESTHETIST, CERTIFIED REGISTERED

## 2022-10-21 PROCEDURE — 2580000003 HC RX 258: Performed by: SURGERY

## 2022-10-21 PROCEDURE — P9045 ALBUMIN (HUMAN), 5%, 250 ML: HCPCS | Performed by: NURSE ANESTHETIST, CERTIFIED REGISTERED

## 2022-10-21 PROCEDURE — 6360000002 HC RX W HCPCS: Performed by: SURGERY

## 2022-10-21 RX ORDER — LIDOCAINE HYDROCHLORIDE 10 MG/ML
INJECTION, SOLUTION INFILTRATION; PERINEURAL PRN
Status: DISCONTINUED | OUTPATIENT
Start: 2022-10-21 | End: 2022-10-21 | Stop reason: SDUPTHER

## 2022-10-21 RX ORDER — SUCCINYLCHOLINE CHLORIDE 20 MG/ML
INJECTION INTRAMUSCULAR; INTRAVENOUS PRN
Status: DISCONTINUED | OUTPATIENT
Start: 2022-10-21 | End: 2022-10-21 | Stop reason: SDUPTHER

## 2022-10-21 RX ORDER — SODIUM CHLORIDE 9 MG/ML
INJECTION, SOLUTION INTRAVENOUS CONTINUOUS PRN
Status: DISCONTINUED | OUTPATIENT
Start: 2022-10-21 | End: 2022-10-21 | Stop reason: SDUPTHER

## 2022-10-21 RX ORDER — CALCIUM CHLORIDE 100 MG/ML
INJECTION INTRAVENOUS; INTRAVENTRICULAR PRN
Status: DISCONTINUED | OUTPATIENT
Start: 2022-10-21 | End: 2022-10-21 | Stop reason: SDUPTHER

## 2022-10-21 RX ORDER — ROCURONIUM BROMIDE 10 MG/ML
INJECTION, SOLUTION INTRAVENOUS PRN
Status: DISCONTINUED | OUTPATIENT
Start: 2022-10-21 | End: 2022-10-21 | Stop reason: SDUPTHER

## 2022-10-21 RX ORDER — ALBUMIN, HUMAN INJ 5% 5 %
SOLUTION INTRAVENOUS PRN
Status: DISCONTINUED | OUTPATIENT
Start: 2022-10-21 | End: 2022-10-21 | Stop reason: SDUPTHER

## 2022-10-21 RX ORDER — MIDAZOLAM HYDROCHLORIDE 1 MG/ML
INJECTION INTRAMUSCULAR; INTRAVENOUS PRN
Status: DISCONTINUED | OUTPATIENT
Start: 2022-10-21 | End: 2022-10-21 | Stop reason: SDUPTHER

## 2022-10-21 RX ORDER — ONDANSETRON 2 MG/ML
INJECTION INTRAMUSCULAR; INTRAVENOUS PRN
Status: DISCONTINUED | OUTPATIENT
Start: 2022-10-21 | End: 2022-10-21 | Stop reason: SDUPTHER

## 2022-10-21 RX ORDER — FENTANYL CITRATE 50 UG/ML
INJECTION, SOLUTION INTRAMUSCULAR; INTRAVENOUS PRN
Status: DISCONTINUED | OUTPATIENT
Start: 2022-10-21 | End: 2022-10-21 | Stop reason: SDUPTHER

## 2022-10-21 RX ORDER — VASOPRESSIN 20 [USP'U]/ML
INJECTION, SOLUTION INTRAMUSCULAR; SUBCUTANEOUS PRN
Status: DISCONTINUED | OUTPATIENT
Start: 2022-10-21 | End: 2022-10-21 | Stop reason: SDUPTHER

## 2022-10-21 RX ORDER — SODIUM CHLORIDE, SODIUM LACTATE, POTASSIUM CHLORIDE, CALCIUM CHLORIDE 600; 310; 30; 20 MG/100ML; MG/100ML; MG/100ML; MG/100ML
INJECTION, SOLUTION INTRAVENOUS CONTINUOUS PRN
Status: DISCONTINUED | OUTPATIENT
Start: 2022-10-21 | End: 2022-10-21 | Stop reason: SDUPTHER

## 2022-10-21 RX ORDER — PROPOFOL 10 MG/ML
INJECTION, EMULSION INTRAVENOUS PRN
Status: DISCONTINUED | OUTPATIENT
Start: 2022-10-21 | End: 2022-10-21 | Stop reason: SDUPTHER

## 2022-10-21 RX ADMIN — PROPOFOL 200 MG: 10 INJECTION, EMULSION INTRAVENOUS at 15:09

## 2022-10-21 RX ADMIN — SUCCINYLCHOLINE CHLORIDE 140 MG: 20 INJECTION, SOLUTION INTRAMUSCULAR; INTRAVENOUS at 15:09

## 2022-10-21 RX ADMIN — PHENYLEPHRINE HYDROCHLORIDE 100 MCG: 10 INJECTION INTRAVENOUS at 15:16

## 2022-10-21 RX ADMIN — CALCIUM CHLORIDE 0.5 G: 100 INJECTION, SOLUTION INTRAVENOUS at 15:57

## 2022-10-21 RX ADMIN — SODIUM CHLORIDE, SODIUM LACTATE, POTASSIUM CHLORIDE, AND CALCIUM CHLORIDE: 600; 310; 30; 20 INJECTION, SOLUTION INTRAVENOUS at 16:01

## 2022-10-21 RX ADMIN — ALBUMIN (HUMAN) 12.5 G: 12.5 INJECTION, SOLUTION INTRAVENOUS at 16:19

## 2022-10-21 RX ADMIN — CALCIUM CHLORIDE 0.5 G: 100 INJECTION, SOLUTION INTRAVENOUS at 16:19

## 2022-10-21 RX ADMIN — LIDOCAINE HYDROCHLORIDE 50 MG: 10 INJECTION, SOLUTION INFILTRATION; PERINEURAL at 15:40

## 2022-10-21 RX ADMIN — SODIUM CHLORIDE: 9 INJECTION, SOLUTION INTRAVENOUS at 14:56

## 2022-10-21 RX ADMIN — ROCURONIUM BROMIDE 10 MG: 10 INJECTION, SOLUTION INTRAVENOUS at 17:10

## 2022-10-21 RX ADMIN — SODIUM CHLORIDE, SODIUM LACTATE, POTASSIUM CHLORIDE, AND CALCIUM CHLORIDE: 600; 310; 30; 20 INJECTION, SOLUTION INTRAVENOUS at 14:56

## 2022-10-21 RX ADMIN — FENTANYL CITRATE 100 MCG: 50 INJECTION, SOLUTION INTRAMUSCULAR; INTRAVENOUS at 15:08

## 2022-10-21 RX ADMIN — FENTANYL CITRATE 50 MCG: 50 INJECTION, SOLUTION INTRAMUSCULAR; INTRAVENOUS at 15:48

## 2022-10-21 RX ADMIN — SODIUM CHLORIDE: 9 INJECTION, SOLUTION INTRAVENOUS at 17:12

## 2022-10-21 RX ADMIN — ROCURONIUM BROMIDE 70 MG: 10 INJECTION, SOLUTION INTRAVENOUS at 15:20

## 2022-10-21 RX ADMIN — FENTANYL CITRATE 50 MCG: 50 INJECTION, SOLUTION INTRAMUSCULAR; INTRAVENOUS at 17:42

## 2022-10-21 RX ADMIN — ONDANSETRON 4 MG: 2 INJECTION INTRAMUSCULAR; INTRAVENOUS at 17:58

## 2022-10-21 RX ADMIN — ROCURONIUM BROMIDE 20 MG: 10 INJECTION, SOLUTION INTRAVENOUS at 17:42

## 2022-10-21 RX ADMIN — SODIUM CHLORIDE, SODIUM LACTATE, POTASSIUM CHLORIDE, AND CALCIUM CHLORIDE: 600; 310; 30; 20 INJECTION, SOLUTION INTRAVENOUS at 17:12

## 2022-10-21 RX ADMIN — LIDOCAINE HYDROCHLORIDE 50 MG: 10 INJECTION, SOLUTION INFILTRATION; PERINEURAL at 15:08

## 2022-10-21 RX ADMIN — FENTANYL CITRATE 50 MCG: 50 INJECTION, SOLUTION INTRAMUSCULAR; INTRAVENOUS at 18:59

## 2022-10-21 RX ADMIN — ALBUMIN (HUMAN) 12.5 G: 12.5 INJECTION, SOLUTION INTRAVENOUS at 15:57

## 2022-10-21 RX ADMIN — MIDAZOLAM 2 MG: 1 INJECTION INTRAMUSCULAR; INTRAVENOUS at 19:00

## 2022-10-21 RX ADMIN — VASOPRESSIN 1 UNITS: 20 INJECTION INTRAVENOUS at 18:22

## 2022-10-21 RX ADMIN — PHENYLEPHRINE HYDROCHLORIDE 175 MCG/MIN: 10 INJECTION INTRAVENOUS at 15:18

## 2022-10-21 RX ADMIN — HYDROMORPHONE HYDROCHLORIDE 1 MG: 1 INJECTION, SOLUTION INTRAMUSCULAR; INTRAVENOUS; SUBCUTANEOUS at 18:10

## 2022-10-21 RX ADMIN — MIDAZOLAM 2 MG: 1 INJECTION INTRAMUSCULAR; INTRAVENOUS at 14:56

## 2022-10-21 RX ADMIN — CEFAZOLIN 2000 MG: 2 INJECTION, POWDER, FOR SOLUTION INTRAMUSCULAR; INTRAVENOUS at 15:27

## 2022-10-21 ASSESSMENT — ENCOUNTER SYMPTOMS: SHORTNESS OF BREATH: 1

## 2022-10-21 ASSESSMENT — LIFESTYLE VARIABLES: SMOKING_STATUS: 0

## 2022-10-21 NOTE — CARE COORDINATION
CM Met patient at the bedside. Patient's Mother at the bedside. Pt does live alone and is independent at baseline. Pt will be having surgery today. CM will follow for discharge needs. 10/21/22 4174   Service Assessment   Patient Orientation Alert and Oriented   Cognition Alert   History Provided By Patient   Primary [address] is: Patient Declined (Legal Next of Kin Remains as Decision Maker)   PCP Verified by CM Yes   Prior Functional Level Independent in ADLs/IADLs   Current Functional Level Independent in ADLs/IADLs   Can patient return to prior living arrangement Unknown at present   Ability to make needs known: Good   Family able to assist with home care needs: Yes   Would you like for me to discuss the discharge plan with any other family members/significant others, and if so, who? Yes   Financial Resources Campbell Perez   (none at this time)   CM/SW Referral   (none at this time)   Social/Functional History   Lives With Alone   Type of [address]   (pt able to manage stairs)   Bathroom Shower/Tub Tub/Shower unit   [address]   (none at this time)   [address] Responsibilities Yes   Transfer Assistance Independent   Active  Yes   Mode of Transportation Car   Occupation Retired   Discharge Planning   Type of [address] [place] [address] Prior To Admission None   Potential Assistance Needed N/A   DME Ordered?  No   Type of Home Care Services None   Patient expects to be discharged to: [facility] [address] [facility] Dis[facility]   Transition of Care Consult (CM Consult) SNF   Services At/After Discharge [facility] (SNF)     Patient Contact Information:    [address] [address] [phone] (home)   Telephone Information:   Mobile [phone]     Above information verified? [x]   Yes  []   No      Emergency Contacts:    Extended Emergency Contact Information  Primary Emergency Contact: Marbella Walls  Address: [address] [place], [place] [address]Fred [address] [place] [address] Phone: [phone]  Relation: Niece/Nephew   needed? No  Secondary Emergency Contact: Ilene [address] Phone: [phone]  Relation: Parent      Have you been vaccinated for COVID-19 (SARS-CoV-2)? []   Yes  [x]   No                   If so, when?     Which :         []   Pfizer-BioNTech  []   Moderna  []   Augusta Ache  []   Other:         Pharmacy:    [facility] [address], [place] [address] [phone] [address]  [address] [place] [address] [place] [address]  Phone: [phone] Fax: 2 [address] [place] [address] [phone]  [address] [facility]  [address] [place] [address]  Phone: [phone] Fax: [phone]          Patient Deficits:    []   Yes   [x]   No         Nancy Coma Scale  Eye Opening: Spontaneous  Best Verbal Response: Oriented  Best Motor Response: Obeys commands  [place] Coma Scale Score: 15    Electronically signed by Kaleta Mohs, RN on 10/21/2022 at 2:02 PM

## 2022-10-21 NOTE — CONSULTS
MEDICAL ONCOLOGY CONSULTATION    Pt Name: Ramon Anderson  MRN: [id number]  YOB: 1955  Date of evaluation: 10/21/2022    REASON FOR CONSULTATION: Colon cancer  REQUESTING PHYSICIAN: General surgery, Dr. Bertha Rangel    History Obtained From:    patient, electronic medical record    HISTORY OF PRESENT ILLNESS:  Nelly Ramon was last seen by me on 10/1/2022. The patient has a history of hypertension, atrial fibrillation. He presented ER department with complaints of worsening abdominal pain with associated nausea and vomiting. 10/20/2022-CT abdomen pelvis showed diffuse fluid and air distention of the the entire small bowel and proximal large bowel measuring up to 4.3 cm in the left abdomen and up to 7.6 cm of the proximal colon. Transitional point seen at a site of masslike thickening of the hepatic flexure measuring 3.2 x 3.9 cm (series 2 image 35). Multiple prominent and enlarged retroperitoneal lymph nodes. Largest is 1.5 cm in the left periaortic region (series 2 image 33). Past Medical History:    Past Medical History:   Diagnosis Date    Ex-cigarette smoker 10/3/2016    Hypertension     Non-ischemic cardiomyopathy ([address] 10/3/2016    9/28/2016  Echo  EF 25% 10/3/16  Cath  Mild CAD, EF 10%     Paroxysmal atrial fibrillation ([address] 3/16/2018       Past Surgical History:    Past Surgical History:   Procedure Laterality Date    FINGER AMPUTATION Left     partial left thumb amputation following trauma    TONSILLECTOMY         Social History:    Social History:   TOBACCO:   reports that he quit smoking about 30 years ago. His smoking use included cigarettes. He started smoking about 32 years ago. He has a 2.00 pack-year smoking history.  He has never used smokeless tobacco.  ETOH:   reports current alcohol use  Resides: [place], [place]    Family History:   Family History   Problem Relation Age of Onset    High Blood Pressure Mother     Cancer Father         Stomach    Heart Disease Maternal Grandmother     Diabetes Maternal Uncle        Current Hospital Medications:    Current Facility-Administered Medications   Medication Dose Route Frequency Provider Last Rate Last Admin    HYDROmorphone HCl PF (DILAUDID) injection 0.5 mg  0.5 mg IntraVENous Q30 Min PRN Terri Martinez MD   0.5 mg at 10/21/22 0860    levothyroxine (SYNTHROID) tablet 88 mcg  88 mcg Oral Daily Eunice Grant MD        carvedilol (COREG) tablet 25 mg  25 mg Oral BID Eunice Grant MD        apixaban (ELIQUIS) tablet 5 mg  5 mg Oral BID Eunice Grant MD        glucose chewable tablet 16 g  4 tablet Oral PRN Eunice Grant MD        dextrose bolus 10% 125 mL  125 mL IntraVENous PRN Eunice Grant MD        Or    dextrose bolus 10% 250 mL  250 mL IntraVENous PRN Eunice Grant MD        glucagon (rDNA) injection 1 mg  1 mg SubCUTAneous PRN Eunice Grant MD        dextrose 10 % infusion   IntraVENous Continuous PRN Eunice Grant MD        sodium chloride flush 0.9 % injection 10 mL  10 mL IntraVENous 2 times per day Eunice Grant MD        sodium chloride flush 0.9 % injection 10 mL  10 mL IntraVENous PRN Eunice Grant MD        0.9 % sodium chloride infusion   IntraVENous PRN Eunice Grant MD        potassium chloride (KLOR-CON M) extended release tablet 40 mEq  40 mEq Oral PRN Eunice Grant MD        Or    potassium bicarb-citric acid (EFFER-K) effervescent tablet 40 mEq  40 mEq Oral PRN Eunice Grant MD        Or    potassium chloride 10 mEq/100 mL IVPB (Peripheral Line)  10 mEq IntraVENous PRN Eunice Grant MD        ondansetron (ZOFRAN-ODT) disintegrating tablet 4 mg  4 mg Oral Q8H PRN Eunice Grant MD        Or    ondansetron [facility]) injection 4 mg  4 mg IntraVENous Q6H PRN Eunice Grant MD        magnesium hydroxide (MILK OF MAGNESIA) 400 MG/5ML suspension 30 mL  30 mL Oral Daily PRN Eunice Grant MD        acetaminophen (TYLENOL) tablet 650 mg  650 mg Oral Q6H PRN Bhargavi Shaikh MD        Or    acetaminophen (TYLENOL) suppository 650 mg  650 mg Rectal Q6H PRN Bhargavi Shaikh MD        insulin lispro (HUMALOG) injection vial 0-4 Units  0-4 Units SubCUTAneous TID WC Bhargavi Shaikh MD        insulin lispro (HUMALOG) injection vial 0-4 Units  0-4 Units SubCUTAneous Nightly Bhargavi Shaikh MD           Allergies: No Known Allergies      Subjective Constitutional:  Negative for chills and fever. HENT:  Negative for congestion and sore throat. Eyes:  Negative for pain and redness. Respiratory:  Negative for cough and shortness of breath. Cardiovascular:  Negative for chest pain and palpitations. Gastrointestinal:  Positive for abdominal distention, abdominal pain, nausea and vomiting. Negative for blood in stool and constipation. Endocrine: Negative for polydipsia and polyphagia. Genitourinary:  Negative for dysuria and hematuria. Musculoskeletal:  Negative for back pain and gait problem. Skin:  Negative for rash and wound. Neurological:  Positive for dizziness. Negative for headaches. Psychiatric/Behavioral:  Negative for confusion and dysphoric mood. Objective   /72   Pulse 60   Temp (!) 96.6 °F (35.9 °C) (Temporal)   Resp 18   Ht 5' 10\" (1.778 m)   Wt 230 lb (104.3 kg)   SpO2 96%   BMI 33.00 kg/m²     PHYSICAL EXAM:  CONSTITUTIONAL: Alert, appropriate, no acute distress, obese  EYES: Non icteric, EOM intact, pupils equal round   ENT: Nasogastric tube  NECK: Supple, no masses. No palpable thyroid mass  CHEST/LUNGS: CTA bilaterally, normal respiratory effort   CARDIOVASCULAR: RRR, no murmurs. No lower extremity edema  ABDOMEN: Abdominal distention and tenderness. active bowel sounds, no HSM. No palpable masses  EXTREMITIES: warm, full ROM in all 4 extremities, no focal weakness.   SKIN: warm, dry with no rashes or lesions  LYMPH: No cervical, clavicular, axillary, or inguinal Pancreas: The pancreatic parenchyma is unremarkable and there is no main duct dilatation. Spleen: The spleen is nonenlarged. Calcified granulomas throughout the spleen. Adrenals: No adrenal glands are symmetric. Genitourinary: The kidneys are symmetric and enhance appropriately. No suspicious focal parenchymal abnormality is identified in either kidney. There is no collecting system dilatation. The bladder is unremarkable, as imaged. The prostate gland appears unremarkable. Gastrointestinal: Diffuse fluid and air distention of the the entire small bowel and proximal large bowel measuring up to 4.3 cm in the left abdomen and up to 7.6 cm of the proximal colon. Transitional point seen at a site of masslike thickening of the hepatic flexure measuring 3.2 x 3.9 cm (series 2 image 35). Colonic diverticulosis without diverticulitis. The appendix appears unremarkable. Lymphatics: Multiple prominent and enlarged retroperitoneal lymph nodes. Largest is 1.5 cm in the left periaortic region (series 2 image 33). Vascular: Moderate calcific atherosclerosis. The abdominal aorta is normal in caliber. MSK/Body Wall: No concerning bony lesion is identified. Small fat-containing right inguinal hernia. Peritoneum/Other: There is no free fluid or abnormal collection. No free gas. 1.Irregular mass at the hepatic flexure of the colon resulting in high-grade small bowel and proximal large bowel obstruction. Severe fluid and gaseous distention of the entire stomach and small bowel including the distal esophagus. Findings are overall concerning for colonic neoplasm. Recommend GI and surgical consultation. 2.Scattered enlarged retroperitoneal lymph nodes. These are presumably metastatic if the colon mass proves to be malignant. 3.Marked hydropic distention of the gallbladder containing gallstones. No CT evidence of cholecystitis. Recommend correlation with LFTs.     XR CHEST PORTABLE    Result Date: 10/20/2022  NO PRIOR REPORT AVAILABLE Exam: X-RAY OF [facility] Clinical data:Confirmation of course of NG/OG/NE tube and location of tip of tube. Technique:Single view of the chest. Prior studies: Radiograph of the chest done on same day. Findings: The lungs are grossly clear; noevidence of acute infiltrate or pleural effusion. Cardiac silhouette is mildly enlarged. No acute osseous abnormality is detected. The NG tube can be traced to the level of distal esophagus. Scattered nonspecific gas-filled loops of bowel in the upper abdomen. The NG tube can be traced to the level of distal esophagus. Suggest advancing accordingly, with a follow-up radiograph. Recommendation: As above. Electronically Signed by Juni Barros MD at 20-Oct-2022 06:15:09 PM EST             XR CHEST PORTABLE    Result Date: 10/20/2022  NO PRIOR REPORT AVAILABLE Exam: X-RAY OF [facility] Clinical data:Abdominal pain, vomiting. Technique:Single view of the chest. Prior studies: No prior studies submitted. Findings: The trachea is midline. The heart is enlarged. Mediastinal and pulmonary vascular shadows are normal.  There is no focal consolidation or effusion. The osseous structures are intact. Cardiomegaly. Recommendation: Follow up as clinically indicated. Electronically Signed by Cheo Munroe MD at 20-Oct-2022 03:26:36 PM EST              ASSESSMENT:  #Colonic mass  -CT scan as above  -To OR today with Dr. Marisela Cheadle  -Baseline CEA  -We will follow-up path result and further treatment recommendations from      PLAN:  Further staging to be completed as outpatient  We will follow-up pathology report    I have seen, examined and reviewed this patient medication list, appropriate labs and imaging studies. I reviewed relevant medical records and others physicians notes. I discussed the plans of care with the patient. I answered all the questions to the patients satisfaction.  I have also reviewed the chief complaint (CC) and part of the history (History of Present Illness (HPI), Past Family Social History [facility]), or Review of Systems (ROS) and made changes when appropriated.        (Please note that portions of this note were completed with a voice recognition program. Efforts were made to edit the dictations but occasionally words are mis-transcribed.)        Amando Weems MD    10/21/22  5:47 AM

## 2022-10-21 NOTE — PROGRESS NOTES
[facility]    Patient:  Marielena Vasquez  YOB: 1955  Date of Service: 10/21/2022  MRN: [id number]   Acct: [de-identified]   Primary Care Physician: No primary care provider on file. Advance Directive: Full Code  Admit Date: 10/20/2022       Hospital Day: 1        CHIEF COMPLAINT:     Chief Complaint   Patient presents with    Abdominal Pain     Pt arrived to the ed with c/o \"I think I have food poisoning. \" Onset Tuesday afternoon. Pt c/o abdominal pain and excess bloating and gas. 10/21/2022 4:29 PM  Subjective / Interval History:   10/21/2022  Patient seen and examined this AM.  Doing well. No new complaints. Abdominal pain improved/controlled. NG tube remains in place. Patient laying comfortably in bed in no apparent acute distress. Denies any acute complaints or distress at this time. Review of Systems:   Review of Systems  ROS: 14 point review of systems is negative except as specifically addressed above.     Diet NPO Exceptions are: Sips of Water with Meds    Intake/Output Summary (Last 24 hours) at 10/21/2022 1629  Last data filed at 10/21/2022 1619  Gross per 24 hour   Intake 1500 ml   Output 1350 ml   Net 150 ml       Medications:   [MAR Hold] sodium chloride      lactated ringers 100 mL/hr at 10/21/22 1346    [MAR Hold] dextrose      [MAR Hold] sodium chloride       Current Facility-Administered Medications   Medication Dose Route Frequency Provider Last Rate Last Admin    [MAR Hold] sodium chloride flush 0.9 % injection 5-40 mL  5-40 mL IntraVENous 2 times per day Rose Mireles MD        [facility] Hold] sodium chloride flush 0.9 % injection 5-40 mL  5-40 mL IntraVENous PRN Rose Mireles MD        [facility] Hold] 0.9 % sodium chloride infusion   IntraVENous PRN Rose Mireles MD        lactated ringers infusion   IntraVENous Continuous Eura Buys,  mL/hr at 10/21/22 1346 New Bag at 10/21/22 1346    [MAR Hold] levothyroxine (SYNTHROID) tablet 88 mcg  88 mcg Oral Daily Gabo Patel MD        [MAR Hold] carvedilol (COREG) tablet 25 mg  25 mg Oral BID Gabo Patel MD   25 mg at 10/21/22 0946    [MAR Hold] glucose chewable tablet 16 g  4 tablet Oral PRN Gabo Patel MD        [facility] Hold] dextrose bolus 10% 125 mL  125 mL IntraVENous PRN Gabo Patel MD        Or    [facility] Hold] dextrose bolus 10% 250 mL  250 mL IntraVENous PRN Gabo Patel MD        [MAR Hold] glucagon (rDNA) injection 1 mg  1 mg SubCUTAneous PRN Gabo Patel MD        [facility] Hold] dextrose 10 % infusion   IntraVENous Continuous PRN Gabo Patel MD        [facility] Hold] sodium chloride flush 0.9 % injection 10 mL  10 mL IntraVENous 2 times per day Gabo aPtel MD        [facility] Hold] sodium chloride flush 0.9 % injection 10 mL  10 mL IntraVENous PRN Gabo Patel MD        [facility] Hold] 0.9 % sodium chloride infusion   IntraVENous PRN Gabo Patel MD        [facility] Hold] potassium chloride (KLOR-CON M) extended release tablet 40 mEq  40 mEq Oral PRN Gabo Patel MD        Or    [facility] Hold] potassium bicarb-citric acid (EFFER-K) effervescent tablet 40 mEq  40 mEq Oral PRN Gabo Patel MD        Or    [facility] Hold] potassium chloride 10 mEq/100 mL IVPB (Peripheral Line)  10 mEq IntraVENous PRN Gabo Patel MD        [MAR Hold] ondansetron (ZOFRAN-ODT) disintegrating tablet 4 mg  4 mg Oral Q8H PRN Gabo Patel MD        Or    [facility] Hold] ondansetron (ZOFRAN) injection 4 mg  4 mg IntraVENous Q6H PRN Gabo Patel MD        [MAR Hold] magnesium hydroxide (MILK OF MAGNESIA) 400 MG/5ML suspension 30 mL  30 mL Oral Daily PRN Gabo Patel MD        [MAR Hold] acetaminophen (TYLENOL) tablet 650 mg  650 mg Oral Q6H PRN Gabo Patel MD        Or    [facility] Hold] acetaminophen (TYLENOL) suppository 650 mg  650 mg Rectal Q6H PRN Gabo Patel MD        [MAR Hold] insulin lispro (HUMALOG) injection vial 0-4 Units  0-4 Units SubCUTAneous TID WC Cinthia Citizen, MD [facility] Hold] insulin lispro (HUMALOG) injection vial 0-4 Units  0-4 Units SubCUTAneous Nightly Eunice Grant MD         Facility-Administered Medications Ordered in Other Encounters   Medication Dose Route Frequency Provider Last Rate Last Admin    phenylephrine (MARIE-SYNEPHRINE) injection   IntraVENous PRN Mylinda Bullard, APRN - CRNA   100 mcg at 10/21/22 1516    phenylephrine (MARIE-SYNEPHRINE) 20 mg in sodium chloride 0.9 % 250 mL infusion   IntraVENous Continuous PRN Mylinda Bullard, APRN - CRNA 112.5 mL/hr at 10/21/22 1518 150 mcg/min at 10/21/22 1518    rocuronium (ZEMURON) injection   IntraVENous PRN Mylinda Bullard, APRN - CRNA   70 mg at 10/21/22 1520    lactated ringers infusion   IntraVENous Continuous PRN Mylinda Bullard, APRN - CRNA   New Bag at 10/21/22 1456    0.9 % sodium chloride infusion   IntraVENous Continuous PRN Mylinda Bullard, APRN - CRNA   New Bag at 10/21/22 1456    midazolam (VERSED) injection   IntraVENous PRN Mylinda Bullard, APRN - CRNA   2 mg at 10/21/22 1456    lidocaine 1 % injection   IntraVENous PRN Mylinda Bullard, APRN - CRNA   50 mg at 10/21/22 1540    fentaNYL (SUBLIMAZE) injection   IntraVENous PRN Mylinda Bullard, APRN - CRNA   100 mcg at 10/21/22 1508    propofol injection   IntraVENous PRN Mylinda Bullard, APRN - CRNA   200 mg at 10/21/22 1509    succinylcholine (ANECTINE) injection   IntraVENous PRN Mylinda Bullard, APRN - CRNA   140 mg at 10/21/22 1509    albumin human 5 % IV solution   IntraVENous PRN Mylinda Bullard, APRN - CRNA   12.5 g at 10/21/22 1619    calcium chloride 10 % injection   IntraVENous PRN Mylinda Bullard, APRN - CRNA   0.5 g at 10/21/22 1557         [MAR Hold] sodium chloride      lactated ringers 100 mL/hr at 10/21/22 1346    [MAR Hold] dextrose      [MAR Hold] sodium chloride        [MAR Hold] sodium chloride flush  5-40 mL IntraVENous 2 times per day    [MAR Hold] levothyroxine  88 mcg Oral Daily    [MAR Hold] carvedilol  25 mg Oral BID    [MAR Hold] sodium chloride flush  10 mL IntraVENous 2 times per day    [MAR Hold] insulin lispro  0-4 Units SubCUTAneous TID WC    [MAR Hold] insulin lispro  0-4 Units SubCUTAneous Nightly     [MAR Hold] sodium chloride flush, [MAR Hold] sodium chloride, [MAR Hold] glucose, [MAR Hold] dextrose bolus **OR** [MAR Hold] dextrose bolus, [MAR Hold] glucagon (rDNA), [MAR Hold] dextrose, [MAR Hold] sodium chloride flush, [MAR Hold] sodium chloride, [MAR Hold] potassium chloride **OR** [MAR Hold] potassium alternative oral replacement **OR** [MAR Hold] potassium chloride, [MAR Hold] ondansetron **OR** [MAR Hold] ondansetron, [MAR Hold] magnesium hydroxide, [MAR Hold] acetaminophen **OR** [MAR Hold] acetaminophen  Diet NPO Exceptions are: Sips of Water with Meds       Labs:   CBC with DIFF:  Recent Labs     10/20/22  1346 10/21/22  0341   WBC 6.2 4.7*   RBC 5.56 5.06   HGB 16.9 15.7   HCT 50.3 47.3   MCV 90.5 93.5   MCH 30.4 31.0   MCHC 33.6 33.2   RDW 14.4 14.5    142   MPV 10.9 11.3   NEUTOPHILPCT 68.6* 65.7*   LYMPHOPCT 16.3* 18.0*   MONOPCT 14.7* 15.9*   EOSRELPCT 0.0 0.0   BASOPCT 0.2 0.2   NEUTROABS 4.3 3.1   LYMPHSABS 1.0* 0.8*   MONOSABS 0.90 0.70   EOSABS 0.00 0.00   BASOSABS 0.00 0.00       CMP/BMP:  Recent Labs     10/20/22  1346 10/21/22  0341    142   K 4.2 3.8    105   CO2 24 27   ANIONGAP 12 10   GLUCOSE 181* 133*   BUN 51* 51*   CREATININE 0.8 0.9   LABGLOM >60 >60   CALCIUM 9.3 8.2*   PROT 8.9* 7.4   LABALBU 4.2 3.7   BILITOT 1.5* 1.3*   ALKPHOS 87 73   ALT 53* 54*   AST 52* 54*         CRP:  No results for input(s): CRP in the last 72 hours. Sed Rate:  No results for input(s): SEDRATE in the last 72 hours.       HgBA1c:  No components found for: HGBA1C  FLP:    Lab Results   Component Value Date/Time    TRIG 63 09/29/2016 01:16 AM    HDL 23 09/29/2016 01:16 AM    LDLCALC 47 09/29/2016 01:16 AM     TSH:    Lab Results   Component Value Date/Time    TSH 3.930 04/15/2022 11:37 AM     Troponin T: No results for input(s): TROPONINI in the last 72 hours. Pro-BNP: No results for input(s): BNP in the last 72 hours. INR: No results for input(s): INR in the last 72 hours. ABGs:   Lab Results   Component Value Date/Time    PHART 7.440 09/27/2016 09:03 PM    PO2ART 77.0 09/27/2016 09:03 PM    HKW3XPO 34.0 09/27/2016 09:03 PM     UA:No results for input(s): NITRITE, COLORU, PHUR, LABCAST, WBCUA, RBCUA, MUCUS, TRICHOMONAS, YEAST, BACTERIA, CLARITYU, SPECGRAV, LEUKOCYTESUR, UROBILINOGEN, BILIRUBINUR, BLOODU, GLUCOSEU, AMORPHOUS in the last 72 hours. Invalid input(s): Topher Sharp      Culture Results:    No results for input(s): CXSURG in the last 720 hours. Blood Culture Recent: No results for input(s): BC in the last 720 hours. No results for input(s): BC, BLOODCULT2, ORG in the last 72 hours. Cultures:   No results for input(s): CULTURE in the last 72 hours. No results for input(s): BC, Angy Crystal in the last 72 hours. No results for input(s): CXSURG in the last 72 hours. No results for input(s): MG, PHOS in the last 72 hours. Recent Labs     10/20/22  1346 10/21/22  0341   AST 52* 54*   ALT 53* 54*   BILITOT 1.5* 1.3*   ALKPHOS 87 73         RAD:   CT ABDOMEN PELVIS W IV CONTRAST Additional Contrast? None    Result Date: 10/20/2022  CLINICAL HISTORY: 48-hour history of diffuse abdominal pain distention nausea vomiting some diarrhea no prior history COMPARISON: No comparison TECHNIQUE: Axial images of the abdomen and pelvis were obtained after the administration of IV contrast.Coronal and sagittal multiplanar reconstructions were performed. One or more of the following dose reduction techniques were used: Automated exposure control, adjustment of the mA and/or kV according to patient size, and/or use of iterative reconstruction technique. COMPARISON: None. FINDINGS: Statements: None. Lower Chest: Unremarkable.  Hepatobiliary: Hepatic steatosis. Calcified granulomas throughout the liver. No focal lesion is identified. Marked distention of the gallbladder containing gallstones. No CT evidence of cholecystitis however. No biliary ductal dilatation is evident. Pancreas: The pancreatic parenchyma is unremarkable and there is no main duct dilatation. Spleen: The spleen is nonenlarged. Calcified granulomas throughout the spleen. Adrenals: No adrenal glands are symmetric. Genitourinary: The kidneys are symmetric and enhance appropriately. No suspicious focal parenchymal abnormality is identified in either kidney. There is no collecting system dilatation. The bladder is unremarkable, as imaged. The prostate gland appears unremarkable. Gastrointestinal: Diffuse fluid and air distention of the the entire small bowel and proximal large bowel measuring up to 4.3 cm in the left abdomen and up to 7.6 cm of the proximal colon. Transitional point seen at a site of masslike thickening of the hepatic flexure measuring 3.2 x 3.9 cm (series 2 image 35). Colonic diverticulosis without diverticulitis. The appendix appears unremarkable. Lymphatics: Multiple prominent and enlarged retroperitoneal lymph nodes. Largest is 1.5 cm in the left periaortic region (series 2 image 33). Vascular: Moderate calcific atherosclerosis. The abdominal aorta is normal in caliber. MSK/Body Wall: No concerning bony lesion is identified. Small fat-containing right inguinal hernia. Peritoneum/Other: There is no free fluid or abnormal collection. No free gas. 1.Irregular mass at the hepatic flexure of the colon resulting in high-grade small bowel and proximal large bowel obstruction. Severe fluid and gaseous distention of the entire stomach and small bowel including the distal esophagus. Findings are overall concerning for colonic neoplasm. Recommend GI and surgical consultation. 2.Scattered enlarged retroperitoneal lymph nodes. These are presumably metastatic if the colon mass proves to be malignant. 3.Marked hydropic distention of the gallbladder containing gallstones. No CT evidence of cholecystitis. Recommend correlation with LFTs. XR CHEST PORTABLE    Result Date: 10/20/2022  NO PRIOR REPORT AVAILABLE Exam: X-RAY OF [facility] Clinical data:Confirmation of course of NG/OG/NE tube and location of tip of tube. Technique:Single view of the chest. Prior studies: Radiograph of the chest done on same day. Findings: The lungs are grossly clear; noevidence of acute infiltrate or pleural effusion. Cardiac silhouette is mildly enlarged. No acute osseous abnormality is detected. The NG tube can be traced to the level of distal esophagus. Scattered nonspecific gas-filled loops of bowel in the upper abdomen. The NG tube can be traced to the level of distal esophagus. Suggest advancing accordingly, with a follow-up radiograph. Recommendation: As above. Electronically Signed by Kostas Real MD at 20-Oct-2022 06:15:09 PM EST             XR CHEST PORTABLE    Result Date: 10/20/2022  NO PRIOR REPORT AVAILABLE Exam: X-RAY OF [facility] Clinical data:Abdominal pain, vomiting. Technique:Single view of the chest. Prior studies: No prior studies submitted. Findings: The trachea is midline. The heart is enlarged. Mediastinal and pulmonary vascular shadows are normal.  There is no focal consolidation or effusion. The osseous structures are intact. Cardiomegaly. Recommendation: Follow up as clinically indicated.  Electronically Signed by Myke Bell MD at 20-Oct-2022 03:26:36 PM EST                 Objective:   Vitals:   BP 99/65   Pulse 90   Temp 98.1 °F (36.7 °C) (Temporal)   Resp 16   Ht 5' 10\" (1.778 m)   Wt 230 lb (104.3 kg)   SpO2 94%   BMI 33.00 kg/m²     Patient Vitals for the past 24 hrs:   BP Temp Temp src Pulse Resp SpO2   10/21/22 1345 -- -- -- 90 16 94 %   10/21/22 1340 -- -- -- 92 17 95 %   10/21/22 1335 99/65 -- -- 87 17 95 %   10/21/22 0806 (!) 121/94 98.1 °F (36.7 °C) Temporal 52 16 96 % 10/21/22 0517 -- -- -- -- 18 --   10/21/22 0433 110/72 (!) 96.6 °F (35.9 °C) Temporal 60 17 96 %   10/20/22 2021 96/69 97.7 °F (36.5 °C) -- (!) 107 16 95 %   10/20/22 1847 120/70 (!) 96.7 °F (35.9 °C) Temporal 94 16 --   10/20/22 1830 (!) 104/93 97 °F (36.1 °C) -- 74 18 95 %   10/20/22 1800 (!) 126/91 97 °F (36.1 °C) -- 74 18 94 %   10/20/22 1730 117/84 -- -- 76 18 94 %   10/20/22 1700 123/79 -- -- 78 18 92 %   10/20/22 1630 (!) 128/93 -- -- 80 18 95 %       24HR INTAKE/OUTPUT:    Intake/Output Summary (Last 24 hours) at 10/21/2022 1629  Last data filed at 10/21/2022 1619  Gross per 24 hour   Intake 1500 ml   Output 1350 ml   Net 150 ml       Physical Exam  Vitals and nursing note reviewed. Constitutional:       General: He is not in acute distress. Appearance: Normal appearance. He is obese. He is not ill-appearing, toxic-appearing or diaphoretic. HENT:      Head: Normocephalic and atraumatic. Comments: NG tube in place     Right Ear: External ear normal.      Left Ear: External ear normal.      Nose: Nose normal. No congestion or rhinorrhea. Mouth/Throat:      Mouth: Mucous membranes are moist.      Pharynx: Oropharynx is clear. No oropharyngeal exudate or posterior oropharyngeal erythema. Eyes:      General: No scleral icterus. Right eye: No discharge. Left eye: No discharge. Extraocular Movements: Extraocular movements intact. Conjunctiva/sclera: Conjunctivae normal.      Pupils: Pupils are equal, round, and reactive to light. Cardiovascular:      Rate and Rhythm: Normal rate and regular rhythm. Pulses: Normal pulses. Heart sounds: Normal heart sounds. No murmur heard. No friction rub. No gallop. Pulmonary:      Effort: Pulmonary effort is normal. No respiratory distress. Breath sounds: Normal breath sounds. No stridor. No wheezing, rhonchi or rales. Chest:      Chest wall: No tenderness.    Abdominal:      General: Bowel sounds are normal. There is distension. Palpations: Abdomen is soft. Tenderness: There is abdominal tenderness (Diffuse tenderness palpation. No guarding, rigidity, rebound tenderness). There is no guarding or rebound. Musculoskeletal:         General: No swelling, tenderness, deformity or signs of injury. Normal range of motion. Cervical back: Normal range of motion and neck supple. No rigidity. No muscular tenderness. Right lower leg: No edema. Left lower leg: No edema. Skin:     General: Skin is warm and dry. Capillary Refill: Capillary refill takes less than 2 seconds. Coloration: Skin is not jaundiced or pale. Findings: No bruising, erythema, lesion or rash. Neurological:      General: No focal deficit present. Mental Status: He is alert and oriented to person, place, and time. Cranial Nerves: No cranial nerve deficit. Sensory: No sensory deficit. Motor: No weakness. Coordination: Coordination normal.   Psychiatric:         Mood and Affect: Mood normal.         Behavior: Behavior normal.         Thought Content:  Thought content normal.         Judgment: Judgment normal.         Assessment/plan:         Hospital Problems             Last Modified POA    * (Principal) SBO (small bowel obstruction) ([address] 10/20/2022 Yes    Essential hypertension 10/20/2022 Yes    Overview Signed 9/29/2016  6:23 PM by Giovanni Bhagat MD     9/28/2016  Echo  EF 20%         Hypothyroidism 10/20/2022 Yes    Paroxysmal atrial fibrillation ([address] 10/20/2022 Yes    Hyperglycemia 10/20/2022 Yes    Non-ischemic cardiomyopathy ([address] 10/20/2022 Yes    Overview Addendum 9/1/2017 10:17 AM by Justice Yu MA     9/28/2016  Echo  EF 25%, AUC indication 24, AUC score 8  10/3/16  Cath  Mild CAD, EF 10%  3/20/17 Echo normal LVFX, EF 55-60%           Colonic mass 10/21/2022 Unknown    Overview Signed 10/21/2022 11:10 AM by Jessica Vizcaino MD     Added automatically from request for surgery [id number] Hypothyroidism  Levothyroxine 88 mcg p.o. daily  TSH level 2.39 (10/201/2020)     Hyperglycemia  Initial glucose of 181 (10/20/2022)  No documented history of diabetes. Hemoglobin A1C 5.6% (10/20/022)  Insulin Lispro (Humalog) on a Low dose sliding scale  Monitor POC glucose, and adjust insulin regimen accordingly based on daily insulin requirement. Continue management of other chronic medical conditions - see above and orders. Advance Directive: Full Code    Diet NPO Exceptions are: Sips of Water with Meds         Consults Made:   IP CONSULT TO GENERAL SURGERY  IP CONSULT TO GI  IP CONSULT TO SOCIAL WORK  IP CONSULT TO ONCOLOGY      DVT prophylaxis: Already on Apixaban    Discharge planning:  Continue work-up and management as noted above    Time Spent is  35 mins  in the examination, evaluation, counseling and review of medications, assessment and plan.      Electronically signed by   Charlotte Maguire MD, MPH, MD,   Internal Medicine Hospitalist   10/21/2022 4:29 PM

## 2022-10-21 NOTE — BRIEF OP NOTE
Brief Postoperative Note      Patient: Jose Santana  YOB: 1955  MRN: [id number]    Date of Procedure: 10/21/2022    Pre-Op Diagnosis: Colonic mass [K63.89]    Post-Op Diagnosis: Same and obstructing colonic mass at the hepatic flexure, inability to pass NGT, decompressive gastrostomy tube       Procedure(s):  RIGHT HEMICOLECTOMY WITH GASTROSTOMY PLACEMENT    Surgeon(s):  MD Boris Johnson DO    Assistant:  * No surgical staff found *    Anesthesia: General    Estimated Blood Loss (mL): 016     Complications: None    Specimens:   ID Type Source Tests Collected by Time Destination   A : RIGHT COLON, TERMINAL ILEUM, AND APPENDIX Tissue Colon [address] [facility], MD 10/21/2022 1811        Implants:  * No implants in log *      Drains:   Closed/Suction Drain Superior;Midline Abdomen Bulb (Active)       Gastrostomy/Enterostomy/Jejunostomy Tube Gastrostomy LUQ 1 20 fr (Active)       Urinary Catheter 10/21/22 Acevedo (Active)       [REMOVED] NG/OG/NJ/NE Tube Nasogastric Right nostril (Removed)   Surrounding Skin Clean, dry & intact; Intact 10/21/22 0735   Securement device Adhesive based elliott 10/21/22 0735   Status Suction-low continuous 10/21/22 0735   Placement Verified X-Ray (repeat); External Catheter Length;Gastric Contents 10/21/22 0735   NG/OG/NJ/NE External Measurement (cm) 53 cm 10/21/22 0735   Drainage Appearance [place] 10/21/22 0954   Output (mL) 450 ml 10/21/22 0954       Findings: hard mass adherent to liver and small bowel posteriorly. NGT in the esophagus at the beginning of the case, unable to advance. Removed and attempted to replace, likely false passage created. ENT called from the ER. Plan to come in in the am and scope patient. Leaving him intubated overnight.     Electronically signed by Chandrika Espinosa MD on 10/21/2022 at 6:43 PM

## 2022-10-21 NOTE — ANESTHESIA PRE PROCEDURE
Department of Anesthesiology  Preprocedure Note       Name:  Bharat Guadalupe   Age:  77 y.o.  :  1955                                          MRN:  [id number]         Date:  10/21/2022      Surgeon: Tobias Smith):  Savanna Charles MD    Procedure: Procedure(s):  BOWEL RESECTION HEMICOLECTOMY    Medications prior to admission:   Prior to Admission medications    Medication Sig Start Date End Date Taking?  Authorizing Provider   levothyroxine (SYNTHROID) 88 MCG tablet TAKE ONE TABLET BY MOUTH ONCE DAILY 3/10/22   ALLISON Davidson   carvedilol (COREG) 25 MG tablet Take 1 tablet by mouth 2 times daily 3/9/22   ALLISON Davidson   sacubitril-valsartan (ENTRESTO)  MG per tablet Take 1 tablet by mouth twice daily 3/9/22   ALLISON Davidson   furosemide (LASIX) 40 MG tablet Take 1 tablet by mouth once daily 3/9/22   ALLISON Davidson   spironolactone (ALDACTONE) 25 MG tablet TAKE ONE TABLET BY MOUTH ONCE DAILY 3/9/22   ALLISON Davidson   apixaban (ELIQUIS) 5 MG TABS tablet Take 1 tablet by mouth 2 times daily 3/9/22 10/18/22  ALLISON Davidson       Current medications:    Current Facility-Administered Medications   Medication Dose Route Frequency Provider Last Rate Last Admin    [facility]] sodium chloride flush 0.9 % injection 5-40 mL  5-40 mL IntraVENous 2 times per day Savanna Charles MD       [facility]] sodium chloride flush 0.9 % injection 5-40 mL  5-40 mL IntraVENous PRN Savanna Charles MD        [facility]] 0.9 % sodium chloride infusion   IntraVENous PRN Savanna Charles MD        [facility]] ceFAZolin (ANCEF) 2,000 mg in sterile water 20 mL IV syringe  2,000 mg IntraVENous On Call to 181Jennifer Self MD        [facility]] levothyroxine (SYNTHROID) tablet 88 mcg  88 mcg Oral Daily Amilcar Murphy MD        [facility]] carvedilol (COREG) tablet 25 mg  25 mg Oral BID Amilcar Murphy MD   25 mg at 10/21/22 0946    [[facility] Hold] glucose chewable tablet 16 g  4 tablet Oral PRN Keturah Fernández Hipolito Harris MD        [facility]] dextrose bolus 10% 125 mL  125 mL IntraVENous PRN Charmaine Cerna MD        Or   Obdulia Barone [facility] Hold] dextrose bolus 10% 250 mL  250 mL IntraVENous PRN Charmaine Cerna MD        [facility]] glucagon (rDNA) injection 1 mg  1 mg SubCUTAneous PRN Charmaine Cerna MD        [facility]] dextrose 10 % infusion   IntraVENous Continuous PRN Charmaine Cerna MD        [facility]] sodium chloride flush 0.9 % injection 10 mL  10 mL IntraVENous 2 times per day Charmaine Cerna MD        [facility]] sodium chloride flush 0.9 % injection 10 mL  10 mL IntraVENous PRN Charmaine Cerna MD        [facility]] 0.9 % sodium chloride infusion   IntraVENous PRN Charmaine Cerna MD        [facility]] potassium chloride (KLOR-CON M) extended release tablet 40 mEq  40 mEq Oral PRN Charmaine Cerna MD        Or   Obdulia Barone [facility] Hold] potassium bicarb-citric acid (EFFER-K) effervescent tablet 40 mEq  40 mEq Oral PRN Charmaine Cerna MD        Or   Obdulia Barone [facility] Hold] potassium chloride 10 mEq/100 mL IVPB (Peripheral Line)  10 mEq IntraVENous PRN Charmaine Cerna MD        [facility]] ondansetron (ZOFRAN-ODT) disintegrating tablet 4 mg  4 mg Oral Q8H PRN Charmaine Cerna MD        Or   Obdulia Barone [facility] Hold] ondansetron [facility]) injection 4 mg  4 mg IntraVENous Q6H PRN Charmaine Cerna MD        [facility] Hold] magnesium hydroxide (MILK OF MAGNESIA) 400 MG/5ML suspension 30 mL  30 mL Oral Daily PRIGNACIO Cerna MD        [facility] Hold] acetaminophen (TYLENOL) tablet 650 mg  650 mg Oral Q6H PRN Charmaine Cerna MD        Or   Obdulia Barone [facility] Hold] acetaminophen (TYLENOL) suppository 650 mg  650 mg Rectal Q6H PRN Charmaine Cerna MD        [facility] Hold] insulin lispro (HUMALOG) injection vial 0-4 Units  0-4 Units SubCUTAneous TID WC Charmaine Cerna MD        [facility] Hold] insulin lispro (HUMALOG) injection vial 0-4 Units  0-4 Units SubCUTAneous Nightly Charmaine Cerna MD           Allergies:  No Known Allergies    Problem List:    Patient Active Problem List   Diagnosis Code    Shortness of breath R06.02    Essential hypertension I10    Hypothyroidism E03.9    Obesity (BMI 35.0-39.9 without comorbidity) E66.9    CKD (chronic kidney disease), stage III (HCC) N18.30    Acute renal failure (HCC) N17.9    Non-ischemic cardiomyopathy ([facility] I42.8    Ex-cigarette smoker Z87.891    Hypokalemia E87.6    Paroxysmal atrial fibrillation (HCC) I48.0    [facility]small [facility] obstruction) [facility] K56.609    Hyperglycemia R73.9    Colonic mass K63.89       Past Medical History:        Diagnosis Date    Ex-cigarette smoker 10/3/2016    Hypertension     Non-ischemic cardiomyopathy ([facility] 10/3/2016    2016  Echo  EF 25% 10/3/16  Cath  Mild CAD, EF 10%     Paroxysmal atrial fibrillation ([facility] 3/16/2018       Past Surgical History:        Procedure Laterality Date    FINGER AMPUTATION Left     partial left thumb amputation following trauma    TONSILLECTOMY         Social History:    Social History     Tobacco Use    Smoking status: Former     Packs/day: 1.00     Years: 2.00     Pack years: 2.00     Types: Cigarettes     Start date: [facility] [address]     Quit date:      Years since quittin.8    Smokeless tobacco: Never    Tobacco comments:     light smoking history - socially   Substance Use Topics    Alcohol use:  Yes                                Counseling given: Not Answered  Tobacco comments: light smoking history - socially      Vital Signs (Current):   Vitals:    10/20/22 2021 10/21/22 0433 10/21/22 0517 10/21/22 0806   BP: 96/69 110/72  (!) 121/94   Pulse: (!) 107 60  52   Resp: 16 17 18 16   Temp: 97.7 °F (36.5 °C) (!) 96.6 °F (35.9 °C)  98.1 °F (36.7 °C)   TempSrc:  Temporal  Temporal   SpO2: 95% 96%  96%   Weight:       Height:                                                  BP Readings from Last 3 Encounters:   10/21/22 (!) 121/94   10/18/22 124/88   04/15/22 118/78       NPO Status: BMI:   Wt Readings from Last 3 Encounters:   10/20/22 230 lb (104.3 kg)   10/18/22 227 lb (103 kg)   04/15/22 238 lb (108 kg)     Body mass index is 33 kg/m².     CBC:   Lab Results   Component Value Date/Time    WBC 4.7 10/21/2022 03:41 AM    RBC 5.06 10/21/2022 03:41 AM    HGB 15.7 10/21/2022 03:41 AM    HCT 47.3 10/21/2022 03:41 AM    MCV 93.5 10/21/2022 03:41 AM    RDW 14.5 10/21/2022 03:41 AM     10/21/2022 03:41 AM       CMP:   Lab Results   Component Value Date/Time     10/21/2022 03:41 AM    K 3.8 10/21/2022 03:41 AM     10/21/2022 03:41 AM    CO2 27 10/21/2022 03:41 AM    BUN 51 10/21/2022 03:41 AM    CREATININE 0.9 10/21/2022 03:41 AM    GFRAA >59 04/15/2022 11:37 AM    LABGLOM >60 10/21/2022 03:41 AM    GLUCOSE 133 10/21/2022 03:41 AM    PROT 7.4 10/21/2022 03:41 AM    CALCIUM 8.2 10/21/2022 03:41 AM    BILITOT 1.3 10/21/2022 03:41 AM    ALKPHOS 73 10/21/2022 03:41 AM    AST 54 10/21/2022 03:41 AM    ALT 54 10/21/2022 03:41 AM       POC Tests:   Recent Labs     10/21/22  1134   POCGLU 134*       Coags:   Lab Results   Component Value Date/Time    PROTIME 2.0 03/16/2018 09:28 AM    INR 2.6 09/10/2020 11:03 AM    INR 1.14 01/08/2018 12:30 PM       HCG (If Applicable): No results found for: PREGTESTUR, PREGSERUM, HCG, HCGQUANT     ABGs:   Lab Results   Component Value Date/Time    PHART 7.440 09/27/2016 09:03 PM    PO2ART 77.0 09/27/2016 09:03 PM    JTJ7OUT 34.0 09/27/2016 09:03 PM    AQW4FGS 23.1 09/27/2016 09:03 PM    BEART -0.4 09/27/2016 09:03 PM    U4JULDPA 93.7 09/27/2016 09:03 PM        Type & Screen (If Applicable):  No results found for: LABABO, LABRH    Drug/Infectious Status (If Applicable):  No results found for: HIV, HEPCAB    COVID-19 Screening (If Applicable):   Lab Results   Component Value Date/Time    COVID19 Not Detected 10/20/2022 02:23 PM           Anesthesia Evaluation  Patient summary reviewed and Nursing notes reviewed no history of anesthetic complications:   Airway: Mallampati: II  TM distance: >3 FB   Neck ROM: full  Mouth opening: > = 3 FB   Dental:    (+) upper dentures      Pulmonary:   (+) shortness of breath:      (-) sleep apnea and not a current smoker                          ROS comment: CXR:  Impression  The NG tube can be traced to the level of distal esophagus. Suggest advancing accordingly, with a follow-up radiograph. Cardiovascular:  Exercise tolerance: no interval change,   (+) hypertension:, dysrhythmias: atrial fibrillation, hyperlipidemia    (-) pacemaker, past MI, CABG/stent and  angina    ECG reviewed      Echocardiogram reviewed         Beta Blocker:  Dose within 24 Hrs      ROS comment: Echo 2017:   Summary   Normal left ventricular size with preserved LV function and an estimated   ejection fraction of approximately 55-60%. No evidence of left ventricular mass or thrombus noted. E/A flow reversal noted. Suggestive of diastolic dysfunction. Mild concentric left ventricular hypertrophy. Signature     Neuro/Psych:      (-) seizures and CVA           GI/Hepatic/Renal:   (+) renal disease: ARF,          ROS comment: + colonic mass with bowel obstruction    CT abdomen/ pelvis: 1. Irregular mass at the hepatic flexure of the colon resulting in high-grade  small bowel and proximal large bowel obstruction. Severe fluid and gaseous  distention of the entire stomach and small bowel including the distal esophagus. Findings are overall concerning for colonic neoplasm. Recommend GI and surgical  consultation. 2.Scattered enlarged retroperitoneal lymph nodes. These are presumably  metastatic if the colon mass proves to be malignant. 3.Marked hydropic distention of the gallbladder containing gallstones. No CT  evidence of cholecystitis. Recommend correlation with LFTs. .   Endo/Other:    (+) hypothyroidism::., electrolyte abnormalities, .                  Abdominal:   (+) obese, Vascular:     - DVT and PE. Other Findings: NGT right nare          Anesthesia Plan      general     ASA 3     (2nd IV in preop. NGT to suction.)  Induction: intravenous and rapid sequence. MIPS: Postoperative opioids intended and Prophylactic antiemetics administered. Anesthetic plan and risks discussed with patient. Use of blood products discussed with patient whom consented to blood products.                      Young Solis,    10/21/2022

## 2022-10-21 NOTE — PROGRESS NOTES
Subjective:  No acute events or changes. Reports that abdominal pain is a little better, but still present. Did have BM this AM.    Objective:  BP (!) 121/94   Pulse 52   Temp 98.1 °F (36.7 °C) (Temporal)   Resp 16   Ht 5' 10\" (1.778 m)   Wt 230 lb (104.3 kg)   SpO2 96%   BMI 33.00 kg/m²   General: NAD, AAO  Chest: regular, non-labored  Abdomen: Soft, mild distention, minimal TTP    CBC:   Lab Results   Component Value Date/Time    WBC 4.7 10/21/2022 03:41 AM    RBC 5.06 10/21/2022 03:41 AM    HGB 15.7 10/21/2022 03:41 AM    HCT 47.3 10/21/2022 03:41 AM    MCV 93.5 10/21/2022 03:41 AM    MCH 31.0 10/21/2022 03:41 AM    MCHC 33.2 10/21/2022 03:41 AM    RDW 14.5 10/21/2022 03:41 AM     10/21/2022 03:41 AM    MPV 11.3 10/21/2022 03:41 AM     BMP:    Lab Results   Component Value Date/Time     10/21/2022 03:41 AM    K 3.8 10/21/2022 03:41 AM     10/21/2022 03:41 AM    CO2 27 10/21/2022 03:41 AM    BUN 51 10/21/2022 03:41 AM    LABALBU 3.7 10/21/2022 03:41 AM    CREATININE 0.9 10/21/2022 03:41 AM    CALCIUM 8.2 10/21/2022 03:41 AM    GFRAA >59 04/15/2022 11:37 AM    LABGLOM >60 10/21/2022 03:41 AM    GLUCOSE 133 10/21/2022 03:41 AM   CEA 11    Date 10/21/22 0000 - 10/21/22 2359   Shift 2299-5594 1851-6688 7355-9752 24 Hour Total   INTAKE   I.V.(mL/kg) 1000(9.6)   1000(9.6)   Shift Total(mL/kg) 1000(9.6)   1000(9.6)   OUTPUT   Urine(mL/kg/hr) 700(0.8)   700   Emesis/NG output(mL/kg)  450(4.3)  450(4.3)   Shift Total(mL/kg) 700(6.7) 450(4.3)  1150(11)   Weight (kg) 104.3 104.3 104.3 104.3     Assessment and plan:  77year old male with near obstructing hepatic flexure mass  Discussed continued plan to go to OR today for colon resection. Discussed risks and benefits, including bleeding, infection, and anastomotic leak specifically. The patient expressed understanding and agrees with the plan. Continue NPO, INF, and NGT.

## 2022-10-22 NOTE — ANESTHESIA POSTPROCEDURE EVALUATION
Department of Anesthesiology  Postprocedure Note    Patient: Madison Phoenix  MRN: [id number]  YOB: 1955  Date of evaluation: 10/21/2022      Procedure Summary     Date: 10/21/22 Room / Location: [address]    Anesthesia Start: 1456 Anesthesia Stop: 1901    Procedure: RIGHT HEMICOLECTOMY WITH GASTROSTOMY PLACEMENT Diagnosis:       Colonic mass      (Colonic mass [K63.89])    Surgeons: Megan Dallas MD Responsible Provider: Ana Lilia Reeves DO    Anesthesia Type: general ASA Status: 3          Anesthesia Type: No value filed. Mary Phase I: Mary Score: 5    Mary Phase II:        Anesthesia Post Evaluation    Patient location during evaluation: PACU  Patient participation: complete - patient cannot participate  Level of consciousness: sedated and ventilated  Pain score: 0  Airway patency: patent  Nausea & Vomiting: no nausea and no vomiting  Complications: no  Cardiovascular status: hemodynamically stable and hypotensive  Respiratory status: acceptable, spontaneous ventilation, nonlabored ventilation and ventilator  Hydration status: hypovolemic  Comments: Patient on phenylephrine drip. 2mg versed given.   50 ncg fentanyl given

## 2022-10-22 NOTE — CONSULTS
Department of Otolaryngology  Dr Jess Keller ENT Consult Note      Reason for Consult:  false nasal passage  Requesting Physician:  Dr Chelsie Hess:  nasal trauma    History Obtained From:  staff    HISTORY OF PRESENT ILLNESS:                The patient is a 77 y.o. male with significant past medical history of small bowel obstruction who presents with trauma to the nasopharynx and pharynx due to a false track created by an NG tube. Patient remained extubated status postprocedure awaiting scope to make sure he is safe to be extubated.     Past Medical History:        Diagnosis Date    Ex-cigarette smoker 10/3/2016    Hypertension     Non-ischemic cardiomyopathy ([facility] 10/3/2016    9/28/2016  Echo  EF 25% 10/3/16  Cath  Mild CAD, EF 10%     [facility] 3/16/2018     Past Surgical History:        Procedure Laterality Date    FINGER AMPUTATION Left     partial left thumb amputation following trauma    TONSILLECTOMY       Current Medications:   Current Facility-Administered Medications: sodium chloride flush 0.9 % injection 5-40 mL, 5-40 mL, IntraVENous, 2 times per day  sodium chloride flush 0.9 % injection 5-40 mL, 5-40 mL, IntraVENous, PRN  0.9 % sodium chloride infusion, , IntraVENous, PRN  0.9 % sodium chloride infusion, , IntraVENous, Continuous  morphine (PF) injection 2 mg, 2 mg, IntraVENous, Q2H PRN **OR** morphine injection 4 mg, 4 mg, IntraVENous, Q2H PRN  famotidine (PEPCID) tablet 20 mg, 20 mg, Oral, BID **OR** famotidine (PEPCID) 20 mg in sodium chloride (PF) 10 mL injection, 20 mg, IntraVENous, BID  ceFAZolin (ANCEF) 2,000 mg in sterile water 20 mL IV syringe, 2,000 mg, IntraVENous, Q8H  propofol injection, 50 mcg/kg/min, IntraVENous, Continuous  phenylephrine (MARIE-SYNEPHRINE) 50 mg in dextrose 5 % 250 mL infusion, 30 mcg/min, IntraVENous, Continuous  levothyroxine (SYNTHROID) tablet 88 mcg, 88 mcg, Oral, Daily  carvedilol (COREG) tablet 25 mg, 25 mg, Oral, BID  glucose chewable tablet 16 g, 4 tablet, Oral, PRN  dextrose bolus 10% 125 mL, 125 mL, IntraVENous, PRN **OR** dextrose bolus 10% 250 mL, 250 mL, IntraVENous, PRN  glucagon (rDNA) injection 1 mg, 1 mg, SubCUTAneous, PRN  dextrose 10 % infusion, , IntraVENous, Continuous PRN  sodium chloride flush 0.9 % injection 10 mL, 10 mL, IntraVENous, 2 times per day  sodium chloride flush 0.9 % injection 10 mL, 10 mL, IntraVENous, PRN  0.9 % sodium chloride infusion, , IntraVENous, PRN  potassium chloride (KLOR-CON M) extended release tablet 40 mEq, 40 mEq, Oral, PRN **OR** potassium bicarb-citric acid (EFFER-K) effervescent tablet 40 mEq, 40 mEq, Oral, PRN **OR** potassium chloride 10 mEq/100 mL IVPB (Peripheral Line), 10 mEq, IntraVENous, PRN  ondansetron (ZOFRAN-ODT) disintegrating tablet 4 mg, 4 mg, Oral, Q8H PRN **OR** ondansetron (ZOFRAN) injection 4 mg, 4 mg, IntraVENous, Q6H PRN  magnesium hydroxide (MILK OF MAGNESIA) 400 MG/5ML suspension 30 mL, 30 mL, Oral, Daily PRN  acetaminophen (TYLENOL) tablet 650 mg, 650 mg, Oral, Q6H PRN **OR** acetaminophen (TYLENOL) suppository 650 mg, 650 mg, Rectal, Q6H PRN  insulin lispro (HUMALOG) injection vial 0-4 Units, 0-4 Units, SubCUTAneous, TID WC  insulin lispro (HUMALOG) injection vial 0-4 Units, 0-4 Units, SubCUTAneous, Nightly  Allergies:  Patient has no known allergies. Social History:    Noncontributory  Family History:   Noncontributory  REVIEW OF SYSTEMS:    Review of systems not obtained due to patient factors - intubation    PHYSICAL EXAM:    VITALS:  BP (!) 85/57   Pulse 80   Temp 98.2 °F (36.8 °C)   Resp 30   Ht 5' 10\" (1.778 m)   Wt 230 lb (104.3 kg)   SpO2 99%   BMI 33.00 kg/m²   Patient intubated and partially sedated. Procedure   An Afrin lidocaine mix was sprayed in each nasal cavity. Once this took effect the flexible endoscope was used to examine both nasal cavities. He is patent bilaterally with trauma to to the right.   Scope passed down into pharynx and other than secretions looks clear. IMPRESSION/RECOMMENDATIONS:      51-year-old male currently intubated due to trauma from nasogastric tube causing false passage. On scope I see no contraindication to wean to extubation and positive pressure if needed. Recommend nasal saline rinses twice a day to each nostril for the next 2 weeks. Also good coverage for antibiotics given the false tract. Please let me know if you have any questions or concerns.

## 2022-10-22 NOTE — PROGRESS NOTES
Patient name: Marielena Vasquez  Patient : 1955  10/22/2022  6:53 AM  ROOM 146    Portions of this note have been copied forward, however, changed to reflect the most current clinical status of this patient. Subjective: Intubated and sedated. Appears to be in no acute distress. On Samm-Synephrine for pressure support, /72, weaning. Anticipating ENT to perform a scope this morning. HISTORY OF PRESENT ILLNESS:   Remedios Shanks was last seen by Soledad Velásquez on 10/1/2022. The patient has a history of hypertension, atrial fibrillation. He presented ER department with complaints of worsening abdominal pain with associated nausea and vomiting. 10/20/2022-CT abdomen pelvis showed diffuse fluid and air distention of the the entire small bowel and proximal large bowel measuring up to 4.3 cm in the left abdomen and up to 7.6 cm of the proximal colon. Transitional point seen at a site of masslike thickening of the hepatic flexure measuring 3.2 x 3.9 cm (series 2 image 35). Multiple prominent and enlarged retroperitoneal lymph nodes. Largest is 1.5 cm in the left periaortic region (series 2 image 33).      Current Pain Assessment  Pain Assessment  Pain Assessment: Face, Legs, Activity, Cry, and Consolability (FLACC)  Pain Level: 5  Patient's Stated Pain Goal: 0 - No pain  Pain Location: Abdomen  Pain Orientation: Lower, Mid  Pain Descriptors: Cramping, Discomfort, Tender  Functional Pain Assessment: Prevents or interferes with all active and some passive activities  Non-Pharmaceutical Pain Intervention(s): Rest    OBJECTIVE:  VITALS: /72   Pulse 78   Temp 98.3 °F (36.8 °C) (Temporal)   Resp 24   Ht 5' 10\" (1.778 m)   Wt 230 lb (104.3 kg)   SpO2 99%   BMI 33.00 kg/m²   I&O:   Intake/Output Summary (Last 24 hours) at 10/22/2022 0653  Last data filed at 10/22/2022 0600  Gross per 24 hour   Intake 8178.51 ml   Output 2275 ml   Net 5903.51 ml     PHYSICAL EXAM:  CONSTITUTIONAL: Intubated and sedated  EYES: Non icteric, EOM intact, pupils equal round   ENT: ET tube  NECK: Supple, no masses. No palpable thyroid mass  CHEST/LUNGS: CTA bilaterally, normal respiratory effort   CARDIOVASCULAR: RRR, no murmurs. No lower extremity edema  ABDOMEN: Midline abdominal dressing intact. G-tube clamped and dressing intact. No bowel sounds  EXTREMITIES: warm, full ROM in all 4 extremities, no focal weakness. SKIN: warm, dry with no rashes or lesions  LYMPH: No cervical, clavicular, axillary, or inguinal lymphadenopathy  NEUROLOGIC: Sedated   PSYCH: Sedated    BMP:   Recent Labs     10/21/22  0341 10/22/22  0245    143   K 3.8 3.9    112*   CO2 27 19*   BUN 51* 42*   CREATININE 0.9 0.7   GLUCOSE 133* 122*   CALCIUM 8.2* 7.5*     Recent Labs     10/21/22  0341 10/20/22  1346   WBC 4.7* 6.2   HGB 15.7 16.9   HCT 47.3 50.3   MCV 93.5 90.5    175     CMP:    Recent Labs     10/20/22  1346 10/21/22  0341 10/22/22  0245    142 143   K 4.2 3.8 3.9    105 112*   CO2 24 27 19*   BUN 51* 51* 42*   CREATININE 0.8 0.9 0.7   LABGLOM >60 >60 >60   GLUCOSE 181* 133* 122*   PROT 8.9* 7.4  --    LABALBU 4.2 3.7  --    CALCIUM 9.3 8.2* 7.5*   BILITOT 1.5* 1.3*  --    ALKPHOS 87 73  --    AST 52* 54*  --    ALT 53* 54*  --        30Day lookback of cultures:    Blood Culture Recent: No results for input(s): BC in the last 720 hours. Gram Stain Recent: No results for input(s): LABGRAM in the last 720 hours. Resp Culture Recent: No results for input(s): CULTRESP in the last 720 hours. Body Fluid Recent : No results for input(s): BFCX in the last 720 hours. MRSA Recent : No results for input(s): [facility] in the last 720 hours. Urine Culture Recent : No results for input(s): LABURIN in the last 720 hours. Organism Recent : No results for input(s): ORG in the last 720 hours.        Radiology:   CT ABDOMEN PELVIS W IV CONTRAST Additional Contrast? None    Result Date: 10/20/2022  CLINICAL HISTORY: 48-hour history of diffuse abdominal pain distention nausea vomiting some diarrhea no prior history COMPARISON: No comparison TECHNIQUE: Axial images of the abdomen and pelvis were obtained after the administration of IV contrast.Coronal and sagittal multiplanar reconstructions were performed. One or more of the following dose reduction techniques were used: Automated exposure control, adjustment of the mA and/or kV according to patient size, and/or use of iterative reconstruction technique. COMPARISON: None. FINDINGS: Statements: None. Lower Chest: Unremarkable. Hepatobiliary: Hepatic steatosis. Calcified granulomas throughout the liver. No focal lesion is identified. Marked distention of the gallbladder containing gallstones. No CT evidence of cholecystitis however. No biliary ductal dilatation is evident. Pancreas: The pancreatic parenchyma is unremarkable and there is no main duct dilatation. Spleen: The spleen is nonenlarged. Calcified granulomas throughout the spleen. Adrenals: No adrenal glands are symmetric. Genitourinary: The kidneys are symmetric and enhance appropriately. No suspicious focal parenchymal abnormality is identified in either kidney. There is no collecting system dilatation. The bladder is unremarkable, as imaged. The prostate gland appears unremarkable. Gastrointestinal: Diffuse fluid and air distention of the the entire small bowel and proximal large bowel measuring up to 4.3 cm in the left abdomen and up to 7.6 cm of the proximal colon. Transitional point seen at a site of masslike thickening of the hepatic flexure measuring 3.2 x 3.9 cm (series 2 image 35). Colonic diverticulosis without diverticulitis. The appendix appears unremarkable. Lymphatics: Multiple prominent and enlarged retroperitoneal lymph nodes. Largest is 1.5 cm in the left periaortic region (series 2 image 33). Vascular: Moderate calcific atherosclerosis. The abdominal aorta is normal in caliber.  MSK/Body Wall: No concerning bony lesion is identified. Small fat-containing right inguinal hernia. Peritoneum/Other: There is no free fluid or abnormal collection. No free gas. 1.Irregular mass at the hepatic flexure of the colon resulting in high-grade small bowel and proximal large bowel obstruction. Severe fluid and gaseous distention of the entire stomach and small bowel including the distal esophagus. Findings are overall concerning for colonic neoplasm. Recommend GI and surgical consultation. 2.Scattered enlarged retroperitoneal lymph nodes. These are presumably metastatic if the colon mass proves to be malignant. 3.Marked hydropic distention of the gallbladder containing gallstones. No CT evidence of cholecystitis. Recommend correlation with LFTs. US GALLBLADDER RUQ    Result Date: 10/21/2022  NO PRIOR REPORT AVAILABLE Exam: ULTRASOUND OF THE ABDOMEN LIMITED, RIGHT UPPER QUADRANT Clinical data: Elevated LFTs, sepsis, lactic acidosis. The patient is on ventilator. Technique: Real-time grayscale imaging of the right upper quadrant was performed. Images supplemented with color Doppler technique. Prior studies: CT scan of abdomen and pelvis dated 10/20/22. Findings: Limited exam due to poor acoustic penetration. Diffuse increased echogenicity of the liver parenchyma. A shadowing focus//calcification in the right lobe measuring up 1.1 cm. No intrahepatic biliary distention. The gallbladder is distended with several small stones. The gallbladder wall thickness measures 0.8 cm. No sludge. The common bile duct is normal in caliber measuring 0.4cm. The right kidney swucsyer68.7 x 6.6 x 5.4 cm. Normal echogenicity and cortical thickness are preserved. No evidence of renal masses, no calculi and no hydronephrosis Diffuse increased echogenicity of the pancreas. Imaged portion of the aorta demonstrates no acute pathology. Visualized portion of the inferior vena cava is unremarkable. 1.  Limited exam for reasons discussed above.  2.  Diffuse fatty infiltration of the liver with a 1.1 cm calcification in the right hepatic lobe. 3.  Distended gallbladder with several small stones and mild diffuse gallbladder wall thickening. 4.  No other significant abnormality. Recommendation: Follow up as clinically indicated. Electronically Signed by Jacob Tyler MD at 21-Oct-2022 11:06:19 PM EST             XR CHEST PORTABLE    Result Date: 10/20/2022  NO PRIOR REPORT AVAILABLE [facility] Clinical data:Confirmation of course of NG/OG/NE tube and location of tip of tube. Technique:Single view of the chest. Prior studies: Radiograph of the chest done on same day. Findings: The lungs are grossly clear; noevidence of acute infiltrate or pleural effusion. Cardiac silhouette is mildly enlarged. No acute osseous abnormality is detected. The NG tube can be traced to the level of distal esophagus. Scattered nonspecific gas-filled loops of bowel in the upper abdomen. The NG tube can be traced to the level of distal esophagus. Suggest advancing accordingly, with a follow-up radiograph. Recommendation: As above. Electronically Signed by Gentry Raya MD at 20-Oct-2022 06:15:09 PM EST             XR CHEST PORTABLE    Result Date: 10/20/2022  NO PRIOR REPORT AVAILABLE [facility] data:Abdominal pain, vomiting. Technique:Single view of the chest. Prior studies: No prior studies submitted. Findings: The trachea is midline. The heart is enlarged. Mediastinal and pulmonary vascular shadows are normal.  There is no focal consolidation or effusion. The osseous structures are intact. Cardiomegaly. Recommendation: Follow up as clinically indicated.  Electronically Signed by Gulhsan Pettit MD at 20-Oct-2022 03:26:36 PM EST               Medications  Current Facility-Administered Medications   Medication Dose Route Frequency Provider Last Rate Last Admin    sodium chloride flush 0.9 % injection 5-40 mL  5-40 mL IntraVENous 2 times per day Josette Adams MD        sodium chloride flush 0.9 % injection 5-40 mL  5-40 mL IntraVENous PRN Josette Adams MD        0.9 % sodium chloride infusion   IntraVENous PRN Josette Adams MD        0.9 % sodium chloride infusion   IntraVENous Continuous Josette Adams  mL/hr at 10/22/22 0414 Rate Verify at 10/22/22 0414    morphine (PF) injection 2 mg  2 mg IntraVENous Q2H PRN Josette Adams MD        Or    morphine injection 4 mg  4 mg IntraVENous Q2H PRN Josette Adams MD        famotidine (PEPCID) tablet 20 mg  20 mg Oral BID Josette Adams MD        Or    famotidine (PEPCID) 20 mg in sodium chloride (PF) 10 mL injection  20 mg IntraVENous BID Josette Adams MD   20 mg at 10/22/22 3464    ceFAZolin (ANCEF) 2,000 mg in sterile water 20 mL IV syringe  2,000 mg IntraVENous Q8H Josette Adams MD   2,000 mg at 10/22/22 0645    propofol injection  50 mcg/kg/min IntraVENous Continuous Clenton Leather, DO 31.3 mL/hr at 10/22/22 0644 50 mcg/kg/min at 10/22/22 0644    phenylephrine (MARIE-SYNEPHRINE) 50 mg in dextrose 5 % 250 mL infusion  30 mcg/min IntraVENous Continuous Clenton Leather, DO 9 mL/hr at 10/21/22 1857 30 mcg/min at 10/21/22 1857    levothyroxine (SYNTHROID) tablet 88 mcg  88 mcg Oral Daily Josette Adams MD        carvedilol (COREG) tablet 25 mg  25 mg Oral BID Josette Adams MD   25 mg at 10/21/22 0946    glucose chewable tablet 16 g  4 tablet Oral PRN Josette Adams MD        dextrose bolus 10% 125 mL  125 mL IntraVENous PRN Josette Adams MD        Or    dextrose bolus 10% 250 mL  250 mL IntraVENous PRN Josette Adams MD        glucagon (rDNA) injection 1 mg  1 mg SubCUTAneous PRN Josette Adams MD        dextrose 10 % infusion   IntraVENous Continuous PRN Josette Adams MD        sodium chloride flush 0.9 % injection 10 mL  10 mL IntraVENous 2 times per day Josette Adams MD        sodium chloride flush 0.9 % injection 10 mL  10 mL IntraVENous PRN Josette Adams MD        0.9 % sodium chloride infusion   IntraVENous PRN Leander Ruiz MD        potassium chloride (KLOR-CON M) extended release tablet 40 mEq  40 mEq Oral PRN Leander Ruiz MD        Or    potassium bicarb-citric acid (EFFER-K) effervescent tablet 40 mEq  40 mEq Oral PRN Leander Ruiz MD        Or    potassium chloride 10 mEq/100 mL IVPB (Peripheral Line)  10 mEq IntraVENous PRN Leander Ruiz MD        ondansetron (ZOFRAN-ODT) disintegrating tablet 4 mg  4 mg Oral Q8H PRN Leander Ruiz MD        Or    ondansetron [facility]) injection 4 mg  4 mg IntraVENous Q6H PRN Leander Ruiz MD        magnesium hydroxide (MILK OF MAGNESIA) 400 MG/5ML suspension 30 mL  30 mL Oral Daily PRN Leander Ruiz MD        acetaminophen (TYLENOL) tablet 650 mg  650 mg Oral Q6H PRN Leander Ruiz MD        Or    acetaminophen (TYLENOL) suppository 650 mg  650 mg Rectal Q6H PRN Leander Ruiz MD        insulin lispro (HUMALOG) injection vial 0-4 Units  0-4 Units SubCUTAneous TID WC Leander Ruiz MD        insulin lispro (HUMALOG) injection vial 0-4 Units  0-4 Units SubCUTAneous Nightly Leander Ruiz MD           Allergies  Patient has no known allergies. ASSESSMENT:  #Hepatic flexure colonic mass  -CT scan pelvis with IV contrast on 10/20/2022:  Irregular mass at the hepatic flexure of the colon resulting in high-grade small bowel and proximal large bowel obstruction. Severe fluid and gaseous distention of the entire stomach and small bowel including the distal esophagus. Scattered enlarged retroperitoneal lymph nodes. These are presumably metastatic if the colon mass proves to be malignant. Marked hydropic distention of the gallbladder containing gallstones. No CT  evidence of cholecystitis.    -Colectomy with gastrostomy placement by Dr. Marisela Cheadle on 10/21/2022: Operative note indicated hard mass adherent to the liver and small bowel posteriorly.   POD #1  -Baseline CEA 11.0 on 10/21/2022  -We will follow-up path result and further treatment recommendations from        PLAN:  Continue with supportive care  Anticipating ENT to scope this morning  Further staging to be completed as outpatient  We will follow-up pathology report        Bert Grace ALLISON    10/22/22  6:53 AM      Physician's attestation and contribution:  I, Dr Jacques Carvajal, personally and independently performed an evaluation on  Sergo Dominguez        I have reviewed relevant medical information/data to include but not limited to the medication list, relevant appropriate lab work and imaging when applicable. I reviewed other physician's notes, ancillary services and nurses assessments. I have reviewed the above documentation completed by Neha COOPER   Please see my additional addended and/or modified contributions to the history of present illness, physical examination, and assessment/medical decision-making and plan that reflects my findings and impressions. I discussed essential elements of the care plan with Nicole COOPER and the patient. I have encouraged and answered all the questions raised to the patient's understanding and satisfaction. I concur with the above stated. Subjective-intubated, sedated and on pressors. Objective-despite above, appears to be clinically stable without new or acute exacerbations    Assessment/plan:    Preop CEA of 11.0  POD#1 right hemicolectomy with gastrostomy placement by Dr. Navi Galeana    Anticipating ENT evaluation with fiberoptic scope for evaluation of an abnormality seen during intubation. Agree with plan. Pathology on partial colectomy pending    CBC on 10/21/2022 with a WBC of 4.7, hemoglobin of 15.7 and a platelet count of 606,174.     Electronically signed by Jacques Carvajal MD on 10/22/22 at 8:48 AM CDT

## 2022-10-22 NOTE — CONSULTS
[facility] [address], [address] [facility]                                  CONSULTATION    PATIENT NAME: Shavonne Mulligan              :        1955  MED REC NO:   [id number]                              ROOM:       [facility]  ACCOUNT NO:   [de-identified]                           ADMIT DATE: 10/20/2022  PROVIDER:     Partha Fam MD    CONSULT DATE:  10/21/2022    ASSESSMENT:  Presentation of colonic obstruction attributed to  adenocarcinoma of the colon at the hepatic flexure level as evidenced by  CAT scan of the abdomen. PLAN:  Recommend going ahead with surgical resection rather than  pursuing colonoscopy with biopsy since the findings look so obvious  radiographically and clinically. HISTORY OF PRESENT ILLNESS:  This 59-year-old male has multiple chronic  medical illnesses with paroxysmal atrial fibrillation, nonischemic  cardiomyopathy, hypertension and LVEF of 25%. The patient presents with insidious onset of change in bowel habits over  the last two weeks and then abdominal distention and then nausea and  vomiting for the past two days consistent with bowel obstruction. The  patient has had no prior abdominal surgeries or colonoscopy in the past.  He has not had any prior upper GI complaints or anemia. Admission labs  show hemoglobin 15.7 gm, normal chemistry and slightly elevated  transaminases, which are not thought to be clinically significant at  this time. As discussed above, CAT scan of the abdomen shows the  obvious colonic mass at the hepatic flexure with near complete  circumferential narrowing and blockage. PAST MEDICAL HISTORY:  Paroxysmal atrial fibrillation, nonischemic  cardiomyopathy, ejection fraction of 25%, hypertension, overweight. PAST SURGICAL HISTORY:  Partial left thumb amputation, no prior  abdominal surgeries. CURRENT MAINTENANCE MEDICATIONS:  Reviewed in the medication record.     ALLERGIES: None known allergies. SOCIAL HISTORY:  . Prior smoking habit. Current use of alcohol. FAMILY HISTORY:  Father  of stomach cancer. PHYSICAL EXAMINATION:  GENERAL:  Afebrile, no acute distress. VITALS SIGNS:  Overweight at 230 pounds. Vital signs normal.  HEENT:  ENT exam unremarkable. NECK:  Supple. LUNGS:  Clear to auscultation percussion. HEART:  No S3, murmur or rub. ABDOMEN:  Generalized abdominal distention and tenderness but no  peritoneal signs. Unable to palpate the size of the liver. No spleen  tip palpable. I agree with Dr. Lee Cisneros, general surgeon, that she should go ahead with  midline exploratory for obvious colon cancer and evaluate for possible  lymph node involvement.         Benito Lerma MD    D: 10/21/2022 20:07:25      T: 10/21/2022 20:11:42     [id number]  Job#: [id number]     Doc#: [id number]    CC: Area L Indication Text: Tumors in this location are included in Area L (trunk and extremities).  Mohs surgery is indicated for larger tumors, or tumors with aggressive histologic features, in these anatomic locations.

## 2022-10-22 NOTE — PROGRESS NOTES
[facility] General Surgery Progress Note    Chief Complaint:  Chief Complaint   Patient presents with    Abdominal Pain     Pt arrived to the ed with c/o \"I think I have food poisoning. \" Onset Tuesday afternoon. Pt c/o abdominal pain and excess bloating and gas. POD # 1 s/p exlap, right hemicolectomy, and gastrostomy tube placement. S: Seen an examined at bedside. Remains intubated due to nasopharyngeal injury. Slightly hypotensive. Sedated w/ proprofol. O:   BP (!) 85/57   Pulse 92   Temp 98.2 °F (36.8 °C)   Resp (!) 31   Ht 5' 10\" (1.778 m)   Wt 230 lb (104.3 kg)   SpO2 97%   BMI 33.00 kg/m²   I/O reviewed and appropriate  CONSTITUTIONAL: sedated. Intubated. SKIN: warm, dry with no rashes or lesions  HEENT: NCAT, Non icteric, PERR. Trachea Midline. CHEST/LUNGS: CTA bilaterally. Normal respiratory effort. CARDIOVASCULAR: RRR, No edema. ABDOMEN: soft, ND, appropriately TTP, +BS. Gastrostomy to LUQ. Incisions: dressing with minimal strike through. NEUROLOGIC: CN II-XI grossly intact, no motor or sensory deficits   MUSCULOSKELETAL: No clubbing or cyanosis.    PSYCHIATRIC:  sedated    LABS:   CBC:   Recent Labs     10/20/22  1346 10/21/22  0341   WBC 6.2 4.7*   RBC 5.56 5.06   HGB 16.9 15.7   HCT 50.3 47.3    142   LYMPHOPCT 16.3* 18.0*   MONOPCT 14.7* 15.9*   BASOPCT 0.2 0.2   MONOSABS 0.90 0.70   LYMPHSABS 1.0* 0.8*   EOSABS 0.00 0.00   BASOSABS 0.00 0.00      BMP:   Recent Labs     10/20/22  1346 10/21/22  0341 10/22/22  0245    142 143   K 4.2 3.8 3.9    105 112*   CO2 24 27 19*   ANIONGAP 12 10 12   GLUCOSE 181* 133* 122*   CREATININE 0.8 0.9 0.7   LABGLOM >60 >60 >60   CALCIUM 9.3 8.2* 7.5*     LFT:   Recent Labs     10/20/22  1346 10/21/22  0341   PROT 8.9* 7.4   LABALBU 4.2 3.7   BILITOT 1.5* 1.3*   ALKPHOS 87 73   ALT 53* 54*   AST 52* 54*       A: Principal Problem:    SBO (small bowel obstruction) (HCC)  Active Problems:    Essential hypertension Hypothyroidism    Hyperglycemia    Non-ischemic cardiomyopathy (HCC)    Paroxysmal atrial fibrillation (HCC)    Colonic mass  Resolved Problems:    * No resolved hospital problems. *      P:   Reviewed surgical findings with the family member at bedside. Allowed for video recording at her request, to share the information with her family. Analgesia prn. Awaiting h/h. If hgb low, may benefit from unit of prbc, and this may assist with BP. Family member states that she is unsure when he last had his Eliquis, possibly Wednesday. Recommend holding this medication for now. Wean phenylephrine as tolerated. Appreciate ENT evaluation. Extubate as tolerated. G-tube to gravity. Explained to family at bedside, on video per her request, that this gastrostomy tube is temporary but needed as ngt was not able to be placed during surgery. NPO. Unsure of why RUQ performed, no signs of cholecystitis appreciated intraoperatively. IVF. Awaiting pathology. Explained to the family that this can take a few days and we won't know status of lymph nodes until this returns. Continue ICU care for now.        Nolene Najjar, DO   Electronically Signed on 10/22/2022 at 1:19 PM

## 2022-10-22 NOTE — PROGRESS NOTES
Hospitalist Progress Note    Patient:  Malu Segovia  YOB: 1955  Date of Service: 10/22/2022  MRN: [id number]   Acct: [de-identified]   Primary Care Physician: No primary care provider on file. Advance Directive: Full Code  Admit Date: 10/20/2022       Hospital Day: 2  Referring Provider: Toby Leal DO    Patient Seen, Chart, Consults, Notes, Labs, Radiology studies reviewed. Subjective:  Malu Segovia is a 77 y.o. male  whom we are following for respiratory failure, status post right hemicolectomy, nonischemic cardiomyopathy. We were able to successfully extubate him this afternoon. He is doing well. He is now postop day 1 from a right hemicolectomy. His CEA was elevated. The final pathology is still pending. Allergies:  Patient has no known allergies.     Medicines:  Current Facility-Administered Medications   Medication Dose Route Frequency Provider Last Rate Last Admin    sodium chloride flush 0.9 % injection 5-40 mL  5-40 mL IntraVENous 2 times per day Amairani Rodgers MD   10 mL at 10/22/22 0925    sodium chloride flush 0.9 % injection 5-40 mL  5-40 mL IntraVENous PRN Amairani Rodgers MD        0.9 % sodium chloride infusion   IntraVENous PRN Amairani Rodgers MD        0.9 % sodium chloride infusion   IntraVENous Continuous Amairani Rodgers  mL/hr at 10/22/22 0414 Rate Verify at 10/22/22 0414    morphine (PF) injection 2 mg  2 mg IntraVENous Q2H PRN Amairani Rodgers MD        Or    morphine injection 4 mg  4 mg IntraVENous Q2H PRN Amairani Rodgers MD        famotidine (PEPCID) tablet 20 mg  20 mg Oral BID Amairani Rodgers MD        Or    famotidine (PEPCID) 20 mg in sodium chloride (PF) 10 mL injection  20 mg IntraVENous BID Amairani Rodgers MD   20 mg at 10/22/22 0916    ceFAZolin (ANCEF) 2,000 mg in sterile water 20 mL IV syringe  2,000 mg IntraVENous Q8H Amairani Rodgers MD   2,000 mg at 10/22/22 0645    propofol injection  50 mcg/kg/min IntraVENous Continuous [facility], DO 25 mL/hr at 10/22/22 1219 40 mcg/kg/min at 10/22/22 1219    phenylephrine (MARIE-SYNEPHRINE) 50 mg in dextrose 5 % 250 mL infusion  30 mcg/min IntraVENous Continuous Lylia Mungo, DO 3 mL/hr at 10/22/22 0630 10 mcg/min at 10/22/22 0630    levothyroxine (SYNTHROID) tablet 88 mcg  88 mcg Oral Daily Sheridan Garcia MD   88 mcg at 10/22/22 8244    carvedilol (COREG) tablet 25 mg  25 mg Oral BID Sheridan Garcia MD   25 mg at 10/21/22 0946    glucose chewable tablet 16 g  4 tablet Oral PRN Sheridan Garcia MD        dextrose bolus 10% 125 mL  125 mL IntraVENous PRN Sheridan Garcia MD        Or    dextrose bolus 10% 250 mL  250 mL IntraVENous PRN Sheridan Garcia MD        glucagon (rDNA) injection 1 mg  1 mg SubCUTAneous PRN Sheridan Garcia MD        dextrose 10 % infusion   IntraVENous Continuous PRN Sheridan Garcia MD        sodium chloride flush 0.9 % injection 10 mL  10 mL IntraVENous 2 times per day Sheridan Garcia MD   10 mL at 10/22/22 0923    sodium chloride flush 0.9 % injection 10 mL  10 mL IntraVENous PRN Sheridan Garcia MD        0.9 % sodium chloride infusion   IntraVENous PRN Sheridan Garcia MD        potassium chloride (KLOR-CON M) extended release tablet 40 mEq  40 mEq Oral PRN Sheridan Garcia MD        Or    potassium bicarb-citric acid (EFFER-K) effervescent tablet 40 mEq  40 mEq Oral PRN Sheridan Garcia MD        Or    potassium chloride 10 mEq/100 mL IVPB (Peripheral Line)  10 mEq IntraVENous PRN Sheridan Garcia MD        ondansetron (ZOFRAN-ODT) disintegrating tablet 4 mg  4 mg Oral Q8H PRN Sheridan Garcia MD        Or    ondansetron [facility]) injection 4 mg  4 mg IntraVENous Q6H PRN Sheridan Garcia MD        magnesium hydroxide (MILK OF MAGNESIA) 400 MG/5ML suspension 30 mL  30 mL Oral Daily PRN Sheridan Garcia MD        acetaminophen (TYLENOL) tablet 650 mg  650 mg Oral Q6H PRN Sheridan Garcia MD        Or    acetaminophen (TYLENOL) suppository 650 mg  650 mg Rectal Q6H PRN Sheridan Garcia, MD        insulin lispro (HUMALOG) injection vial 0-4 Units  0-4 Units SubCUTAneous TID WC Brian Jensen MD        insulin lispro (HUMALOG) injection vial 0-4 Units  0-4 Units SubCUTAneous Nightly Brian Jensen MD           Past Medical History:  Past Medical History:   Diagnosis Date    Ex-cigarette smoker 10/3/2016    Hypertension     Non-ischemic cardiomyopathy ([facility] [address] 10/3/2016    2016  Echo  EF 25% 10/3/16  Cath  Mild CAD, EF 10%     Paroxysmal atrial fibrillation ([facility] [address] 3/16/2018       Past Surgical History:  Past Surgical History:   Procedure Laterality Date    FINGER AMPUTATION Left     partial left thumb amputation following trauma    TONSILLECTOMY         Family History  Family History   Problem Relation Age of Onset    High Blood Pressure Mother     Cancer Father         Stomach    Heart Disease Maternal Grandmother     Diabetes Maternal Uncle        Social History  Social History     Socioeconomic History    Marital status:      Spouse name: Not on file    Number of children: Not on file    Years of education: Not on file    Highest education level: Not on file   Occupational History    Not on file   Tobacco Use    Smoking status: Former     Packs/day: 1.00     Years: 2.00     Pack years: 2.00     Types: Cigarettes     Start date:      Quit date:      Years since quittin.8    Smokeless tobacco: Never    Tobacco comments:     light smoking history - socially   Vaping Use    Vaping Use: Never used   Substance and Sexual Activity    Alcohol use: Yes    Drug use: No    Sexual activity: Not on file   Other Topics Concern    Not on file   Social History Narrative    Not on file     Social Determinants of Health     Financial Resource Strain: Not on file   Food Insecurity: Not on file   Transportation Needs: Not on file   Physical Activity: Not on file   Stress: Not on file   Social Connections: Not on file   Intimate Partner Violence: Not on file   Housing Stability: Not on file Review of Systems:    Review of Systems   Unable to perform ROS: Acuity of condition         Objective:  Blood pressure 111/80, pulse 96, temperature 97.8 °F (36.6 °C), temperature source Temporal, resp. rate 15, height 5' 10\" (1.778 m), weight 230 lb (104.3 kg), SpO2 98 %. Intake/Output Summary (Last 24 hours) at 10/22/2022 1449  Last data filed at 10/22/2022 1215  Gross per 24 hour   Intake 8482.14 ml   Output 2305 ml   Net 6177.14 ml       Physical Exam  Vitals and nursing note reviewed. Constitutional:       Appearance: He is ill-appearing. HENT:      Head: Normocephalic and atraumatic. Right Ear: External ear normal.      Left Ear: External ear normal.      Nose: Nose normal.      Mouth/Throat:      Mouth: Mucous membranes are moist.   Eyes:      Conjunctiva/sclera: Conjunctivae normal.      Pupils: Pupils are equal, round, and reactive to light. Cardiovascular:      Rate and Rhythm: Normal rate and regular rhythm. Heart sounds: Normal heart sounds. Pulmonary:      Effort: Pulmonary effort is normal.      Breath sounds: Normal breath sounds. Abdominal:      General: Abdomen is flat. Palpations: Abdomen is soft. Musculoskeletal:         General: No swelling. Cervical back: Neck supple. No rigidity. Skin:     General: Skin is warm and dry. Labs:  BMP:   Recent Labs     10/20/22  1346 10/21/22  0341 10/22/22  0245    142 143   K 4.2 3.8 3.9    105 112*   CO2 24 27 19*   BUN 51* 51* 42*   CREATININE 0.8 0.9 0.7   CALCIUM 9.3 8.2* 7.5*     CBC:   Recent Labs     10/20/22  1346 10/21/22  0341 10/22/22  1215   WBC 6.2 4.7*  --    HGB 16.9 15.7 12.3*   HCT 50.3 47.3 37.9*   MCV 90.5 93.5  --     142  --      LIVER PROFILE:   Recent Labs     10/20/22  1346 10/21/22  0341   AST 52* 54*   ALT 53* 54*   LIPASE 24  --    BILITOT 1.5* 1.3*   ALKPHOS 87 73     PT/INR: No results for input(s): PROTIME, INR in the last 72 hours.   APTT: No results for input(s): APTT in the last 72 hours. BNP:  No results for input(s): BNP in the last 72 hours. Ionized Calcium:No results for input(s): IONCA in the last 72 hours. Magnesium:No results for input(s): MG in the last 72 hours. Phosphorus:No results for input(s): PHOS in the last 72 hours. HgbA1C:   Recent Labs     10/20/22  1346   LABA1C 5.6     Hepatic:   Recent Labs     10/20/22  1346 10/21/22  0341   ALKPHOS 87 73   ALT 53* 54*   AST 52* 54*   PROT 8.9* 7.4   BILITOT 1.5* 1.3*   LABALBU 4.2 3.7     Lactic Acid: No results for input(s): LACTA in the last 72 hours. Troponin: No results for input(s): CKTOTAL, CKMB, TROPONINT in the last 72 hours. ABGs: No results for input(s): PH, PCO2, PO2, HCO3, O2SAT in the last 72 hours. CRP:  No results for input(s): CRP in the last 72 hours. Sed Rate:  No results for input(s): SEDRATE in the last 72 hours. Cultures:   No results for input(s): CULTURE in the last 72 hours. No results for input(s): BC, Francis Lennox in the last 72 hours. No results for input(s): CXSURG in the last 72 hours. Radiology reports as per the Radiologist  Radiology: CT ABDOMEN PELVIS W IV CONTRAST Additional Contrast? None    Result Date: 10/20/2022  CLINICAL HISTORY: 48-hour history of diffuse abdominal pain distention nausea vomiting some diarrhea no prior history COMPARISON: No comparison TECHNIQUE: Axial images of the abdomen and pelvis were obtained after the administration of IV contrast.Coronal and sagittal multiplanar reconstructions were performed. One or more of the following dose reduction techniques were used: Automated exposure control, adjustment of the mA and/or kV according to patient size, and/or use of iterative reconstruction technique. COMPARISON: None. FINDINGS: Statements: None. Lower Chest: Unremarkable. Hepatobiliary: Hepatic steatosis. Calcified granulomas throughout the liver. No focal lesion is identified. Marked distention of the gallbladder containing gallstones. No CT evidence of cholecystitis however. No biliary ductal dilatation is evident. Pancreas: The pancreatic parenchyma is unremarkable and there is no main duct dilatation. Spleen: The spleen is nonenlarged. Calcified granulomas throughout the spleen. Adrenals: No adrenal glands are symmetric. Genitourinary: The kidneys are symmetric and enhance appropriately. No suspicious focal parenchymal abnormality is identified in either kidney. There is no collecting system dilatation. The bladder is unremarkable, as imaged. The prostate gland appears unremarkable. Gastrointestinal: Diffuse fluid and air distention of the the entire small bowel and proximal large bowel measuring up to 4.3 cm in the left abdomen and up to 7.6 cm of the proximal colon. Transitional point seen at a site of masslike thickening of the hepatic flexure measuring 3.2 x 3.9 cm (series 2 image 35). Colonic diverticulosis without diverticulitis. The appendix appears unremarkable. Lymphatics: Multiple prominent and enlarged retroperitoneal lymph nodes. Largest is 1.5 cm in the left periaortic region (series 2 image 33). Vascular: Moderate calcific atherosclerosis. The abdominal aorta is normal in caliber. MSK/Body Wall: No concerning bony lesion is identified. Small fat-containing right inguinal hernia. Peritoneum/Other: There is no free fluid or abnormal collection. No free gas. 1.Irregular mass at the hepatic flexure of the colon resulting in high-grade small bowel and proximal large bowel obstruction. Severe fluid and gaseous distention of the entire stomach and small bowel including the distal esophagus. Findings are overall concerning for colonic neoplasm. Recommend GI and surgical consultation. 2.Scattered enlarged retroperitoneal lymph nodes. These are presumably metastatic if the colon mass proves to be malignant. 3.Marked hydropic distention of the gallbladder containing gallstones. No CT evidence of cholecystitis. Recommend correlation with LFTs.     US GALLBLADDER RUQ    Result Date: 10/21/2022  NO PRIOR REPORT AVAILABLE Exam: ULTRASOUND OF THE ABDOMEN LIMITED, RIGHT UPPER QUADRANT Clinical data: Elevated LFTs, sepsis, lactic acidosis. The patient is on ventilator. Technique: Real-time grayscale imaging of the right upper quadrant was performed. Images supplemented with color Doppler technique. Prior studies: CT scan of abdomen and pelvis dated 10/20/22. Findings: Limited exam due to poor acoustic penetration. Diffuse increased echogenicity of the liver parenchyma. A shadowing focus//calcification in the right lobe measuring up 1.1 cm. No intrahepatic biliary distention. The gallbladder is distended with several small stones. The gallbladder wall thickness measures 0.8 cm. No sludge. The common bile duct is normal in caliber measuring 0.4cm. The right kidney [id number] x 6.6 x 5.4 cm. Normal echogenicity and cortical thickness are preserved. No evidence of renal masses, no calculi and no hydronephrosis Diffuse increased echogenicity of the pancreas. Imaged portion of the aorta demonstrates no acute pathology. Visualized portion of the inferior vena cava is unremarkable. 1.  Limited exam for reasons discussed above. 2.  Diffuse fatty infiltration of the liver with a 1.1 cm calcification in the right hepatic lobe. 3.  Distended gallbladder with several small stones and mild diffuse gallbladder wall thickening. 4.  No other significant abnormality. Recommendation: Follow up as clinically indicated. Electronically Signed by Kenny Garcia MD at 21-Oct-2022 11:06:19 PM EST             XR CHEST PORTABLE    Result Date: 10/20/2022  NO PRIOR REPORT AVAILABLE [facility] data:Confirmation of course of NG/OG/NE tube and location of tip of tube. Technique:Single view of the chest. Prior studies: Radiograph of the chest done on same day. Findings: The lungs are grossly clear; noevidence of acute infiltrate or pleural effusion.  Cardiac silhouette is mildly enlarged. No acute osseous abnormality is detected. The NG tube can be traced to the level of distal esophagus. Scattered nonspecific gas-filled loops of bowel in the upper abdomen. The NG tube can be traced to the level of distal esophagus. Suggest advancing accordingly, with a follow-up radiograph. Recommendation: As above. Electronically Signed by Juni Barros MD at 20-Oct-2022 06:15:09 PM EST             XR CHEST PORTABLE    Result Date: 10/20/2022  NO PRIOR REPORT AVAILABLE Exam: X-RAY OF [facility] Clinical data:Abdominal pain, vomiting. Technique:Single view of the chest. Prior studies: No prior studies submitted. Findings: The trachea is midline. The heart is enlarged. Mediastinal and pulmonary vascular shadows are normal.  There is no focal consolidation or effusion. The osseous structures are intact. Cardiomegaly. Recommendation: Follow up as clinically indicated. Electronically Signed by Cheo Munroe MD at 20-Oct-2022 03:26:36 PM EST                Assessment     SBO (small bowel obstruction). S/P right hemicolectomy. Essential hypertension. Control medical management. Non-ischemic cardiomyopathy. Medical management. Paroxysmal atrial fibrillation. Continue beta blocker. Colonic mass. Pathology pending. Hypoxemic respiratory failure. Successfully extubated. Please document 40 minutes of critical care time for patient assessment, chart review, discussion with staff, .       Dwayne Roberts DO

## 2022-10-22 NOTE — ANESTHESIA POST-OP
Anesthesia Post Op Note:    Pt went to OR with NGT in place but was not palpated in stomach. Original NGT removed. I attempted a few times with resistance ~ 15 cm consistent with previous attempts. Attempted to view placement with Mac 4 blade but was not optimal due to large tongue and mustache. Glidescope brought to room to assist with visualization. After passing an NGT with glidescope visualization I noticed movement behind the right posterior pharyngeal tissue. I then passed a fiberoptic scope which tracked through the false passage and I could visualize the scope light just behind the tissue. Dr. Daquan Smith and Jean-Pierre Araya made aware of these findings. Dr. Estrella Zee with ENT service notified. Plan:  1. Leave patient intubated overnight. 2.  Dr. Estrella Zee will scope in am prior to extubation. 3.  Avoid non invasive positive pressure ventilation (cpap/ bipap) as this may extend the false passage.     Tacos VALVERDE

## 2022-10-23 ENCOUNTER — ANESTHESIA (OUTPATIENT)
Dept: ENDOSCOPY | Age: 67
End: 2022-10-23
Payer: MEDICARE

## 2022-10-23 ENCOUNTER — ANESTHESIA EVENT (OUTPATIENT)
Dept: ENDOSCOPY | Age: 67
End: 2022-10-23
Payer: MEDICARE

## 2022-10-23 PROBLEM — K56.7 ILEUS, POSTOPERATIVE (HCC): Status: ACTIVE | Noted: 2022-01-01

## 2022-10-23 PROBLEM — K91.89 ILEUS, POSTOPERATIVE (HCC): Status: ACTIVE | Noted: 2022-10-20

## 2022-10-23 PROBLEM — D69.6 THROMBOCYTOPENIA (HCC): Status: ACTIVE | Noted: 2022-10-23

## 2022-10-23 PROCEDURE — 2580000003 HC RX 258: Performed by: ANESTHESIOLOGY

## 2022-10-23 PROCEDURE — 2500000003 HC RX 250 WO HCPCS: Performed by: ANESTHESIOLOGY

## 2022-10-23 PROCEDURE — 6360000002 HC RX W HCPCS: Performed by: ANESTHESIOLOGY

## 2022-10-23 RX ORDER — SODIUM CHLORIDE, SODIUM LACTATE, POTASSIUM CHLORIDE, CALCIUM CHLORIDE 600; 310; 30; 20 MG/100ML; MG/100ML; MG/100ML; MG/100ML
INJECTION, SOLUTION INTRAVENOUS CONTINUOUS PRN
Status: DISCONTINUED | OUTPATIENT
Start: 2022-10-23 | End: 2022-10-23 | Stop reason: SDUPTHER

## 2022-10-23 RX ORDER — FENTANYL CITRATE 50 UG/ML
INJECTION, SOLUTION INTRAMUSCULAR; INTRAVENOUS PRN
Status: DISCONTINUED | OUTPATIENT
Start: 2022-10-23 | End: 2022-10-23 | Stop reason: SDUPTHER

## 2022-10-23 RX ORDER — PROPOFOL 10 MG/ML
INJECTION, EMULSION INTRAVENOUS PRN
Status: DISCONTINUED | OUTPATIENT
Start: 2022-10-23 | End: 2022-10-23 | Stop reason: SDUPTHER

## 2022-10-23 RX ORDER — LIDOCAINE HYDROCHLORIDE 10 MG/ML
INJECTION, SOLUTION EPIDURAL; INFILTRATION; INTRACAUDAL; PERINEURAL PRN
Status: DISCONTINUED | OUTPATIENT
Start: 2022-10-23 | End: 2022-10-23 | Stop reason: SDUPTHER

## 2022-10-23 RX ORDER — DEXMEDETOMIDINE HYDROCHLORIDE 100 UG/ML
INJECTION, SOLUTION INTRAVENOUS PRN
Status: DISCONTINUED | OUTPATIENT
Start: 2022-10-23 | End: 2022-10-23 | Stop reason: SDUPTHER

## 2022-10-23 RX ADMIN — DEXMEDETOMIDINE HYDROCHLORIDE 4 MCG: 100 INJECTION, SOLUTION, CONCENTRATE INTRAVENOUS at 15:08

## 2022-10-23 RX ADMIN — PHENYLEPHRINE HYDROCHLORIDE 200 MCG: 10 INJECTION INTRAVENOUS at 15:25

## 2022-10-23 RX ADMIN — PROPOFOL 50 MG: 10 INJECTION, EMULSION INTRAVENOUS at 15:20

## 2022-10-23 RX ADMIN — LIDOCAINE HYDROCHLORIDE 50 MG: 10 INJECTION, SOLUTION EPIDURAL; INFILTRATION; INTRACAUDAL; PERINEURAL at 15:20

## 2022-10-23 RX ADMIN — DEXMEDETOMIDINE HYDROCHLORIDE 8 MCG: 100 INJECTION, SOLUTION, CONCENTRATE INTRAVENOUS at 14:57

## 2022-10-23 RX ADMIN — DEXMEDETOMIDINE HYDROCHLORIDE 8 MCG: 100 INJECTION, SOLUTION, CONCENTRATE INTRAVENOUS at 15:03

## 2022-10-23 RX ADMIN — PHENYLEPHRINE HYDROCHLORIDE 200 MCG: 10 INJECTION INTRAVENOUS at 15:36

## 2022-10-23 RX ADMIN — FENTANYL CITRATE 50 MCG: 50 INJECTION INTRAMUSCULAR; INTRAVENOUS at 15:20

## 2022-10-23 RX ADMIN — PROPOFOL 20 MG: 10 INJECTION, EMULSION INTRAVENOUS at 15:28

## 2022-10-23 RX ADMIN — SODIUM CHLORIDE, SODIUM LACTATE, POTASSIUM CHLORIDE, AND CALCIUM CHLORIDE: 600; 310; 30; 20 INJECTION, SOLUTION INTRAVENOUS at 14:52

## 2022-10-23 ASSESSMENT — ENCOUNTER SYMPTOMS: SHORTNESS OF BREATH: 1

## 2022-10-23 ASSESSMENT — LIFESTYLE VARIABLES: SMOKING_STATUS: 0

## 2022-10-23 NOTE — ANESTHESIA PRE PROCEDURE
Department of Anesthesiology  Preprocedure Note       Name:  Chayo Barr   Age:  77 y.o.  :  1955                                          MRN:  [id number]         Date:  10/23/2022      Surgeon: Norm Benedict):  Richelle Herring MD    Procedure: Procedure(s):  EGD ESOPHAGOGASTRODUODENOSCOPY PEG TUBE INSERTION    Medications prior to admission:   Prior to Admission medications    Medication Sig Start Date End Date Taking? Authorizing Provider   levothyroxine (SYNTHROID) 88 MCG tablet TAKE ONE TABLET BY MOUTH ONCE DAILY 3/10/22   Charmayne Handy, APRN   carvedilol (COREG) 25 MG tablet Take 1 tablet by mouth 2 times daily 3/9/22   Charmayne Handy, APRN   sacubitril-valsartan (ENTRESTO)  MG per tablet Take 1 tablet by mouth twice daily 3/9/22   Charmayne Handy, APRN   furosemide (LASIX) 40 MG tablet Take 1 tablet by mouth once daily 3/9/22   Charmayne Handy, APRN   spironolactone (ALDACTONE) 25 MG tablet TAKE ONE TABLET BY MOUTH ONCE DAILY 3/9/22   Charmayne Handy, APRN   apixaban (ELIQUIS) 5 MG TABS tablet Take 1 tablet by mouth 2 times daily 3/9/22 10/18/22  Charmayne Handy, APRN       Current medications:    No current facility-administered medications for this visit. No current outpatient medications on file.      Facility-Administered Medications Ordered in Other Visits   Medication Dose Route Frequency Provider Last Rate Last Admin    metronidazole (FLAGYL) 500 mg in 0.9% NaCl 100 mL IVPB premix  500 mg IntraVENous E4O Magali Godoy, DO   Stopped at 10/23/22 1145    sodium chloride 0.45 % 1,000 mL infusion   IntraVENous Continuous Magali Godoy  mL/hr at 10/23/22 0915 Rate Verify at 10/23/22 0915    thiamine (B-1) injection 100 mg  100 mg IntraVENous Daily Rossy Sor, DO        LORazepam (ATIVAN) injection 0.5 mg  0.5 mg IntraVENous Q6H PRN Rossy Sor, DO        metoprolol (LOPRESSOR) injection 5 mg  5 mg IntraVENous Q6H Rossy Sor, DO   5 mg at 10/23/22 1044  LORazepam (ATIVAN) injection 1 mg  1 mg IntraVENous Q1H PRN Truett De Leon, DO   1 mg at 10/22/22 1742    HYDROmorphone HCl PF (DILAUDID) injection 2 mg  2 mg IntraVENous F7Q PRN Magali Cottondon, DO   2 mg at 10/23/22 1313    Or    HYDROmorphone HCl PF (DILAUDID) injection 1 mg  1 mg IntraVENous S4M PRN Magali Cottondon, DO   1 mg at 10/23/22 0249    Or    HYDROmorphone HCl PF (DILAUDID) injection 0.5 mg  0.5 mg IntraVENous Z8Y PRN Magali Cottondon, DO        HYDROmorphone HCl PF (DILAUDID) injection 2 mg  2 mg IntraVENous Once Magaliedu Godoy, DO        dexmedetomidine (PRECEDEX) 400 mcg in sodium chloride 0.9 % 100 mL infusion  0.1-1.5 mcg/kg/hr IntraVENous Continuous Truett De Leon, DO   Stopped at 10/23/22 9622    sodium chloride flush 0.9 % injection 5-40 mL  5-40 mL IntraVENous 2 times per day Declan Hernandez MD   10 mL at 10/22/22 0925    sodium chloride flush 0.9 % injection 5-40 mL  5-40 mL IntraVENous PRN Declan Hernandez MD        0.9 % sodium chloride infusion   IntraVENous PRN Declan Hernandez MD        famotidine (PEPCID) tablet 20 mg  20 mg Oral BID Declan Hernandez MD        Or    famotidine (PEPCID) 20 mg in sodium chloride (PF) 10 mL injection  20 mg IntraVENous BID Declan Hernandez MD   20 mg at 10/23/22 0736    ceFAZolin (ANCEF) 2,000 mg in sterile water 20 mL IV syringe  2,000 mg IntraVENous Q8H Declan Hernandez MD   2,000 mg at 10/23/22 8451    levothyroxine (SYNTHROID) tablet 88 mcg  88 mcg Oral Daily Declan Hernandez MD   88 mcg at 10/23/22 0737    glucose chewable tablet 16 g  4 tablet Oral PRN Declan Hernandez MD        dextrose bolus 10% 125 mL  125 mL IntraVENous PRN Declan Hernandez MD        Or    dextrose bolus 10% 250 mL  250 mL IntraVENous PRN Declan Hernandez MD        glucagon (rDNA) injection 1 mg  1 mg SubCUTAneous PRN Declan Hernandez MD        dextrose 10 % infusion   IntraVENous Continuous PRN Declan Hernandez MD        sodium chloride flush 0.9 % injection 10 mL  10 mL IntraVENous 2 times per day Betty Dick MD   10 mL at 10/22/22 0923    sodium chloride flush 0.9 % injection 10 mL  10 mL IntraVENous PRN Betty Dick MD        0.9 % sodium chloride infusion   IntraVENous PRN Betty Dick MD        potassium chloride (KLOR-CON M) extended release tablet 40 mEq  40 mEq Oral PRN Betty Dick MD        Or    potassium bicarb-citric acid (EFFER-K) effervescent tablet 40 mEq  40 mEq Oral PRN Betty Dick MD        Or    potassium chloride 10 mEq/100 mL IVPB (Peripheral Line)  10 mEq IntraVENous PRN Betty Dick MD        ondansetron (ZOFRAN-ODT) disintegrating tablet 4 mg  4 mg Oral Q8H PRN Betty Dick MD        Or    ondansetron [facility]) injection 4 mg  4 mg IntraVENous Q6H PRN Betty Dick MD        magnesium hydroxide (MILK OF MAGNESIA) 400 MG/5ML suspension 30 mL  30 mL Oral Daily PRN Betty Dick MD        acetaminophen (TYLENOL) tablet 650 mg  650 mg Oral Q6H PRN Betty Dick MD        Or    acetaminophen (TYLENOL) suppository 650 mg  650 mg Rectal Q6H PRN Betty Dick MD        insulin lispro (HUMALOG) injection vial 0-4 Units  0-4 Units SubCUTAneous TID WC Betty Dick MD        insulin lispro (HUMALOG) injection vial 0-4 Units  0-4 Units SubCUTAneous Nightly Betty Dick MD           Allergies:  No Known Allergies    Problem List:    Patient Active Problem List   Diagnosis Code    Shortness of breath R06.02    Essential hypertension I10    Hypothyroidism E03.9    Obesity (BMI 35.0-39.9 without comorbidity) E66.9    CKD (chronic kidney disease), stage III ([facility] N18.30    Acute renal failure ([facility] N17.9    Non-ischemic cardiomyopathy ([address] I42.8    Ex-cigarette smoker Z87.891    Hypokalemia E87.6    Paroxysmal atrial fibrillation (HCC) I48.0    SBO (small bowel obstruction) ([address] K56.609    Hyperglycemia R73.9    Colonic mass K63.89    Thrombocytopenia (HCC) D69.6    Ileus, postoperative ([address] K91.89, K56.7       Past Medical History:        Diagnosis Date    Ex-cigarette smoker 10/3/2016    Hypertension     Non-ischemic cardiomyopathy ([facility] 10/3/2016    2016  Echo  EF 25% 10/3/16  Cath  Mild CAD, EF 10%     Paroxysmal atrial fibrillation ([facility] 3/16/2018       Past Surgical History:        Procedure Laterality Date    FINGER AMPUTATION Left     partial left thumb amputation following trauma    TONSILLECTOMY         Social History:    Social History     Tobacco Use    Smoking status: Former     Packs/day: 1.00     Years: 2.00     Pack years: 2.00     Types: Cigarettes     Start date:      Quit date:      Years since quittin.8    Smokeless tobacco: Never    Tobacco comments:     light smoking history - socially   Substance Use Topics    Alcohol use: Yes                                Counseling given: Not Answered  Tobacco comments: light smoking history - socially      Vital Signs (Current): There were no vitals filed for this visit.                                            BP Readings from Last 3 Encounters:   10/23/22 97/72   10/18/22 124/88   04/15/22 118/78       NPO Status:                                                                                 BMI:   Wt Readings from Last 3 Encounters:   10/23/22 229 lb 11.2 oz (104.2 kg)   10/18/22 227 lb (103 kg)   04/15/22 238 lb (108 kg)     There is no height or weight on file to calculate BMI.    CBC:   Lab Results   Component Value Date/Time    WBC 4.9 10/23/2022 02:27 AM    RBC 4.00 10/23/2022 02:27 AM    HGB 12.3 10/23/2022 02:27 AM    HCT 39.0 10/23/2022 02:27 AM    MCV 97.5 10/23/2022 02:27 AM    RDW 14.7 10/23/2022 02:27 AM    PLT 68 10/23/2022 02:27 AM       CMP:   Lab Results   Component Value Date/Time     10/23/2022 02:26 AM    K 4.0 10/23/2022 02:26 AM     10/23/2022 02:26 AM    CO2 23 10/23/2022 02:26 AM    BUN 29 10/23/2022 02:26 AM    CREATININE 0.8 10/23/2022 02:26 AM    GFRAA >59 04/15/2022 11:37 AM LABGLOM >60 10/23/2022 02:26 AM    GLUCOSE 111 10/23/2022 02:26 AM    PROT 5.4 10/23/2022 02:26 AM    CALCIUM 7.5 10/23/2022 02:26 AM    BILITOT 1.9 10/23/2022 02:26 AM    ALKPHOS 44 10/23/2022 02:26 AM    AST 55 10/23/2022 02:26 AM    ALT 35 10/23/2022 02:26 AM       POC Tests:   Recent Labs     10/23/22  1215   POCGLU 99       Coags:   Lab Results   Component Value Date/Time    PROTIME 2.0 03/16/2018 09:28 AM    INR 2.6 09/10/2020 11:03 AM    INR 1.14 01/08/2018 12:30 PM       HCG (If Applicable): No results found for: PREGTESTUR, PREGSERUM, HCG, HCGQUANT     ABGs:   Lab Results   Component Value Date/Time    PHART 7.440 09/27/2016 09:03 PM    PO2ART 77.0 09/27/2016 09:03 PM    RBP9FEI 34.0 09/27/2016 09:03 PM    QOZ3MSY 23.1 09/27/2016 09:03 PM    BEART -0.4 09/27/2016 09:03 PM    M3KHZPVU 93.7 09/27/2016 09:03 PM        Type & Screen (If Applicable):  No results found for: LABABO, LABRH    Drug/Infectious Status (If Applicable):  No results found for: HIV, HEPCAB    COVID-19 Screening (If Applicable):   Lab Results   Component Value Date/Time    COVID19 Not Detected 10/20/2022 02:23 PM           Anesthesia Evaluation  Patient summary reviewed and Nursing notes reviewed no history of anesthetic complications:   Airway: Mallampati: II  TM distance: >3 FB   Neck ROM: full  Mouth opening: > = 3 FB   Dental:    (+) upper dentures      Pulmonary:   (+) shortness of breath:      (-) sleep apnea and not a current smoker                          ROS comment: CXR:  Impression  The NG tube can be traced to the level of distal esophagus. Suggest advancing accordingly, with a follow-up radiograph.     Cardiovascular:  Exercise tolerance: no interval change,   (+) hypertension:, dysrhythmias: atrial fibrillation, hyperlipidemia    (-) pacemaker, past MI, CABG/stent and  angina    ECG reviewed      Echocardiogram reviewed         Beta Blocker:  Dose within 24 Hrs      ROS comment: Echo 2017:   Summary   Normal left ventricular size with preserved LV function and an estimated   ejection fraction of approximately 55-60%. No evidence of left ventricular mass or thrombus noted. E/A flow reversal noted. Suggestive of diastolic dysfunction. Mild concentric left ventricular hypertrophy. Signature     Neuro/Psych:      (-) seizures and CVA           GI/Hepatic/Renal:   (+) renal disease: ARF,          ROS comment: + colonic mass with bowel obstruction    CT abdomen/ pelvis: 1. Irregular mass at the hepatic flexure of the colon resulting in high-grade  small bowel and proximal large bowel obstruction. Severe fluid and gaseous  distention of the entire stomach and small bowel including the distal esophagus. Findings are overall concerning for colonic neoplasm. Recommend GI and surgical  consultation. 2.Scattered enlarged retroperitoneal lymph nodes. These are presumably  metastatic if the colon mass proves to be malignant. 3.Marked hydropic distention of the gallbladder containing gallstones. No CT  evidence of cholecystitis. Recommend correlation with LFTs. .   Endo/Other:    (+) hypothyroidism, blood dyscrasia (anemia, thrombocytopenia)::., electrolyte abnormalities, . Abdominal:   (+) obese,           Vascular:     - DVT and PE. Other Findings: NGT right nare            Anesthesia Plan      general and TIVA     ASA 3     (Pt pulled out G-tube in icu. He is currently on precedex gtt.  )  Induction: intravenous and rapid sequence. MIPS: Postoperative opioids intended and Prophylactic antiemetics administered. Anesthetic plan and risks discussed with patient. Use of blood products discussed with patient whom consented to blood products.                      Terri Pratt DO   10/23/2022

## 2022-10-23 NOTE — ANESTHESIA POSTPROCEDURE EVALUATION
Department of Anesthesiology  Postprocedure Note    Patient: Maricarmen Limon  MRN: [id number]  YOB: 1955  Date of evaluation: 10/23/2022      Procedure Summary     Date: 10/23/22 Room / Location: [address]    Anesthesia Start: 1454 Anesthesia Stop:     Procedure: EGD ESOPHAGOGASTRODUODENOSCOPY PEG TUBE INSERTION Diagnosis:       Ileus, postoperative ([address]      (Ileus, postoperative ([address] [K91.89, K56.7])    Surgeons: Mina Gutierrez MD Responsible Provider: Vinita Phoenix, DO    Anesthesia Type: general, TIVA ASA Status: 3          Anesthesia Type: No value filed.     Mary Phase I: Mary Score: 7    Mary Phase II:        Anesthesia Post Evaluation    Patient location during evaluation: bedside  Patient participation: complete - patient cannot participate  Level of consciousness: lethargic  Pain score: 0  Airway patency: patent  Nausea & Vomiting: no nausea and no vomiting  Complications: no  Cardiovascular status: hemodynamically stable  Respiratory status: acceptable  Hydration status: euvolemic

## 2022-10-23 NOTE — PLAN OF CARE
Problem: Chronic Conditions and Co-morbidities  Goal: Patient's chronic conditions and co-morbidity symptoms are monitored and maintained or improved  Outcome: Progressing  Flowsheets (Taken 10/23/2022 0800)  Care Plan - Patient's Chronic Conditions and Co-Morbidity Symptoms are Monitored and Maintained or Improved: Monitor and assess patient's chronic conditions and comorbid symptoms for stability, deterioration, or improvement     Problem: Skin/Tissue Integrity  Goal: Absence of new skin breakdown  Description: 1. Monitor for areas of redness and/or skin breakdown  2. Assess vascular access sites hourly  3. Every 4-6 hours minimum:  Change oxygen saturation probe site  4. Every 4-6 hours:  If on nasal continuous positive airway pressure, respiratory therapy assess nares and determine need for appliance change or resting period.   Outcome: Progressing

## 2022-10-23 NOTE — BRIEF OP NOTE
Brief Postoperative Note      Patient: Emma Rob  YOB: 1955  MRN: [id number]    Date of Procedure: 10/23/2022    Pre-Op Diagnosis: Ileus, postoperative ([facility] [K91.89, K56.7]    Post-Op Diagnosis:  gastroparesis with retained food in stomach                                                      Post-op ileus  Procedure(s):  EGD ESOPHAGOGASTRODUODENOSCOPY PEG TUBE INSERTION    Surgeon(s):  Jeanette Nails MD    Assistant: Floresita Ramirez RN      Anesthesia: Monitor Anesthesia Care    Estimated Blood Loss (mL): none    Complications:  none    Specimens:   * No specimens in log *    Implants:  * No implants in log *      Drains:   Closed/Suction Drain Superior;Midline Abdomen Bulb (Active)   Site Description Clean, dry & intact 10/23/22 1200   Dressing Status Clean, dry & intact 10/23/22 1200   Drainage Appearance Bloody 10/23/22 1200   Drain Status To bulb suction 10/23/22 1200   Output (ml) 30 ml 10/23/22 1400       Urinary Catheter 10/21/22 Acevedo (Active)   Catheter Indications Perioperative use for selected surgical procedures; Need for fluid volume management of the critically ill patient in a critical care setting 10/23/22 1200   Site Assessment No urethral drainage 10/23/22 1200   Urine Color Yellow;Wiley 10/23/22 1200   Urine Appearance Clear 10/23/22 1200   Securement Method Securing device (Describe) 10/23/22 1200   Catheter Care Completed Yes 10/22/22 1758   Catheter Best Practices  Drainage tube clipped to bed; Bag below bladder;Bag not on floor; Lack of dependent loop in tubing;Drainage bag less than half full 10/23/22 1200   Status Draining 10/23/22 1200   Output (mL) 100 mL 10/23/22 1400       [REMOVED] NG/OG/NJ/NE Tube Nasogastric Right nostril (Removed)   Surrounding Skin Clean, dry & intact; Intact 10/21/22 0735   Securement device Adhesive based elliott 10/21/22 0735   Status Suction-low continuous 10/21/22 0735   Placement Verified X-Ray (repeat); External Catheter Length;Gastric Contents 10/21/22 0735   NG/OG/NJ/NE External Measurement (cm) 53 cm 10/21/22 0735   Drainage Appearance Brown 10/21/22 0954   Output (mL) 450 ml 10/21/22 0954       [REMOVED] Gastrostomy/Enterostomy/Jejunostomy Tube Gastrostomy LUQ 1 20 fr (Removed)   $ Gastrostomy insertion $ Yes 10/22/22 1610   Site Description Clean, dry & intact 10/23/22 0400   G Port Status Other(comment) 10/23/22 0400   Surrounding Skin Clean, dry & intact 10/23/22 0400   Dressing Status Clean, dry & intact 10/23/22 0400   Dressing Type Dry dressing 10/23/22 0400   G-Tube Care Completed Yes 10/23/22 0400   Output (mL) 125 ml 10/23/22 0600       Findings: retained food in stomach without gastric outlet obstruction    Electronically signed by Ryan Hays MD on 10/23/2022 at 4:26 PM

## 2022-10-23 NOTE — PROGRESS NOTES
[facility] General Surgery Progress Note    Chief Complaint:  Chief Complaint   Patient presents with    Abdominal Pain     Pt arrived to the ed with c/o \"I think I have food poisoning. \" Onset Tuesday afternoon. Pt c/o abdominal pain and excess bloating and gas. POD # 2 s/p exlap, right hemicolectomy, and gastrostomy tube placement. S: Seen an examined at bedside. Extubated yesterday. Patient rested comfortably overnight after some agitation yesterday evening. Pulled gastrostomy tube out this morning while in restraints. No flatus. No vomiting. Continues to be sedated with precedex. O:   /72   Pulse 91   Temp 98.4 °F (36.9 °C) (Temporal)   Resp 16   Ht 5' 10\" (1.778 m)   Wt 229 lb 11.2 oz (104.2 kg)   SpO2 95%   BMI 32.96 kg/m²   I/O reviewed and appropriate  CONSTITUTIONAL: sedated. Opens eyes to verbal stimuli. SKIN: warm, dry with no rashes or lesions  HEENT: NCAT, Non icteric, PERR. Trachea Midline. CHEST/LUNGS: CTA bilaterally. Normal respiratory effort. CARDIOVASCULAR: RRR, No edema. ABDOMEN: soft, ND, appropriately TTP, +BS. Gastrostomy bumper sutured to RUQ. TRISTAN in place. .  Incisions: dressing with minimal strike through. NEUROLOGIC: CN II-XI grossly intact, no motor or sensory deficits   MUSCULOSKELETAL: No clubbing or cyanosis.    PSYCHIATRIC:  sedated    LABS:   CBC:   Recent Labs     10/20/22  1346 10/21/22  0341 10/22/22  1215 10/23/22  0227   WBC 6.2 4.7*  --  4.9   RBC 5.56 5.06  --  4.00*   HGB 16.9 15.7 12.3* 12.3*   HCT 50.3 47.3 37.9* 39.0*    142  --  68*   BANDSPCT  --   --   --  6*   LYMPHOPCT 16.3* 18.0*  --  15.0*   MONOPCT 14.7* 15.9*  --  4.0   BASOPCT 0.2 0.2  --  0.0   MONOSABS 0.90 0.70  --  0.20   LYMPHSABS 1.0* 0.8*  --  0.7*   EOSABS 0.00 0.00  --  0.00   BASOSABS 0.00 0.00  --  0.00      BMP:   Recent Labs     10/21/22  0341 10/22/22  0245 10/23/22  0226    143 148*   K 3.8 3.9 4.0    112* 116*   CO2 27 19* 23   ANIONGAP 10 12 9 GLUCOSE 133* 122* 111*   CREATININE 0.9 0.7 0.8   LABGLOM >60 >60 >60   CALCIUM 8.2* 7.5* 7.5*     LFT:   Recent Labs     10/20/22  1346 10/21/22  0341 10/23/22  0226   PROT 8.9* 7.4 5.4*   LABALBU 4.2 3.7 2.9*   BILITOT 1.5* 1.3* 1.9*   ALKPHOS 87 73 44   ALT 53* 54* 35   AST 52* 54* 55*       A: Principal Problem:    SBO (small bowel obstruction) (HCC)  Active Problems:    Essential hypertension    Hypothyroidism    Hyperglycemia    Thrombocytopenia (HCC)    Non-ischemic cardiomyopathy (HCC)    Paroxysmal atrial fibrillation (HCC)    Colonic mass  Resolved Problems:    * No resolved hospital problems. *      P:   Analgesia prn. Continue precedex and ativan prn for agitation. Reports of significant etoh usage. CIWA. Monitor for signs of withdrawal.   H/H stable. Continue to hold Eliquis. Off pressors. Dislodged gastrostomy tube. Will consult GI to request endoscopic replacement. Prefer not to place NGT due to significant difficulty with placements in the operating room. Needs decompression for anticipated postoperative ileus to prevent vomiting and aspiration. Recommend abdominal binder to protect TRISTAN drain and new G-Tube. Continue restraints as needed for patient's protection. IVF. Labs tomorrow. Monitor platelet levels. Appreciate heme/onc input. Appears this is not the first time his platelets have been on the lower side. Awaiting pathology. Continue ICU care for now. Dr. Stella Pineda will return tomorrow.       Alize De Los Santos DO   Electronically Signed on 10/23/2022 at 9:54 AM

## 2022-10-23 NOTE — ACP (ADVANCE CARE PLANNING)
Advance Care Planning   Healthcare Decision Maker:    Primary Decision Maker: Tariq Andrade - [phone]    Secondary Decision Maker: Chemo Rodas - Brother/Sister - [phone]      Electronically signed by Sivan Montano on 10/22/2022 at 7:16 PM

## 2022-10-23 NOTE — PROGRESS NOTES
Status post Right Hemicolectomy for colonic obstruction due to adenocarcinoma, G tube pulled out by patient, Dr Gillis Apgar requests G tube replacement, needed for bowel decompression and administration of medications, nonischemic cardiomyopathy with EF 20% stable, AF rate controlled, not on anticoagulant since admission, sedate on Precedex, lungs clear, G tube site left upper abdomen dry. Hgb 12.3 gm  Plan: EGD PEG placement today.

## 2022-10-23 NOTE — PROGRESS NOTES
10/22/22 1902   Encounter Summary   Encounter Overview/Reason  Advance Care Planning   Service Provided For: Family; Patient  (in ICU)   Referral/Consult From: Physician   Support System Family members   Complexity of Encounter Moderate   Begin Time 1345   End Time  1415   Total Time Calculated 30 min   Encounter    Type Initial Screen/Assessment   Spiritual/Emotional needs   Type Spiritual Support   Advance Care Planning   Type ACP conversation   Assessment/Intervention/Outcome   Assessment Concerns with suffering; Hopeful   Intervention Active listening;Explored/Affirmed feelings, thoughts, concerns;Prayer (assurance of)/[place]   Outcome Expressed feelings, needs, and concerns     Mr. Adam Metzger was not able to make decision due to clinical status at time of visit. Pt's niece [facility] and sister Lazarus Meyer are willing to be HCDMs (see ACP Note). LW paper work provided for the family for future use.  will follow up.    Electronically signed by Sana Mierles on 10/22/2022 at 7:14 PM

## 2022-10-23 NOTE — PROGRESS NOTES
[address] [facility] truck found a truck           Patient name: Maricarmen Limon  Patient : 1955  10/23/2022  7:05 AM  ROOM 146    Portions of this note have been copied forward, however, changed to reflect the most current clinical status of this patient. Subjective: Extubated yesterday, only on room air with SPO2 of 99%. Pulled out G-tube this morning. Requiring soft wrist restraints and on Precedex just received Dilaudid. HISTORY OF PRESENT ILLNESS:   Ana Evans was last seen by Conrad Agrawal on 10/1/2022. The patient has a history of hypertension, atrial fibrillation. He presented ER department with complaints of worsening abdominal pain with associated nausea and vomiting. 10/20/2022-CT abdomen pelvis showed diffuse fluid and air distention of the the entire small bowel and proximal large bowel measuring up to 4.3 cm in the left abdomen and up to 7.6 cm of the proximal colon. Transitional point seen at a site of masslike thickening of the hepatic flexure measuring 3.2 x 3.9 cm (series 2 image 35). Multiple prominent and enlarged retroperitoneal lymph nodes. Largest is 1.5 cm in the left periaortic region (series 2 image 33). Current Pain Assessment  Pain Assessment  Pain Assessment: 0-10  Pain Level: 10  Patient's Stated Pain Goal: 0 - No pain  Pain Location: Abdomen  Pain Orientation: Mid  Pain Descriptors: Discomfort  Functional Pain Assessment: Prevents or interferes some active activities and ADLs  Non-Pharmaceutical Pain Intervention(s): Rest    OBJECTIVE:  VITALS: BP 97/64   Pulse 94   Temp 98.5 °F (36.9 °C) (Temporal)   Resp 23   Ht 5' 10\" (1.778 m)   Wt 229 lb 11.2 oz (104.2 kg)   SpO2 99%   BMI 32.96 kg/m²   I&O:   Intake/Output Summary (Last 24 hours) at 10/23/2022 0705  Last data filed at 10/23/2022 0600  Gross per 24 hour   Intake 3321.65 ml   Output 2670 ml   Net 651.65 ml       PHYSICAL EXAM:  CONSTITUTIONAL: Mildly sedated with Precedex and Dilaudid currently.   Wearing soft wrist restraints, pulling at lines and pulled out G-tube. EYES: Non icteric, EOM intact, pupils equal round   NECK: Supple, no masses. No palpable thyroid mass  CHEST/LUNGS: CTA bilaterally, normal respiratory effort   CARDIOVASCULAR: RRR, no murmurs. No lower extremity edema  ABDOMEN: Midline abdominal dressing intact. Dressing intact to previous G-tube site. EXTREMITIES: warm, full ROM in all 4 extremities, no focal weakness. SKIN: warm, dry with no rashes or lesions  LYMPH: No cervical, clavicular, axillary, or inguinal lymphadenopathy  NEUROLOGIC: Mildly sedated with Precedex and Dilaudid currently. PSYCH: Unable to assess    BMP:   Recent Labs     10/22/22  0245 10/23/22  0226    148*   K 3.9 4.0   * 116*   CO2 19* 23   BUN 42* 29*   CREATININE 0.7 0.8   GLUCOSE 122* 111*   CALCIUM 7.5* 7.5*       Recent Labs     10/23/22  0227 10/22/22  1215 10/21/22  0341 10/20/22  1346   WBC 4.9  --  4.7* 6.2   HGB 12.3* 12.3* 15.7 16.9   HCT 39.0* 37.9* 47.3 50.3   MCV 97.5*  --  93.5 90.5   PLT 68*  --  142 175       CMP:    Recent Labs     10/21/22  0341 10/22/22  0245 10/23/22  0226    143 148*   K 3.8 3.9 4.0    112* 116*   CO2 27 19* 23   BUN 51* 42* 29*   CREATININE 0.9 0.7 0.8   LABGLOM >60 >60 >60   GLUCOSE 133* 122* 111*   PROT 7.4  --  5.4*   LABALBU 3.7  --  2.9*   CALCIUM 8.2* 7.5* 7.5*   BILITOT 1.3*  --  1.9*   ALKPHOS 73  --  44   AST 54*  --  55*   ALT 54*  --  35         30Day lookback of cultures:    Blood Culture Recent: No results for input(s): BC in the last 720 hours. Gram Stain Recent: No results for input(s): LABGRAM in the last 720 hours. Resp Culture Recent: No results for input(s): CULTRESP in the last 720 hours. Body Fluid Recent : No results for input(s): BFCX in the last 720 hours. MRSA Recent : No results for input(s): [facility] in the last 720 hours. Urine Culture Recent : No results for input(s): LABURIN in the last 720 hours.   Organism Recent : No results for input(s): ORG in the last 720 hours. Radiology:   CT ABDOMEN PELVIS W IV CONTRAST Additional Contrast? None    Result Date: 10/20/2022  CLINICAL HISTORY: 48-hour history of diffuse abdominal pain distention nausea vomiting some diarrhea no prior history COMPARISON: No comparison TECHNIQUE: Axial images of the abdomen and pelvis were obtained after the administration of IV contrast.Coronal and sagittal multiplanar reconstructions were performed. One or more of the following dose reduction techniques were used: Automated exposure control, adjustment of the mA and/or kV according to patient size, and/or use of iterative reconstruction technique. COMPARISON: None. FINDINGS: Statements: None. Lower Chest: Unremarkable. Hepatobiliary: Hepatic steatosis. Calcified granulomas throughout the liver. No focal lesion is identified. Marked distention of the gallbladder containing gallstones. No CT evidence of cholecystitis however. No biliary ductal dilatation is evident. Pancreas: The pancreatic parenchyma is unremarkable and there is no main duct dilatation. Spleen: The spleen is nonenlarged. Calcified granulomas throughout the spleen. Adrenals: No adrenal glands are symmetric. Genitourinary: The kidneys are symmetric and enhance appropriately. No suspicious focal parenchymal abnormality is identified in either kidney. There is no collecting system dilatation. The bladder is unremarkable, as imaged. The prostate gland appears unremarkable. Gastrointestinal: Diffuse fluid and air distention of the the entire small bowel and proximal large bowel measuring up to 4.3 cm in the left abdomen and up to 7.6 cm of the proximal colon. Transitional point seen at a site of masslike thickening of the hepatic flexure measuring 3.2 x 3.9 cm (series 2 image 35). Colonic diverticulosis without diverticulitis. The appendix appears unremarkable. Lymphatics: Multiple prominent and enlarged retroperitoneal lymph nodes. Largest is 1.5 cm in the left periaortic region (series 2 image 33). Vascular: Moderate calcific atherosclerosis. The abdominal aorta is normal in caliber. MSK/Body Wall: No concerning bony lesion is identified. Small fat-containing right inguinal hernia. Peritoneum/Other: There is no free fluid or abnormal collection. No free gas. 1.Irregular mass at the hepatic flexure of the colon resulting in high-grade small bowel and proximal large bowel obstruction. Severe fluid and gaseous distention of the entire stomach and small bowel including the distal esophagus. Findings are overall concerning for colonic neoplasm. Recommend GI and surgical consultation. 2.Scattered enlarged retroperitoneal lymph nodes. These are presumably metastatic if the colon mass proves to be malignant. 3.Marked hydropic distention of the gallbladder containing gallstones. No CT evidence of cholecystitis. Recommend correlation with LFTs. US GALLBLADDER RUQ    Result Date: 10/21/2022  NO PRIOR REPORT AVAILABLE Exam: ULTRASOUND OF THE ABDOMEN LIMITED, RIGHT UPPER QUADRANT Clinical data: Elevated LFTs, sepsis, lactic acidosis. The patient is on ventilator. Technique: Real-time grayscale imaging of the right upper quadrant was performed. Images supplemented with color Doppler technique. Prior studies: CT scan of abdomen and pelvis dated 10/20/22. Findings: Limited exam due to poor acoustic penetration. Diffuse increased echogenicity of the liver parenchyma. A shadowing focus//calcification in the right lobe measuring up 1.1 cm. No intrahepatic biliary distention. The gallbladder is distended with several small stones. The gallbladder wall thickness measures 0.8 cm. No sludge. The common bile duct is normal in caliber measuring 0.4cm. The right kidney vaxkppej48.7 x 6.6 x 5.4 cm. Normal echogenicity and cortical thickness are preserved. No evidence of renal masses, no calculi and no hydronephrosis Diffuse increased echogenicity of the pancreas.  Imaged portion of the aorta demonstrates no acute pathology. Visualized portion of the inferior vena cava is unremarkable. 1.  Limited exam for reasons discussed above. 2.  Diffuse fatty infiltration of the liver with a 1.1 cm calcification in the right hepatic lobe. 3.  Distended gallbladder with several small stones and mild diffuse gallbladder wall thickening. 4.  No other significant abnormality. Recommendation: Follow up as clinically indicated. Electronically Signed by Laurent Dietrich MD at 21-Oct-2022 11:06:19 PM EST             XR CHEST PORTABLE    Result Date: 10/20/2022  NO PRIOR REPORT AVAILABLE Exam: X-RAY OF [facility] Clinical data:Confirmation of course of NG/OG/NE tube and location of tip of tube. Technique:Single view of the chest. Prior studies: Radiograph of the chest done on same day. Findings: The lungs are grossly clear; noevidence of acute infiltrate or pleural effusion. Cardiac silhouette is mildly enlarged. No acute osseous abnormality is detected. The NG tube can be traced to the level of distal esophagus. Scattered nonspecific gas-filled loops of bowel in the upper abdomen. The NG tube can be traced to the level of distal esophagus. Suggest advancing accordingly, with a follow-up radiograph. Recommendation: As above. Electronically Signed by Juni Barros MD at 20-Oct-2022 06:15:09 PM EST             XR CHEST PORTABLE    Result Date: 10/20/2022  NO PRIOR REPORT AVAILABLE Exam: X-RAY OF [facility] Clinical data:Abdominal pain, vomiting. Technique:Single view of the chest. Prior studies: No prior studies submitted. Findings: The trachea is midline. The heart is enlarged. Mediastinal and pulmonary vascular shadows are normal.  There is no focal consolidation or effusion. The osseous structures are intact. Cardiomegaly. Recommendation: Follow up as clinically indicated.  Electronically Signed by Cheo Munroe MD at 20-Oct-2022 03:26:36 PM EST               Medications  Current Facility-Administered Medications   Medication Dose Route Frequency Provider Last Rate Last Admin    LORazepam (ATIVAN) injection 0.5 mg  0.5 mg IntraVENous Q6H PRN Fa[facility], DO        metoprolol (LOPRESSOR) injection 5 mg  5 mg IntraVENous Q6H Faiza Abrahan, DO   5 mg at 10/22/22 1649    LORazepam (ATIVAN) injection 1 mg  1 mg IntraVENous Q1H PRN Faiza Abrahan, DO   1 mg at 10/22/22 1742    HYDROmorphone HCl PF (DILAUDID) injection 2 mg  2 mg IntraVENous W6B PRN Magali Godoy, DO   2 mg at 10/22/22 1805    Or    HYDROmorphone HCl PF (DILAUDID) injection 1 mg  1 mg IntraVENous N6L PRN Magali Godoy, DO   1 mg at 10/23/22 8348    Or    HYDROmorphone HCl PF (DILAUDID) injection 0.5 mg  0.5 mg IntraVENous A1P PRN Magali Godoy, DO        HYDROmorphone HCl PF (DILAUDID) injection 2 mg  2 mg IntraVENous Once Magali Godoy, DO        dexmedetomidine (PRECEDEX) 400 mcg in sodium chloride 0.9 % 100 mL infusion  0.1-1.5 mcg/kg/hr IntraVENous Continuous Faiza [facility], DO 13 mL/hr at 10/23/22 0510 0.5 mcg/kg/hr at 10/23/22 0510    sodium chloride flush 0.9 % injection 5-40 mL  5-40 mL IntraVENous 2 times per day Isabelle Lopez MD   10 mL at 10/22/22 0925    sodium chloride flush 0.9 % injection 5-40 mL  5-40 mL IntraVENous PRN Isabelle Lopez MD        0.9 % sodium chloride infusion   IntraVENous PRN Isabelle Lopez MD        0.9 % sodium chloride infusion   IntraVENous Continuous Isabelle Lopez  mL/hr at 10/23/22 0454 Rate Verify at 10/23/22 0454    morphine (PF) injection 2 mg  2 mg IntraVENous Q2H PRN Isabelle Lopez MD   2 mg at 10/22/22 1515    Or    morphine injection 4 mg  4 mg IntraVENous Q2H PRN Isabelle Lopez MD        famotidine (PEPCID) tablet 20 mg  20 mg Oral BID Isabelle Lopez MD        Or    famotidine (PEPCID) 20 mg in sodium chloride (PF) 10 mL injection  20 mg IntraVENous BID Isabelle Lopez MD   20 mg at 10/22/22 2125    ceFAZolin (ANCEF) 2,000 mg in sterile water 20 mL IV syringe 2,000 mg IntraVENous Q8H Rodolfo Ludwig MD   2,000 mg at 10/23/22 1556    levothyroxine (SYNTHROID) tablet 88 mcg  88 mcg Oral Daily Rodolfo Ludwig MD   88 mcg at 10/22/22 1712    glucose chewable tablet 16 g  4 tablet Oral PRN Rodolfo Ludwig MD        dextrose bolus 10% 125 mL  125 mL IntraVENous PRN Rodolfo Ludwig MD        Or    dextrose bolus 10% 250 mL  250 mL IntraVENous PRN Rodolfo Ludwig MD        glucagon (rDNA) injection 1 mg  1 mg SubCUTAneous PRN Rodolfo Ludwig MD        dextrose 10 % infusion   IntraVENous Continuous PRN Rodolfo Ludwig MD        sodium chloride flush 0.9 % injection 10 mL  10 mL IntraVENous 2 times per day Rodolfo Ludwig MD   10 mL at 10/22/22 0923    sodium chloride flush 0.9 % injection 10 mL  10 mL IntraVENous PRN Rodolfo Ludwig MD        0.9 % sodium chloride infusion   IntraVENous PRN Rodolfo Ludwig MD        potassium chloride (KLOR-CON M) extended release tablet 40 mEq  40 mEq Oral PRN Rodolfo Ludwig MD        Or    potassium bicarb-citric acid (EFFER-K) effervescent tablet 40 mEq  40 mEq Oral PRN Rodolfo Ludwig MD        Or    potassium chloride 10 mEq/100 mL IVPB (Peripheral Line)  10 mEq IntraVENous PRN Rodolfo Ludwig MD        ondansetron (ZOFRAN-ODT) disintegrating tablet 4 mg  4 mg Oral Q8H PRN Rodolfo Ludwig MD        Or    [facility]ron [facility] injection 4 mg  4 mg IntraVENous Q6H PRN Rodolfo Ludwig MD        magnesium hydroxide (MILK OF MAGNESIA) 400 MG/5ML suspension 30 mL  30 mL Oral Daily PRN Rodolfo Ludwig MD        acetaminophen (TYLENOL) tablet 650 mg  650 mg Oral Q6H PRN Rodolfo Ludwig MD        Or    acetaminophen (TYLENOL) suppository 650 mg  650 mg Rectal Q6H PRN Rodolfo Ludwig MD        insulin lispro (HUMALOG) injection vial 0-4 Units  0-4 Units SubCUTAneous TID  Rodolfo Ludwig MD        insulin lispro (HUMALOG) injection vial 0-4 Units  0-4 Units SubCUTAneous Nightly Rodolfo Ludwig MD           Allergies  Patient has no known allergies. ASSESSMENT:  #Hepatic flexure colonic mass  -CT scan pelvis with IV contrast on 10/20/2022:  Irregular mass at the hepatic flexure of the colon resulting in high-grade small bowel and proximal large bowel obstruction. Severe fluid and gaseous distention of the entire stomach and small bowel including the distal esophagus. Scattered enlarged retroperitoneal lymph nodes. These are presumably metastatic if the colon mass proves to be malignant. Marked hydropic distention of the gallbladder containing gallstones. No CT evidence of cholecystitis. Hepatic steatosis. Calcified granulomas throughout the liver  Spleen non enlarged and calcified granulomas throughout the spleen.    -Colectomy with gastrostomy placement by Dr. Seven Esquivel on 10/21/2022: Operative note indicated hard mass adherent to the liver and small bowel posteriorly. POD #2  -Baseline CEA 11.0 on 10/21/2022  -We will follow-up path result and further treatment recommendations from  -Obtain postop CEA     #Thrombocytopenia-suspect multifactorial to include liver dysfunction and long term alcohol use. Admission a hemoglobin of 16.9 on 10/20/2022. No active bleeding noted. Hemoglobin stable at 12.3    #Elevated bilirubin  CT scan of the abdomen pelvis on 10/20/2022 reported Hepatic steatosis. Calcified granulomas throughout the liver. Spleen non enlarged and calcified granulomas throughout the spleen. PLAN:  Continue with supportive care  Monitor platelet count  Further staging to be completed as outpatient  We will follow-up pathology report  Postop CEA requested      Nicole Foley , APRN    10/23/22  7:05 AM    Physician's attestation and contribution:  I, Dr Cynthia Bernardo, personally and independently [facility]        I have reviewed relevant medical information/data to include but not limited to the medication list, relevant appropriate lab work and imaging when applicable.    I reviewed other physician's notes, ancillary services and nurses assessments. I have reviewed the above documentation completed by Carrillo COOPER   Please see my additional addended and/or modified contributions to the history of present illness, physical examination, and assessment/medical decision-making and plan that reflects my findings and impressions. I discussed essential elements of the care plan with Nicole COOPER and the patient. I have encouraged and answered all the questions raised to the patient's understanding and satisfaction. I concur with the above stated. Subjective-continues with issues with confusion, just received Dilaudid and now is resting. Difficult to arouse. Objective-no acute clinical manifestations noted on exam in the ICU. Assessment/plan:  POD#2 right hemicolectomy with gastrostomy placement by Dr. Bertha Rangel for a colon mass. Pathology pending. ENT scope on 10/22/2022 for false passage created by NG tube trauma. Unremarkable scope other than above described. We will continue to follow awaiting pathology.     Electronically signed by Bassam Dunlap MD on 10/23/22 at 9:48 AM CDT

## 2022-10-23 NOTE — PROGRESS NOTES
Hospitalist Progress Note    Patient:  Mary Cortes  YOB: 1955  Date of Service: 10/23/2022  MRN: [id number]   Acct: [de-identified]   Primary Care Physician: No primary care provider on file. Advance Directive: Full Code  Admit Date: 10/20/2022       Hospital Day: 3  Referring Provider: Dwayne Roberts DO    Patient Seen, Chart, Consults, Notes, Labs, Radiology studies reviewed. Subjective:  Mary Cortes is a 77 y.o. male  whom we are following for respiratory failure, status post right hemicolectomy, nonischemic cardiomyopathy. He developed some alcohol withdrawal symptoms yesterday. He is on Precedex and intermittent Ativan. Unfortunately he pulled out his G-tube overnight. GI is going to replace it today. Allergies:  Patient has no known allergies.     Medicines:  Current Facility-Administered Medications   Medication Dose Route Frequency Provider Last Rate Last Admin    metronidazole (FLAGYL) 500 mg in 0.9% NaCl 100 mL IVPB premix  500 mg IntraVENous S1K Magali Godoy, DO   Stopped at 10/23/22 1145    sodium chloride 0.45 % 1,000 mL infusion   IntraVENous Continuous Magali Godoy,  mL/hr at 10/23/22 0915 Rate Verify at 10/23/22 0915    LORazepam (ATIVAN) injection 0.5 mg  0.5 mg IntraVENous Q6H PRN Dwayne Slocumb, DO        metoprolol (LOPRESSOR) injection 5 mg  5 mg IntraVENous Q6H Dwayne Slocumb, DO   5 mg at 10/23/22 1044    LORazepam (ATIVAN) injection 1 mg  1 mg IntraVENous Q1H PRN Dwayne Slocumb, DO   1 mg at 10/22/22 1742    HYDROmorphone HCl PF (DILAUDID) injection 2 mg  2 mg IntraVENous O0H PRN Magali Godoy, DO   2 mg at 10/23/22 1313    Or    HYDROmorphone HCl PF (DILAUDID) injection 1 mg  1 mg IntraVENous A7K PRN Magali Godoy, DO   1 mg at 10/23/22 0249    Or    HYDROmorphone HCl PF (DILAUDID) injection 0.5 mg  0.5 mg IntraVENous S0N PRN Magali Godoy, DO        HYDROmorphone HCl PF (DILAUDID) injection 2 mg  2 mg IntraVENous Once Magali Godoy DO        dexmedetomidine (PRECEDEX) 400 mcg in sodium chloride 0.9 % 100 mL infusion  0.1-1.5 mcg/kg/hr IntraVENous Continuous Leda Dugan DO   Stopped at 10/23/22 9610    sodium chloride flush 0.9 % injection 5-40 mL  5-40 mL IntraVENous 2 times per day Megan Dallas MD   10 mL at 10/22/22 0925    sodium chloride flush 0.9 % injection 5-40 mL  5-40 mL IntraVENous PRN Megan Dallas MD        0.9 % sodium chloride infusion   IntraVENous PRN Megan Dallas MD        famotidine (PEPCID) tablet 20 mg  20 mg Oral BID Megan Dallas MD        Or    famotidine (PEPCID) 20 mg in sodium chloride (PF) 10 mL injection  20 mg IntraVENous BID Megan Dallas MD   20 mg at 10/23/22 0736    ceFAZolin (ANCEF) 2,000 mg in sterile water 20 mL IV syringe  2,000 mg IntraVENous Q8H Megan Dallas MD   2,000 mg at 10/23/22 9938    levothyroxine (SYNTHROID) tablet 88 mcg  88 mcg Oral Daily Megan Dallas MD   88 mcg at 10/23/22 0737    glucose chewable tablet 16 g  4 tablet Oral PRN Megan Dallas MD        dextrose bolus 10% 125 mL  125 mL IntraVENous PRN Megan Dallas MD        Or    dextrose bolus 10% 250 mL  250 mL IntraVENous PRN Megan Dallas MD        glucagon (rDNA) injection 1 mg  1 mg SubCUTAneous PRN Megan Dallas MD        dextrose 10 % infusion   IntraVENous Continuous PRN Megan Dallas MD        sodium chloride flush 0.9 % injection 10 mL  10 mL IntraVENous 2 times per day Megan Dallas MD   10 mL at 10/22/22 0923    sodium chloride flush 0.9 % injection 10 mL  10 mL IntraVENous PRN Megan Dallas MD        0.9 % sodium chloride infusion   IntraVENous PRN Megan Dallas MD        potassium chloride (KLOR-CON M) extended release tablet 40 mEq  40 mEq Oral PRN Megan Dallas MD        Or    potassium bicarb-citric acid (EFFER-K) effervescent tablet 40 mEq  40 mEq Oral PRN Megan Dallas MD        Or    potassium chloride 10 mEq/100 mL IVPB (Peripheral Line)  10 mEq IntraVENous PRN Landon Hammans, MD        ondansetron (ZOFRAN-ODT) disintegrating tablet 4 mg  4 mg Oral Q8H PRN Landon Hammans, MD        Or    ondansetron [facility]) injection 4 mg  4 mg IntraVENous Q6H PRN Landon Hammans, MD        magnesium hydroxide (MILK OF MAGNESIA) 400 MG/5ML suspension 30 mL  30 mL Oral Daily PRN Landon Hammans, MD        acetaminophen (TYLENOL) tablet 650 mg  650 mg Oral Q6H PRN Landon Hammans, MD        Or    acetaminophen (TYLENOL) suppository 650 mg  650 mg Rectal Q6H PRN Landon Hammans, MD        insulin lispro (HUMALOG) injection vial 0-4 Units  0-4 Units SubCUTAneous TID WC Landon Hammans, MD        insulin lispro (HUMALOG) injection vial 0-4 Units  0-4 Units SubCUTAneous Nightly Landon Hammans, MD           Past Medical History:  Past Medical History:   Diagnosis Date    Ex-cigarette smoker 10/3/2016    Hypertension     Non-ischemic cardiomyopathy ([facility] [address] 10/3/2016    2016  Echo  EF 25% 10/3/16  Cath  Mild CAD, EF 10%     Paroxysmal atrial fibrillation ([address] 3/16/2018       Past Surgical History:  Past Surgical History:   Procedure Laterality Date    FINGER AMPUTATION Left     partial left thumb amputation following trauma    TONSILLECTOMY         Family History  Family History   Problem Relation Age of Onset    High Blood Pressure Mother     Cancer Father         Stomach    Heart Disease Maternal Grandmother     Diabetes Maternal Uncle        Social History  Social History     Socioeconomic History    Marital status:      Spouse name: Not on file    Number of children: Not on file    Years of education: Not on file    Highest education level: Not on file   Occupational History    Not on file   Tobacco Use    Smoking status: Former     Packs/day: 1.00     Years: 2.00     Pack years: 2.00     Types: Cigarettes     Start date:      Quit date:      Years since quittin.8    Smokeless tobacco: Never    Tobacco comments:     light smoking history - socially   Vaping Use Vaping Use: Never used   Substance and Sexual Activity    Alcohol use: Yes    Drug use: No    Sexual activity: Not on file   Other Topics Concern    Not on file   Social History Narrative    Not on file     Social Determinants of Health     Financial Resource Strain: Not on file   Food Insecurity: Not on file   Transportation Needs: Not on file   Physical Activity: Not on file   Stress: Not on file   Social Connections: Not on file   Intimate Partner Violence: Not on file   Housing Stability: Not on file         Review of Systems:    Review of Systems   Unable to perform ROS: Mental status change         Objective:  Blood pressure 102/77, pulse 93, temperature 96.9 °F (36.1 °C), temperature source Temporal, resp. rate (!) 36, height 5' 10\" (1.778 m), weight 229 lb 11.2 oz (104.2 kg), SpO2 93 %. Intake/Output Summary (Last 24 hours) at 10/23/2022 1349  Last data filed at 10/23/2022 1200  Gross per 24 hour   Intake 3269.83 ml   Output 2555 ml   Net 714.83 ml       Physical Exam  Vitals and nursing note reviewed. Constitutional:       Appearance: He is ill-appearing. HENT:      Head: Normocephalic and atraumatic. Right Ear: External ear normal.      Left Ear: External ear normal.      Nose: Nose normal.      Mouth/Throat:      Mouth: Mucous membranes are moist.   Eyes:      Conjunctiva/sclera: Conjunctivae normal.      Pupils: Pupils are equal, round, and reactive to light. Cardiovascular:      Rate and Rhythm: Normal rate and regular rhythm. Heart sounds: Normal heart sounds. Pulmonary:      Effort: Pulmonary effort is normal.      Breath sounds: Normal breath sounds. Abdominal:      General: Abdomen is flat. Palpations: Abdomen is soft. Musculoskeletal:         General: No swelling. Cervical back: Neck supple. No rigidity. Skin:     General: Skin is warm and dry. Neurological:      Mental Status: He is disoriented.        Labs:  BMP:   Recent Labs     10/21/22  7840 10/22/22  0245 10/23/22  0226    143 148*   K 3.8 3.9 4.0    112* 116*   CO2 27 19* 23   BUN 51* 42* 29*   CREATININE 0.9 0.7 0.8   CALCIUM 8.2* 7.5* 7.5*     CBC:   Recent Labs     10/21/22  0341 10/22/22  1215 10/23/22  0227   WBC 4.7*  --  4.9   HGB 15.7 12.3* 12.3*   HCT 47.3 37.9* 39.0*   MCV 93.5  --  97.5*     --  68*     LIVER PROFILE:   Recent Labs     10/21/22  0341 10/23/22  0226   AST 54* 55*   ALT 54* 35   BILITOT 1.3* 1.9*   ALKPHOS 73 44     PT/INR: No results for input(s): PROTIME, INR in the last 72 hours. APTT: No results for input(s): APTT in the last 72 hours. BNP:  No results for input(s): BNP in the last 72 hours. Ionized Calcium:No results for input(s): IONCA in the last 72 hours. Magnesium:No results for input(s): MG in the last 72 hours. Phosphorus:No results for input(s): PHOS in the last 72 hours. HgbA1C: No results for input(s): LABA1C in the last 72 hours. Hepatic:   Recent Labs     10/21/22  0341 10/23/22  0226   ALKPHOS 73 44   ALT 54* 35   AST 54* 55*   PROT 7.4 5.4*   BILITOT 1.3* 1.9*   LABALBU 3.7 2.9*     Lactic Acid: No results for input(s): LACTA in the last 72 hours. Troponin: No results for input(s): CKTOTAL, CKMB, TROPONINT in the last 72 hours. ABGs: No results for input(s): PH, PCO2, PO2, HCO3, O2SAT in the last 72 hours. CRP:  No results for input(s): CRP in the last 72 hours. Sed Rate:  No results for input(s): SEDRATE in the last 72 hours. Cultures:   No results for input(s): CULTURE in the last 72 hours. No results for input(s): BC, Christmegan Gavin in the last 72 hours. No results for input(s): CXSURG in the last 72 hours.     Radiology reports as per the Radiologist  Radiology: CT ABDOMEN PELVIS W IV CONTRAST Additional Contrast? None    Result Date: 10/20/2022  CLINICAL HISTORY: 48-hour history of diffuse abdominal pain distention nausea vomiting some diarrhea no prior history COMPARISON: No comparison TECHNIQUE: Axial images of the abdomen and pelvis were obtained after the administration of IV contrast.Coronal and sagittal multiplanar reconstructions were performed. One or more of the following dose reduction techniques were used: Automated exposure control, adjustment of the mA and/or kV according to patient size, and/or use of iterative reconstruction technique. COMPARISON: None. FINDINGS: Statements: None. Lower Chest: Unremarkable. Hepatobiliary: Hepatic steatosis. Calcified granulomas throughout the liver. No focal lesion is identified. Marked distention of the gallbladder containing gallstones. No CT evidence of cholecystitis however. No biliary ductal dilatation is evident. Pancreas: The pancreatic parenchyma is unremarkable and there is no main duct dilatation. Spleen: The spleen is nonenlarged. Calcified granulomas throughout the spleen. Adrenals: No adrenal glands are symmetric. Genitourinary: The kidneys are symmetric and enhance appropriately. No suspicious focal parenchymal abnormality is identified in either kidney. There is no collecting system dilatation. The bladder is unremarkable, as imaged. The prostate gland appears unremarkable. Gastrointestinal: Diffuse fluid and air distention of the the entire small bowel and proximal large bowel measuring up to 4.3 cm in the left abdomen and up to 7.6 cm of the proximal colon. Transitional point seen at a site of masslike thickening of the hepatic flexure measuring 3.2 x 3.9 cm (series 2 image 35). Colonic diverticulosis without diverticulitis. The appendix appears unremarkable. Lymphatics: Multiple prominent and enlarged retroperitoneal lymph nodes. Largest is 1.5 cm in the left periaortic region (series 2 image 33). Vascular: Moderate calcific atherosclerosis. The abdominal aorta is normal in caliber. MSK/Body Wall: No concerning bony lesion is identified. Small fat-containing right inguinal hernia. Peritoneum/Other: There is no free fluid or abnormal collection. No free gas.     1.Irregular mass at the hepatic flexure of the colon resulting in high-grade small bowel and proximal large bowel obstruction. Severe fluid and gaseous distention of the entire stomach and small bowel including the distal esophagus. Findings are overall concerning for colonic neoplasm. Recommend GI and surgical consultation. 2.Scattered enlarged retroperitoneal lymph nodes. These are presumably metastatic if the colon mass proves to be malignant. 3.Marked hydropic distention of the gallbladder containing gallstones. No CT evidence of cholecystitis. Recommend correlation with LFTs. US GALLBLADDER RUQ    Result Date: 10/21/2022  NO PRIOR REPORT AVAILABLE Exam: ULTRASOUND OF THE ABDOMEN LIMITED, RIGHT UPPER QUADRANT Clinical data: Elevated LFTs, sepsis, lactic acidosis. The patient is on ventilator. Technique: Real-time grayscale imaging of the right upper quadrant was performed. Images supplemented with color Doppler technique. Prior studies: CT scan of abdomen and pelvis dated 10/20/22. Findings: Limited exam due to poor acoustic penetration. Diffuse increased echogenicity of the liver parenchyma. A shadowing focus//calcification in the right lobe measuring up 1.1 cm. No intrahepatic biliary distention. The gallbladder is distended with several small stones. The gallbladder wall thickness measures 0.8 cm. No sludge. The common bile duct is normal in caliber measuring 0.4cm. The right kidney [id number] x 6.6 x 5.4 cm. Normal echogenicity and cortical thickness are preserved. No evidence of renal masses, no calculi and no hydronephrosis Diffuse increased echogenicity of the pancreas. Imaged portion of the aorta demonstrates no acute pathology. Visualized portion of the inferior vena cava is unremarkable. 1.  Limited exam for reasons discussed above. 2.  Diffuse fatty infiltration of the liver with a 1.1 cm calcification in the right hepatic lobe.  3.  Distended gallbladder with several small stones and mild diffuse gallbladder wall thickening. 4.  No other significant abnormality. Recommendation: Follow up as clinically indicated. Electronically Signed by Elizabeth Montana MD at 21-Oct-2022 11:06:19 PM EST             XR CHEST PORTABLE    Result Date: 10/20/2022  NO PRIOR REPORT AVAILABLE Exam: X-RAY OF [facility] Clinical data:Confirmation of course of NG/OG/NE tube and location of tip of tube. Technique:Single view of the chest. Prior studies: Radiograph of the chest done on same day. Findings: The lungs are grossly clear; noevidence of acute infiltrate or pleural effusion. Cardiac silhouette is mildly enlarged. No acute osseous abnormality is detected. The NG tube can be traced to the level of distal esophagus. Scattered nonspecific gas-filled loops of bowel in the upper abdomen. The NG tube can be traced to the level of distal esophagus. Suggest advancing accordingly, with a follow-up radiograph. Recommendation: As above. Electronically Signed by Goldie Burnette MD at 20-Oct-2022 06:15:09 PM EST             XR CHEST PORTABLE    Result Date: 10/20/2022  NO PRIOR REPORT AVAILABLE Exam: X-RAY OF [facility] Clinical data:Abdominal pain, vomiting. Technique:Single view of the chest. Prior studies: No prior studies submitted. Findings: The trachea is midline. The heart is enlarged. Mediastinal and pulmonary vascular shadows are normal.  There is no focal consolidation or effusion. The osseous structures are intact. Cardiomegaly. Recommendation: Follow up as clinically indicated. Electronically Signed by Radha Sood MD at 20-Oct-2022 03:26:36 PM EST                Assessment     SBO (small bowel obstruction). S/P right hemicolectomy. Essential hypertension. Control medical management. Non-ischemic cardiomyopathy. Medical management. Paroxysmal atrial fibrillation. Continue beta blocker. Colonic mass. Pathology pending. Hypoxemic respiratory failure. Successfully extubated.     Please document 42 minutes of critical care time for patient assessment, chart review, discussion with staff, .       Lavon Mortensen, DO

## 2022-10-23 NOTE — PROGRESS NOTES
Pt pulled out G Tube. Dr. Sher Pandey notified and stated to put a dressing over it.      Electronically signed by Saud Carnes RN on 10/23/2022 at 6:25 AM

## 2022-10-24 PROBLEM — R45.1 AGITATION: Status: ACTIVE | Noted: 2022-01-01

## 2022-10-24 PROBLEM — Z51.5 PALLIATIVE CARE PATIENT: Status: ACTIVE | Noted: 2022-01-01

## 2022-10-24 NOTE — PLAN OF CARE
Problem: Chronic Conditions and Co-morbidities  Goal: Patient's chronic conditions and co-morbidity symptoms are monitored and maintained or improved  Outcome: Progressing  Flowsheets (Taken 10/24/2022 0800)  Care Plan - Patient's Chronic Conditions and Co-Morbidity Symptoms are Monitored and Maintained or Improved: Monitor and assess patient's chronic conditions and comorbid symptoms for stability, deterioration, or improvement     Problem: Skin/Tissue Integrity  Goal: Absence of new skin breakdown  Description: 1. Monitor for areas of redness and/or skin breakdown  2. Assess vascular access sites hourly  3. Every 4-6 hours minimum:  Change oxygen saturation probe site  4. Every 4-6 hours:  If on nasal continuous positive airway pressure, respiratory therapy assess nares and determine need for appliance change or resting period. Outcome: Progressing     Problem: Safety - Medical Restraint  Goal: Remains free of injury from restraints (Restraint for Interference with Medical Device)  Description: INTERVENTIONS:  1. Determine that other, less restrictive measures have been tried or would not be effective before applying the restraint  2. Evaluate the patient's condition at the time of restraint application  3. Inform patient/family regarding the reason for restraint  4.  Q2H: Monitor safety, psychosocial status, comfort, nutrition and hydration  Outcome: Progressing  Flowsheets  Taken 10/24/2022 0600 by Brooke Rowland RN  Remains free of injury from restraints (restraint for interference with medical device): Determine that other, less restrictive measures have been tried or would not be effective before applying the restraint  Taken 10/24/2022 0500 by Brooke Rowland RN  Remains free of injury from restraints (restraint for interference with medical device): Determine that other, less restrictive measures have been tried or would not be effective before applying the restraint  Taken 10/24/2022 0400 by Brooke Rowland RN  Remains free of injury from restraints (restraint for interference with medical device): Determine that other, less restrictive measures have been tried or would not be effective before applying the restraint  Taken 10/24/2022 0300 by Brooke Rowland RN  Remains free of injury from restraints (restraint for interference with medical device): Determine that other, less restrictive measures have been tried or would not be effective before applying the restraint  Taken 10/24/2022 0200 by Brooke Rowland RN  Remains free of injury from restraints (restraint for interference with medical device): Determine that other, less restrictive measures have been tried or would not be effective before applying the restraint  Taken 10/24/2022 0100 by Brooke Rowland RN  Remains free of injury from restraints (restraint for interference with medical device): Determine that other, less restrictive measures have been tried or would not be effective before applying the restraint  Taken 10/24/2022 0000 by Brooke Rowland RN  Remains free of injury from restraints (restraint for interference with medical device): Determine that other, less restrictive measures have been tried or would not be effective before applying the restraint  Taken 10/23/2022 2300 by Brooke Rowland RN  Remains free of injury from restraints (restraint for interference with medical device): Determine that other, less restrictive measures have been tried or would not be effective before applying the restraint  Taken 10/23/2022 2200 by Brooke Rowland RN  Remains free of injury from restraints (restraint for interference with medical device): Determine that other, less restrictive measures have been tried or would not be effective before applying the restraint  Taken 10/23/2022 2100 by Brooke Rowland RN  Remains free of injury from restraints (restraint for interference with medical device): Determine that other, less restrictive measures have been tried or would not be effective before applying the restraint  Taken 10/23/2022 2000 by Lindsay Salomon RN  Remains free of injury from restraints (restraint for interference with medical device): Determine that other, less restrictive measures have been tried or would not be effective before applying the restraint  Taken 10/23/2022 1920 by Lindsay Salomon RN  Remains free of injury from restraints (restraint for interference with medical device):   Determine that other, less restrictive measures have been tried or would not be effective before applying the restraint   Inform patient/family regarding the reason for restraint   Every 2 hours: Monitor safety, psychosocial status, comfort, nutrition and hydration   Evaluate the patient's condition at the time of restraint application

## 2022-10-24 NOTE — CONSULTS
Palliative Care Consult Note    10/24/2022 8:46 AM  Subjective:  Admit Date: 10/20/2022  PCP: No primary care provider on file. Date of Service: 10/24/2022    Reason for Consultation:  Goals of Care, Code Status, Family Support     History Obtained From: EMR/Patient and their Family    History Of Present Illness: The patient is a 77 y.o. male with PMH HTN, afib, and non-ischemic cardiomyopathy who presented to [facility] ED 10/20/2022 complaining of abdominal pain, distention, and n/v x2 days. CT abdomen/pelvis in ED revealed a colon mass at the hepatic flexure causing obstruction. He was admitted under hospitalist services and initiated on NGT therapy for decompression. GS was consulted in ED and pt underwent right hemicolectomy with gastrostomy tube placement 10/21/2022 with hard mass adherent to liver and small bowel posteriorly. He remained intubated and sedated post op due to concern for trauma to the nasopharynx and pharynx due to a false track created by an NG tube. He underwent scoping with ENT 10/22/2022 to evaluate for safety to extubate and was found to be patent bilaterally with trauma to to the right but no no contraindication to wean to extubation and positive pressure if needed. He was liberated from the ventilator 10/23/2022 and demonstrated increased agitation with need for intermittent precedex/ativan for concern of ETOH withdrawal. He required endoscopic Gtube replacement with GI 10/23/2022 after self removing Gtube once extubated. Oncology has evaluated with elevated CEA and pathology pending with plans for further staging workup to be completed outpatient. Palliative care is consulted for goals of care, symptom management, and family support.      Past Medical History:        Diagnosis Date    Ex-cigarette smoker 10/03/2016    Hypertension     Non-ischemic cardiomyopathy ([facility] [address] 10/03/2016    9/28/2016  Echo  EF 25% 10/3/16  Cath  Mild CAD, EF 10%     Palliative care patient 10/24/2022    Paroxysmal atrial fibrillation ([facility] [address] 03/16/2018       Past Surgical History:        Procedure Laterality Date    FINGER AMPUTATION Left     partial left thumb amputation following trauma    HEMICOLECTOMY N/A 10/21/2022    RIGHT HEMICOLECTOMY WITH GASTROSTOMY PLACEMENT performed by Megan Dallas MD at [address] Medications:  Prior to Admission medications    Medication Sig Start Date End Date Taking? Authorizing Provider   levothyroxine (SYNTHROID) 88 MCG tablet TAKE ONE TABLET BY MOUTH ONCE DAILY 3/10/22   ALLISON Feldman   carvedilol (COREG) 25 MG tablet Take 1 tablet by mouth 2 times daily 3/9/22   ALLISON Feldman   sacubitril-valsartan (ENTRESTO)  MG per tablet Take 1 tablet by mouth twice daily 3/9/22   ALLISON Feldman   furosemide (LASIX) 40 MG tablet Take 1 tablet by mouth once daily 3/9/22   ALLISON Feldman   spironolactone (ALDACTONE) 25 MG tablet TAKE ONE TABLET BY MOUTH ONCE DAILY 3/9/22   ALLISON Feldman   apixaban (ELIQUIS) 5 MG TABS tablet Take 1 tablet by mouth 2 times daily 3/9/22 10/18/22  ALLISON Feldman     Allergies:    Patient has no known allergies. Social History:    The patient currently lives at home alone  Tobacco:   reports that he quit smoking about 30 years ago. His smoking use included cigarettes. He started smoking about 32 years ago. He has a 2.00 pack-year smoking history. He has never used smokeless tobacco.  Alcohol:   reports current alcohol use.   Illicit Drugs: Unknown    Family History:      Problem Relation Age of Onset    High Blood Pressure Mother     Cancer Father         Stomach    Heart Disease Maternal Grandmother     Diabetes Maternal Uncle        Review of Systems:   Unable to assess 2/2 mental status change    Physical Examination:  /75   Pulse (!) 113   Temp 97.3 °F (36.3 °C) (Temporal)   Resp 15   Ht 5' 10\" (1.778 m)   Wt 229 lb 11.2 oz (104.2 kg)   SpO2 92%   BMI 32.96 kg/m²     General appearance: 76 yo male, ill appearing, no acute distress   Head: Normocephalic, without obvious abnormality, atraumatic  Eyes: conjunctivae/corneas clear. Pupils sluggish  Ears: normal external ears and nose  Neck: no JVD, supple, symmetrical, trachea midline   Lungs: soft scattered rhonchi to auscultation bilaterally,no rales or wheezes   Heart: Irregular rhythm and tachycardic, S1, S2 normal, no murmur  Abdomen: Rounded, taut, minimal bowel sounds, no grimacing to palpation, G-tube with gastric contents draining  Extremities:No lower extremity edema,  No erythema, no tenderness to palpation  Skin: Warm, dry, abdominal incision WNL  Neurologic: Lethargic, oriented to person only, intermittent agitation, impulsive and not following commands, moves extremities spontaneously     Diagnostic Data:  CBC:  Recent Labs     10/22/22  1215 10/23/22  0227 10/24/22  0117   WBC  --  4.9 5.5   HGB 12.3* 12.3* 11.9*   HCT 37.9* 39.0* 37.4*   PLT  --  68* 69*     BMP:  Recent Labs     10/22/22  0245 10/23/22  0226 10/24/22  0117    148* 144   K 3.9 4.0 4.1   * 116* 117*   CO2 19* 23 20*   BUN 42* 29* 23   CREATININE 0.7 0.8 0.7   CALCIUM 7.5* 7.5* 7.4*   PHOS  --   --  1.9*     Recent Labs     10/23/22  0226 10/24/22  0117   AST 55* 106*   ALT 35 47*   BILITOT 1.9* 2.9*   ALKPHOS 44 40     CT ABDOMEN PELVIS W IV CONTRAST Additional Contrast? None  Result Date: 10/20/2022  1. Irregular mass at the hepatic flexure of the colon resulting in high-grade small bowel and proximal large bowel obstruction. Severe fluid and gaseous distention of the entire stomach and small bowel including the distal esophagus. Findings are overall concerning for colonic neoplasm. Recommend GI and surgical consultation. 2.Scattered enlarged retroperitoneal lymph nodes. These are presumably metastatic if the colon mass proves to be malignant. 3.Marked hydropic distention of the gallbladder containing gallstones. No CT evidence of cholecystitis. Recommend correlation with LFTs. US GALLBLADDER RUQ  Result Date: 10/21/2022  1. Limited exam for reasons discussed above. 2.  Diffuse fatty infiltration of the liver with a 1.1 cm calcification in the right hepatic lobe. 3.  Distended gallbladder with several small stones and mild diffuse gallbladder wall thickening. 4.  No other significant abnormality. Recommendation: Follow up as clinically indicated. Electronically Signed by Paramjit Paige MD at 21-Oct-2022 11:06:19 PM EST             XR CHEST PORTABLE  Result Date: 10/20/2022  The NG tube can be traced to the level of distal esophagus. Suggest advancing accordingly, with a follow-up radiograph. Recommendation: As above. Electronically Signed by Brandie Perez MD at 20-Oct-2022 06:15:09 PM EST             XR CHEST PORTABLE  Result Date: 10/20/2022  Cardiomegaly. Recommendation: Follow up as clinically indicated.  Electronically Signed by Magda Dunbar MD at 20-Oct-2022 03:26:36 PM EST             Palliative Performance Scale:  [x] 40% Mainly in bed  Extensive disease  Mainly assistance  Normal/reduced intake  LOC full/confusion    Palliative Review of Advance Directives:     Surrogate Decision Maker: Vernon Murcia, daughter/legal NOK    Durable Power of : No    Advanced Directives/Living [place]: No    Out of hospital medical orders in place to reflect resuscitation status (MOLST/POLST): No    Information Sharing:  Patient's awareness of illness:  [] Terminal [] Life-Threatening [] Serious [] Non life-threatening [] Not serious   [x] Not discussed    Family awareness of illness:   [] Terminal [] Life-Threatening [x] Serious [] Nonlife-threatening [] Not serious   [] Not discussed    Assessment/Plan:  Principal Problem:    SBO (small bowel obstruction) ([facility] [address]  Active Problems:    Essential hypertension    Hypothyroidism    Hyperglycemia    Thrombocytopenia ([facility] [address]    Palliative care patient    Non-ischemic cardiomyopathy ([facility] [address]    Paroxysmal atrial fibrillation [facility])    Colonic mass    Ileus, postoperative ([address]  Resolved Problems:    * No resolved hospital problems. *       Visit Summary:  Chart reviewed, patient discussed with nursing staff. Reviewed health issues, work up and treatment plan as well as factors that lead to hospitalization. Mr. Rahul Bynum is seen at bedside this morning and report obtained from RN. He remains altered and intermittently agitated requiring as needed medications. He continues with bilateral wrist restraints to ensure line safety. He developed A. fib with RVR this morning and was initiated on digoxin. Concern noted for cirrhotic liver changes and elevated bilirubin. Gallbladder did appear dilated at time of surgery without evidence of infection per GS note. Repeat ultrasound ordered by GI today and may require cholecystectomy tube as he is not a candidate for further surgery at this time. G-tube remains to gravity decompression until return of bowel function. Pts daughter contacted RN today noting that she was not notified of patient's hospitalization or aware of his surgery and it appeared she was not listed on contact list.  Patient's other family members had not notified hospital staff that patient had a living adult child. I called his daughter, Frank Covington, to update on patient's status and plan of care. She states that she lives out of state in [place] and planned a surprise vacation back to [place] this week and found out patient was in the hospital then. She reports that she does speak with her father over the telephone but was not aware he was not feeling well. We reviewed patient's required surgery, medication regimen, plan for work-up outpatient. I provided education on legal next of kin decision makers in the state of [place] and she verbalizes that she wishes to remain patient's decision-maker at this time.   She does report that patient's sister and niece listed in contacts are okay to visit and receive information regarding patient's care. We reviewed patient's code status and she feels he would want to be a DNR in the event of cardiac or respiratory arrest.  ACP updated. I called and spoke with patient's sister, Den Loaiza, to update on the above. She reports that patient and daughter do not often talk and family is concerned she is involved in his care. She does agree that pt would likely wish to be a DNR but does not have any documents listing this. I explained that if family did not want [facility] involved in decision making they would have to apply for emergent guardianship through the courts. We did discuss that patient could complete a living will once his mental status returned to baseline and he was able to make decisions for himself. Opportunity for questions and emotional support provided. Will follow. Candidate for SCOP: To be determined based on hospital course    Recommendations:     Palliative Care- [address] continue all medical treatments/workup and monitor for improvement. D/c planning based on hospital course. Code status- DNR  SBO 2/2 hepatic flexure colonic mass- POD#3 right hemicolectomy with gastrostomy placement by Dr. Tiffanie Sylvester. Pathology pending G-tube replaced 10/23/2022 per GI endoscopically after patient self removed once extubated, remains to gravity until return of bowel function. Reglan initiated. Baseline CEA 11.0 on 10/21/2022 and postop CEA 6.9. Oncology following with plans for outpatient staging work-up. As needed Dilaudid for pain control  Elevated bilirubin- repeat bedside RUQ ultrasound per GI for concern of hydrops of gallbladder on admission CT vs alcoholic liver disease  Agitation- concern 2/2 EtOH withdrawal. Weaned from precedex. PRN ativan available. Restraints as warranted  A. fib with RVR- noted this morning per hospitalist note. Initiated on digoxin daily and metoprolol every 4 hours.   Monitor for rate control  Hypoxemic respiratory failure- extubated 10/23/2022 and now tolerating room air  Nonischemic cardiomyopathy- noted. Mgmt per hospitalist    Thank you for consulting palliative care and allowing us to participate in the care of the patient.     Counseling Topics: Goals of care, Code Status, Disease process education, pt/family support    Time Spent Counseling > 50%:  YES                                   Total Time Spent with patient/family counseling, workup/treatment review, counseling and placement of orders/preparation of this note: 73 minutes plus an additional 32 minutes of ACP discussion with pts family over telephone    Electronically signed by ALLISON Osorio CNP on 10/24/2022 at 8:46 AM    (Please note that portions of this note were completed with a voice recognition program.  Efforts were made to edit the dictations but occasionally words are mis-transcribed.)

## 2022-10-24 NOTE — PROGRESS NOTES
Subjective:  Patient requiring less sedation and is not restrained currently, but still quite delirious and unable to answer questions. Objective:  /87   Pulse (!) 116   Temp 97.3 °F (36.3 °C) (Temporal)   Resp 12   Ht 5' 10\" (1.778 m)   Wt 229 lb 11.2 oz (104.2 kg)   SpO2 90%   BMI 32.96 kg/m²   Date 10/24/22 0000 - 10/24/22 2359   Shift 0000-0759 [id number] 24 Hour Total   INTAKE   P.O.(mL/kg/hr) 0(0) 0  0   I. V.(mL/kg) 1230(11.8) 623. 3(6)  1853. 3(17.8)   IV Piggyback(mL/kg) 200(1.9) 350(3.4)  550(5.3)   Shift Total(mL/kg) 1430(13.7) 973.3(9.3)  2403. 3(23.1)   OUTPUT   Urine(mL/kg/hr) 10(0) 650  660   Emesis/NG output(mL/kg) 45(0.4) 65(0.6)  110(1.1)   Drains(mL/kg) 135(1.3) 230(2.2)  365(3.5)   Shift Total(mL/kg) 190(1.8) 945(9.1)  1135(10.9)   Weight (kg) 104.2 104.2 104.2 104.2     General: Sleepy, lethargic, gradually opens eyes to voice, but does not completely answer questions  Chest: Regular, tachy  Abdomen: Soft, mild distention, ATTP, incision C/d/I, TRISTAN with serous output, g tube with gastric contents    CBC:   Lab Results   Component Value Date/Time    WBC 5.5 10/24/2022 01:17 AM    RBC 3.84 10/24/2022 01:17 AM    HGB 11.9 10/24/2022 01:17 AM    HCT 37.4 10/24/2022 01:17 AM    MCV 97.4 10/24/2022 01:17 AM    MCH 31.0 10/24/2022 01:17 AM    MCHC 31.8 10/24/2022 01:17 AM    RDW 14.6 10/24/2022 01:17 AM    PLT 69 10/24/2022 01:17 AM    MPV 11.3 10/24/2022 01:17 AM     CMP:    Lab Results   Component Value Date/Time     10/24/2022 01:17 AM    K 4.1 10/24/2022 01:17 AM    K 4.0 10/23/2022 02:26 AM     10/24/2022 01:17 AM    CO2 20 10/24/2022 01:17 AM    BUN 23 10/24/2022 01:17 AM    CREATININE 0.7 10/24/2022 01:17 AM    GFRAA >59 04/15/2022 11:37 AM    LABGLOM >60 10/24/2022 01:17 AM    GLUCOSE 85 10/24/2022 01:17 AM    PROT 5.1 10/24/2022 01:17 AM    LABALBU 2.6 10/24/2022 01:17 AM    CALCIUM 7.4 10/24/2022 01:17 AM    BILITOT 2.9 10/24/2022 01:17 AM    ALKPHOS 44 10/24/2022 01:17 AM     10/24/2022 01:17 AM    ALT 47 10/24/2022 01:17 AM     Assessment and plan:  77year old male POD #3 s/p open right hemicolectomy for obstructing hepatic flexure mass  1) Expected POI, leave g tube to gravity until return of bowel function  2) apparent alcoholism with delirium- continue with protocol  3) elevated bilirubin- at time of surgery, patient's liver was visualized and palpated and certainly consistent with cirrhotic changes. Gallbladder was dilated but did not appear infected, more like affects from obstruction. Will follow up results of repeat US ordered by GI. Question if liver enzyme and bilirubin abnormalities are related to alcoholic liver disease vs cholecystitis, vs stone.  If gallbladder appears worsened on US, would recommend radiology place cholecystostomy tube, not a candidate for any surgery at this time  Patient is delirious, with multiple drains, IV meds, tachycardia, I do not feel he is appropriate for transfer to the floor at this time

## 2022-10-24 NOTE — CARE COORDINATION
See note from [address] F[address]; Pt has a daughter    Sonam Isaac re: responsible parties authorized to make healthcare decisions. *If an adult Pt, whose physician has determined that he or she does not have decisional capacity and has not executed an advance directive, the below shall be authorized to make healthcare decisions on behalf of the patient (if willing/available/competent):     The judicially appointed guardian  The -in-fact: DPOA/HCS  Spouse of the Pt  Adult child of the Pt-if more than one, majority rules  Parents of the Pt  Nearest living relative-if more than one, majority rules  Electronically signed by Fan Evans [facility] on 10/24/2022 at 12:55 PM

## 2022-10-24 NOTE — OP NOTE
Operative Report    PATIENT NAME: Ricardo [facility] RECORD NO. [id number]  SURGEON: Amairani Rodgers MD MD   Primary Care Physician: No primary care provider on file. Date: 10/21/2022     PROCEDURE PERFORMED: open right hemicolectomy with open gastrostomy tube placement  PREOPERATIVE DIAGNOSIS: obstructing colon mass at the hepatic flexure  POSTOPERATIVE DIAGNOSIS: Same, with inability to pass NGT  SURGEON:  Amairani Rodgers MD   Assistant: Dr. Cholo Gaspar:  General  ESTIMATED BLOOD LOSS: 200 ml  SPECIMEN:  Right colon with terminal ileum  COMPLICATIONS:  None; patient tolerated the procedure well. FINDINGS: Very dilated small bowel, proximal colon, and stomach. Hard mass in the RUQ, cirrhotic appearing liver   DISPOSITION: PACU - hemodynamically stable. CONDITION: stable      Indications: The patient is a 77year old male who presented to the ER with nausea, vomiting, and abdominal pain. Imaging showed a mass at the hepatic flexure causing bowel obstruction. He is taken at this time for resection. Procedure Details     After the risks and benefits of the procedure were explained, informed consent was obtained, and the patient was taken to the operating room. The patient was placed in supine position and anesthesia was begun. The abdomen was prepped and draped in normal sterile fashion. A time out was performed. A generous midline incision was made with scalpel. Soft tissue was dissected with cautery down to the fascia, the fascia was opened with cautery, and the abdominal cavity was entered. The small bowel was noted to immediately be dilated as well as the stomach. The stomach was palpated and the NGT was not able to be palpated. On preop imaging it was in the esophagus. Anesthesia attempted to advance the tube but was not able to. The tube was removed and they tried to replace it. They met resistance  and continued to work on this throughout the case.  Eventually they felt there was a false passage, and discussed the case with ENT. The ring retractor system was placed and the bowel was retracted. The cecum was very dilated. The bowel was palpated and there was a hard mass at the hepatic flexure. The liver was palpated and visualized and found to be nodular and likely cirrhotic. The cecum was medially retracted and the white line of toldt was scored with cautery. The colon was so large and dilated, a silk stitch was placed in a pursestring fashion and an opening was made with cautery, suctioning a large amount of liquid stool. Once this was done, the pursestring was tied down. There had been some spillage, this was irrigated and suctioned. This did deflate the cecum slightly and allow it to be handled better. The colon was very adherent to the RUQ. As the white line of toldt was taken down from inferior, this was encountered. The area was then approached from the medial position. The omentum was taken down with the hand held energy device until the transverse colon was encountered. The omentum was incised going to the hepatic flexure, but this was again densely adherent to the liver and the mass, with essentially no plane between them. Locations were then chosen in the transverse colon and distal ileum for resection. A window was created in the mesentery of the distal ileum and the 75 PEDRO stapler was fired to divide the bowel. A location was chosen on the transverse colon distal to the area of mass, a window was created in the mesentery, and the bowel was again divided with the 75 PEDRO. With the divided ends of bowel, traction was then applied to try to help create space for further dissection, medially rotating the colon off the retroperitoneum. Tedious dissection was performed along the posterior wall of the hepatic flexure, taking care not to injure the duodenum.  Through the entire surgery, retraction and dissection was difficult due to the dilatation of the bowel and size of the mass, making assistance from Dr. Jordan List necessary. After this dissection was finally completed, the mesentery was divided using the large jaw hand held energy device. The vessel pedicles were oversewn with silk stitches. The specimen was passed off for pathology. The cavity was irrigated and suctioned. There was good hemostasis at this point. The stapled bowel ends were aligned, taking care not to twist the mesentery. The antimesenteric staple corners were divided and the 75 PEDRO staple arms were passed down the bowel. The antimesenteric edges of bowel were aligned and the stapler was fired to create the anastomosis. The common enterotomy was then closed with the 60 blue TA stapler. Spare interrupted silk stitches were placed to lembert the staple lines. The anastomosis was widely patent, with no evidence of leak and good hemostasis. The abdominal cavity was again thoroughly irrigated and suctioned. A 15 round TRISTAN was placed through the RLQ to lie along the area were there had been spillage from the pursestring, and the drain was sutured in place. Attention was then turned to the stomach, which was still very dilated as no NGT had been able to be placed. It was decided to place a gastrostomy tube for decompression. The 20 [place] gastrostomy tube was used. Two concentric silk pursestring stitches were placed. An incision was made on the abdominal wall and the tonsil passed through the muscle to bring the gastrostomy tube through. An opening was made in the center of the pursestring stitches with cautery, and the gastrostomy tube was inserted. The balloon was filled as directed on packaging. The pursestring stitches were tied down. The stomach was then tacked to the abdominal wall in three locations. The gtube was secured at the abdominal wall at about 3.5 cm. The gtube was secured to the abdominal wall with 4 stitches. The abdominal fascia was then closed with two looped #1 stitches, meeting in the center.  The subcutaneous tissue was irrigated and suctioned. The skin was closed with skin staples. Sterile dressings were applied. The patient tolerated the procedure well, was removed from anesthesia, and transferred to the recovery area in stable condition.                   Saloni Frazier MD   Electronically signed 10/24/22 4:16 PM

## 2022-10-24 NOTE — OP NOTE
[facility], [address] [facility]                                OPERATIVE REPORT    PATIENT NAME: Roslyn Saha              :        1955  MED REC NO:   [id number]                              ROOM:       [facility]  ACCOUNT NO:   [de-identified]                           ADMIT DATE: 10/20/2022  PROVIDER:     Kwaku Selby MD    DATE OF PROCEDURE:  10/23/2022    PROCEDURE PERFORMED:  Percutaneous endoscopic gastrostomy placement. INDICATION:  A 59-year-old white male recently underwent right  hemicolectomy for colon obstruction due to colon cancer. He had a  G-tube pulled out by the patient who had started to go into alcohol  withdrawal.  Dr. Carol Foster requested the G-tube to be replaced for bowel  decompression and administration of medications. PREMEDICATION:  Propofol and Precedex per anesthetist.    PROCEDURE:  The Olympus video endoscope was passed by mouth. The  esophagus had a normal lining down to the GE junction at 40 cm. There  were no esophageal varices. The stomach was insufflated with air to  find retained, solid food stuffs. The scope was passed to assure that  there was no pyloric stenosis. The duodenal bulb was normal.  Second  portion of the duodenum was empty. The scope was withdrawn back into  the stomach. The scope light was transilluminated at the previous  G-tube site. The skin was prepped and draped. An introducer needle  with trocar was inserted at the previous G-tube site into the free  gastric space. A blue guidewire was inserted into the stomach through  the introducer and then snared and removed via the mouth. A PEG tube  was attached to the guidewire and pulled into position. The tube was  then tailored to an appropriate length capped and fastened. The patient  tolerated the procedure well. Estimated blood loss, none.     PLAN:  G-tube can be used immediately for gastric decompression and  administration of medication with flush. Reglan has been ordered IV q.6  hours to help facilitate gastric emptying of retained food in the  stomach.         Douglas Arce MD    D: 10/23/2022 17:46:58      T: 10/23/2022 17:51:01     [id number]  Job#: [id number]     Doc#: [id number]    CC:

## 2022-10-24 NOTE — PROGRESS NOTES
POD 3 s/p Right hemicolectomy for colon obstruction due to colon cancer, PEG draining clear yellow fluid to gravity bag to decompress GII tract, awaiting return of bowel tone, retained food stuffs in stomach so Reglan given to racilitate gastric emptying. Separate problem noted with mild rise in LFT's possibly related to cholelithiasis. Hydrops of the GB was shown on admission CT. Plan: Bedside RUQ US and monitor LFT's for trend. allergic reaction

## 2022-10-24 NOTE — ACP (ADVANCE CARE PLANNING)
Advance Care Planning     Advance Care Planning (ACP) Physician/NP/PA (Provider) Davion Dolan      Date of ACP Conversation: 10/24/2022    Conversation Conducted with:  Pts daughter/legal NOK  and sister over telephone    Health Care Decision Maker:    Current Designated Health Care Decision Maker:     Primary Decision MakeKonstantin Rivas - [phone]    Secondary Decision Maker: Itzel Caldera - Brother/Sister - [phone]    Care Preferences:  Ventilation: \"If you were in your present state of health and suddenly became very ill and were unable to breathe on your own, what would your preference be about the use of a ventilator (breathing machine) if it was available to you? \"    NO    Resuscitation:  \"CPR works best to restart the heart when there is a sudden event, like a heart attack, in someone who is otherwise healthy. Unfortunately, CPR does not typically restart the heart for people who have serious health conditions or who are very sick. \"    \"In the event your heart stopped as a result of an underlying serious health condition, would you want attempts to be made to restart your heart (answer \"yes\" for attempt to resuscitate) or would you prefer a natural death (answer \"no\" for do not attempt to resuscitate)? \"   NO    Conversation Outcomes / Follow-Up Plan:   Pts daughter reports that pt would want to continue all current treatments/workup but feels he would wish to be a DNR in the event of cardiac or respiratory arrest.     Length of Voluntary ACP Conversation in minutes:  32 minutes during consult visit    ALLISON Elizalde - CNP

## 2022-10-24 NOTE — PROGRESS NOTES
Hospitalist Progress Note    Patient:  Brian Hutchison  YOB: 1955  Date of Service: 10/24/2022  MRN: [id number]   Acct: [de-identified]   Primary Care Physician: No primary care provider on file. Advance Directive: Full Code  Admit Date: 10/20/2022       Hospital Day: 4  Referring Provider: Lebron More, DO    Patient Seen, Chart, Consults, Notes, Labs, Radiology studies reviewed. Subjective:  Brian Hutchison is a 77 y.o. male  whom we are following for respiratory failure, status post right hemicolectomy, nonischemic cardiomyopathy. He was successfully extubated. We did utilize some low-dose Precedex for some mild alcohol withdrawal.  Family notes that he has a longstanding history of alcoholism. He still is encephalopathic. He developed some atrial fibrillation with rapid ventricular response earlier this morning. He was started on digoxin. GFR is stable. Allergies:  Patient has no known allergies.     Medicines:  Current Facility-Administered Medications   Medication Dose Route Frequency Provider Last Rate Last Admin    sodium phosphate 33.33 mmol in sodium chloride 0.9 % 250 mL IVPB  0.32 mmol/kg IntraVENous PRN Estefani Maya MD        sodium phosphate 10 mmol in sodium chloride 0.9 % 250 mL IVPB  10 mmol IntraVENous PRN Estefani Maya MD        Or    sodium phosphate 15 mmol in sodium chloride 0.9 % 250 mL IVPB  15 mmol IntraVENous PRN Estefani Maya MD        Or    sodium phosphate 20 mmol in sodium chloride 0.9 % 500 mL IVPB  20 mmol IntraVENous PRN Estefani Maya MD        digoxin Jose Dobson) injection 125 mcg  125 mcg IntraVENous Daily Lebron More, DO   125 mcg at 10/24/22 0806    metoprolol (LOPRESSOR) injection 5 mg  5 mg IntraVENous Q4H Lebron More, DO   5 mg at 10/24/22 1241    sodium bicarbonate 50 mEq in dextrose 5 % and 0.2 % NaCl 1,000 mL infusion   IntraVENous Continuous Lebron More,  mL/hr at 10/24/22 0946 New Bag at 10/24/22 8031 iron sucrose (VENOFER) 500 mg in sodium chloride 0.9 % 250 mL IVPB  500 mg IntraVENous Once Donna Baum, DO 78.6 mL/hr at 10/24/22 0946 500 mg at 10/24/22 0946    metronidazole (FLAGYL) 500 mg in 0.9% NaCl 100 mL IVPB premix  500 mg IntraVENous Javier Ardon MD   Stopped at 10/24/22 1050    thiamine (B-1) injection 100 mg  100 mg IntraVENous Daily Renna Meigs, MD   100 mg at 10/24/22 0745    LORazepam (ATIVAN) injection 0.5 mg  0.5 mg IntraVENous Q6H PRN Renna Meigs, MD        HYDROmorphone HCl PF (DILAUDID) injection 2 mg  2 mg IntraVENous Q2H PRN Renna Meigs, MD   2 mg at 10/24/22 1010    Or    HYDROmorphone HCl PF (DILAUDID) injection 1 mg  1 mg IntraVENous Q2H PRN Renna Meigs, MD   1 mg at 10/23/22 6264    Or    HYDROmorphone HCl PF (DILAUDID) injection 0.5 mg  0.5 mg IntraVENous Q2H PRN Renna Meigs, MD        HYDROmorphone HCl PF (DILAUDID) injection 2 mg  2 mg IntraVENous Once Renna Meigs, MD        sodium chloride flush 0.9 % injection 5-40 mL  5-40 mL IntraVENous 2 times per day Renna Meigs, MD   10 mL at 10/22/22 0925    sodium chloride flush 0.9 % injection 5-40 mL  5-40 mL IntraVENous PRN Renna Meigs, MD        0.9 % sodium chloride infusion   IntraVENous PRN Renna Meigs, MD        famotidine (PEPCID) tablet 20 mg  20 mg Oral BID Renna Meigs, MD        Or    famotidine (PEPCID) 20 mg in sodium chloride (PF) 10 mL injection  20 mg IntraVENous BID Renna Meigs, MD   20 mg at 10/24/22 0745    ceFAZolin (ANCEF) 2,000 mg in sterile water 20 mL IV syringe  2,000 mg IntraVENous Javier Ardon MD   2,000 mg at 10/24/22 0745    glucose chewable tablet 16 g  4 tablet Oral PRN Renna Meigs, MD        dextrose bolus 10% 125 mL  125 mL IntraVENous PRN Renna Meigs, MD        Or    dextrose bolus 10% 250 mL  250 mL IntraVENous PRN Renna Meigs, MD        glucagon (rDNA) injection 1 mg  1 mg SubCUTAneous PRN Vira Chavarria MD        dextrose 10 % infusion   IntraVENous Continuous PRN Vira Chavarria MD        potassium chloride (KLOR-CON M) extended release tablet 40 mEq  40 mEq Oral PRN Vira Chavarria MD        Or    potassium bicarb-citric acid (EFFER-K) effervescent tablet 40 mEq  40 mEq Oral PRN Vira Chavarria MD        Or    potassium chloride 10 mEq/100 mL IVPB (Peripheral Line)  10 mEq IntraVENous PRN Vira Chavarria MD        ondansetron (ZOFRAN-ODT) disintegrating tablet 4 mg  4 mg Oral Q8H PRN Vira Chavarria MD        Or    ondansetron [facility]) injection 4 mg  4 mg IntraVENous Q6H PRN Vira Chavarria MD        magnesium hydroxide (MILK OF MAGNESIA) 400 MG/5ML suspension 30 mL  30 mL Oral Daily PRN Vira Chavarria MD        acetaminophen (TYLENOL) tablet 650 mg  650 mg Oral Q6H PRN Vira Chavarria MD        Or    acetaminophen (TYLENOL) suppository 650 mg  650 mg Rectal Q6H PRN Vira Chavarria MD        insulin lispro (HUMALOG) injection vial 0-4 Units  0-4 Units SubCUTAneous TID WC Vira Chavarria MD        insulin lispro (HUMALOG) injection vial 0-4 Units  0-4 Units SubCUTAneous Nightly Vira Chavarria MD           Past Medical History:  Past Medical History:   Diagnosis Date    Ex-cigarette smoker 10/03/2016    Hypertension     Non-ischemic cardiomyopathy ([facility] [address] 10/03/2016    9/28/2016  Echo  EF 25% 10/3/16  Cath  Mild CAD, EF 10%     Palliative care patient 10/24/2022    Paroxysmal atrial fibrillation ([facility] [address] 03/16/2018       Past Surgical History:  Past Surgical History:   Procedure Laterality Date    FINGER AMPUTATION Left     partial left thumb amputation following trauma    GASTROSTOMY TUBE PLACEMENT  10/23/2022    Dr Butterfield Levels N/A 10/21/2022    RIGHT HEMICOLECTOMY WITH GASTROSTOMY PLACEMENT performed by Chandrika Espinosa MD at [address]         Family History  Family History   Problem Relation Age of Onset    High Blood Pressure Mother     Cancer Father         Stomach    Heart Disease Maternal Grandmother     Diabetes Maternal Uncle        Social History  Social History     Socioeconomic History    Marital status:      Spouse name: Not on file    Number of children: Not on file    Years of education: Not on file    Highest education level: Not on file   Occupational History    Not on file   Tobacco Use    Smoking status: Former     Packs/day: 1.00     Years: 2.00     Pack years: 2.00     Types: Cigarettes     Start date: [address] [facility]     Quit date:      Years since quittin.8    Smokeless tobacco: Never    Tobacco comments:     light smoking history - socially   Vaping Use    Vaping Use: Never used   Substance and Sexual Activity    Alcohol use: Yes    Drug use: No    Sexual activity: Not on file   Other Topics Concern    Not on file   Social History Narrative    Not on file     Social Determinants of Health     Financial Resource Strain: Not on file   Food Insecurity: Not on file   Transportation Needs: Not on file   Physical Activity: Not on file   Stress: Not on file   Social Connections: Not on file   Intimate Partner Violence: Not on file   Housing Stability: Not on file         Review of Systems:    Review of Systems   Unable to perform ROS: Mental status change         Objective:  Blood pressure 134/85, pulse (!) 108, temperature 98 °F (36.7 °C), temperature source Temporal, resp. rate 18, height 5' 10\" (1.778 m), weight 229 lb 11.2 oz (104.2 kg), SpO2 93 %. Intake/Output Summary (Last 24 hours) at 10/24/2022 1303  Last data filed at 10/24/2022 1244  Gross per 24 hour   Intake 4245.83 ml   Output 2000 ml   Net 2245.83 ml       Physical Exam  Vitals and nursing note reviewed. Constitutional:       Appearance: He is ill-appearing. HENT:      Head: Normocephalic and atraumatic.       Right Ear: External ear normal.      Left Ear: External ear normal.      Nose: Nose normal. Mouth/Throat:      Mouth: Mucous membranes are moist.   Eyes:      Conjunctiva/sclera: Conjunctivae normal.      Pupils: Pupils are equal, round, and reactive to light. Cardiovascular:      Rate and Rhythm: Normal rate and regular rhythm. Heart sounds: Normal heart sounds. Pulmonary:      Effort: Pulmonary effort is normal.      Breath sounds: Normal breath sounds. Abdominal:      General: Abdomen is flat. Palpations: Abdomen is soft. Musculoskeletal:         General: No swelling. Cervical back: Neck supple. No rigidity. Skin:     General: Skin is warm and dry. Neurological:      Mental Status: He is disoriented. Labs:  BMP:   Recent Labs     10/22/22  0245 10/23/22  0226 10/24/22  0117    148* 144   K 3.9 4.0 4.1   * 116* 117*   CO2 19* 23 20*   PHOS  --   --  1.9*   BUN 42* 29* 23   CREATININE 0.7 0.8 0.7   CALCIUM 7.5* 7.5* 7.4*     CBC:   Recent Labs     10/22/22  1215 10/23/22  0227 10/24/22  0117   WBC  --  4.9 5.5   HGB 12.3* 12.3* 11.9*   HCT 37.9* 39.0* 37.4*   MCV  --  97.5* 97.4*   PLT  --  68* 69*     LIVER PROFILE:   Recent Labs     10/23/22  0226 10/24/22  0117   AST 55* 106*   ALT 35 47*   BILITOT 1.9* 2.9*   ALKPHOS 44 44     PT/INR: No results for input(s): PROTIME, INR in the last 72 hours. APTT: No results for input(s): APTT in the last 72 hours. BNP:  No results for input(s): BNP in the last 72 hours. Ionized Calcium:No results for input(s): IONCA in the last 72 hours. Magnesium:No results for input(s): MG in the last 72 hours. Phosphorus:  Recent Labs     10/24/22  0117   PHOS 1.9*     HgbA1C: No results for input(s): LABA1C in the last 72 hours. Hepatic:   Recent Labs     10/23/22  0226 10/24/22  0117   ALKPHOS 44 44   ALT 35 47*   AST 55* 106*   PROT 5.4* 5.1*   BILITOT 1.9* 2.9*   LABALBU 2.9* 2.6*     Lactic Acid: No results for input(s): LACTA in the last 72 hours.   Troponin: No results for input(s): CKTOTAL, CKMB, TROPONINT in the last 72 hours.  ABGs: No results for input(s): PH, PCO2, PO2, HCO3, O2SAT in the last 72 hours. CRP:  No results for input(s): CRP in the last 72 hours. Sed Rate:  No results for input(s): SEDRATE in the last 72 hours. Cultures:   No results for input(s): CULTURE in the last 72 hours. No results for input(s): BC, Lelan Salts in the last 72 hours. No results for input(s): CXSURG in the last 72 hours. Radiology reports as per the Radiologist  Radiology: CT ABDOMEN PELVIS W IV CONTRAST Additional Contrast? None    Result Date: 10/20/2022  CLINICAL HISTORY: 48-hour history of diffuse abdominal pain distention nausea vomiting some diarrhea no prior history COMPARISON: No comparison TECHNIQUE: Axial images of the abdomen and pelvis were obtained after the administration of IV contrast.Coronal and sagittal multiplanar reconstructions were performed. One or more of the following dose reduction techniques were used: Automated exposure control, adjustment of the mA and/or kV according to patient size, and/or use of iterative reconstruction technique. COMPARISON: None. FINDINGS: Statements: None. Lower Chest: Unremarkable. Hepatobiliary: Hepatic steatosis. Calcified granulomas throughout the liver. No focal lesion is identified. Marked distention of the gallbladder containing gallstones. No CT evidence of cholecystitis however. No biliary ductal dilatation is evident. Pancreas: The pancreatic parenchyma is unremarkable and there is no main duct dilatation. Spleen: The spleen is nonenlarged. Calcified granulomas throughout the spleen. Adrenals: No adrenal glands are symmetric. Genitourinary: The kidneys are symmetric and enhance appropriately. No suspicious focal parenchymal abnormality is identified in either kidney. There is no collecting system dilatation. The bladder is unremarkable, as imaged. The prostate gland appears unremarkable.  Gastrointestinal: Diffuse fluid and air distention of the the entire small bowel and proximal large bowel measuring up to 4.3 cm in the left abdomen and up to 7.6 cm of the proximal colon. Transitional point seen at a site of masslike thickening of the hepatic flexure measuring 3.2 x 3.9 cm (series 2 image 35). Colonic diverticulosis without diverticulitis. The appendix appears unremarkable. Lymphatics: Multiple prominent and enlarged retroperitoneal lymph nodes. Largest is 1.5 cm in the left periaortic region (series 2 image 33). Vascular: Moderate calcific atherosclerosis. The abdominal aorta is normal in caliber. MSK/Body Wall: No concerning bony lesion is identified. Small fat-containing right inguinal hernia. Peritoneum/Other: There is no free fluid or abnormal collection. No free gas. 1.Irregular mass at the hepatic flexure of the colon resulting in high-grade small bowel and proximal large bowel obstruction. Severe fluid and gaseous distention of the entire stomach and small bowel including the distal esophagus. Findings are overall concerning for colonic neoplasm. Recommend GI and surgical consultation. 2.Scattered enlarged retroperitoneal lymph nodes. These are presumably metastatic if the colon mass proves to be malignant. 3.Marked hydropic distention of the gallbladder containing gallstones. No CT evidence of cholecystitis. Recommend correlation with LFTs. US GALLBLADDER RUQ    Result Date: 10/21/2022  NO PRIOR REPORT AVAILABLE Exam: ULTRASOUND OF THE ABDOMEN LIMITED, RIGHT UPPER QUADRANT Clinical data: Elevated LFTs, sepsis, lactic acidosis. The patient is on ventilator. Technique: Real-time grayscale imaging of the right upper quadrant was performed. Images supplemented with color Doppler technique. Prior studies: CT scan of abdomen and pelvis dated 10/20/22. Findings: Limited exam due to poor acoustic penetration. Diffuse increased echogenicity of the liver parenchyma. A shadowing focus//calcification in the right lobe measuring up 1.1 cm. No intrahepatic biliary distention. The gallbladder is distended with several small stones. The gallbladder wall thickness measures 0.8 cm. No sludge. The common bile duct is normal in caliber measuring 0.4cm. The right kidney dbodgyax92.7 x 6.6 x 5.4 cm. Normal echogenicity and cortical thickness are preserved. No evidence of renal masses, no calculi and no hydronephrosis Diffuse increased echogenicity of the pancreas. Imaged portion of the aorta demonstrates no acute pathology. Visualized portion of the inferior vena cava is unremarkable. 1.  Limited exam for reasons discussed above. 2.  Diffuse fatty infiltration of the liver with a 1.1 cm calcification in the right hepatic lobe. 3.  Distended gallbladder with several small stones and mild diffuse gallbladder wall thickening. 4.  No other significant abnormality. Recommendation: Follow up as clinically indicated. Electronically Signed by Ladell Kayser MD at 21-Oct-2022 11:06:19 PM EST             XR CHEST PORTABLE    Result Date: 10/20/2022  NO PRIOR REPORT AVAILABLE [facility] [place] Clinical data:Confirmation of course of NG/OG/NE tube and location of tip of tube. Technique:Single view of the chest. Prior studies: Radiograph of the chest done on same day. Findings: The lungs are grossly clear; noevidence of acute infiltrate or pleural effusion. Cardiac silhouette is mildly enlarged. No acute osseous abnormality is detected. The NG tube can be traced to the level of distal esophagus. Scattered nonspecific gas-filled loops of bowel in the upper abdomen. The NG tube can be traced to the level of distal esophagus. Suggest advancing accordingly, with a follow-up radiograph. Recommendation: As above. Electronically Signed by Gerard Webb MD at 20-Oct-2022 06:15:09 PM EST             XR CHEST PORTABLE    Result Date: 10/20/2022  NO PRIOR REPORT AVAILABLE Exam: X-RAY OF THECHEST Clinical data:Abdominal pain, vomiting. Technique:Single view of the chest. Prior studies: No prior studies submitted. Findings:  The trachea is midline. The heart is enlarged. Mediastinal and pulmonary vascular shadows are normal.  There is no focal consolidation or effusion. The osseous structures are intact. Cardiomegaly. Recommendation: Follow up as clinically indicated. Electronically Signed by Gulshan Pettit MD at 20-Oct-2022 03:26:36 PM EST                Assessment     SBO (small bowel obstruction). S/P right hemicolectomy. Essential hypertension. Control medical management. Non-ischemic cardiomyopathy. Medical management. Paroxysmal atrial fibrillation. Continue beta blocker. Colonic mass. Pathology pending. Hypoxemic respiratory failure. Successfully extubated. Please document 45 minutes of critical care time for patient assessment, chart review, discussion with staff, .       Lavon Mortensen,
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
## 2022-10-25 NOTE — PROGRESS NOTES
Hospitalist Progress Note    Patient:  Cely Murray  YOB: 1955  Date of Service: 10/25/2022  MRN: [id number]   Acct: [de-identified]   Primary Care Physician: No primary care provider on file. Advance Directive: DNR  Admit Date: 10/20/2022       Hospital Day: 5  Referring Provider: Martell Clark DO    Patient Seen, Chart, Consults, Notes, Labs, Radiology studies reviewed. Subjective:  Cely Murray is a 77 y.o. male  whom we are following for respiratory failure, status post right hemicolectomy, nonischemic cardiomyopathy. His rate remains elevated and also remains in atrial fibrillation. We will get cardiology to evaluate. This afternoon he has now spiked a fever to 106 °F.  Given the complexity of his recent surgery and possible gallbladder involvement, I would like to have infectious disease evaluate. Allergies:  Patient has no known allergies.     Medicines:  Current Facility-Administered Medications   Medication Dose Route Frequency Provider Last Rate Last Admin    BPCO OINT 1 Dose  1 Dose Apply externally BID Martell Clark DO   1 Dose at 10/25/22 1145    dilTIAZem HCl in sodium chloride 125 mg / 125 mL infusion  2.5-15 mg/hr IntraVENous Continuous Lopez Shea MD 10 mL/hr at 10/25/22 1408 10 mg/hr at 10/25/22 1408    dilTIAZem injection 10 mg  10 mg IntraVENous Once Lopez Shea MD        [START ON 10/26/2022] piperacillin-tazobactam (ZOSYN) 3,375 mg in dextrose 5 % 50 mL IVPB ([id number])  3,375 mg IntraVENous Q8H Siddharth Stover MD        piperacillin-tazobactam (ZOSYN) 4,500 mg in sodium chloride 0.9 % 100 mL IVPB ([id number])  4,500 mg IntraVENous Once Siddharth Stover MD        sodium phosphate 33.33 mmol in sodium chloride 0.9 % 250 mL IVPB  0.32 mmol/kg IntraVENous PRN Lalito Santos MD        sodium phosphate 10 mmol in sodium chloride 0.9 % 250 mL IVPB  10 mmol IntraVENous PRN Lalito Santos MD        Or    sodium phosphate 15 mmol in sodium chloride 0.9 % 250 mL IVPB  15 mmol IntraVENous PRN Mando Randolph MD        Or    sodium phosphate 20 mmol in sodium chloride 0.9 % 500 mL IVPB  20 mmol IntraVENous PRN Mando Randolph MD        digoxin [place]) injection 125 mcg  125 mcg IntraVENous Daily Jannice Courtney, DO   125 mcg at 10/25/22 0813    metoprolol (LOPRESSOR) injection 5 mg  5 mg IntraVENous Q4H Jannice Bussey, DO   5 mg at 10/25/22 1404    sodium bicarbonate 50 mEq in dextrose 5 % and 0.2 % NaCl 1,000 mL infusion   IntraVENous Continuous Jannice Bussey,  mL/hr at 10/25/22 0515 New Bag at 10/25/22 0515    labetalol (NORMODYNE;TRANDATE) injection 10 mg  10 mg IntraVENous Q4H PRN Jannice Bussey, DO   10 mg at 10/24/22 1408    metoclopramide (REGLAN) injection 10 mg  10 mg IntraVENous Q6H Jannice Bussey, DO   10 mg at 10/25/22 1145    thiamine (B-1) injection 100 mg  100 mg IntraVENous Daily Estela Stanton MD   100 mg at 10/25/22 0813    LORazepam (ATIVAN) injection 0.5 mg  0.5 mg IntraVENous Q6H PRN Estela Stanton MD   0.5 mg at 10/25/22 0848    HYDROmorphone HCl PF (DILAUDID) injection 2 mg  2 mg IntraVENous Q2H PRN Estela Stanton MD   2 mg at 10/25/22 3399    Or    HYDROmorphone HCl PF (DILAUDID) injection 1 mg  1 mg IntraVENous Q2H PRN Estela Stanton MD   1 mg at 10/25/22 2026    Or    HYDROmorphone HCl PF (DILAUDID) injection 0.5 mg  0.5 mg IntraVENous Q2H PRN Estela Stanton MD        HYDROmorphone HCl PF (DILAUDID) injection 2 mg  2 mg IntraVENous Once Estela Stanton MD        sodium chloride flush 0.9 % injection 5-40 mL  5-40 mL IntraVENous 2 times per day Estela Stanton MD   10 mL at 10/25/22 0814    sodium chloride flush 0.9 % injection 5-40 mL  5-40 mL IntraVENous PRN Estela Stanton MD        0.9 % sodium chloride infusion   IntraVENous PRN Donesantiago Stanton MD        famotidine (PEPCID) tablet 20 mg  20 mg Oral BID Estela Stanton MD        Or famotidine (PEPCID) 20 mg in sodium chloride (PF) 10 mL injection  20 mg IntraVENous BID Dom Ayoub MD   20 mg at 10/25/22 1193    glucose chewable tablet 16 g  4 tablet Oral PRN Dom Ayoub MD        dextrose bolus 10% 125 mL  125 mL IntraVENous PRN Dom Ayoub MD        Or    dextrose bolus 10% 250 mL  250 mL IntraVENous PRN Dom Ayoub MD        glucagon (rDNA) injection 1 mg  1 mg SubCUTAneous PRN Dom Ayoub MD        dextrose 10 % infusion   IntraVENous Continuous PRN Dom Ayoub MD        potassium chloride (KLOR-CON M) extended release tablet 40 mEq  40 mEq Oral PRN Dom Ayoub MD        Or    potassium bicarb-citric acid (EFFER-K) effervescent tablet 40 mEq  40 mEq Oral PRN Dom Ayoub MD        Or    potassium chloride 10 mEq/100 mL IVPB (Peripheral Line)  10 mEq IntraVENous PRN Dom Ayoub MD        ondansetron (ZOFRAN-ODT) disintegrating tablet 4 mg  4 mg Oral Q8H PRN Dom Ayoub MD        Or    ondansetron [facility]) injection 4 mg  4 mg IntraVENous Q6H PRN Dom Ayoub MD        magnesium hydroxide (MILK OF MAGNESIA) 400 MG/5ML suspension 30 mL  30 mL Oral Daily PRN Dom Ayoub MD   30 mL at 10/24/22 1609    acetaminophen (TYLENOL) tablet 650 mg  650 mg Oral Q6H PRN Dom Ayoub MD        Or    acetaminophen (TYLENOL) suppository 650 mg  650 mg Rectal Q6H PRN Dom Ayoub MD        insulin lispro (HUMALOG) injection vial 0-4 Units  0-4 Units SubCUTAneous TID  Dom Ayoub MD        insulin lispro (HUMALOG) injection vial 0-4 Units  0-4 Units SubCUTAneous Nightly Dom Ayoub MD           Past Medical History:  Past Medical History:   Diagnosis Date    Ex-cigarette smoker 10/03/2016    Hypertension     Non-ischemic cardiomyopathy ([address] 10/03/2016    9/28/2016  Echo  EF 25% 10/3/16  Cath  Mild CAD, EF 10%     Palliative care patient 10/24/2022    Paroxysmal atrial fibrillation [address] [address] 2018       Past Surgical History:  Past Surgical History:   Procedure Laterality Date    FINGER AMPUTATION Left     partial left thumb amputation following trauma    GASTROSTOMY TUBE PLACEMENT  10/23/2022    Dr Edgar Roman N/A 10/21/2022    RIGHT HEMICOLECTOMY WITH GASTROSTOMY PLACEMENT performed by Saloni Frazier MD at [place] [address] ENDOSCOPY N/A 10/23/2022    EGD ESOPHAGOGASTRODUODENOSCOPY PEG TUBE INSERTION performed by Jonas Shah MD at [facility]       Family History  Family History   Problem Relation Age of Onset    High Blood Pressure Mother     Cancer Father         Stomach    Heart Disease Maternal Grandmother     Diabetes Maternal Uncle        Social History  Social History     Socioeconomic History    Marital status:      Spouse name: Not on file    Number of children: Not on file    Years of education: Not on file    Highest education level: Not on file   Occupational History    Not on file   Tobacco Use    Smoking status: Former     Packs/day: 1.00     Years: 2.00     Pack years: 2.00     Types: Cigarettes     Start date:      Quit date:      Years since quittin.8    Smokeless tobacco: Never    Tobacco comments:     light smoking history - socially   Vaping Use    Vaping Use: Never used   Substance and Sexual Activity    Alcohol use: Yes    Drug use: No    Sexual activity: Not on file   Other Topics Concern    Not on file   Social History Narrative    Not on file     Social Determinants of Health     Financial Resource Strain: Not on file   Food Insecurity: Not on file   Transportation Needs: Not on file   Physical Activity: Not on file   Stress: Not on file   Social Connections: Not on file   Intimate Partner Violence: Not on file   Housing Stability: Not on file         Review of Systems:    Review of Systems   Unable to perform ROS: Mental status change         Objective:  Blood pressure 91/61, pulse (!) 115, temperature 98.9 °F (37.2 °C), resp. rate (!) 39, height 5' 10\" (1.778 m), weight 221 lb 14.4 oz (100.7 kg), SpO2 94 %. Intake/Output Summary (Last 24 hours) at 10/25/2022 1646  Last data filed at 10/25/2022 1547  Gross per 24 hour   Intake 1400 ml   Output 2610 ml   Net -1210 ml       Physical Exam  Vitals and nursing note reviewed. Constitutional:       Appearance: He is ill-appearing. HENT:      Head: Normocephalic and atraumatic. Right Ear: External ear normal.      Left Ear: External ear normal.      Nose: Nose normal.      Mouth/Throat:      Mouth: Mucous membranes are moist.   Eyes:      Conjunctiva/sclera: Conjunctivae normal.      Pupils: Pupils are equal, round, and reactive to light. Cardiovascular:      Rate and Rhythm: Normal rate and regular rhythm. Heart sounds: Normal heart sounds. Pulmonary:      Effort: Pulmonary effort is normal.      Breath sounds: Normal breath sounds. Abdominal:      General: Abdomen is flat. Palpations: Abdomen is soft. Musculoskeletal:         General: No swelling. Cervical back: Neck supple. No rigidity. Skin:     General: Skin is warm and dry. Neurological:      Mental Status: He is disoriented. Labs:  BMP:   Recent Labs     10/23/22  0226 10/24/22  0117 10/25/22  0449   * 144 145   K 4.0 4.1 3.7   * 117* 110   CO2 23 20* 27   PHOS  --  1.9*  --    BUN 29* 23 22   CREATININE 0.8 0.7 0.8   CALCIUM 7.5* 7.4* 7.8*     CBC:   Recent Labs     10/23/22  0227 10/24/22  0117 10/25/22  0449   WBC 4.9 5.5 6.3   HGB 12.3* 11.9* 13.2*   HCT 39.0* 37.4* 41.2*   MCV 97.5* 97.4* 95.6*   PLT 68* 69* 82*     LIVER PROFILE:   Recent Labs     10/23/22  0226 10/24/22  0117 10/25/22  0449   AST 55* 106* 79*   ALT 35 47* 34   LIPASE  --   --  37   BILIDIR  --   --  3.8*   BILITOT 1.9* 2.9* 5.0*  4.9*   ALKPHOS 44 44 49     PT/INR: No results for input(s): PROTIME, INR in the last 72 hours.   APTT: No results for input(s): APTT in the last 72 hours. BNP:  No results for input(s): BNP in the last 72 hours. Ionized Calcium:No results for input(s): IONCA in the last 72 hours. Magnesium:No results for input(s): MG in the last 72 hours. Phosphorus:  Recent Labs     10/24/22  0117   PHOS 1.9*     HgbA1C: No results for input(s): LABA1C in the last 72 hours. Hepatic:   Recent Labs     10/23/22  0226 10/24/22  0117 10/25/22  0449   ALKPHOS 44 44 49   ALT 35 47* 34   AST 55* 106* 79*   PROT 5.4* 5.1* 5.4*   BILITOT 1.9* 2.9* 5.0*  4.9*   BILIDIR  --   --  3.8*   LABALBU 2.9* 2.6* 2.5*     Lactic Acid: No results for input(s): LACTA in the last 72 hours. Troponin: No results for input(s): CKTOTAL, CKMB, TROPONINT in the last 72 hours. ABGs: No results for input(s): PH, PCO2, PO2, HCO3, O2SAT in the last 72 hours. CRP:  No results for input(s): CRP in the last 72 hours. Sed Rate:  No results for input(s): SEDRATE in the last 72 hours. Cultures:   No results for input(s): CULTURE in the last 72 hours. No results for input(s): BC, Emelyn Dage in the last 72 hours. No results for input(s): CXSURG in the last 72 hours. Radiology reports as per the Radiologist  Radiology: CT ABDOMEN PELVIS W IV CONTRAST Additional Contrast? None    Result Date: 10/20/2022  CLINICAL HISTORY: 48-hour history of diffuse abdominal pain distention nausea vomiting some diarrhea no prior history COMPARISON: No comparison TECHNIQUE: Axial images of the abdomen and pelvis were obtained after the administration of IV contrast.Coronal and sagittal multiplanar reconstructions were performed. One or more of the following dose reduction techniques were used: Automated exposure control, adjustment of the mA and/or kV according to patient size, and/or use of iterative reconstruction technique. COMPARISON: None. FINDINGS: Statements: None. Lower Chest: Unremarkable. Hepatobiliary: Hepatic steatosis. Calcified granulomas throughout the liver. No focal lesion is identified. Marked distention of the gallbladder containing gallstones. No CT evidence of cholecystitis however. No biliary ductal dilatation is evident. Pancreas: The pancreatic parenchyma is unremarkable and there is no main duct dilatation. Spleen: The spleen is nonenlarged. Calcified granulomas throughout the spleen. Adrenals: No adrenal glands are symmetric. Genitourinary: The kidneys are symmetric and enhance appropriately. No suspicious focal parenchymal abnormality is identified in either kidney. There is no collecting system dilatation. The bladder is unremarkable, as imaged. The prostate gland appears unremarkable. Gastrointestinal: Diffuse fluid and air distention of the the entire small bowel and proximal large bowel measuring up to 4.3 cm in the left abdomen and up to 7.6 cm of the proximal colon. Transitional point seen at a site of masslike thickening of the hepatic flexure measuring 3.2 x 3.9 cm (series 2 image 35). Colonic diverticulosis without diverticulitis. The appendix appears unremarkable. Lymphatics: Multiple prominent and enlarged retroperitoneal lymph nodes. Largest is 1.5 cm in the left periaortic region (series 2 image 33). Vascular: Moderate calcific atherosclerosis. The abdominal aorta is normal in caliber. MSK/Body Wall: No concerning bony lesion is identified. Small fat-containing right inguinal hernia. Peritoneum/Other: There is no free fluid or abnormal collection. No free gas. 1.Irregular mass at the hepatic flexure of the colon resulting in high-grade small bowel and proximal large bowel obstruction. Severe fluid and gaseous distention of the entire stomach and small bowel including the distal esophagus. Findings are overall concerning for colonic neoplasm. Recommend GI and surgical consultation. 2.Scattered enlarged retroperitoneal lymph nodes. These are presumably metastatic if the colon mass proves to be malignant. 3.Marked hydropic distention of the gallbladder containing gallstones. No CT evidence of cholecystitis. Recommend correlation with LFTs. US GALLBLADDER RUQ    Result Date: 10/21/2022  NO PRIOR REPORT AVAILABLE Exam: ULTRASOUND OF THE ABDOMEN LIMITED, RIGHT UPPER QUADRANT Clinical data: Elevated LFTs, sepsis, lactic acidosis. The patient is on ventilator. Technique: Real-time grayscale imaging of the right upper quadrant was performed. Images supplemented with color Doppler technique. Prior studies: CT scan of abdomen and pelvis dated 10/20/22. Findings: Limited exam due to poor acoustic penetration. Diffuse increased echogenicity of the liver parenchyma. A shadowing focus//calcification in the right lobe measuring up 1.1 cm. No intrahepatic biliary distention. The gallbladder is distended with several small stones. The gallbladder wall thickness measures 0.8 cm. No sludge. The common bile duct is normal in caliber measuring 0.4cm. The right kidney [id number] x 6.6 x 5.4 cm. Normal echogenicity and cortical thickness are preserved. No evidence of renal masses, no calculi and no hydronephrosis Diffuse increased echogenicity of the pancreas. Imaged portion of the aorta demonstrates no acute pathology. Visualized portion of the inferior vena cava is unremarkable. 1.  Limited exam for reasons discussed above. 2.  Diffuse fatty infiltration of the liver with a 1.1 cm calcification in the right hepatic lobe. 3.  Distended gallbladder with several small stones and mild diffuse gallbladder wall thickening. 4.  No other significant abnormality. Recommendation: Follow up as clinically indicated. Electronically Signed by Greg Ty MD at 21-Oct-2022 11:06:19 PM EST             XR CHEST PORTABLE    Result Date: 10/20/2022  NO PRIOR REPORT AVAILABLE Exam: X-RAY OF [facility] Clinical data:Confirmation of course of NG/OG/NE tube and location of tip of tube. Technique:Single view of the chest. Prior studies: Radiograph of the chest done on same day. Findings: The lungs are grossly clear; noevidence of acute infiltrate or pleural effusion. Cardiac silhouette is mildly enlarged. No acute osseous abnormality is detected. The NG tube can be traced to the level of distal esophagus. Scattered nonspecific gas-filled loops of bowel in the upper abdomen. The NG tube can be traced to the level of distal esophagus. Suggest advancing accordingly, with a follow-up radiograph. Recommendation: As above. Electronically Signed by Edelmira Primrose MD at 20-Oct-2022 06:15:09 PM EST             XR CHEST PORTABLE    Result Date: 10/20/2022  NO PRIOR REPORT AVAILABLE Exam: X-RAY OF [facility] Clinical data:Abdominal pain, vomiting. Technique:Single view of the chest. Prior studies: No prior studies submitted. Findings: The trachea is midline. The heart is enlarged. Mediastinal and pulmonary vascular shadows are normal.  There is no focal consolidation or effusion. The osseous structures are intact. Cardiomegaly. Recommendation: Follow up as clinically indicated. Electronically Signed by Monse Valadez MD at 20-Oct-2022 03:26:36 PM EST                Assessment     SBO (small bowel obstruction). S/P right hemicolectomy. New onset fever. Broad-spectrum antibiotics. Infectious disease consultation. Essential hypertension. Control medical management. Non-ischemic cardiomyopathy. Medical management. Paroxysmal atrial fibrillation. Continue beta blocker. Colonic mass. Pathology pending. Hypoxemic respiratory failure. Successfully extubated. Please document 47 minutes of critical care time for patient assessment, chart review, discussion with staff, .       Rupa Gavin DO

## 2022-10-25 NOTE — PROGRESS NOTES
Palliative Care Progress Note  10/25/2022 8:18 AM    Patient:  Maricarmen Limon  YOB: 1955  Primary Care Physician: No primary care provider on file. Advance Directive: DNR  Admit Date: 10/20/2022       Hospital Day: 5  Portions of this note have been copied forward, however, changed to reflect the most current clinical status of this patient. CHIEF COMPLAINT/REASON FOR CONSULTATION Goals of care, family support, Code status, symptom management     SUBJECTIVE:  Mr. Noemi Lance is resting comfortably in bed after receiving as needed medications. Remains in ICU with bilateral wrist restraints. HPI: The patient is a 77 y.o. male with PMH HTN, afib, and non-ischemic cardiomyopathy who presented to [facility] ED 10/20/2022 complaining of abdominal pain, distention, and n/v x2 days. CT abdomen/pelvis in ED revealed a colon mass at the hepatic flexure causing obstruction. He was admitted under hospitalist services and initiated on NGT therapy for decompression. GS was consulted in ED and pt underwent right hemicolectomy with gastrostomy tube placement 10/21/2022 with hard mass adherent to liver and small bowel posteriorly. He remained intubated and sedated post op due to concern for trauma to the nasopharynx and pharynx due to a false track created by an NG tube. He underwent scoping with ENT 10/22/2022 to evaluate for safety to extubate and was found to be patent bilaterally with trauma to to the right but no no contraindication to wean to extubation and positive pressure if needed. He was liberated from the ventilator 10/23/2022 and demonstrated increased agitation with need for intermittent precedex/ativan for concern of ETOH withdrawal. He required endoscopic Gtube replacement with GI 10/23/2022 after self removing Gtube once extubated. Oncology has evaluated with elevated CEA and pathology pending with plans for further staging workup to be completed outpatient.     Review of Systems:   14 point review of systems is negative except as specifically addressed above. Objective:   VITALS:  /75   Pulse (!) 139   Temp 99.7 °F (37.6 °C) (Temporal)   Resp 22   Ht 5' 10\" (1.778 m)   Wt 221 lb 14.4 oz (100.7 kg)   SpO2 93%   BMI 31.84 kg/m²   24HR INTAKE/OUTPUT:    Intake/Output Summary (Last 24 hours) at 10/25/2022 0818  Last data filed at 10/25/2022 0600  Gross per 24 hour   Intake 2673.33 ml   Output 3565 ml   Net -891.67 ml     General appearance: 76 yo male, ill appearing, NAD  Head: Normocephalic, without obvious abnormality, atraumatic  Eyes: Icteric.  Pupils sluggish  Ears: normal external ears and nose  Neck: no JVD, supple, symmetrical, trachea midline   Lungs: soft scattered rhonchi to auscultation bilaterally,no rales or wheezes   Heart: Irregular rhythm and tachycardic, S1, S2 normal, no murmur  Abdomen: Rounded, taut, absent bowel sounds, no grimacing to palpation, G-tube with gastric contents draining  Extremities:No lower extremity edema,  No erythema, no tenderness to palpation  Skin: Warm, dry, jaundiced, abdominal incision WNL  Neurologic: Lethargic, does not converse, intermittent agitation, impulsive and not following commands, moves extremities spontaneously     Medications:      IV infusion builder 100 mL/hr at 10/25/22 0515    sodium chloride      dextrose        digoxin  125 mcg IntraVENous Daily    metoprolol  5 mg IntraVENous Q4H    metoclopramide  10 mg IntraVENous Q6H    thiamine  100 mg IntraVENous Daily    HYDROmorphone  2 mg IntraVENous Once    sodium chloride flush  5-40 mL IntraVENous 2 times per day    famotidine  20 mg Oral BID    Or    famotidine (PEPCID) injection  20 mg IntraVENous BID    insulin lispro  0-4 Units SubCUTAneous TID     insulin lispro  0-4 Units SubCUTAneous Nightly     sodium phosphate IVPB, sodium phosphate IVPB **OR** sodium phosphate IVPB **OR** sodium phosphate IVPB, labetalol, LORazepam, HYDROmorphone **OR** HYDROmorphone **OR** HYDROmorphone, sodium chloride flush, sodium chloride, glucose, dextrose bolus **OR** dextrose bolus, glucagon (rDNA), dextrose, potassium chloride **OR** potassium alternative oral replacement **OR** potassium chloride, ondansetron **OR** ondansetron, magnesium hydroxide, acetaminophen **OR** acetaminophen  Diet NPO Exceptions are: Sips of Water with Meds     Lab and other Data:     Recent Labs     10/23/22  0227 10/24/22  0117 10/25/22  0449   WBC 4.9 5.5 6.3   HGB 12.3* 11.9* 13.2*   PLT 68* 69* 82*     Recent Labs     10/23/22  0226 10/24/22  0117 10/25/22  0449   * 144 145   K 4.0 4.1 3.7   * 117* 110   CO2 23 20* 27   BUN 29* 23 22   CREATININE 0.8 0.7 0.8   GLUCOSE 111* 85 135*     Recent Labs     10/23/22  0226 10/24/22  0117 10/25/22  0449   AST 55* 106* 79*   ALT 35 47* 34   BILITOT 1.9* 2.9* 4.9*   ALKPHOS 44 44 49     RAD:   CT ABDOMEN PELVIS W IV CONTRAST Additional Contrast? None  Result Date: 10/20/2022  1. Irregular mass at the hepatic flexure of the colon resulting in high-grade small bowel and proximal large bowel obstruction. Severe fluid and gaseous distention of the entire stomach and small bowel including the distal esophagus. Findings are overall concerning for colonic neoplasm. Recommend GI and surgical consultation. 2.Scattered enlarged retroperitoneal lymph nodes. These are presumably metastatic if the colon mass proves to be malignant. 3.Marked hydropic distention of the gallbladder containing gallstones. No CT evidence of cholecystitis. Recommend correlation with LFTs. US GALLBLADDER RUQ  Result Date: 10/21/2022  1. Limited exam for reasons discussed above. 2.  Diffuse fatty infiltration of the liver with a 1.1 cm calcification in the right hepatic lobe. 3.  Distended gallbladder with several small stones and mild diffuse gallbladder wall thickening. 4.  No other significant abnormality. Recommendation: Follow up as clinically indicated.  Electronically Signed by Jacob Tyler MD at 21-Oct-2022 11:06:19 PM EST             XR CHEST PORTABLE  Result Date: 10/20/2022  The NG tube can be traced to the level of distal esophagus. Suggest advancing accordingly, with a follow-up radiograph. Recommendation: As above. Electronically Signed by Mylene Hartley MD at 20-Oct-2022 06:15:09 PM EST             XR CHEST PORTABLE  Result Date: 10/20/2022  Cardiomegaly. Recommendation: Follow up as clinically indicated. Electronically Signed by Jumana Shine MD at 20-Oct-2022 03:26:36 PM EST             Assessment/Plan   Principal Problem:    SBO (small bowel obstruction) ([facility] [address]  Active Problems:    Essential hypertension    Hypothyroidism    Hyperglycemia    Thrombocytopenia (HCC)    Palliative care patient    Agitation    Non-ischemic cardiomyopathy ([facility] [address]    Paroxysmal atrial fibrillation (HCC)    Colonic mass    Ileus, postoperative ([facility] [address]  Resolved Problems:    * No resolved hospital problems. *      Visit Summary:  Chart reviewed, patient discussed with nursing staff. Reviewed health issues, work up and treatment plan as well as factors that lead to hospitalization. Mr. Adam Metzger is seen at bedside with report obtained from RN. Patient has become increasingly jaundiced with rapid heart rate. RUQ ultrasound to be completed today. Cardiology consulted. He continues to require restraints and as needed locations for intermittent agitation. He remains lethargic today and unable to participate in visit. I attempted to call his daughter, Jose Luis Franklin, to update on patient's status and plan of care with no answer. VM left with my contact information. Candidate for SCOP: To be determined based on hospital course     Recommendations:      Palliative Care- [address] continue all medical treatments/workup and monitor for improvement. D/c planning based on hospital course. Code status- DNR  SBO 2/2 hepatic flexure colonic mass- POD#3 right hemicolectomy with gastrostomy placement by Dr. Lee Cisneros.   Pathology pending G-tube replaced 10/23/2022 per GI endoscopically after patient self removed once extubated, remains to gravity until return of bowel function. Reglan initiated. Baseline CEA 11.0 on 10/21/2022 and postop CEA 6.9. Oncology following with plans for outpatient staging work-up. As needed Dilaudid for pain control  Elevated bilirubin- worsening jaundice with bilirubin 4.9 without associated LFT's elevation. repeat bedside RUQ ultrasound ordered per GI for concern of hydrops of gallbladder on admission CT vs alcoholic liver disease  Agitation- concern 2/2 EtOH withdrawal. Weaned from precedex. PRN ativan available. Restraints as warranted  A. fib with RVR- cardiology consulted. Initiated on digoxin daily and metoprolol every 4 hours with continued rapid rate. Monitor for rate control  Hypoxemic respiratory failure- extubated 10/23/2022 and now tolerating room air  Nonischemic cardiomyopathy- noted. Mgmt per hospitalist    Thank you for consulting Palliative Care and allowing us to participate in the care of this patient.    Time Spent Counseling > 50%:  YES                                   Total Time Spent with patient/family counseling, workup/treatment review, counseling and placement of orders/preparation of this note: 23 minutes    Electronically signed by ALLISON Lazo CNP on 10/25/2022 at 8:18 AM    (Please note that portions of this note were completed with a voice recognition program.  Ayesha Brown made to edit the dictations but occasionally words are mis-transcribed.)

## 2022-10-25 NOTE — PROGRESS NOTES
Physical Therapy    PHYSICAL THERAPY        DATE: 10/25/2022    Received order for physical therapy evaluation. The evaluation was attempted but was unable to be completed due to:     [] The patient currently has an order for bed rest.     [] The patient declined.     [] The patient was unavailable. [x] Nursing declined due to patient waiting on ultrasound that needs to be done in bed.     [] Waiting on clarification orders from Ortho / Neuro     [] The patient is currently restrained. Due to facility policy on restraints physical therapy is deferred when a patient is restrained.         Electronically signed by Yaneli Singer PT on 10/25/2022 at 8:06 AM

## 2022-10-25 NOTE — PROGRESS NOTES
POD# 4 Post-op abdominal pain and tenderness, low grade fever, AF rate 150, O2 sat good, BP stable, no bowel sounds yet, drain and PEG functioning well, rapidly developing jaundice with bilirubin 4.9 without associated LFT's elevation, possible intrahepatic cholestasis as seen with passive liver congestion due to CHF with CM and LVEF 20% and uncontrolled rapid AF despite Digoxin and Metaprotol versus known gallstone disease. Plan:  Cardiology consult for AF rate control and restart Entresto and Eliquis vs Lovenox,, fractionate bilirubin, recheck Lipase, complete repeat RUQ US which was ordered yesterday morning but never done.

## 2022-10-25 NOTE — CONSULTS
[facility]  Cardiology Consult      Requesting MD:  Jose Elias La DO   Admit Status:         History obtained from:   [] Patient  [] Other (specify):     Patient:  Sergo Dominguez  [id number]     Chief Complaint:   Chief Complaint   Patient presents with    Abdominal Pain     Pt arrived to the ed with c/o \"I think I have food poisoning. \" Onset Tuesday afternoon. Pt c/o abdominal pain and excess bloating and gas. HPI: Mr. Zara Marques is a 77 y.o. male with a history of nonischemic cardiomyopathy and paroxysmal atrial fibrillation admitted 10/20/2022 for 2-day history of abdominal pain nausea and vomiting thought to be secondary to small bowel obstruction. Underwent right hemicolectomy on 10/21/2022 and percutaneous gastrostomy tube placement 10/23/2022. History of atrial fibrillation possibly chronic at this point not really sure we were consulted because of atrial fibrillation with rapid ventricular response rate. No reported chest pain. No BNP level obtained at this point. BNP on 4/15/2022 was 2582 peak [id number]. Review of Systems:  Review of Systems   Constitutional: Negative. Negative for chills, fever and unexpected weight change. HENT: Negative. Eyes: Negative. Respiratory: Negative. Negative for shortness of breath. Cardiovascular: Negative. Negative for chest pain. Gastrointestinal: Negative. Negative for diarrhea, nausea and vomiting. Endocrine: Negative. Genitourinary: Negative. Musculoskeletal: Negative. Skin: Negative. Neurological: Negative. All other systems reviewed and are negative.     Cardiac Specific Data:  Specialty Problems          Cardiology Problems    Essential hypertension        Non-ischemic cardiomyopathy (HCC)        Paroxysmal atrial fibrillation [facility])           Past Medical History:  Past Medical History:   Diagnosis Date    Ex-cigarette smoker 10/03/2016    Hypertension     Non-ischemic cardiomyopathy ([facility] 10/03/2016    2016  Echo  EF 25% 10/3/16  Cath  Mild CAD, EF 10%     Palliative care patient 10/24/2022    Paroxysmal atrial fibrillation ([facility] 2018        Past Surgical History:  Past Surgical History:   Procedure Laterality Date    FINGER AMPUTATION Left     partial left thumb amputation following trauma    GASTROSTOMY TUBE PLACEMENT  10/23/2022    Dr Delia Villalobos N/A 10/21/2022    RIGHT HEMICOLECTOMY WITH GASTROSTOMY PLACEMENT performed by Elma Aguilar MD at [address] [facility] ENDOSCOPY N/A 10/23/2022    EGD ESOPHAGOGASTRODUODENOSCOPY PEG TUBE INSERTION performed by Compa Chavez MD at [address] [facility] Endoscopy       Past Family History:  Family History   Problem Relation Age of Onset    High Blood Pressure Mother     Cancer Father         Stomach    Heart Disease Maternal Grandmother     Diabetes Maternal Uncle        Past Social History:  Social History     Socioeconomic History    Marital status:      Spouse name: Not on file    Number of children: Not on file    Years of education: Not on file    Highest education level: Not on file   Occupational History    Not on file   Tobacco Use    Smoking status: Former     Packs/day: 1.00     Years: 2.00     Pack years: 2.00     Types: Cigarettes     Start date:      Quit date:      Years since quittin.8    Smokeless tobacco: Never    Tobacco comments:     light smoking history - socially   Vaping Use    Vaping Use: Never used   Substance and Sexual Activity    Alcohol use: Yes    Drug use: No    Sexual activity: Not on file   Other Topics Concern    Not on file   Social History Narrative    Not on file     Social Determinants of Health     Financial Resource Strain: Not on file   Food Insecurity: Not on file   Transportation Needs: Not on file   Physical Activity: Not on file   Stress: Not on file   Social Connections: Not on file   Intimate Partner Violence: Not on file Housing Stability: Not on file       Allergies:  No Known Allergies    Home Meds:  Prior to Admission medications    Medication Sig Start Date End Date Taking?  Authorizing Provider   levothyroxine (SYNTHROID) 88 MCG tablet TAKE ONE TABLET BY MOUTH ONCE DAILY 3/10/22   ALLISON Pimentel   carvedilol (COREG) 25 MG tablet Take 1 tablet by mouth 2 times daily 3/9/22   ALLISON Pimentel   sacubitril-valsartan (ENTRESTO)  MG per tablet Take 1 tablet by mouth twice daily 3/9/22   ALLISON Pimentel   furosemide (LASIX) 40 MG tablet Take 1 tablet by mouth once daily 3/9/22   ALLISON Pimentel   spironolactone (ALDACTONE) 25 MG tablet TAKE ONE TABLET BY MOUTH ONCE DAILY 3/9/22   ALLISON Pimentel   apixaban (ELIQUIS) 5 MG TABS tablet Take 1 tablet by mouth 2 times daily 3/9/22 10/18/22  ALLISON Pimentel       Current Meds:   BPCO  1 Dose Apply externally BID    dilTIAZem  10 mg IntraVENous Once    piperacillin-tazobactam  3,375 mg IntraVENous Q8H    digoxin  125 mcg IntraVENous Daily    metoprolol  5 mg IntraVENous Q4H    metoclopramide  10 mg IntraVENous Q6H    thiamine  100 mg IntraVENous Daily    HYDROmorphone  2 mg IntraVENous Once    sodium chloride flush  5-40 mL IntraVENous 2 times per day    famotidine  20 mg Oral BID    Or    famotidine (PEPCID) injection  20 mg IntraVENous BID    insulin lispro  0-4 Units SubCUTAneous TID     insulin lispro  0-4 Units SubCUTAneous Nightly       Current Infused Meds:   dilTIAZem 10 mg/hr (10/25/22 1408)    IV infusion builder 100 mL/hr at 10/25/22 0515    sodium chloride      dextrose         Physical Exam:  Vitals:    10/25/22 1500   BP: 91/61   Pulse: (!) 115   Resp: (!) 39   Temp:    SpO2: 94%       Intake/Output Summary (Last 24 hours) at 10/25/2022 1616  Last data filed at 10/25/2022 1547  Gross per 24 hour   Intake 1400 ml   Output 2610 ml   Net -1210 ml     Estimated body mass index is 31.84 kg/m² as calculated from the following:    Height as of this encounter: 5' 10\" (1.778 m). Weight as of this encounter: 221 lb 14.4 oz (100.7 kg). Physical Exam  Vitals reviewed. Constitutional:       General: He is not in acute distress. Appearance: He is well-developed. He is ill-appearing. He is not toxic-appearing or diaphoretic. Comments: Lethargic and unresponsive verbally   HENT:      Head: Normocephalic and atraumatic. Nose: Nose normal.      Mouth/Throat:      Mouth: Mucous membranes are moist.      Pharynx: Oropharynx is clear. Eyes:      General: No scleral icterus. Extraocular Movements: Extraocular movements intact. Pupils: Pupils are equal, round, and reactive to light. Neck:      Vascular: No carotid bruit or JVD. Cardiovascular:      Rate and Rhythm: Tachycardia present. Rhythm irregular. Heart sounds: Normal heart sounds. No murmur heard. No friction rub. No gallop. Pulmonary:      Effort: Pulmonary effort is normal. No respiratory distress. Breath sounds: Normal breath sounds. No stridor. No wheezing, rhonchi or rales. Chest:      Chest wall: No tenderness. Abdominal:      General: Abdomen is flat. Bowel sounds are normal. There is no distension. Palpations: Abdomen is soft. There is no mass. Tenderness: There is no abdominal tenderness. There is no right CVA tenderness, left CVA tenderness, guarding or rebound. Hernia: No hernia is present. Musculoskeletal:         General: No swelling, tenderness, deformity or signs of injury. Cervical back: No rigidity or tenderness. Right lower leg: No edema. Left lower leg: No edema. Lymphadenopathy:      Cervical: No cervical adenopathy. Skin:     General: Skin is warm and dry. Neurological:      General: No focal deficit present. Mental Status: He is oriented to person, place, and time. Mental status is at baseline. Cranial Nerves: No cranial nerve deficit. Sensory: No sensory deficit.       Motor: No weakness. Coordination: Coordination normal.   Psychiatric:         Mood and Affect: Mood normal.         Behavior: Behavior normal.         Thought Content: Thought content normal.         Judgment: Judgment normal.       Labs:  Recent Labs     10/23/22  0227 10/24/22  0117 10/25/22  0449   WBC 4.9 5.5 6.3   HGB 12.3* 11.9* 13.2*   PLT 68* 69* 82*       Recent Labs     10/23/22  0226 10/24/22  0117 10/25/22  0449   * 144 145   K 4.0 4.1 3.7   * 117* 110   CO2 23 20* 27   BUN 29* 23 22   CREATININE 0.8 0.7 0.8   LABGLOM >60 >60 >60   CALCIUM 7.5* 7.4* 7.8*   PHOS  --  1.9*  --        CK, CKMB, Troponin: @LABRCNT (CKTOTAL:3, CKMB:3, TROPONINI:3)@    Last 3 BNP:  No results for input(s): BNP in the last 72 hours. IMAGING:  CT ABDOMEN PELVIS W IV CONTRAST Additional Contrast? None    Result Date: 10/20/2022  CLINICAL HISTORY: 48-hour history of diffuse abdominal pain distention nausea vomiting some diarrhea no prior history COMPARISON: No comparison TECHNIQUE: Axial images of the abdomen and pelvis were obtained after the administration of IV contrast.Coronal and sagittal multiplanar reconstructions were performed. One or more of the following dose reduction techniques were used: Automated exposure control, adjustment of the mA and/or kV according to patient size, and/or use of iterative reconstruction technique. COMPARISON: None. FINDINGS: Statements: None. Lower Chest: Unremarkable. Hepatobiliary: Hepatic steatosis. Calcified granulomas throughout the liver. No focal lesion is identified. Marked distention of the gallbladder containing gallstones. No CT evidence of cholecystitis however. No biliary ductal dilatation is evident. Pancreas: The pancreatic parenchyma is unremarkable and there is no main duct dilatation. Spleen: The spleen is nonenlarged. Calcified granulomas throughout the spleen. Adrenals: No adrenal glands are symmetric.  Genitourinary: The kidneys are symmetric and enhance appropriately. No suspicious focal parenchymal abnormality is identified in either kidney. There is no collecting system dilatation. The bladder is unremarkable, as imaged. The prostate gland appears unremarkable. Gastrointestinal: Diffuse fluid and air distention of the the entire small bowel and proximal large bowel measuring up to 4.3 cm in the left abdomen and up to 7.6 cm of the proximal colon. Transitional point seen at a site of masslike thickening of the hepatic flexure measuring 3.2 x 3.9 cm (series 2 image 35). Colonic diverticulosis without diverticulitis. The appendix appears unremarkable. Lymphatics: Multiple prominent and enlarged retroperitoneal lymph nodes. Largest is 1.5 cm in the left periaortic region (series 2 image 33). Vascular: Moderate calcific atherosclerosis. The abdominal aorta is normal in caliber. MSK/Body Wall: No concerning bony lesion is identified. Small fat-containing right inguinal hernia. Peritoneum/Other: There is no free fluid or abnormal collection. No free gas. 1.Irregular mass at the hepatic flexure of the colon resulting in high-grade small bowel and proximal large bowel obstruction. Severe fluid and gaseous distention of the entire stomach and small bowel including the distal esophagus. Findings are overall concerning for colonic neoplasm. Recommend GI and surgical consultation. 2.Scattered enlarged retroperitoneal lymph nodes. These are presumably metastatic if the colon mass proves to be malignant. 3.Marked hydropic distention of the gallbladder containing gallstones. No CT evidence of cholecystitis. Recommend correlation with LFTs. US GALLBLADDER RUQ    Result Date: 10/25/2022  Exam: Right upper quadrant ultrasound History: Elevated liver enzymes Comparison: Ultrasound dated October 21, 2022. Technique: Grayscale and color Doppler ultrasound images of the right upper quadrant were obtained. Findings: Pancreas: Visualized portions are unremarkable.  Aorta and IVC: Visualized portions are unremarkable. Liver: Normal in size. Normal echogenicity. There is a calcification in the right hepatic lobe. Portal Vein: Patent with Doppler confirmed hepatopedal flow. Gallbladder: Gallstones are present. No significant wall thickening or pericholecystic fluid. Common bile duct: Normal. Right kidney: Normal in size. No hydronephrosis or focal lesion. Peritoneum: Small amount of free fluid in the right upper quadrant. 1.Cholelithiasis without sonographic signs of acute cholecystitis. 2.Small amount of right upper quadrant free fluid. US GALLBLADDER RUQ    Result Date: 10/21/2022  NO PRIOR REPORT AVAILABLE Exam: ULTRASOUND OF THE ABDOMEN LIMITED, RIGHT UPPER QUADRANT Clinical data: Elevated LFTs, sepsis, lactic acidosis. The patient is on ventilator. Technique: Real-time grayscale imaging of the right upper quadrant was performed. Images supplemented with color Doppler technique. Prior studies: CT scan of abdomen and pelvis dated 10/20/22. Findings: Limited exam due to poor acoustic penetration. Diffuse increased echogenicity of the liver parenchyma. A shadowing focus//calcification in the right lobe measuring up 1.1 cm. No intrahepatic biliary distention. The gallbladder is distended with several small stones. The gallbladder wall thickness measures 0.8 cm. No sludge. The common bile duct is normal in caliber measuring 0.4cm. The right kidney [id number] x 6.6 x 5.4 cm. Normal echogenicity and cortical thickness are preserved. No evidence of renal masses, no calculi and no hydronephrosis Diffuse increased echogenicity of the pancreas. Imaged portion of the aorta demonstrates no acute pathology. Visualized portion of the inferior vena cava is unremarkable. 1.  Limited exam for reasons discussed above. 2.  Diffuse fatty infiltration of the liver with a 1.1 cm calcification in the right hepatic lobe.  3.  Distended gallbladder with several small stones and mild diffuse gallbladder wall thickening. 4.  No other significant abnormality. Recommendation: Follow up as clinically indicated. Electronically Signed by Jered Pang MD at 21-Oct-2022 11:06:19 PM EST             XR CHEST PORTABLE    Result Date: 10/20/2022  NO PRIOR REPORT AVAILABLE Exam: X-RAY OF [facility] Clinical data:Confirmation of course of NG/OG/NE tube and location of tip of tube. Technique:Single view of the chest. Prior studies: Radiograph of the chest done on same day. Findings: The lungs are grossly clear; noevidence of acute infiltrate or pleural effusion. Cardiac silhouette is mildly enlarged. No acute osseous abnormality is detected. The NG tube can be traced to the level of distal esophagus. Scattered nonspecific gas-filled loops of bowel in the upper abdomen. The NG tube can be traced to the level of distal esophagus. Suggest advancing accordingly, with a follow-up radiograph. Recommendation: As above. Electronically Signed by Kostas Real MD at 20-Oct-2022 06:15:09 PM EST             XR CHEST PORTABLE    Result Date: 10/20/2022  NO PRIOR REPORT AVAILABLE Exam: X-RAY OF [facility] Clinical data:Abdominal pain, vomiting. Technique:Single view of the chest. Prior studies: No prior studies submitted. Findings: The trachea is midline. The heart is enlarged. Mediastinal and pulmonary vascular shadows are normal.  There is no focal consolidation or effusion. The osseous structures are intact. Cardiomegaly. Recommendation: Follow up as clinically indicated.  Electronically Signed by Myke Bell MD at 20-Oct-2022 03:26:36 PM EST             EGD    Result Date: 10/23/2022  No dictation       Assessment:  ?Chronic atrial fibrillation with rapid ventricular response rate  Percutaneous endoscopic gastrostomy placement 10/23/2022  EGD 10/23/2022  Status post right hemicolectomy 10/21/2022  Small bowel obstruction  Hypertension  Hypothyroidism  Thrombocytopenia  History of alcohol abuse possible withdrawal  Febrile illness to 106 degrees today  Nonischemic cardiomyopathy  Colon mass  Postoperative ileus  Chronic kidney disease  Shortness of breath  Prior tobacco abuse  Obesity  Echocardiogram 3/14/2017 normal left ventricular function EF 55 to 51% grade 1 diastolic dysfunction mild concentric LVH  Admission 10/20/2022 for abdominal pain nausea and vomiting for 48 hours      Recommendations:  Continue intravenous diltiazem may give additional boluses of 10 mg as needed for rate control  Echocardiogram we will review  Baseline BNP level  Reassess in the a.m.

## 2022-10-25 NOTE — PROGRESS NOTES
Subjective:  Patient is in afib, rate in the 150s. Did get ativan and dilaudid this morning. No other changes.     Objective:  BP 91/61   Pulse (!) 115   Temp 98.9 °F (37.2 °C)   Resp (!) 39   Ht 5' 10\" (1.778 m)   Wt 221 lb 14.4 oz (100.7 kg)   SpO2 94%   BMI 31.84 kg/m²   Date 10/25/22 0000 - 10/25/22 2359   Shift 4362-6942 2484-7331 4695-2524 24 Hour Total   INTAKE   I.V.(mL/kg) 800(7.9)   800(7.9)   Shift Total(mL/kg) 800(7.9)   800(7.9)   OUTPUT   Urine(mL/kg/hr) 435(0.5)   435   Emesis/NG output(mL/kg) 50(0.5)   50(0.5)   Drains(mL/kg) 530(5.3) 100(1)  630(6.3)   Shift Total(mL/kg) 1015(10.1) 100(1)  1115(11.1)   Weight (kg) 100.7 100.7 100.7 100.7     General: NAD, sleeping and difficult to rouse, lethargic  Chest: Irreg, tachy  Abdomen: Soft, mild distention, no grimace with palpation, incision intact, no erythema, TRISTAN with serous output, gastrostomy with gastric contents    RUQ US done, read pending    CBC:   Lab Results   Component Value Date/Time    WBC 6.3 10/25/2022 04:49 AM    RBC 4.31 10/25/2022 04:49 AM    HGB 13.2 10/25/2022 04:49 AM    HCT 41.2 10/25/2022 04:49 AM    MCV 95.6 10/25/2022 04:49 AM    MCH 30.6 10/25/2022 04:49 AM    MCHC 32.0 10/25/2022 04:49 AM    RDW 14.4 10/25/2022 04:49 AM    PLT 82 10/25/2022 04:49 AM    MPV 11.9 10/25/2022 04:49 AM     CMP:    Lab Results   Component Value Date/Time     10/25/2022 04:49 AM    K 3.7 10/25/2022 04:49 AM     10/25/2022 04:49 AM    CO2 27 10/25/2022 04:49 AM    BUN 22 10/25/2022 04:49 AM    CREATININE 0.8 10/25/2022 04:49 AM    GFRAA >59 04/15/2022 11:37 AM    LABGLOM >60 10/25/2022 04:49 AM    GLUCOSE 135 10/25/2022 04:49 AM    PROT 5.4 10/25/2022 04:49 AM    LABALBU 2.5 10/25/2022 04:49 AM    CALCIUM 7.8 10/25/2022 04:49 AM    BILITOT 4.9 10/25/2022 04:49 AM    BILITOT 5.0 10/25/2022 04:49 AM    ALKPHOS 49 10/25/2022 04:49 AM    AST 79 10/25/2022 04:49 AM    ALT 34 10/25/2022 04:49 AM     Assessment and plan:  77year old male s/p open right hemicolectomy for obstructing hepatic flexure cancer  Patient with apparent withdrawal from alcohol, with liver disease. I feel his elevated liver enzymes are likely due to chronic liver disease. RUQ US pending. Discussed plan with Dr. Lara Mcneal. If duct dilated, plan for likely ERCP. If not dilated, continue to monitor. I will start zosyn to have some coverage in case of cholecystitis. No plan fo any cholecystectomy in this immediate post operative period. If he were to get more sick, then would consider radiology to do percutaneous cholecystectomy. Of course, with the patient's encephalopathy, presumably due to alcoholic liver disease, it is difficult to have a good exam. If he were to be becoming consistently febrile, with continued elevated bilirubin, would consider ERCP. Unfortunately, HIDA scan in setting of alcoholic liver disease is typically not very helpful and can be misleading. Continue with other cares per medicine team, agree with likely needing cardizem drip and heparin, follow up recs from cardiology.

## 2022-10-25 NOTE — PROGRESS NOTES
Repeat RUQ US reports cholelithiasis without acute cholecystitis or CBD dilation to explain the rising LFT's. Patient is already on Zosyn. Patient is not a candidate for cholecystectomy.

## 2022-10-25 NOTE — PROGRESS NOTES
[facility]  N[facility]  Consult Note       NAME:  Ishaan Mcclendon  MEDICAL RECORD NUMBER:  [id number]  AGE: 77 y.o.    GENDER: male  : 1955  TODAY'S DATE:  10/25/2022    Subjective   Reason for Wound Nurse Evaluation and Assessment: Left Buttock DTI      Ishaan Mcclendon is a 77 y.o. male referred by:   [] Physician  [x] Nursing  [] Other:     Wound Identification:  Wound Type: pressure  Contributing Factors: decreased mobility    Wound History: Patient Acquired on this admission  Current Wound Care Treatment:  BPCO ointment, offloading    Patient Goal of Care:  [x] Wound Healing  [] Odor Control  [] Palliative Care  [] Pain Control   [] Other:         PAST MEDICAL HISTORY        Diagnosis Date    Ex-cigarette smoker 10/03/2016    Hypertension     Non-ischemic cardiomyopathy ([facility] [address] 10/03/2016    2016  Echo  EF 25% 10/3/16  Cath  Mild CAD, EF 10%     Palliative care patient 10/24/2022    Paroxysmal atrial fibrillation ([facility] [address] 2018       PAST SURGICAL HISTORY    Past Surgical History:   Procedure Laterality Date    FINGER AMPUTATION Left     partial left thumb amputation following trauma    GASTROSTOMY TUBE PLACEMENT  10/23/2022    Dr Indira Agudelo 10/21/2022    RIGHT Lizabeth Pencil performed by Priscilla Ramos MD at [place] [address] ENDOSCOPY N/A 10/23/2022    EGD ESOPHAGOGASTRODUODENOSCOPY PEG TUBE INSERTION performed by Carlos Tello MD at [facility]       FAMILY HISTORY    Family History   Problem Relation Age of Onset    High Blood Pressure Mother     Cancer Father         Stomach    Heart Disease Maternal Grandmother     Diabetes Maternal Uncle        SOCIAL HISTORY    Social History     Tobacco Use    Smoking status: Former     Packs/day: 1.00     Years: 2.00     Pack years: 2.00     Types: Cigarettes     Start date:      Quit date:      Years since quittin.8    Smokeless tobacco: Never Tobacco comments:     light smoking history - socially   Vaping Use    Vaping Use: Never used   Substance Use Topics    Alcohol use: Yes    Drug use: No       ALLERGIES    No Known Allergies    MEDICATIONS    No current facility-administered medications on file prior to encounter.      Current Outpatient Medications on File Prior to Encounter   Medication Sig Dispense Refill    levothyroxine (SYNTHROID) 88 MCG tablet TAKE ONE TABLET BY MOUTH ONCE DAILY 30 tablet 5    carvedilol (COREG) 25 MG tablet Take 1 tablet by mouth 2 times daily 180 tablet 3    sacubitril-valsartan (ENTRESTO)  MG per tablet Take 1 tablet by mouth twice daily 180 tablet 3    furosemide (LASIX) 40 MG tablet Take 1 tablet by mouth once daily 90 tablet 3    spironolactone (ALDACTONE) 25 MG tablet TAKE ONE TABLET BY MOUTH ONCE DAILY 90 tablet 3    apixaban (ELIQUIS) 5 MG TABS tablet Take 1 tablet by mouth 2 times daily 60 tablet 3       Objective    BP (!) 91/59   Pulse (!) 137   Temp 99.7 °F (37.6 °C) (Temporal)   Resp (!) 32   Ht 5' 10\" (1.778 m)   Wt 221 lb 14.4 oz (100.7 kg)   SpO2 91%   BMI 31.84 kg/m²     LABS:  WBC:    Lab Results   Component Value Date/Time    WBC 6.3 10/25/2022 04:49 AM     H/H:    Lab Results   Component Value Date/Time    HGB 13.2 10/25/2022 04:49 AM    HCT 41.2 10/25/2022 04:49 AM     PTT:  No results found for: APTT, PTT[APTT}  PT/INR:    Lab Results   Component Value Date/Time    PROTIME 2.0 03/16/2018 09:28 AM    INR 2.6 09/10/2020 11:03 AM    INR 1.14 01/08/2018 12:30 PM     HgBA1c:    Lab Results   Component Value Date/Time    LABA1C 5.6 10/20/2022 01:46 PM       Assessment   Salvador Risk Score: Salvador Scale Score: 12    Patient Active Problem List   Diagnosis Code    Shortness of breath R06.02    Essential hypertension I10    Hypothyroidism E03.9    Obesity (BMI 35.0-39.9 without comorbidity) E66.9    CKD (chronic kidney disease), stage III (HCC) N18.30    Acute renal failure (HCC) N17.9    Non-ischemic cardiomyopathy ([facility] [address] I42.8    Ex-cigarette smoker Z87.891    Hypokalemia E87.6    Paroxysmal atrial fibrillation (HCC) I48.0    SBO (small bowel obstruction) ([address] K56.609    Hyperglycemia R73.9    Colonic mass K63.89    Thrombocytopenia (HCC) D69.6    Ileus, postoperative ([facility] [address] K91.89, K56.7    Palliative care patient Z51.5    Agitation R45.1       Measurements:  Puncture 10/23/22 Abdomen (Active)   Wound Assessment Dry 10/24/22 0800   Michelle-wound Assessment Fragile 10/24/22 0800   Drainage Amount Moderate 10/24/22 0800   Drainage Description Sanguinous 10/24/22 0800   Dressing/Treatment Adhesive bandage 10/24/22 0800   Dressing Changed Changed/New 10/24/22 0800   Dressing Status New dressing applied 10/24/22 0800   Dressing Change Due 10/25/22 10/24/22 0800   Dressing/Treatment Adhesive bandage 10/24/22 0800   Number of days: 2       Wound 10/23/22 Buttocks Left;Upper (Active)   Wound Image   10/25/22 0936   Wound Etiology Deep tissue/Injury 10/25/22 0936   Dressing Status Clean;Dry; Intact 10/25/22 0701   Dressing/Treatment Pharmaceutical agent (see MAR) 10/25/22 0936   Dressing Change Due 10/25/22 10/25/22 0936   Wound Length (cm) 1 cm 10/25/22 0936   Wound Width (cm) 1 cm 10/25/22 0936   Wound Depth (cm) 0 cm 10/25/22 0936   Wound Surface Area (cm^2) 1 cm^2 10/25/22 0936   Change in Wound Size % (l*w) -2400 10/25/22 0936   Wound Volume (cm^3) 0 cm^3 10/25/22 0936   Wound Assessment Purple/maroon 10/25/22 0936   Drainage Amount None 10/25/22 0936   Odor None 10/25/22 0936   Michelle-wound Assessment Blanchable erythema 10/25/22 0936   Margins Attached edges 10/25/22 0936   Number of days: 2       Wound 10/25/22 Nose Right Right Nare/septum & top of nose (Active)   Wound Image   10/25/22 0938   Wound Etiology Deep tissue/Injury 10/25/22 0938   Dressing/Treatment Pharmaceutical agent (see MAR) 10/25/22 0938   Dressing Change Due 10/25/22 10/25/22 0938   Wound Length (cm) 1.2 cm 10/25/22 0938   Wound Width (cm) 0.3 cm 10/25/22 0938   Wound Depth (cm) 0 cm 10/25/22 0938   Wound Surface Area (cm^2) 0.36 cm^2 10/25/22 0938   Wound Volume (cm^3) 0 cm^3 10/25/22 0938   Wound Assessment Purple/maroon 10/25/22 0938   Drainage Amount None 10/25/22 0938   Michelle-wound Assessment Intact 10/25/22 0938   Margins Attached edges 10/25/22 0938   Number of days: 0     Incision 10/21/22 Abdomen Medial;Upper (Active)   Dressing Status Old drainage noted 10/25/22 0701   Dressing Change Due 10/25/22 10/25/22 0400   Incision Cleansed Soap and water 10/25/22 0400   Dressing/Treatment Adhesive bandage 10/25/22 0701   Closure Staples 10/25/22 0701   Margins Approximated 10/25/22 0701   Incision Assessment Dry 10/25/22 0400   Drainage Amount Small 10/25/22 0701   Drainage Description Serosanguinous 10/25/22 0701   Michelle-incision Assessment Intact 10/25/22 0701   Number of days: 3         Response to treatment:  Well tolerated by patient.      Pain Assessment:  Severity:  0 / 10  Quality of pain: N/A  Wound Pain Timing/Severity: none  Premedicated: N/A    Plan   Plan of Care: Puncture 10/23/22 Abdomen-Dressing/Treatment: Adhesive bandage  Wound 10/23/22 Buttocks Left;Upper-Dressing/Treatment: Pharmaceutical agent (see MAR)  Wound 10/25/22 Nose Right Right Nare/septum & top of nose-Dressing/Treatment: Pharmaceutical agent (see MAR)    Specialty Bed Required : Yes   [x] Low Air Loss ([place] ordered)  [] Pressure Redistribution  [] Fluid Immersion  [] Bariatric  [] Total Pressure Relief  [] Other:     Current Diet: Diet NPO Exceptions are: Sips of Water with Meds  Dietician consult:  N/A    Discharge Plan:  Placement for patient upon discharge: TBD    Patient appropriate for Outpatient [address]: TBD    Referrals:  []   [] [facility]  [] Supplies  [] Other    Patient/Caregiver Teaching:  Level of patient/caregiver understanding able to:   [] Indicates understanding       [] Needs reinforcement  [] Unsuccessful      [] Verbal Understanding  [] Demonstrated understanding       [] No evidence of learning  [] Refused teaching         [x] N/A       Consultation for new deep tissue injury to the left buttock, just left and below the coccyx. During assessment deep tissue injury to the right nare at the external septum and top of nose that together appear linear and most likely from the NG tube that was previously in right nostril. The left buttock deep tissue injury is surrounded by now blanching redness. Patient noted to be on a floor bed. Recommendations:    Place patient on low air loss mattress. Turn and reposition to 30 degree side lying position every 2 hours. LEFT BUTTOCK & RIGHT NARE DTI: Clean with soap and water before application. Apply BPCO ointment to affected and surrounding areas 2 times daily and prn bathing or soiling.     Electronically signed by   Angie Caraballo RN, [facility] 10/25/2022

## 2022-10-25 NOTE — PROGRESS NOTES
Patient name: Rea See  Patient : 1955  10/25/2022  6:01 AM  ROOM 146    Portions of this note have been copied forward, however, changed to reflect the most current clinical status of this patient. Subjective: Patient is jaundiced. Green drainage from the abdominal drain. A. fib with RVR, heart rate 146 nurse at bedside to give Lopressor and digoxin. States cardiology consult being placed    HISTORY OF PRESENT ILLNESS:   Lux Zepeda was last seen by Zachary Waters on 10/1/2022. The patient has a history of hypertension, atrial fibrillation. He presented ER department with complaints of worsening abdominal pain with associated nausea and vomiting. 10/20/2022-CT abdomen pelvis showed diffuse fluid and air distention of the the entire small bowel and proximal large bowel measuring up to 4.3 cm in the left abdomen and up to 7.6 cm of the proximal colon. Transitional point seen at a site of masslike thickening of the hepatic flexure measuring 3.2 x 3.9 cm (series 2 image 35). Multiple prominent and enlarged retroperitoneal lymph nodes. Largest is 1.5 cm in the left periaortic region (series 2 image 33). OBJECTIVE:  VITALS: /80   Pulse (!) 132   Temp 98.6 °F (37 °C) (Temporal)   Resp 12   Ht 5' 10\" (1.778 m)   Wt 221 lb 14.4 oz (100.7 kg)   SpO2 90%   BMI 31.84 kg/m²   I&O:   Intake/Output Summary (Last 24 hours) at 10/25/2022 0601  Last data filed at 10/25/2022 0400  Gross per 24 hour   Intake 2773.33 ml   Output 3755 ml   Net -981.67 ml     PHYSICAL EXAM:  CONSTITUTIONAL: Lethargic. Does not converse  EYES: Icteric, pupils equal round   NECK: Supple, no masses. No JVD  CHEST/LUNGS: CTA bilaterally, normal respiratory effort   CARDIOVASCULAR: A. fib, RVR. ,  ABDOMEN: Midline abdominal dressing intact. Dressing intact to previous G-tube site.   EXTREMITIES: warm, trace edema  SKIN: warm, dry with no rashes or lesions  NEUROLOGIC: Lethargic  PSYCH: Unable to assess    BMP:   Recent Labs     10/24/22  0117 10/25/22  0449    145   K 4.1 3.7   * 110   CO2 20* 27   BUN 23 22   CREATININE 0.7 0.8   GLUCOSE 85 135*   CALCIUM 7.4* 7.8*     Recent Labs     10/25/22  0449 10/24/22  0117 10/23/22  0227   WBC 6.3 5.5 4.9   HGB 13.2* 11.9* 12.3*   HCT 41.2* 37.4* 39.0*   MCV 95.6* 97.4* 97.5*   PLT 82* 69* 68*     CMP:    Recent Labs     10/24/22  0117 10/25/22  0449    145   K 4.1 3.7   * 110   CO2 20* 27   BUN 23 22   CREATININE 0.7 0.8   LABGLOM >60 >60   GLUCOSE 85 135*   PROT 5.1* 5.4*   LABALBU 2.6* 2.5*   CALCIUM 7.4* 7.8*   BILITOT 2.9* 4.9*   ALKPHOS 44 49   * 79*   ALT 47* 34       30Day lookback of cultures:    Blood Culture Recent: No results for input(s): BC in the last 720 hours. Gram Stain Recent: No results for input(s): LABGRAM in the last 720 hours. Resp Culture Recent: No results for input(s): CULTRESP in the last 720 hours. Body Fluid Recent : No results for input(s): BFCX in the last 720 hours. MRSA Recent : No results for input(s): [address] in the last 720 hours. Urine Culture Recent : No results for input(s): LABURIN in the last 720 hours. Organism Recent : No results for input(s): ORG in the last 720 hours. Radiology:   CT ABDOMEN PELVIS W IV CONTRAST Additional Contrast? None    Result Date: 10/20/2022  CLINICAL HISTORY: 48-hour history of diffuse abdominal pain distention nausea vomiting some diarrhea no prior history COMPARISON: No comparison TECHNIQUE: Axial images of the abdomen and pelvis were obtained after the administration of IV contrast.Coronal and sagittal multiplanar reconstructions were performed. One or more of the following dose reduction techniques were used: Automated exposure control, adjustment of the mA and/or kV according to patient size, and/or use of iterative reconstruction technique. COMPARISON: None. FINDINGS: Statements: None. Lower Chest: Unremarkable.  Hepatobiliary: Hepatic steatosis. Calcified granulomas throughout the liver. No focal lesion is identified. Marked distention of the gallbladder containing gallstones. No CT evidence of cholecystitis however. No biliary ductal dilatation is evident. Pancreas: The pancreatic parenchyma is unremarkable and there is no main duct dilatation. Spleen: The spleen is nonenlarged. Calcified granulomas throughout the spleen. Adrenals: No adrenal glands are symmetric. Genitourinary: The kidneys are symmetric and enhance appropriately. No suspicious focal parenchymal abnormality is identified in either kidney. There is no collecting system dilatation. The bladder is unremarkable, as imaged. The prostate gland appears unremarkable. Gastrointestinal: Diffuse fluid and air distention of the the entire small bowel and proximal large bowel measuring up to 4.3 cm in the left abdomen and up to 7.6 cm of the proximal colon. Transitional point seen at a site of masslike thickening of the hepatic flexure measuring 3.2 x 3.9 cm (series 2 image 35). Colonic diverticulosis without diverticulitis. The appendix appears unremarkable. Lymphatics: Multiple prominent and enlarged retroperitoneal lymph nodes. Largest is 1.5 cm in the left periaortic region (series 2 image 33). Vascular: Moderate calcific atherosclerosis. The abdominal aorta is normal in caliber. MSK/Body Wall: No concerning bony lesion is identified. Small fat-containing right inguinal hernia. Peritoneum/Other: There is no free fluid or abnormal collection. No free gas. 1.Irregular mass at the hepatic flexure of the colon resulting in high-grade small bowel and proximal large bowel obstruction. Severe fluid and gaseous distention of the entire stomach and small bowel including the distal esophagus. Findings are overall concerning for colonic neoplasm. Recommend GI and surgical consultation. 2.Scattered enlarged retroperitoneal lymph nodes. These are presumably metastatic if the colon mass proves to be malignant. 3.Marked hydropic distention of the gallbladder containing gallstones. No CT evidence of cholecystitis. Recommend correlation with LFTs. US GALLBLADDER RUQ    Result Date: 10/21/2022  NO PRIOR REPORT AVAILABLE Exam: ULTRASOUND OF THE ABDOMEN LIMITED, RIGHT UPPER QUADRANT Clinical data: Elevated LFTs, sepsis, lactic acidosis. The patient is on ventilator. Technique: Real-time grayscale imaging of the right upper quadrant was performed. Images supplemented with color Doppler technique. Prior studies: CT scan of abdomen and pelvis dated 10/20/22. Findings: Limited exam due to poor acoustic penetration. Diffuse increased echogenicity of the liver parenchyma. A shadowing focus//calcification in the right lobe measuring up 1.1 cm. No intrahepatic biliary distention. The gallbladder is distended with several small stones. The gallbladder wall thickness measures 0.8 cm. No sludge. The common bile duct is normal in caliber measuring 0.4cm. The right kidney yvpzoiro47.7 x 6.6 x 5.4 cm. Normal echogenicity and cortical thickness are preserved. No evidence of renal masses, no calculi and no hydronephrosis Diffuse increased echogenicity of the pancreas. Imaged portion of the aorta demonstrates no acute pathology. Visualized portion of the inferior vena cava is unremarkable. 1.  Limited exam for reasons discussed above. 2.  Diffuse fatty infiltration of the liver with a 1.1 cm calcification in the right hepatic lobe. 3.  Distended gallbladder with several small stones and mild diffuse gallbladder wall thickening. 4.  No other significant abnormality. Recommendation: Follow up as clinically indicated. Electronically Signed by [place] Aponte MD at 21-Oct-2022 11:06:19 PM EST             XR CHEST PORTABLE    Result Date: 10/20/2022  NO PRIOR REPORT AVAILABLE [facility] Clinical data:Confirmation of course of NG/OG/NE tube and location of tip of tube.  Technique:Single view of the chest. Prior studies: Radiograph of the chest done on same day. Findings: The lungs are grossly clear; noevidence of acute infiltrate or pleural effusion. Cardiac silhouette is mildly enlarged. No acute osseous abnormality is detected. The NG tube can be traced to the level of distal esophagus. Scattered nonspecific gas-filled loops of bowel in the upper abdomen. The NG tube can be traced to the level of distal esophagus. Suggest advancing accordingly, with a follow-up radiograph. Recommendation: As above. Electronically Signed by Houston Weber MD at 20-Oct-2022 06:15:09 PM EST             XR CHEST PORTABLE    Result Date: 10/20/2022  NO PRIOR REPORT AVAILABLE Exam: X-RAY OF THECHEST Clinical data:Abdominal pain, vomiting. Technique:Single view of the chest. Prior studies: No prior studies submitted. Findings: The trachea is midline. The heart is enlarged. Mediastinal and pulmonary vascular shadows are normal.  There is no focal consolidation or effusion. The osseous structures are intact. Cardiomegaly. Recommendation: Follow up as clinically indicated.  Electronically Signed by Kelsey Thao MD at 20-Oct-2022 03:26:36 PM EST               Medications  Current Facility-Administered Medications   Medication Dose Route Frequency Provider Last Rate Last Admin    sodium phosphate 33.33 mmol in sodium chloride 0.9 % 250 mL IVPB  0.32 mmol/kg IntraVENous PRN Pa Greene MD        sodium phosphate 10 mmol in sodium chloride 0.9 % 250 mL IVPB  10 mmol IntraVENous PRN Pa Greene MD        Or    sodium phosphate 15 mmol in sodium chloride 0.9 % 250 mL IVPB  15 mmol IntraVENous PRN Pa Greene MD        Or    sodium phosphate 20 mmol in sodium chloride 0.9 % 500 mL IVPB  20 mmol IntraVENous PRN Pa Greene MD        digoxin Lawrance Petties) injection 125 mcg  125 mcg IntraVENous Daily Morrison Ginger, DO   125 mcg at 10/24/22 0806    metoprolol (LOPRESSOR) injection 5 mg  5 mg IntraVENous Q4H Marv Ginger, DO   5 mg at 10/25/22 0507    sodium bicarbonate 50 mEq in dextrose 5 % and 0.2 % NaCl 1,000 mL infusion   IntraVENous Continuous Alyne Duhamel,  mL/hr at 10/25/22 0515 New Bag at 10/25/22 0515    labetalol (NORMODYNE;TRANDATE) injection 10 mg  10 mg IntraVENous Q4H PRN Alyne Duhamel, DO   10 mg at 10/24/22 1408    metoclopramide (REGLAN) injection 10 mg  10 mg IntraVENous Q6H Alyne Duhamel, DO   10 mg at 10/25/22 0507    thiamine (B-1) injection 100 mg  100 mg IntraVENous Daily Renna Meigs, MD   100 mg at 10/24/22 0745    LORazepam (ATIVAN) injection 0.5 mg  0.5 mg IntraVENous Q6H PRN Renna Meigs, MD        HYDROmorphone HCl PF (DILAUDID) injection 2 mg  2 mg IntraVENous Q2H PRN Renna Meigs, MD   2 mg at 10/25/22 6228    Or    HYDROmorphone HCl PF (DILAUDID) injection 1 mg  1 mg IntraVENous Q2H PRN Renna Meigs, MD   1 mg at 10/23/22 6719    Or    HYDROmorphone HCl PF (DILAUDID) injection 0.5 mg  0.5 mg IntraVENous Q2H PRN Renna Meigs, MD        HYDROmorphone HCl PF (DILAUDID) injection 2 mg  2 mg IntraVENous Once Renna Meigs, MD        sodium chloride flush 0.9 % injection 5-40 mL  5-40 mL IntraVENous 2 times per day Renna Meigs, MD   10 mL at 10/24/22 1927    sodium chloride flush 0.9 % injection 5-40 mL  5-40 mL IntraVENous PRN Renna Meigs, MD        0.9 % sodium chloride infusion   IntraVENous PRN Renna Meigs, MD        famotidine (PEPCID) tablet 20 mg  20 mg Oral BID Renna Meigs, MD        Or    famotidine (PEPCID) 20 mg in sodium chloride (PF) 10 mL injection  20 mg IntraVENous BID Renna Meigs, MD   20 mg at 10/24/22 1927    glucose chewable tablet 16 g  4 tablet Oral PRN Renna Meigs, MD        dextrose bolus 10% 125 mL  125 mL IntraVENous PRN Renna Meigs, MD        Or    dextrose bolus 10% 250 mL  250 mL IntraVENous PRN Renna Meigs, MD        glucagon (rDNA) injection 1 mg  1 mg SubCUTAneous PRN Mukesh Hirsch MD        dextrose 10 % infusion   IntraVENous Continuous PRN Mukesh Hirsch MD        potassium chloride (KLOR-CON M) extended release tablet 40 mEq  40 mEq Oral PRN Mukesh Hirsch MD        Or    potassium bicarb-citric acid (EFFER-K) effervescent tablet 40 mEq  40 mEq Oral PRN Mukesh Hirsch MD        Or    potassium chloride 10 mEq/100 mL IVPB (Peripheral Line)  10 mEq IntraVENous PRN Mukesh Hirsch MD        ondansetron (ZOFRAN-ODT) disintegrating tablet 4 mg  4 mg Oral Q8H PRN Mukesh Hirsch MD        Or    [facility]ans[facility] injection 4 mg  4 mg IntraVENous Q6H PRN Mukesh Hirsch MD        magnesium hydroxide (MILK OF MAGNESIA) 400 MG/5ML suspension 30 mL  30 mL Oral Daily PRN Mukesh Hirsch MD   30 mL at 10/24/22 1609    acetaminophen (TYLENOL) tablet 650 mg  650 mg Oral Q6H PRN Mukesh Hirsch MD        Or    acetaminophen (TYLENOL) suppository 650 mg  650 mg Rectal Q6H PRN Mukesh Hirsch MD        insulin lispro (HUMALOG) injection vial 0-4 Units  0-4 Units SubCUTAneous TID WC Mukesh Hirsch MD        insulin lispro (HUMALOG) injection vial 0-4 Units  0-4 Units SubCUTAneous Nightly Mukesh Hirsch MD           Allergies  Patient has no known allergies. ASSESSMENT:  #Hepatic flexure colonic mass  -CT scan pelvis with IV contrast on 10/20/2022:  Irregular mass at the hepatic flexure of the colon resulting in high-grade small bowel and proximal large bowel obstruction. Severe fluid and gaseous distention of the entire stomach and small bowel including the distal esophagus. Scattered enlarged retroperitoneal lymph nodes. These are presumably metastatic if the colon mass proves to be malignant. Marked hydropic distention of the gallbladder containing gallstones. No CT evidence of cholecystitis. Hepatic steatosis.  Calcified granulomas throughout the liver  Spleen non enlarged and calcified granulomas throughout the spleen.    -Colectomy with gastrostomy placement by Dr. Esa Jimenes on 10/21/2022: Operative note indicated hard mass adherent to the liver and small bowel posteriorly. POD #4  -Baseline CEA 11.0 on 10/21/2022  -Postop CEA 6.9 on 10/23/2022    -We will follow-up path result and further treatment recommendations from       #Thrombocytopenia-suspect multifactorial to include liver dysfunction and long term alcohol use. Admission a hemoglobin of 16.9 on 10/20/2022. No active bleeding noted. Hemoglobin stable at 13.2    #Elevated bilirubin  CT scan of the abdomen pelvis on 10/20/2022 reported Hepatic steatosis. Calcified granulomas throughout the liver. Spleen non enlarged and calcified granulomas throughout the spleen. Ultrasound gallbladder 10/21/2022:   1. Limited exam.  2.  Diffuse fatty infiltration of the liver with a 1.1 cm calcification in the right hepatic lobe. 3.  Distended gallbladder with several small stones and mild diffuse gallbladder wall thickening. As per general surgery    PLAN:  Continue with supportive care  Monitor platelet count  Further staging to be completed as outpatient  We will follow-up pathology report  Postop CEA 6.9  Elevated bilirubin, addressed per GS  A. fib with RVR, as per attending. Cardiology to see      ALLISON Kendall    10/25/22  6:01 AM    Physician's attestation/substantial contribution:  I, Dr Daljit Lee, independently performed an evaluation on [facility]. I have reviewed relevant medical information/data to include but not limited to medication list, relevant appropriate labs and imaging when applicable. I reviewed other physician's notes, ancillary services and nurses assessment. I have reviewed the above documentation completed by the Nurse Practitioner or Physician Assistant.  Please see my additional contributions to the history of present illness, physical examination, and assessment/medical decision-making that reflect my findings and impressions. I have seen and examined the patient and the key elements of all parts of the encounter have been performed by me. I agree with the assessment and plan as outlined by the ARNP/PA. Subjective-alert but confused this morning. Does not follow commands. Discussed bedside RN. Objective-acute confusional status. Dry mucosa. A. fib with RVR on monitor. Assessment/plan:  Continue current supportive care  Status post surgery  Thrombocytopenia-slightly improved. Platelet count 67,324. Elevated bilirubin.   As per primary team.

## 2022-10-26 PROBLEM — E44.0 MODERATE MALNUTRITION (HCC): Status: ACTIVE | Noted: 2022-01-01

## 2022-10-26 NOTE — ANESTHESIA PROCEDURE NOTES
Arterial Line:    An arterial line was placed in the holding area for the following indication(s): continuous blood pressure monitoring. A 20 gauge (size), 1 and 3/4 inch (length), Arrow (type) catheter was placed, Seldinger technique used, into the right radial artery, secured by Tegaderm. Anesthesia type: General    Events:  patient tolerated procedure well with no complications.   Anesthesiologist: Mo Luke MD  Performed: Anesthesiologist   Preanesthetic Checklist  Completed: patient identified, IV checked, site marked, risks and benefits discussed, surgical/procedural consents, equipment checked, pre-op evaluation, timeout performed, anesthesia consent given, oxygen available and monitors applied/VS acknowledged

## 2022-10-26 NOTE — ANESTHESIA POSTPROCEDURE EVALUATION
Department of Anesthesiology  Postprocedure Note    Patient: Zhane Barone  MRN: [id number]  YOB: 1955  Date of evaluation: 10/26/2022      Procedure Summary     Date: 10/25/22 Room / Location: [address]    Anesthesia Start: 2250 Anesthesia Stop: 10/26/22 0146    Procedure: LAPAROTOMY, REPAIR OF ANASTAMOSAL LEAK, CREATION OF DIVERTING ILEOSTOMY Diagnosis:       Accident, initial encounter      (Accident, initial encounter [X58. James White)    Surgeons: Penny Tarango MD Responsible Provider: Shantell Funk MD    Anesthesia Type: Not recorded ASA Status: Not recorded          Anesthesia Type: No value filed.     Mary Phase I: Mary Score: 7    Mary Phase II:        Anesthesia Post Evaluation    Patient location during evaluation: ICU  Patient participation: complete - patient cannot participate  Level of consciousness: sedated and ventilated  Pain score: 0  Airway patency: patent  Nausea & Vomiting: no nausea and no vomiting  Complications: no  Cardiovascular status: hemodynamically stable and vasoactive/inotropes  Respiratory status: ventilator and intubated  Hydration status: stable

## 2022-10-26 NOTE — PROGRESS NOTES
Occupational Therapy    Nurse advises to hold on evaluations for OT/PT today. Will continue to follow.  Electronically signed by Jazmine Laura OT on 10/26/2022 at 3:04 PM

## 2022-10-26 NOTE — OP NOTE
Operative Note      Patient: Xuan Burks  YOB: 1955  MRN: [id number]      DATE OF SERVICE: 10/26/2022    PREOPERATIVE DIAGNOSIS  Anastomotic leak post open right hemicolectomy    POSTOPERATIVE DIAGNOSIS  Anastomotic leak post open right hemicolectomy    PROCEDURE  Laparotomy, repair of anastomotic leak and creation of diverting ileostomy    Shady Taylor MD     ASSISTANT  None    INDICATIONS  Mr. Dalton Zamora is a 51-year-old gentleman who 4 days ago underwent open right hemicolectomy by Dr. 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 He did well until this morning when he began running fever and up to atrial fibrillation. A work-up was undertaken and a CT scan was eventually obtained. This showed a large volume of intraperitoneal fluid as well as large pneumoperitoneum concerning for anastomotic leak. He is thus taken to the operating room for exploration. PROCEDURE  Patient was taken to the main operating room, placed on the operating table supine. After the induction of adequate general endotracheal anesthesia the abdomen was prepped and draped to a sterile field. Time out was undertaken. The surgical staples holding the midline skin incision together were removed with a hemostat. Subcutaneous tissue was gently  with fingertip dissection and the midline suture line exposed. The suture line was cut and the PDS suture removed without problem. As we open the fascia there was the smell of stool as well as feculent appearing peritoneal fluid. The peritoneal fluid was removed with table suction. We then inspected the area of the anastomosis and I found a leak of about 4 mm in size along the end staple line of the small bowel about 2 cm from the mesenteric end. I could easily pass the tip of a DeBakey pickup through this opening into the lumen of the bowel.   Further inspection along the end staple line of the colon as well as along the PEDRO staple line that connected the 2 together showed no additional areas of concern. The opening along the staple line was then repaired in 2 layers. I used an inner layer of running 3-0 Vicryl full-thickness bites. I then covered this with interrupted 3-0 silk seromuscular Lembert sutures. The abdominal cavity was then copiously irrigated with warm sterile saline. We paid particular attention to the area over the dome of the liver, the subhepatic space and also the left upper quadrant in the area around the spleen. These areas seemed to be the most contaminated and I kept irrigating until the effluent returned clear. The mid abdomen and pelvis were not as contaminated and we also washed those areas until the effluent returned clear. I then selected a point to create a diverting ileostomy in the right lower quadrant skin. I removed a circular piece of skin of about 4 cm in diameter. I took the incision through the subcutaneous tissue until the anterior fascia was identified. This was opened in cruciate fashion to a length of 3 fingerbreadth's. Muscle was  bluntly. The posterior sheath was then opened as well. I passed a Barling clamp from outside to in through the ostomy site. I then selected a loop of small bowel about 15 cm downstream from the anastomosis and brought this out to create an ostomy without problem. Once I had the bowel in appropriate position I made a small window in the mesentery using a hemostat and then passed an ostomy bridge to hold the bowel in position. The abdomen was again checked and hemostasis assured. I placed a 15 round drain in the subhepatic space and brought this out through a stab wound in the right mid abdomen. The drain was secured at skin with 2-0 silk suture. The midline fascia was then reapproximated with running #1 PDS suture.   Subcutaneous tissue was irrigated with saline and then loosely reapproximated with widely spaced skin clips. Mepilex dressing was applied. We then matured the loop ileostomy. I opened the bowel transversely using a cautery. I then folded the edges back and tacked them to the skin using full-thickness bites of bowel wall to the subcuticular layer of skin with interrupted 3-0 Vicryl suture. Upon completion this left a nicely matured loop ileostomy. An ileostomy appliance was brought onto the field and the flange was cut to appropriate size and fitted to the skin. The collection bag was then applied as well. Sponge, needle, instrument count correct on 2 occasions. Estimated intraoperative blood loss 75 mL. Mr. Fan Yan tolerated his surgery well and he was taken to the intensive care unit intubated and in stable condition.         ________________________________  Gerry Piedra MD

## 2022-10-26 NOTE — PROGRESS NOTES
Pharmacy Adjustment per [facility] protocol    Jo-Ann Govea is a 77 y.o. male. Pharmacy has adjusted medications per [facility] protocol. Recent Labs     10/24/22  0117 10/25/22  0449   BUN 23 22       Recent Labs     10/24/22  0117 10/25/22  0449   CREATININE 0.7 0.8       Estimated Creatinine Clearance: 108 mL/min (based on SCr of 0.8 mg/dL). Height:   Ht Readings from Last 1 Encounters:   10/20/22 5' 10\" (1.778 m)     Weight:  Wt Readings from Last 1 Encounters:   10/25/22 221 lb 14.4 oz (100.7 kg)         Plan: Adjust the following medications based on [facility] protocol:           Meropenem to 2 g IV x 1 dose over 30 minutes, followed by 1 g IV every 8 hours extended infusion.     Electronically signed by Mickel Aschoff, [facility] on 10/25/2022 at 9:01 PM

## 2022-10-26 NOTE — PROGRESS NOTES
Palliative Care Progress Note  10/26/2022 8:50 AM    Patient:  Raven Nolasco  YOB: 1955  Primary Care Physician: No primary care provider on file. Advance Directive: DNR  Admit Date: 10/20/2022       Hospital Day: 6  Portions of this note have been copied forward, however, changed to reflect the most current clinical status of this patient. CHIEF COMPLAINT/REASON FOR CONSULTATION Goals of care, family support, Code status, symptom management     SUBJECTIVE:  Mr. Lalo Roque is intubated  in ICU following emergent OR overnight. No s/s distress noted. HPI: The patient is a 77 y.o. male with PMH HTN, afib, and non-ischemic cardiomyopathy who presented to [facility] ED 10/20/2022 complaining of abdominal pain, distention, and n/v x2 days. CT abdomen/pelvis in ED revealed a colon mass at the hepatic flexure causing obstruction. He was admitted under hospitalist services and initiated on NGT therapy for decompression. GS was consulted in ED and pt underwent right hemicolectomy with gastrostomy tube placement 10/21/2022 with hard mass adherent to liver and small bowel posteriorly. He remained intubated and sedated post op due to concern for trauma to the nasopharynx and pharynx due to a false track created by an NG tube. He underwent scoping with ENT 10/22/2022 to evaluate for safety to extubate and was found to be patent bilaterally with trauma to to the right but no no contraindication to wean to extubation and positive pressure if needed. He was liberated from the ventilator 10/23/2022 and demonstrated increased agitation with need for intermittent precedex/ativan for concern of ETOH withdrawal. He required endoscopic Gtube replacement with GI 10/23/2022 after self removing Gtube once extubated. Oncology has evaluated with elevated CEA and pathology pending with plans for further staging workup to be completed outpatient.     Review of Systems:   14 point review of systems is negative except as specifically addressed above. Objective:   VITALS:  /78   Pulse 99   Temp 97 °F (36.1 °C) (Temporal)   Resp 23   Ht 5' 10\" (1.778 m)   Wt 199 lb 3.2 oz (90.4 kg) Comment: bed wt; different bed from yesterdays wt. SpO2 100%   BMI 28.58 kg/m²   24HR INTAKE/OUTPUT:    Intake/Output Summary (Last 24 hours) at 10/26/2022 0850  Last data filed at 10/26/2022 0600  Gross per 24 hour   Intake 4503.94 ml   Output 2065 ml   Net 2438.94 ml       General appearance: 78 yo male, ill appearing, no acute distress  Head: Normocephalic, without obvious abnormality, atraumatic  Eyes: Icteric. Pupils sluggish  Ears: normal external ears and nose  Neck: no JVD, supple, symmetrical, trachea midline   Lungs: soft scattered rhonchi to auscultation bilaterally, mechanically ventilated, equal chest rise  Heart: Irregular rhythm and tachycardic, S1, S2 normal, no murmur  Abdomen: Rounded, taut, hypoactive bowel sounds, no grimacing to palpation, G-tube with gastric contents draining.  Ostomy with liquid stool  Extremities:No lower extremity edema,  No erythema, no tenderness to palpation  Skin: Warm, dry, jaundiced, abdominal incision WNL  Neurologic: Intubated, following some commands off sedation    Medications:      dexmedetomidine 0.2 mcg/kg/hr (10/26/22 0224)    phenylephrine (MARIE-SYNEPHRINE) 50mg/250mL infusion 20 mcg/min (10/26/22 0610)    dilTIAZem Stopped (10/25/22 2226)    IV infusion builder 100 mL/hr at 10/25/22 1811    sodium chloride      dextrose        chlorhexidine  15 mL Mouth/Throat BID    famotidine  20 mg Oral Daily    Or    famotidine (PEPCID) injection  20 mg IntraVENous Daily    metoclopramide  5 mg IntraVENous Q6H    meropenem  1,000 mg IntraVENous Q12H    BPCO  1 Dose Apply externally BID    digoxin  125 mcg IntraVENous Daily    metoprolol  5 mg IntraVENous Q4H    thiamine  100 mg IntraVENous Daily    HYDROmorphone  2 mg IntraVENous Once    sodium chloride flush  5-40 mL IntraVENous 2 times per day insulin lispro  0-4 Units SubCUTAneous TID WC    insulin lispro  0-4 Units SubCUTAneous Nightly     sodium phosphate IVPB, sodium phosphate IVPB **OR** sodium phosphate IVPB **OR** sodium phosphate IVPB, labetalol, LORazepam, HYDROmorphone **OR** HYDROmorphone **OR** HYDROmorphone, sodium chloride flush, sodium chloride, glucose, dextrose bolus **OR** dextrose bolus, glucagon (rDNA), dextrose, potassium chloride **OR** potassium alternative oral replacement **OR** potassium chloride, ondansetron **OR** ondansetron, magnesium hydroxide, acetaminophen **OR** acetaminophen  Diet NPO Exceptions are: Sips of Water with Meds     Lab and other Data:     Recent Labs     10/25/22  0449 10/25/22  2012 10/26/22  0741   WBC 6.3 10.1 12.9*   HGB 13.2* 15.0 12.9*   PLT 82* 85* 57*       Recent Labs     10/25/22  0449 10/25/22  2012 10/26/22  0741    145 140   K 3.7 3.5 4.0    107 110   CO2 27 22 19*   BUN 22 35* 42*   CREATININE 0.8 1.8* 1.9*   GLUCOSE 135* 180* 140*       Recent Labs     10/25/22  0449 10/25/22  2012 10/26/22  0741   AST 79* 232* 426*   ALT 34 61* 92*   BILITOT 5.0*  4.9* 7.2* 6.3*   ALKPHOS 49 45 49       RAD:   CT ABDOMEN PELVIS W IV CONTRAST Additional Contrast? None  Result Date: 10/20/2022  1. Irregular mass at the hepatic flexure of the colon resulting in high-grade small bowel and proximal large bowel obstruction. Severe fluid and gaseous distention of the entire stomach and small bowel including the distal esophagus. Findings are overall concerning for colonic neoplasm. Recommend GI and surgical consultation. 2.Scattered enlarged retroperitoneal lymph nodes. These are presumably metastatic if the colon mass proves to be malignant. 3.Marked hydropic distention of the gallbladder containing gallstones. No CT evidence of cholecystitis. Recommend correlation with LFTs. US GALLBLADDER RUQ  Result Date: 10/21/2022  1. Limited exam for reasons discussed above.  2.  Diffuse fatty infiltration of the liver with a 1.1 cm calcification in the right hepatic lobe. 3.  Distended gallbladder with several small stones and mild diffuse gallbladder wall thickening. 4.  No other significant abnormality. Recommendation: Follow up as clinically indicated. Electronically Signed by Ajit Rosado MD at 21-Oct-2022 11:06:19 PM EST             XR CHEST PORTABLE  Result Date: 10/20/2022  The NG tube can be traced to the level of distal esophagus. Suggest advancing accordingly, with a follow-up radiograph. Recommendation: As above. Electronically Signed by Zina Carlson MD at 20-Oct-2022 06:15:09 PM EST             XR CHEST PORTABLE  Result Date: 10/20/2022  Cardiomegaly. Recommendation: Follow up as clinically indicated. Electronically Signed by Estela Moy MD at 20-Oct-2022 03:26:36 PM EST             Assessment/Plan   Principal Problem:    SBO (small bowel obstruction) ([address]  Active Problems:    Essential hypertension    Hypothyroidism    Hyperglycemia    Thrombocytopenia (HCC)    Palliative care patient    Agitation    Moderate malnutrition (HCC)    Non-ischemic cardiomyopathy (HCC)    Paroxysmal atrial fibrillation (HCC)    Colonic mass    Ileus, postoperative [facility])    Accident  Resolved Problems:    * No resolved hospital problems. *      Visit Summary:  Chart reviewed. Mr. Maryjo Wilson is seen at bedside this morning with RN. Pt developed rectal temp of 106.9 yesterday and CT abdomen showed large volume peritoneal fluid with large pneumoperitoneum. He was taken back to OR overnight for repair of anastomotic leak, washout, and creation of diverting ileostomy. Remains in ICU on ventilator with minimal pressor support. E required large bolume resuscitation for septic shock and abx continue with improvement in vitals today. Remains in afib but rate more controlled. Potential plan for SBT today. I called his daughter, Cyn Gamboa, to update on pt status and plan of care.  She plans to visit at bedside again later today as she was at the hospital most of the night. We reviewed pts medical wishes and she requests information regarding completion of a living will. I explained ACP documents could not be done until pt is able to communicate and neuro status returns to baseline. I provided education that with Marybeth Mariano being his only child and him being unmarried she legally remains his [facility] decision maker. She verbalizes understanding. Opportunity for questions and emotional support provided. Will follow. Candidate for SCOP: To be determined based on hospital course     Recommendations:      Palliative Care- GOC prolong life, continue all medical treatments/workup and monitor for improvement. D/c planning based on hospital course. Code status- DNR  SBO 2/2 hepatic flexure colonic mass- POD#4 right hemicolectomy with gastrostomy placement by Dr. Navi Galeana. G-tube replaced 10/23/2022 per GI endoscopically after patient self removed once extubated, remains to gravity until return of bowel function. Reglan continued Baseline CEA 11.0 on 10/21/2022 and postop CEA 6.9. Pathology at least stage III, will need outpt follow up for further staging workup. Septic shock 2/2 anastomotic leak- s/p laparotomy, repair of leak, washout, and creation of diverting ileostomy by Dr. Chelo Harding 10/26/22. Wean pressor support at able. Abx continue  Acute hypoxemic respiratory failure- requiring intubation postop. Weaned FiO2 and PEEP. Off sedation. SBT today. Elevated bilirubin- worsening jaundice with bilirubin 4.9 without associated LFT's elevation. repeat bedside RUQ ultrasound 10/25/22 shows cholelithiasis without acute cholecystitis or CBD dilation to explain the rising LFTs  SONIA- prerenal vs ATN from sepsis. Fluid resuscitation. Follow labs/output  Agitation- concern 2/2 EtOH withdrawal. Precedex restarted postop. PRN ativan available. Restraints as warranted  A. fib with RVR- cardiology consulted. Initiated on cardizem gtt.  Digoxin held d/t renal function. Rate improved postop  Hypoxemic respiratory failure- extubated 10/23/2022 and now tolerating room air  Nonischemic cardiomyopathy- noted. Mgmt per hospitalist    Thank you for consulting Palliative Care and allowing us to participate in the care of this patient.    Time Spent Counseling > 50%:  YES                                   Total Time Spent with patient/family counseling, workup/treatment review, counseling and placement of orders/preparation of this note: 34 minutes    Electronically signed by ALLISON Erwin CNP on 10/26/2022 at 8:50 AM    (Please note that portions of this note were completed with a voice recognition program.  Delayne Meigs made to edit the dictations but occasionally words are mis-transcribed.)

## 2022-10-26 NOTE — CONSULTS
INFECTIOUS DISEASES CONSULT NOTE    Patient:  Raven Nolasco 77 y.o. male  ROOM # [unfilled]  YOB: 1955  MRN: [id number]  [facility]:  [id number]  Admit date: 10/20/2022   Admitting Physician: Merced Fernando MD  Primary Care Physician: No primary care provider on file. REFERRING PROVIDER: No ref. provider found    Reason for Consultation: Fever    History of Present Illness/Chief Complaint: 66-year-old man. History obtained primarily from chart review. He had presented to the emergency room with abdominal pain. CT scan showed an irregular mass near the hepatic flexure. He was taken to surgery and underwent hemicolectomy. He was found to have cancer. He had developed an anastomotic leak. He was taken to surgery for exploration, irrigation, drainage and creation of colostomy. He is also had PEG tube placement. He had had some fever preoperatively. He was taken to surgery overnight. He is afebrile currently. He has been extubated. He is on pressor support at present. He has had fair amount of output from his TRISTAN drains. He is receiving antibiotic treatment with meropenem. Infectious disease asked to evaluate and offer recommendations.     Current Scheduled Medications:    chlorhexidine  15 mL Mouth/Throat BID    famotidine (PEPCID) injection  20 mg IntraVENous Daily    metoclopramide  5 mg IntraVENous Q6H    meropenem  1,000 mg IntraVENous Q12H    BPCO  1 Dose Apply externally BID    [Held by provider] digoxin  125 mcg IntraVENous Daily    metoprolol  5 mg IntraVENous Q4H    thiamine  100 mg IntraVENous Daily    HYDROmorphone  2 mg IntraVENous Once    sodium chloride flush  5-40 mL IntraVENous 2 times per day    insulin lispro  0-4 Units SubCUTAneous TID WC    insulin lispro  0-4 Units SubCUTAneous Nightly     Current PRN Medications:  sodium phosphate IVPB, sodium phosphate IVPB **OR** sodium phosphate IVPB **OR** sodium phosphate IVPB, labetalol, LORazepam, HYDROmorphone **OR** HYDROmorphone **OR** HYDROmorphone, sodium chloride flush, sodium chloride, glucose, dextrose bolus **OR** dextrose bolus, glucagon (rDNA), dextrose, potassium chloride **OR** potassium alternative oral replacement **OR** potassium chloride, ondansetron **OR** ondansetron, magnesium hydroxide, acetaminophen **OR** acetaminophen    Allergies:  No Known Allergies    Past Medical History: Hypertension. Cardiomyopathy. Paroxysmal atrial fibrillation. Past Surgical History: Hemicolectomy. Exploration for anastomotic leak. Colostomy. PEG tube placement. Finger amputation. Tonsillectomy. Social History: Remote history of tobacco use. Alcohol use not quantified in HPI. No apparent illicit drug use. Family History: Hypertension. Heart disease. Stomach cancer. Diabetes. Exposure History: Unknown    Review of Systems: Unobtainable from patient at present. Currently somewhat somnolent in ICU post surgery. Vital Signs:  BP (!) 78/54   Pulse 75   Temp 96.9 °F (36.1 °C) (Temporal)   Resp 12   Ht 5' 10\" (1.778 m)   Wt 199 lb 3.2 oz (90.4 kg) Comment: bed wt; different bed from yesterdays wt. SpO2 98%   BMI 28.58 kg/m²  Temp (24hrs), Av.4 °F (36.9 °C), Min:96.9 °F (36.1 °C), Max:101.7 °F (38.7 °C)    Physical Exam:   Vital signs reviewed. Somnolent. No respiratory distress.   Lungs decreased breath sounds in bases  Abdomen with ostomy which is pink and viable  TRISTAN drains with serous output  Heart distant heart sounds without apparent murmur  Skin without rash  PEG site without erythema or drainage    Lab Results:  CBC:   Recent Labs     10/24/22  0117 10/25/22  0449 10/25/22  2012 10/26/22  0741   WBC 5.5 6.3 10.1 12.9*   HGB 11.9* 13.2* 15.0 12.9*   HCT 37.4* 41.2* 45.9 40.0*   PLT 69* 82* 85* 57*   LYMPHOPCT 12.5* 13.7*  --  8.0*   MONOPCT 9.9 9.0  --  3.0     CMP:   Recent Labs     10/25/22  0449 10/25/22  2012 10/26/22  0741 10/26/22  1007    145 140  --    K 3.7 3.5 4.0 3.5    107 110  --    CO2 27 22 19*  --    BUN 22 35* 42*  --    CREATININE 0.8 1.8* 1.9*  --    CALCIUM 7.8* 7.4* 7.0*  --    BILITOT 5.0*  4.9* 7.2* 6.3*  --    ALKPHOS 49 45 49  --    ALT 34 61* 92*  --    AST 79* 232* 426*  --    GLUCOSE 135* 180* 140*  --      Culture:   Blood cultures October 25, 2022 no growth to date  Blood cultures October 25, 2022 no growth to date    Radiology:   CT scan abdomen and pelvis done yesterday:    Impression   1. Post-surgery status in the abdomen. Moderate pneumoperitoneum   2. Status post partial colectomy. Diffuse wall thickening and edema of the small bowel suggesting infectious or inflammatory or ischemic process. Recommendation: Follow up as clinically indicated. All CT scans at this facility utilize dose modulation, iterative reconstruction, and/or weight based dosing when appropriate to reduce radiation dose to as low as reasonably achievable. Electronically Signed by Crista Jesus DO at 25-Oct-2022 10:02:05 PM EST        CT scan of the abdomen and pelvis done October 20, 2022:    Impression   1. Irregular mass at the hepatic flexure of the colon resulting in high-grade   small bowel and proximal large bowel obstruction. Severe fluid and gaseous   distention of the entire stomach and small bowel including the distal esophagus. Findings are overall concerning for colonic neoplasm. Recommend GI and surgical   consultation. 2.Scattered enlarged retroperitoneal lymph nodes. These are presumably   metastatic if the colon mass proves to be malignant. 3.Marked hydropic distention of the gallbladder containing gallstones. No CT   evidence of cholecystitis. Recommend correlation with LFTs. Additional Studies Reviewed:   2D echocardiogram done yesterday:  Summary  Mitral valve leaflets are mildly thickened with preserved leaflet mobility. Mild mitral regurgitation is present. Mitral annular calcification is present.   Aortic valve leaflets are moderately thickened with some restriction of mobility. Mild aortic stenosis. Mean gradient 13 mm hg  Tricuspid valve was not well visualized. Mild tricuspid regurgitation. Moderately dilated left atrium. Left ventricular size is mildly increased. Left ventricular ejection fraction is visually estimated at 30%. Difficult to accurately assess LV function and diastology due to abnormal rhythm. No evidence of left ventricular mass or thrombus noted. No regional wall motion abnormalities. The aorta is within normal limits. IVC not visualized. Definity contrast was used to enhance the endocardial borders. Patient rhythm is atrial fib with RVR. Signature  Electronically signed by Karol Sterling MD(Interpreting physician) on 10/25/2022 06:30 PM    Pathology report from colon surgery October 21, 2022:  FINAL DIAGNOSIS:     Colon, right hemicolectomy:   1. Invasive moderately differentiated colonic adenocarcinoma, measuring   6.1 cm in greatest dimension. 2.  Tumor invades through the muscularis propria into the pericolonic   adipose tissue but does not reach the serosal surface. 3.  Surgical excision margins are negative for evidence of carcinoma and   adenomatous change. 4.  Sections of terminal ileum, negative for evidence of malignancy. 5.  Sections of the appendix, negative for evidence of malignancy. 6.  6 out of 32 lymph nodes, positive for metastatic adenocarcinoma. AJCC STAGE: pT3, pN2a, pMx       Impression:   1. Hypotension-suspect intravascular volume depletion. 2.  Fever-improved following surgery for anastomotic leak  3. Colon cancer-moderately differentiated adenocarcinoma (6 out of 32 lymph nodes positive)  4.   Paroxysmal atrial fibrillation    Recommendations:    Continue treatment meropenem  Await blood culture results  Will give fluid bolus  Wean pressor support as tolerated  Continue to follow    Sendy Doty MD  10/26/22  5:54 PM

## 2022-10-26 NOTE — PROGRESS NOTES
Cardiology Daily Note Saida Raza MD      Patient:  Bharat Guadalupe  [id number]    Patient Active Problem List    Diagnosis Date Noted    Acute renal failure ([address] 10/02/2016    Moderate malnutrition ([address] 10/26/2022    Palliative care patient 10/24/2022    Agitation 10/24/2022    Thrombocytopenia ([address] 10/23/2022    SBO (small bowel obstruction) ([address] 10/20/2022    Hyperglycemia 10/20/2022    CKD (chronic kidney disease), stage III ([address] 10/02/2016    Shortness of breath 09/29/2016    Essential hypertension 09/29/2016    Hypothyroidism 09/29/2016    Colonic mass 10/20/2022    Ileus, postoperative ([address] 10/20/2022    Accident 10/20/2022    Paroxysmal atrial fibrillation ([address] 03/16/2018    Hypokalemia 10/04/2016    Non-ischemic cardiomyopathy ([address] 10/03/2016    Ex-cigarette smoker 10/03/2016    Obesity (BMI 35.0-39.9 without comorbidity) 09/29/2016       Admit Date:  10/20/2022    Admission Problem List: Present on Admission:   SBO (small bowel obstruction) (HCC)   Essential hypertension   Hypothyroidism   Non-ischemic cardiomyopathy (HCC)   Paroxysmal atrial fibrillation (HCC)   Hyperglycemia   Thrombocytopenia (HCC)   Palliative care patient   Agitation   Moderate malnutrition ([address]      Cardiac Specific Data:  Specialty Problems          Cardiology Problems    Essential hypertension        Non-ischemic cardiomyopathy ([address]        Paroxysmal atrial fibrillation ([address]           Subjective:  Mr. Kaylee Ann seen today underwent laparotomy with repair of anastomosis leak 10/26/2022. Remains in atrial fibrillation no reported chest pain. Presently not on intravenous diltiazem vital signs stable. Objective:   BP (!) 78/54   Pulse 75   Temp 96.9 °F (36.1 °C) (Temporal)   Resp 12   Ht 5' 10\" (1.778 m)   Wt 199 lb 3.2 oz (90.4 kg) Comment: bed wt; different bed from yesterdays wt.   SpO2 98%   BMI 28.58 kg/m²       Intake/Output Summary (Last 24 hours) at 10/26/2022 8792  Last data filed at 10/26/2022 1734  Gross per 24 hour   Intake 5362.18 ml   Output 3565 ml   Net 1797.18 ml       Prior to Admission medications    Medication Sig Start Date End Date Taking?  Authorizing Provider   levothyroxine (SYNTHROID) 88 MCG tablet TAKE ONE TABLET BY MOUTH ONCE DAILY 3/10/22   ALLISON Walker   carvedilol (COREG) 25 MG tablet Take 1 tablet by mouth 2 times daily 3/9/22   ALLISON Walker   sacubitril-valsartan (ENTRESTO)  MG per tablet Take 1 tablet by mouth twice daily 3/9/22   ALLISON Walker   furosemide (LASIX) 40 MG tablet Take 1 tablet by mouth once daily 3/9/22   ALLISON Walker   spironolactone (ALDACTONE) 25 MG tablet TAKE ONE TABLET BY MOUTH ONCE DAILY 3/9/22   ALLISON Walker   apixaban (ELIQUIS) 5 MG TABS tablet Take 1 tablet by mouth 2 times daily 3/9/22 10/18/22  ALLISON Walker        chlorhexidine  15 mL Mouth/Throat BID    famotidine (PEPCID) injection  20 mg IntraVENous Daily    metoclopramide  5 mg IntraVENous Q6H    meropenem  1,000 mg IntraVENous Q12H    BPCO  1 Dose Apply externally BID    [Held by provider] digoxin  125 mcg IntraVENous Daily    metoprolol  5 mg IntraVENous Q4H    thiamine  100 mg IntraVENous Daily    HYDROmorphone  2 mg IntraVENous Once    sodium chloride flush  5-40 mL IntraVENous 2 times per day    insulin lispro  0-4 Units SubCUTAneous TID     insulin lispro  0-4 Units SubCUTAneous Nightly       TELEMETRY: Atrial fibrillation     Physical Exam:      Physical Exam      General:  Awake, alert, NAD  Skin:  Warm and dry  Neck:  no jvd , no carotid bruits  Chest:  Clear to auscultation, no wheezing or rales  Cardiovascular:  IRRR [id number] no murmurs, clicks, gallups, or rubs  Abdomen:  Soft nontender, nondistended, bowel sounds present  Extremities:  Edema: none    Lab Data:  CBC:   Recent Labs     10/25/22  0449 10/25/22  2012 10/26/22  0741   WBC 6.3 10.1 12.9*   HGB 13.2* 15.0 12.9*   HCT 41.2* 45.9 40.0*   MCV 95.6* 94.1* 95.7*   PLT 82* 85* 57*     BMP: Recent Labs     10/24/22  0117 10/25/22  0449 10/25/22  2012 10/26/22  0741 10/26/22  1007    145 145 140  --    K 4.1 3.7 3.5 4.0 3.5   * 110 107 110  --    CO2 20* 27 22 19*  --    PHOS 1.9*  --   --   --   --    BUN 23 22 35* 42*  --    CREATININE 0.7 0.8 1.8* 1.9*  --      LIVER PROFILE:   Recent Labs     10/25/22  0449 10/25/22  2012 10/26/22  0741   AST 79* 232* 426*   ALT 34 61* 92*   LIPASE 37  --   --    BILIDIR 3.8*  --   --    BILITOT 5.0*  4.9* 7.2* 6.3*   ALKPHOS 49 45 49     PT/INR: No results for input(s): PROTIME, INR in the last 72 hours. APTT:   Recent Labs     10/25/22  2012   APTT 36.0     BNP:  No results for input(s): BNP in the last 72 hours. CK, CKMB, Troponin: @LABRCNT (CKTOTAL:3, CKMB:3, TROPONINI:3)@    IMAGING:  CT ABDOMEN PELVIS W IV CONTRAST Additional Contrast? None    Result Date: 10/25/2022  NO PRIOR REPORT AVAILABLE Exam: CT OF THE ABDOMEN/PELVIS WITH IV CONTRAST Clinical data: Abdominal pain, fever. Recent abdominal surgery. Technique:Direct contiguousaxial CT images were acquired through the abdomen and pelvis with intravenouscontrastusing soft tissue and bone algorithms. Oral contrast was not administered. Reformatted/MPR images were performed. Radiation dose: CTDIvol =28.81 mGy, DLP =1627 mGy x cm. Limitations: Lack of oral contrast limits evaluation of the bowel loops. Prior Studies: Ultrasound of the right upper quadrant done on same day. CT scan of the abdomen/pelvis dated 10/20/22. Findings: Lung bases: Trace bilateral pleural effusion is noted with passive subsegmental bibasal atelectasis. Liver:Unremarkable size andcontour. Normal density. A 9 mm sized calcified nodule is seen in the segment V of liver, in keeping with calcified granuloma. No evidence of mass. No evidence of dilated ducts. Gallbladder Fossa: Multiple hyperdense, calcified gallbladder lumen calculi are noted with largest 9 mm in size.  Spleen: Multiple tiny calcified nodules are seen in the spleen, in keeping with an infective calcified granulomas. Pancreas:  Grossly unremarkable. Adrenal glands: Grossly unremarkable size, contour and density. Kidneys: In anatomic position. Grossly unremarkablerenal size, contour and density. No renal or ureteral calculi. No evidence of a renal mass or cyst. Perinephric space is unremarkable. Retroperitoneum: No enlarged retroperitoneal lymphadenopathy. Atheromatous changes are noted in the abdominal aorta. The IVC appears unremarkable. Peritoneal cavity: Extensive ascites is seen. Diffuse pneumoperitoneum is noted. Gastrointestinal tract: Post gastrostomy tube insertion status is seen. An abdominal drain is noted. Multiple diverticuli are seen in the sigmoid colon without any evidence of acute diverticulitis. Partial hemicolectomy. Numerous loops of small bowel demonstrate circumferential wall thickening and edema. Moderate colonic fecal contents, in keeping with constipation. No obstruction. Appendix:Nonvisualized appendix Pelvis: Solid and hollow viscera grossly unremarkable. Osseous structures: Spondylotic changes are seen in the lumbar spine. No acute or destructive bony process identified. Postsurgical status with midline anterior abdominal wall wound with surgical staples in situ. Acevedo's catheter is seen. 1. Post-surgery status in the abdomen. Moderate pneumoperitoneum 2. Status post partial colectomy. Diffuse wall thickening and edema of the small bowel suggesting infectious or inflammatory or ischemic process. Recommendation: Follow up as clinically indicated. All CT scans at this facility utilize dose modulation, iterative reconstruction, and/or weight based dosing when appropriate to reduce radiation dose to as low as reasonably achievable.  Electronically Signed by Lore Jackson DO at 25-Oct-2022 10:02:05 PM EST             CT ABDOMEN PELVIS W IV CONTRAST Additional Contrast? None    Result Date: 10/20/2022  CLINICAL HISTORY: 48-hour history of diffuse abdominal pain distention nausea vomiting some diarrhea no prior history COMPARISON: No comparison TECHNIQUE: Axial images of the abdomen and pelvis were obtained after the administration of IV contrast.Coronal and sagittal multiplanar reconstructions were performed. One or more of the following dose reduction techniques were used: Automated exposure control, adjustment of the mA and/or kV according to patient size, and/or use of iterative reconstruction technique. COMPARISON: None. FINDINGS: Statements: None. Lower Chest: Unremarkable. Hepatobiliary: Hepatic steatosis. Calcified granulomas throughout the liver. No focal lesion is identified. Marked distention of the gallbladder containing gallstones. No CT evidence of cholecystitis however. No biliary ductal dilatation is evident. Pancreas: The pancreatic parenchyma is unremarkable and there is no main duct dilatation. Spleen: The spleen is nonenlarged. Calcified granulomas throughout the spleen. Adrenals: No adrenal glands are symmetric. Genitourinary: The kidneys are symmetric and enhance appropriately. No suspicious focal parenchymal abnormality is identified in either kidney. There is no collecting system dilatation. The bladder is unremarkable, as imaged. The prostate gland appears unremarkable. Gastrointestinal: Diffuse fluid and air distention of the the entire small bowel and proximal large bowel measuring up to 4.3 cm in the left abdomen and up to 7.6 cm of the proximal colon. Transitional point seen at a site of masslike thickening of the hepatic flexure measuring 3.2 x 3.9 cm (series 2 image 35). Colonic diverticulosis without diverticulitis. The appendix appears unremarkable. Lymphatics: Multiple prominent and enlarged retroperitoneal lymph nodes. Largest is 1.5 cm in the left periaortic region (series 2 image 33). Vascular: Moderate calcific atherosclerosis. The abdominal aorta is normal in caliber. MSK/Body Wall: No concerning bony lesion is identified. Small fat-containing right inguinal hernia. Peritoneum/Other: There is no free fluid or abnormal collection. No free gas. 1.Irregular mass at the hepatic flexure of the colon resulting in high-grade small bowel and proximal large bowel obstruction. Severe fluid and gaseous distention of the entire stomach and small bowel including the distal esophagus. Findings are overall concerning for colonic neoplasm. Recommend GI and surgical consultation. 2.Scattered enlarged retroperitoneal lymph nodes. These are presumably metastatic if the colon mass proves to be malignant. 3.Marked hydropic distention of the gallbladder containing gallstones. No CT evidence of cholecystitis. Recommend correlation with LFTs. US GALLBLADDER RUQ    Result Date: 10/25/2022  Exam: Right upper quadrant ultrasound History: Elevated liver enzymes Comparison: Ultrasound dated October 21, 2022. Technique: Grayscale and color Doppler ultrasound images of the right upper quadrant were obtained. Findings: Pancreas: Visualized portions are unremarkable. Aorta and IVC: Visualized portions are unremarkable. Liver: Normal in size. Normal echogenicity. There is a calcification in the right hepatic lobe. Portal Vein: Patent with Doppler confirmed hepatopedal flow. Gallbladder: Gallstones are present. No significant wall thickening or pericholecystic fluid. Common bile duct: Normal. Right kidney: Normal in size. No hydronephrosis or focal lesion. Peritoneum: Small amount of free fluid in the right upper quadrant. 1.Cholelithiasis without sonographic signs of acute cholecystitis. 2.Small amount of right upper quadrant free fluid. US GALLBLADDER RUQ    Result Date: 10/21/2022  NO PRIOR REPORT AVAILABLE Exam: ULTRASOUND OF THE ABDOMEN LIMITED, RIGHT UPPER QUADRANT Clinical data: Elevated LFTs, sepsis, lactic acidosis. The patient is on ventilator. Technique: Real-time grayscale imaging of the right upper quadrant was performed.  Images supplemented with color Doppler technique. Prior studies: CT scan of abdomen and pelvis dated 10/20/22. Findings: Limited exam due to poor acoustic penetration. Diffuse increased echogenicity of the liver parenchyma. A shadowing focus//calcification in the right lobe measuring up 1.1 cm. No intrahepatic biliary distention. The gallbladder is distended with several small stones. The gallbladder wall thickness measures 0.8 cm. No sludge. The common bile duct is normal in caliber measuring 0.4cm. The right kidney [id number] x 6.6 x 5.4 cm. Normal echogenicity and cortical thickness are preserved. No evidence of renal masses, no calculi and no hydronephrosis Diffuse increased echogenicity of the pancreas. Imaged portion of the aorta demonstrates no acute pathology. Visualized portion of the inferior vena cava is unremarkable. 1.  Limited exam for reasons discussed above. 2.  Diffuse fatty infiltration of the liver with a 1.1 cm calcification in the right hepatic lobe. 3.  Distended gallbladder with several small stones and mild diffuse gallbladder wall thickening. 4.  No other significant abnormality. Recommendation: Follow up as clinically indicated. Electronically Signed by Ilsa Hayes MD at 21-Oct-2022 11:06:19 PM EST             XR CHEST PORTABLE    Result Date: 10/26/2022  NO PRIOR REPORT AVAILABLE [facility] Clinical data:Post-op intubation. Technique:Single view of the chest. Prior studies: Radiographs of the chest dated 10/20/2022. Findings:Interval ETT terminates 4.3 cm above the alfonso. Interval right IJ line terminates in the proximal SVC. Cardiomegaly, as before. Mild dependent atelectasis at the left base. The right lung is relatively clear. No pneumothorax or significant pleural effusions. Tubes and lines, as above. Mild dependent atelectasis at the left base. The right lung is relatively clear. No pneumothorax or significant pleural effusions. Recommendation: Follow up as clinically indicated. Electronically Signed by Ruthie May MD at 26-Oct-2022 07:52:25 AM EST             XR CHEST PORTABLE    Result Date: 10/20/2022  NO PRIOR REPORT AVAILABLE Exam: X-RAY OF THECHEST Clinical data:Confirmation of course of NG/OG/NE tube and location of tip of tube. Technique:Single view of the chest. Prior studies: Radiograph of the chest done on same day. Findings: The lungs are grossly clear; noevidence of acute infiltrate or pleural effusion. Cardiac silhouette is mildly enlarged. No acute osseous abnormality is detected. The NG tube can be traced to the level of distal esophagus. Scattered nonspecific gas-filled loops of bowel in the upper abdomen. The NG tube can be traced to the level of distal esophagus. Suggest advancing accordingly, with a follow-up radiograph. Recommendation: As above. Electronically Signed by Ruthie May MD at 20-Oct-2022 06:15:09 PM EST             XR CHEST PORTABLE    Result Date: 10/20/2022  NO PRIOR REPORT AVAILABLE Exam: X-RAY OF THECHEST Clinical data:Abdominal pain, vomiting. Technique:Single view of the chest. Prior studies: No prior studies submitted. Findings: The trachea is midline. The heart is enlarged. Mediastinal and pulmonary vascular shadows are normal.  There is no focal consolidation or effusion. The osseous structures are intact. Cardiomegaly. Recommendation: Follow up as clinically indicated.  Electronically Signed by Kim Singer MD at 20-Oct-2022 03:26:36 PM EST             EGD    Result Date: 10/23/2022  No dictation         Assessment:  ?Chronic atrial fibrillation with rapid ventricular response rate  Percutaneous endoscopic gastrostomy placement 10/23/2022  EGD 10/23/2022  Status post right hemicolectomy 10/21/2022  Small bowel obstruction  Hypertension  Hypothyroidism  Thrombocytopenia  History of alcohol abuse possible withdrawal  Febrile illness to 106 degrees today  Nonischemic cardiomyopathy  Colon mass  Postoperative ileus  Chronic kidney disease  Shortness of breath  Prior tobacco abuse  Obesity  Echocardiogram 3/14/2017 normal left ventricular function EF 55 to 19% grade 1 diastolic dysfunction mild concentric LVH  Admission 10/20/2022 for abdominal pain nausea and vomiting for 48 hours  Status post laparotomy with repair of anastomosis or leak 10/26/2022      Plan:  Continue current therapy cardiovascular status stable    Jose Garcia MD, MD 10/26/2022 5:39 PM

## 2022-10-26 NOTE — PROGRESS NOTES
Pharmacy Renal Adjustment    Brianna Hunt is a 77 y.o. male. Pharmacy has renally adjusted medications per protocol. Recent Labs     10/25/22  2012 10/26/22  0741   BUN 35* 42*       Recent Labs     10/25/22  2012 10/26/22  0741   CREATININE 1.8* 1.9*       Estimated Creatinine Clearance: 43 mL/min (A) (based on SCr of 1.9 mg/dL (H)). Height:   Ht Readings from Last 1 Encounters:   10/26/22 5' 10\" (1.778 m)     Weight:  Wt Readings from Last 1 Encounters:   10/26/22 199 lb 3.2 oz (90.4 kg)       CKD stage: SONIA         Baseline SCr: 0.8    Plan: Adjust the following medications based on renal function:    Famotidine IV or oral 20 mg twice daily adjusted to Famotidine IV or oral 20 mg daily. Metoclopramide 10 mg IV every 6 hours adjusted to metoclopramide 5 mg IV every 6 hours. Meropenem 1 gm IV every 8 hours adjusted to Meropenem 1 gm IV every 12 hours.      Electronically signed by Blossom Mtz, [facility] on 10/26/2022 at 8:08 AM

## 2022-10-26 NOTE — PROGRESS NOTES
Subjective:  Patient taken to OR by Dr. Marilu Tolentino last night after Ct scan showing free air and fluid. Operative note reviewed. Patient appears stable this morning, working on cpap trial to see about getting extubated. Objective:  BP 91/68   Pulse 76   Temp 96.9 °F (36.1 °C) (Temporal)   Resp 15   Ht 5' 10\" (1.778 m)   Wt 199 lb 3.2 oz (90.4 kg) Comment: bed wt; different bed from yesterdays wt.   SpO2 100%   BMI 28.58 kg/m²   Date 10/26/22 0000 - 10/26/22 2359   Shift 2345-7476 [id number] 4036-6625 24 Hour Total   INTAKE   I.V.(mL/kg) 1863.2(20.6)   1863.2(20.6)   IV Piggyback(mL/kg) 16.8(0.2)   16.8(0.2)   Shift Total(mL/kg) 1880(20.8)   1880(20.8)   OUTPUT   Urine(mL/kg/hr) 175(0.2) 105  280   Emesis/NG output(mL/kg) 40(0.4)   40(0.4)   Drains(mL/kg) 710(7.9) 350(3.9)  1060(11.7)   Shift Total(mL/kg) 925(10.2) 455(5)  1380(15.3)   Weight (kg) 90.4 90.4 90.4 90.4     General: NAD, comfortable on vent  Chest: regular, non-labored, HR improved  Abdomen: Soft, ND, serosang in drain, liquid stool in ostomy, gastric contents in gastrostomy tube, incision intact with no erythema    CBC:   Lab Results   Component Value Date/Time    WBC 12.9 10/26/2022 07:41 AM    RBC 4.18 10/26/2022 07:41 AM    HGB 12.9 10/26/2022 07:41 AM    HCT 40.0 10/26/2022 07:41 AM    MCV 95.7 10/26/2022 07:41 AM    MCH 30.9 10/26/2022 07:41 AM    MCHC 32.3 10/26/2022 07:41 AM    RDW 15.0 10/26/2022 07:41 AM    PLT 57 10/26/2022 07:41 AM    MPV 13.3 10/26/2022 07:41 AM     CMP:    Lab Results   Component Value Date/Time     10/26/2022 07:41 AM    K 3.5 10/26/2022 10:07 AM    K 4.0 10/26/2022 07:41 AM    K 3.7 10/25/2022 04:49 AM     10/26/2022 07:41 AM    CO2 19 10/26/2022 07:41 AM    BUN 42 10/26/2022 07:41 AM    CREATININE 1.9 10/26/2022 07:41 AM    GFRAA >59 04/15/2022 11:37 AM    LABGLOM 38 10/26/2022 07:41 AM    GLUCOSE 140 10/26/2022 07:41 AM    PROT 4.7 10/26/2022 07:41 AM    LABALBU 2.1 10/26/2022 07:41 AM    CALCIUM 7.0 10/26/2022 07:41 AM    BILITOT 6.3 10/26/2022 07:41 AM    ALKPHOS 49 10/26/2022 07:41 AM     10/26/2022 07:41 AM    ALT 92 10/26/2022 07:41 AM     Assessment and plan:  77year old male s/p open right hemicolectomy for obstructing colon cancer, s/p take back with washout, repair, and diverting ileostomy for anastomotic leak  1) Patient does appear more stable hemodynamically.  On some laurie, will order bolus as pt has had losses and likely needs more volume  2) Continue with trial to see about extubating, the pulmonary toilet-- pt extubated shortly after I left the unit  3) Continue Merrem for abdominal contamination  4) Continue PPI prophylaxis, recheck hgb and if stable start lovenox  5) Other cares per primary team  4) Bilirubin still elevated, agree with checking hepatitis panel per GI,

## 2022-10-26 NOTE — PROGRESS NOTES
Hospitalist Progress Note    Patient:  Raven Nolasco  YOB: 1955  Date of Service: 10/26/2022  MRN: [id number]   Acct: [de-identified]   Primary Care Physician: No primary care provider on file. Advance Directive: DNR  Admit Date: 10/20/2022       Hospital Day: 6  Referring Provider: Merced Fernando MD    Patient Seen, Chart, Consults, Notes, Labs, Radiology studies reviewed. Subjective:  Raven Nolasco is a 77 y.o. male  whom we are following for respiratory failure, SBO status post right hemicolectomy for obstructing colon cancer, nonischemic cardiomyopathy, A. fib intermittently in RVR    He underwent an open right hemicolectomy on 10/21 however course complicated after developed sepsis with fevers, leukocytosis with CT showing large volume peritoneal fluid with large pneumoperitoneum, septic shock with peritonitis requiring volume resuscitation, vasopressor support and antibiotics on meropenem. Patient was reintubated for surgery. Additionally intermittently in A. fib with RVR requiring IV AV meaghan blockade. Patient taken back to the OR for repair of anastomotic leak, washout with diverting ileostomy. Patient seen and examined this AM while on mechanical ventilation. Seems to be able to follow some commands off sedation. Remains on some vasopressor support. Allergies:  Patient has no known allergies.     Medicines:  Current Facility-Administered Medications   Medication Dose Route Frequency Provider Last Rate Last Admin    chlorhexidine (PERIDEX) 0.12 % solution 15 mL  15 mL Mouth/Throat BID Sofia Lovett MD   15 mL at 10/26/22 0925    dexmedetomidine (PRECEDEX) 400 mcg in sodium chloride 0.9 % 100 mL infusion  0.2-1.4 mcg/kg/hr IntraVENous Continuous Sofia Lovett MD 5 mL/hr at 10/26/22 0224 0.2 mcg/kg/hr at 10/26/22 0224    phenylephrine (MARIE-SYNEPHRINE) 50 mg in dextrose 5 % 250 mL infusion   mcg/min IntraVENous Continuous Gabriel Kunz MD 9 mL/hr at 10/26/22 1000 30 mcg/min at 10/26/22 1000    famotidine (PEPCID) 20 mg in sodium chloride (PF) 10 mL injection  20 mg IntraVENous Daily Faiza Gauthier, DO        metoclopramide (REGLAN) injection 5 mg  5 mg IntraVENous Q6H Killian Mcfadden MD   5 mg at 10/26/22 1200    meropenem (MERREM) 1000 mg in sodium chloride 0.9% 50 mL (duplex)  1,000 mg IntraVENous Q12H Killian Mcfadden MD        BPCO OINT 1 Dose  1 Dose Apply externally BID Killian Mcfadden MD   1 Dose at 10/26/22 0900    dilTIAZem HCl in sodium chloride 125 mg / 125 mL infusion  2.5-15 mg/hr IntraVENous Continuous Killian Mcfadden MD   Stopped at 10/25/22 2226    sodium phosphate 33.33 mmol in sodium chloride 0.9 % 250 mL IVPB  0.32 mmol/kg IntraVENous PRN Faiza Gauthier, DO        sodium phosphate 10 mmol in sodium chloride 0.9 % 250 mL IVPB  10 mmol IntraVENous PRN Faiza Gauthier, DO        Or    sodium phosphate 15 mmol in sodium chloride 0.9 % 250 mL IVPB  15 mmol IntraVENous PRN Faiza Abrahan, DO        Or    sodium phosphate 20 mmol in sodium chloride 0.9 % 500 mL IVPB  20 mmol IntraVENous PRN Faiza Gauthier, DO        [Held by provider] digoxin (LANOXIN) injection 125 mcg  125 mcg IntraVENous Daily Faiza Gauthier, DO   125 mcg at 10/25/22 0813    metoprolol (LOPRESSOR) injection 5 mg  5 mg IntraVENous Q4H Faizaadolph Gauthier, DO   5 mg at 10/25/22 2032    sodium bicarbonate 50 mEq in dextrose 5 % and 0.2 % NaCl 1,000 mL infusion   IntraVENous Continuous Faizaadolph Voon,  mL/hr at 10/26/22 1135 New Bag at 10/26/22 1135    labetalol (NORMODYNE;TRANDATE) injection 10 mg  10 mg IntraVENous Q4H PRN Killian Mcfadden MD   10 mg at 10/24/22 1408    thiamine (B-1) injection 100 mg  100 mg IntraVENous Daily Faizaadolph Gauthier, DO   100 mg at 10/26/22 0900    LORazepam (ATIVAN) injection 0.5 mg  0.5 mg IntraVENous Q6H PRN Faiza Gauthier, DO   0.5 mg at 10/25/22 0848    HYDROmorphone HCl PF (DILAUDID) injection 2 mg  2 mg IntraVENous Q2H PRN Jodene Bridges, DO   2 mg at 10/25/22 2041    Or    HYDROmorphone HCl PF (DILAUDID) injection 1 mg  1 mg IntraVENous Q2H PRN Jodene Bridges, DO   1 mg at 10/26/22 1457    Or    HYDROmorphone HCl PF (DILAUDID) injection 0.5 mg  0.5 mg IntraVENous Q2H PRN Jodene Bridges, DO        HYDROmorphone HCl PF (DILAUDID) injection 2 mg  2 mg IntraVENous Once Jodene Bridges, DO        sodium chloride flush 0.9 % injection 5-40 mL  5-40 mL IntraVENous 2 times per day Jodene Bridges, DO   10 mL at 10/26/22 0900    sodium chloride flush 0.9 % injection 5-40 mL  5-40 mL IntraVENous PRN Jodene Bridges, DO        0.9 % sodium chloride infusion   IntraVENous PRN Jodene Bridges, DO        glucose chewable tablet 16 g  4 tablet Oral PRN Jodene Bridges, DO        dextrose bolus 10% 125 mL  125 mL IntraVENous PRN Jodene Bridges, DO        Or    dextrose bolus 10% 250 mL  250 mL IntraVENous PRN Jodene Bridges, DO        glucagon (rDNA) injection 1 mg  1 mg SubCUTAneous PRN Jodene Bridges, DO        dextrose 10 % infusion   IntraVENous Continuous PRN Jodene Bridges, DO        potassium chloride (KLOR-CON M) extended release tablet 40 mEq  40 mEq Oral PRN Jodene Bridges, DO        Or    potassium bicarb-citric acid (EFFER-K) effervescent tablet 40 mEq  40 mEq Oral PRN Jodene Bridges, DO        Or    potassium chloride 10 mEq/100 mL IVPB (Peripheral Line)  10 mEq IntraVENous PRN Jodene Bridges, DO        ondansetron (ZOFRAN-ODT) disintegrating tablet 4 mg  4 mg Oral Q8H PRN Jodene Bridges, DO        Or    ondansetron [facility]) injection 4 mg  4 mg IntraVENous Q6H PRN Jodene Bridges, DO        magnesium hydroxide (MILK OF MAGNESIA) 400 MG/5ML suspension 30 mL  30 mL Oral Daily PRN Jodene Bridges, DO   30 mL at 10/24/22 1609    acetaminophen (TYLENOL) tablet 650 mg  650 mg Oral Q6H PRN Jodene Bridges, DO        Or acetaminophen (TYLENOL) suppository 650 mg  650 mg Rectal Q6H PRN Khloe Isaac DO   650 mg at 10/25/22 1746    insulin lispro (HUMALOG) injection vial 0-4 Units  0-4 Units SubCUTAneous TID WC Khloe Isaac DO        insulin lispro (HUMALOG) injection vial 0-4 Units  0-4 Units SubCUTAneous Nightly Khloe Isaac DO           Past Medical History:  Past Medical History:   Diagnosis Date    Ex-cigarette smoker 10/03/2016    Hypertension     Non-ischemic cardiomyopathy ([facility] [address] 10/03/2016    9/28/2016  Echo  EF 25% 10/3/16  Cath  Mild CAD, EF 10%     Palliative care patient 10/24/2022    Paroxysmal atrial fibrillation ([facility] [address] 03/16/2018       Past Surgical History:  Past Surgical History:   Procedure Laterality Date    FINGER AMPUTATION Left     partial left thumb amputation following trauma    GASTROSTOMY TUBE PLACEMENT  10/23/2022    Dr Sapna Mello N/A 10/21/2022    RIGHT HEMICOLECTOMY WITH GASTROSTOMY PLACEMENT performed by Eleni Rose MD at [address] N/A 10/25/2022    LAPAROTOMY, REPAIR OF ANASTAMOSAL LEAK, CREATION OF DIVERTING ILEOSTOMY performed by Bibiana Milton MD at [place] [address] ENDOSCOPY N/A 10/23/2022    EGD ESOPHAGOGASTRODUODENOSCOPY PEG TUBE INSERTION performed by Cristina Issa MD at [address] [facility] End[facility]       Family History  Family History   Problem Relation Age of Onset    High Blood Pressure Mother     Cancer Father         Stomach    Heart Disease Maternal Grandmother     Diabetes Maternal Uncle        Social History  Social History     Socioeconomic History    Marital status:      Spouse name: Not on file    Number of children: Not on file    Years of education: Not on file    Highest education level: Not on file   Occupational History    Not on file   Tobacco Use    Smoking status: Former     Packs/day: 1.00     Years: 2.00     Pack years: 2.00     Types: Cigarettes     Start date: 200     Quit date: 1992 Years since quittin.8    Smokeless tobacco: Never    Tobacco comments:     light smoking history - socially   Vaping Use    Vaping Use: Never used   Substance and Sexual Activity    Alcohol use: Yes    Drug use: No    Sexual activity: Not on file   Other Topics Concern    Not on file   Social History Narrative    Not on file     Social Determinants of Health     Financial Resource Strain: Not on file   Food Insecurity: Not on file   Transportation Needs: Not on file   Physical Activity: Not on file   Stress: Not on file   Social Connections: Not on file   Intimate Partner Violence: Not on file   Housing Stability: Not on file         Review of Systems:    Review of Systems   Unable to perform ROS: Mental status change         Objective:  Blood pressure 95/72, pulse 83, temperature 96.9 °F (36.1 °C), temperature source Temporal, resp. rate 18, height 5' 10\" (1.778 m), weight 199 lb 3.2 oz (90.4 kg), SpO2 96 %. Intake/Output Summary (Last 24 hours) at 10/26/2022 1532  Last data filed at 10/26/2022 1341  Gross per 24 hour   Intake 5362.18 ml   Output 2900 ml   Net 2462.18 ml         Physical Exam  Vitals and nursing note reviewed. Constitutional:       Appearance: He is ill-appearing. HENT:      Head: Normocephalic and atraumatic. Eyes:      General: No scleral icterus. Conjunctiva/sclera: Conjunctivae normal.   Cardiovascular:      Rate and Rhythm: Tachycardia present. Rhythm irregular. Heart sounds: Normal heart sounds. Pulmonary:      Effort: Pulmonary effort is normal.      Breath sounds: Normal breath sounds. Abdominal:      General: Abdomen is flat. Palpations: Abdomen is soft. Tenderness: There is abdominal tenderness. Musculoskeletal:         General: No swelling. Cervical back: No rigidity. Right lower leg: No edema. Left lower leg: No edema. Skin:     General: Skin is warm and dry. Neurological:      Comments:  Follows commands off sedation Labs:  BMP:   Recent Labs     10/24/22  0117 10/25/22  0449 10/25/22  2012 10/26/22  0741 10/26/22  1007    145 145 140  --    K 4.1 3.7 3.5 4.0 3.5   * 110 107 110  --    CO2 20* 27 22 19*  --    PHOS 1.9*  --   --   --   --    BUN 23 22 35* 42*  --    CREATININE 0.7 0.8 1.8* 1.9*  --    CALCIUM 7.4* 7.8* 7.4* 7.0*  --        CBC:   Recent Labs     10/25/22  0449 10/25/22  2012 10/26/22  0741   WBC 6.3 10.1 12.9*   HGB 13.2* 15.0 12.9*   HCT 41.2* 45.9 40.0*   MCV 95.6* 94.1* 95.7*   PLT 82* 85* 57*       LIVER PROFILE:   Recent Labs     10/25/22  0449 10/25/22  2012 10/26/22  0741   AST 79* 232* 426*   ALT 34 61* 92*   LIPASE 37  --   --    BILIDIR 3.8*  --   --    BILITOT 5.0*  4.9* 7.2* 6.3*   ALKPHOS 49 45 49       PT/INR: No results for input(s): PROTIME, INR in the last 72 hours. APTT:   Recent Labs     10/25/22  2012   APTT 36.0     BNP:  No results for input(s): BNP in the last 72 hours. Ionized Calcium:No results for input(s): IONCA in the last 72 hours. Magnesium:  Recent Labs     10/26/22  0741   MG 2.2     Phosphorus:  Recent Labs     10/24/22  0117   PHOS 1.9*       HgbA1C: No results for input(s): LABA1C in the last 72 hours. Hepatic:   Recent Labs     10/25/22  0449 10/25/22  2012 10/26/22  0741   ALKPHOS 49 45 49   ALT 34 61* 92*   AST 79* 232* 426*   PROT 5.4* 5.2* 4.7*   BILITOT 5.0*  4.9* 7.2* 6.3*   BILIDIR 3.8*  --   --    LABALBU 2.5* 2.3* 2.1*       Lactic Acid: No results for input(s): LACTA in the last 72 hours. Troponin: No results for input(s): CKTOTAL, CKMB, TROPONINT in the last 72 hours. ABGs: No results for input(s): PH, PCO2, PO2, HCO3, O2SAT in the last 72 hours. CRP:  No results for input(s): CRP in the last 72 hours. Sed Rate:  No results for input(s): SEDRATE in the last 72 hours. Cultures:   No results for input(s): CULTURE in the last 72 hours. No results for input(s): BC, Emelyn Dage in the last 72 hours.   No results for input(s): CXSURG in the last 72 hours. Radiology reports as per the Radiologist  Radiology: CT ABDOMEN PELVIS W IV CONTRAST Additional Contrast? None    Result Date: 10/20/2022  CLINICAL HISTORY: 48-hour history of diffuse abdominal pain distention nausea vomiting some diarrhea no prior history COMPARISON: No comparison TECHNIQUE: Axial images of the abdomen and pelvis were obtained after the administration of IV contrast.Coronal and sagittal multiplanar reconstructions were performed. One or more of the following dose reduction techniques were used: Automated exposure control, adjustment of the mA and/or kV according to patient size, and/or use of iterative reconstruction technique. COMPARISON: None. FINDINGS: Statements: None. Lower Chest: Unremarkable. Hepatobiliary: Hepatic steatosis. Calcified granulomas throughout the liver. No focal lesion is identified. Marked distention of the gallbladder containing gallstones. No CT evidence of cholecystitis however. No biliary ductal dilatation is evident. Pancreas: The pancreatic parenchyma is unremarkable and there is no main duct dilatation. Spleen: The spleen is nonenlarged. Calcified granulomas throughout the spleen. Adrenals: No adrenal glands are symmetric. Genitourinary: The kidneys are symmetric and enhance appropriately. No suspicious focal parenchymal abnormality is identified in either kidney. There is no collecting system dilatation. The bladder is unremarkable, as imaged. The prostate gland appears unremarkable. Gastrointestinal: Diffuse fluid and air distention of the the entire small bowel and proximal large bowel measuring up to 4.3 cm in the left abdomen and up to 7.6 cm of the proximal colon. Transitional point seen at a site of masslike thickening of the hepatic flexure measuring 3.2 x 3.9 cm (series 2 image 35). Colonic diverticulosis without diverticulitis. The appendix appears unremarkable. Lymphatics: Multiple prominent and enlarged retroperitoneal lymph nodes. Largest is 1.5 cm in the left periaortic region (series 2 image 33). Vascular: Moderate calcific atherosclerosis. The abdominal aorta is normal in caliber. MSK/Body Wall: No concerning bony lesion is identified. Small fat-containing right inguinal hernia. Peritoneum/Other: There is no free fluid or abnormal collection. No free gas. 1.Irregular mass at the hepatic flexure of the colon resulting in high-grade small bowel and proximal large bowel obstruction. Severe fluid and gaseous distention of the entire stomach and small bowel including the distal esophagus. Findings are overall concerning for colonic neoplasm. Recommend GI and surgical consultation. 2.Scattered enlarged retroperitoneal lymph nodes. These are presumably metastatic if the colon mass proves to be malignant. 3.Marked hydropic distention of the gallbladder containing gallstones. No CT evidence of cholecystitis. Recommend correlation with LFTs. US GALLBLADDER RUQ    Result Date: 10/21/2022  NO PRIOR REPORT AVAILABLE Exam: ULTRASOUND OF THE ABDOMEN LIMITED, RIGHT UPPER QUADRANT Clinical data: Elevated LFTs, sepsis, lactic acidosis. The patient is on ventilator. Technique: Real-time grayscale imaging of the right upper quadrant was performed. Images supplemented with color Doppler technique. Prior studies: CT scan of abdomen and pelvis dated 10/20/22. Findings: Limited exam due to poor acoustic penetration. Diffuse increased echogenicity of the liver parenchyma. A shadowing focus//calcification in the right lobe measuring up 1.1 cm. No intrahepatic biliary distention. The gallbladder is distended with several small stones. The gallbladder wall thickness measures 0.8 cm. No sludge. The common bile duct is normal in caliber measuring 0.4cm. The right kidney sctuuiyo64.7 x 6.6 x 5.4 cm. Normal echogenicity and cortical thickness are preserved. No evidence of renal masses, no calculi and no hydronephrosis Diffuse increased echogenicity of the pancreas.  Imaged portion of the aorta demonstrates no acute pathology. Visualized portion of the inferior vena cava is unremarkable. 1.  Limited exam for reasons discussed above. 2.  Diffuse fatty infiltration of the liver with a 1.1 cm calcification in the right hepatic lobe. 3.  Distended gallbladder with several small stones and mild diffuse gallbladder wall thickening. 4.  No other significant abnormality. Recommendation: Follow up as clinically indicated. Electronically Signed by Brandon Delarosa MD at 21-Oct-2022 11:06:19 PM EST             XR CHEST PORTABLE    Result Date: 10/20/2022  NO PRIOR REPORT AVAILABLE Exam: X-RAY OF [facility] Clinical data:Confirmation of course of NG/OG/NE tube and location of tip of tube. Technique:Single view of the chest. Prior studies: Radiograph of the chest done on same day. Findings: The lungs are grossly clear; noevidence of acute infiltrate or pleural effusion. Cardiac silhouette is mildly enlarged. No acute osseous abnormality is detected. The NG tube can be traced to the level of distal esophagus. Scattered nonspecific gas-filled loops of bowel in the upper abdomen. The NG tube can be traced to the level of distal esophagus. Suggest advancing accordingly, with a follow-up radiograph. Recommendation: As above. Electronically Signed by Nora Capone MD at 20-Oct-2022 06:15:09 PM EST             XR CHEST PORTABLE    Result Date: 10/20/2022  NO PRIOR REPORT AVAILABLE [facility] data:Abdominal pain, vomiting. Technique:Single view of the chest. Prior studies: No prior studies submitted. Findings: The trachea is midline. The heart is enlarged. Mediastinal and pulmonary vascular shadows are normal.  There is no focal consolidation or effusion. The osseous structures are intact. Cardiomegaly. Recommendation: Follow up as clinically indicated.  Electronically Signed by Theressa Severe MD at 20-Oct-2022 03:26:36 PM EST                Assessment     SBO (small bowel obstruction). S/P right hemicolectomy for obstructing colon cancer  Colonic mass, pathology pending    Acute hypoxemic respiratory failure requiring intubation  On mechanical ventilatory support today  Weaned FiO2 and PEEP  Follow commands off sedation  - Extubate today    Septic shock due to peritonitis with anastomotic leak  - Status post repair of anastomotic leak, washout and diverting ileostomy  -- Volume resuscitation  - Continue vasopressor support to maintain MAP over 60-65, wean vasopressor support as able  - Continue broad-spectrum IV antibiotics with intra-abdominal coverage, currently on meropenem    Paroxysmal A. fib intermittently in RVR  - Monitor telemetry  - Rate control, AV meaghan blockade on IV meds as long as can tolerate hemodynamically while on pressor support  --If requires increased vasopressor support would switch to amiodarone  -Temporarily held digoxin due to renal function    SONIA, prerenal versus ATN from sepsis  - Follow urine output and labs closely    Continue medical management of other comorbidities including hypertension, nonischemic cardiomyopathy      Total of 39 minutes of critical care time. Time spent exclusive of any procedures.       Armando Billingsley MD

## 2022-10-26 NOTE — PROGRESS NOTES
[facility] General Surgery    Progress Note    Mr. Guerrero Lowery underwent a difficult open right hemicolectomy by Duy Tao and Warren on 10/21/2022. He did well over the weekend but earlier today took a turn for the worse. He has had high fevers as well as confusion. Overall picture is one of sepsis. CT scan of the abdomen and pelvis was obtained earlier this evening and shows a large volume of peritoneal fluid in association with a large pneumoperitoneum. Overall picture is that of an anastomotic leak with abdominal contamination and developing peritonitis and sepsis. I spoke briefly with the patient's daughter by phone and explained that he was not doing well and likely required return to the operating room for control of the leak. She is on her way to the hospital so I can speak with her further and obtain consent for the procedure. An operating room has been requested. We will plan for laparotomy, washout and likely diverting ostomy.     Electronically signed by Domi Caputo MD on 10/25/2022 at 10:03 PM

## 2022-10-26 NOTE — PROGRESS NOTES
Patient looking better after Laparotomy with repair of anastomotic leak creation of diverting ileostomy, non-toxic appearance this AM, fever down on Meropenem, hypotension resolving and weaning off Samm, urine output adequate, AF rate controlled on Digoxin and Metoprolol IV and Cardizem drip no longer needed, intubated with FIO2 40%, lungs clear, no gallop, no bowel sounds, abdominal distention better, dark green bilious fluid in ostomy bag, hgb 12.9 gm stable with no signs of GI bleeding so consider restarting Heparin drip, persistent elevated LFT's and US showing cholelithiasis present but no cholecystitis, CBD dilation or hepatic vein thrombosis, suspect some element of alcoholic liver disease and shock liver versus metastatic disease. Checking Viral hepatitis serology.

## 2022-10-26 NOTE — ANESTHESIA PROCEDURE NOTES
Central Venous Line:    A central venous line was placed using ultrasound guidance, in the OR for the following indication(s): central venous access. Sterility preparation included the following: hand hygiene performed prior to procedure, maximum sterile barriers used and sterile technique used to drape from head to toe. The patient was placed in Trendelenburg position. The right internal jugular vein was prepped. The site was prepped with Chloraprep. A 7 Fr (size), 16 (length), triple lumen was placed. During the procedure, the following specific steps were taken: target vein identified, needle advanced into vein and blood aspirated and guidewire advanced into vein. Intravenous verification was obtained by venous blood return. Post insertion care included: all ports aspirated, all ports flushed easily, guidewire removed intact, Biopatch applied, line sutured in place and dressing applied. During the procedure the patient experienced: patient tolerated procedure well with no complications.       Insertion site scrubbed per usage guidelines?: Yes  Outcomes: patient tolerated procedure well and uncomplicated  Real-time US image taken/store: yes  Anesthesia type: general..No  Staffing  Anesthesiologist: Zuri Luna MD  Preanesthetic Checklist  Completed: patient identified, IV checked, site marked, risks and benefits discussed, surgical/procedural consents, equipment checked, pre-op evaluation, timeout performed, anesthesia consent given, oxygen available, monitors applied/VS acknowledged, fire risk safety assessment completed and verbalized and blood product R/B/A discussed and consented

## 2022-10-26 NOTE — PLAN OF CARE
Nutrition Problem #1: Inadequate oral intake, Increased nutrient needs  Intervention: Food and/or Nutrient Delivery: Continue NPO  Nutritional

## 2022-10-26 NOTE — PROGRESS NOTES
Patient name: Madison Phoenix  Patient : 1955  10/26/2022  6:15 AM  ROOM 146    Portions of this note have been copied forward, however, changed to reflect the most current clinical status of this patient. Subjective: Rectal temperature 106.9 yesterday. Patient taken back to the OR for laparotomy, repair of anastomotic leak and creation of diverting ileostomy earlier this a.m. Currently remains intubated. Hematologically stable, hgb 12.9, platelets currently 82,049. No obvious bleeding. HISTORY OF PRESENT ILLNESS:   Maraih Salas was last seen by Chel Gu on 10/1/2022. The patient has a history of hypertension, atrial fibrillation. He presented ER department with complaints of worsening abdominal pain with associated nausea and vomiting. 10/20/2022-CT abdomen pelvis showed diffuse fluid and air distention of the the entire small bowel and proximal large bowel measuring up to 4.3 cm in the left abdomen and up to 7.6 cm of the proximal colon. Transitional point seen at a site of masslike thickening of the hepatic flexure measuring 3.2 x 3.9 cm (series 2 image 35). Multiple prominent and enlarged retroperitoneal lymph nodes. Largest is 1.5 cm in the left periaortic region (series 2 image 33). 10/21/2022: Colectomy with gastrostomy placement by Dr. Ghislaine Madrid   10/26/2022: laparotomy, repair of anastomotic leak and creation of diverting ileostomy by Dr. Navin Espinosa    OBJECTIVE:  VITALS: /78   Pulse 88   Temp 97 °F (36.1 °C) (Temporal)   Resp 14   Ht 5' 10\" (1.778 m)   Wt 199 lb 3.2 oz (90.4 kg) Comment: bed wt; different bed from yesterdays wt. SpO2 98%   BMI 28.58 kg/m²   I&O:   Intake/Output Summary (Last 24 hours) at 10/26/2022 0615  Last data filed at 10/26/2022 0400  Gross per 24 hour   Intake 4245.97 ml   Output 2075 ml   Net 2170.97 ml     PHYSICAL EXAM:  CONSTITUTIONAL: intubated. Does not converse  EYES: pupils equal round   NECK: Supple, no masses.   No JVD  CHEST/LUNGS: intubated  CARDIOVASCULAR: A. fib, RVR. Heart rate improved, 97  ABDOMEN: Midline abdominal dressing intact. Diverting ileostomy with dark loose stool. TRISTAN drain to suction with large amount serosanguineous drainage  EXTREMITIES: warm, trace edema  SKIN: warm, dry with no rashes or lesions  NEUROLOGIC: Intubated  PSYCH: Unable to assess    BMP:   Recent Labs     10/25/22  0449 10/25/22  2012    145   K 3.7 3.5    107   CO2 27 22   BUN 22 35*   CREATININE 0.8 1.8*   GLUCOSE 135* 180*   CALCIUM 7.8* 7.4*     Recent Labs     10/25/22  2012 10/25/22  0449 10/24/22  0117   WBC 10.1 6.3 5.5   HGB 15.0 13.2* 11.9*   HCT 45.9 41.2* 37.4*   MCV 94.1* 95.6* 97.4*   PLT 85* 82* 69*     CMP:    Recent Labs     10/25/22  0449 10/25/22  2012    145   K 3.7 3.5    107   CO2 27 22   BUN 22 35*   CREATININE 0.8 1.8*   LABGLOM >60 41*   GLUCOSE 135* 180*   PROT 5.4* 5.2*   LABALBU 2.5* 2.3*   CALCIUM 7.8* 7.4*   BILITOT 5.0*  4.9* 7.2*   ALKPHOS 49 45   AST 79* 232*   ALT 34 61*     30Day lookback of cultures:    Blood Culture Recent: No results for input(s): BC in the last 720 hours. Gram Stain Recent: No results for input(s): LABGRAM in the last 720 hours. Resp Culture Recent: No results for input(s): CULTRESP in the last 720 hours. Body Fluid Recent : No results for input(s): BFCX in the last 720 hours. MRSA Recent : No results for input(s): [facility] in the last 720 hours. Urine Culture Recent : No results for input(s): LABURIN in the last 720 hours. Organism Recent : No results for input(s): ORG in the last 720 hours.      Radiology:   CT ABDOMEN PELVIS W IV CONTRAST Additional Contrast? None    Result Date: 10/20/2022  CLINICAL HISTORY: 48-hour history of diffuse abdominal pain distention nausea vomiting some diarrhea no prior history COMPARISON: No comparison TECHNIQUE: Axial images of the abdomen and pelvis were obtained after the administration of IV contrast.Coronal and sagittal multiplanar reconstructions were performed. One or more of the following dose reduction techniques were used: Automated exposure control, adjustment of the mA and/or kV according to patient size, and/or use of iterative reconstruction technique. COMPARISON: None. FINDINGS: Statements: None. Lower Chest: Unremarkable. Hepatobiliary: Hepatic steatosis. Calcified granulomas throughout the liver. No focal lesion is identified. Marked distention of the gallbladder containing gallstones. No CT evidence of cholecystitis however. No biliary ductal dilatation is evident. Pancreas: The pancreatic parenchyma is unremarkable and there is no main duct dilatation. Spleen: The spleen is nonenlarged. Calcified granulomas throughout the spleen. Adrenals: No adrenal glands are symmetric. Genitourinary: The kidneys are symmetric and enhance appropriately. No suspicious focal parenchymal abnormality is identified in either kidney. There is no collecting system dilatation. The bladder is unremarkable, as imaged. The prostate gland appears unremarkable. Gastrointestinal: Diffuse fluid and air distention of the the entire small bowel and proximal large bowel measuring up to 4.3 cm in the left abdomen and up to 7.6 cm of the proximal colon. Transitional point seen at a site of masslike thickening of the hepatic flexure measuring 3.2 x 3.9 cm (series 2 image 35). Colonic diverticulosis without diverticulitis. The appendix appears unremarkable. Lymphatics: Multiple prominent and enlarged retroperitoneal lymph nodes. Largest is 1.5 cm in the left periaortic region (series 2 image 33). Vascular: Moderate calcific atherosclerosis. The abdominal aorta is normal in caliber. MSK/Body Wall: No concerning bony lesion is identified. Small fat-containing right inguinal hernia. Peritoneum/Other: There is no free fluid or abnormal collection. No free gas.     1.Irregular mass at the hepatic flexure of the colon resulting in high-grade small bowel and proximal large bowel obstruction. Severe fluid and gaseous distention of the entire stomach and small bowel including the distal esophagus. Findings are overall concerning for colonic neoplasm. Recommend GI and surgical consultation. 2.Scattered enlarged retroperitoneal lymph nodes. These are presumably metastatic if the colon mass proves to be malignant. 3.Marked hydropic distention of the gallbladder containing gallstones. No CT evidence of cholecystitis. Recommend correlation with LFTs. US GALLBLADDER RUQ    Result Date: 10/21/2022  NO PRIOR REPORT AVAILABLE Exam: ULTRASOUND OF THE ABDOMEN LIMITED, RIGHT UPPER QUADRANT Clinical data: Elevated LFTs, sepsis, lactic acidosis. The patient is on ventilator. Technique: Real-time grayscale imaging of the right upper quadrant was performed. Images supplemented with color Doppler technique. Prior studies: CT scan of abdomen and pelvis dated 10/20/22. Findings: Limited exam due to poor acoustic penetration. Diffuse increased echogenicity of the liver parenchyma. A shadowing focus//calcification in the right lobe measuring up 1.1 cm. No intrahepatic biliary distention. The gallbladder is distended with several small stones. The gallbladder wall thickness measures 0.8 cm. No sludge. The common bile duct is normal in caliber measuring 0.4cm. The right kidney ifxrrdwg06.7 x 6.6 x 5.4 cm. Normal echogenicity and cortical thickness are preserved. No evidence of renal masses, no calculi and no hydronephrosis Diffuse increased echogenicity of the pancreas. Imaged portion of the aorta demonstrates no acute pathology. Visualized portion of the inferior vena cava is unremarkable. 1.  Limited exam for reasons discussed above. 2.  Diffuse fatty infiltration of the liver with a 1.1 cm calcification in the right hepatic lobe. 3.  Distended gallbladder with several small stones and mild diffuse gallbladder wall thickening. 4.  No other significant abnormality.  Recommendation: Follow up as clinically indicated. Electronically Signed by Gloria Phoenix MD at 21-Oct-2022 11:06:19 PM EST             XR CHEST PORTABLE    Result Date: 10/20/2022  NO PRIOR REPORT AVAILABLE Exam: X-RAY OF [facility] Clinical data:Confirmation of course of NG/OG/NE tube and location of tip of tube. Technique:Single view of the chest. Prior studies: Radiograph of the chest done on same day. Findings: The lungs are grossly clear; noevidence of acute infiltrate or pleural effusion. Cardiac silhouette is mildly enlarged. No acute osseous abnormality is detected. The NG tube can be traced to the level of distal esophagus. Scattered nonspecific gas-filled loops of bowel in the upper abdomen. The NG tube can be traced to the level of distal esophagus. Suggest advancing accordingly, with a follow-up radiograph. Recommendation: As above. Electronically Signed by Chao Carrasco MD at 20-Oct-2022 06:15:09 PM EST             XR CHEST PORTABLE    Result Date: 10/20/2022  NO PRIOR REPORT AVAILABLE Exam: X-RAY OF [facility] Clinical data:Abdominal pain, vomiting. Technique:Single view of the chest. Prior studies: No prior studies submitted. Findings: The trachea is midline. The heart is enlarged. Mediastinal and pulmonary vascular shadows are normal.  There is no focal consolidation or effusion. The osseous structures are intact. Cardiomegaly. Recommendation: Follow up as clinically indicated.  Electronically Signed by Stephanie Torres MD at 20-Oct-2022 03:26:36 PM EST               Medications  Current Facility-Administered Medications   Medication Dose Route Frequency Provider Last Rate Last Admin    chlorhexidine (PERIDEX) 0.12 % solution 15 mL  15 mL Mouth/Throat BID Carlos Cuevas MD   15 mL at 10/26/22 0242    dexmedetomidine (PRECEDEX) 400 mcg in sodium chloride 0.9 % 100 mL infusion  0.2-1.4 mcg/kg/hr IntraVENous Continuous Carlos Cuevas MD 5 mL/hr at 10/26/22 0224 0.2 mcg/kg/hr at 10/26/22 0224    phenylephrine (MARIE-SYNEPHRINE) 50 mg in dextrose 5 % 250 mL infusion   mcg/min IntraVENous Continuous Elizabeth Flores MD 6 mL/hr at 10/26/22 0610 20 mcg/min at 10/26/22 0610    BPCO OINT 1 Dose  1 Dose Apply externally BID Domi Caputo MD   1 Dose at 10/25/22 2033    dilTIAZem HCl in sodium chloride 125 mg / 125 mL infusion  2.5-15 mg/hr IntraVENous Continuous Domi Caputo MD   Stopped at 10/25/22 2226    meropenem (MERREM) 1000 mg in sodium chloride 0.9% 50 mL (duplex)  1,000 mg IntraVENous Q8H Domi Caputo MD 16.7 mL/hr at 10/26/22 0459 1,000 mg at 10/26/22 0459    sodium phosphate 33.33 mmol in sodium chloride 0.9 % 250 mL IVPB  0.32 mmol/kg IntraVENous PRN Dwayne Slocumb, DO        sodium phosphate 10 mmol in sodium chloride 0.9 % 250 mL IVPB  10 mmol IntraVENous PRN Dwayne Slocumb, DO        Or    sodium phosphate 15 mmol in sodium chloride 0.9 % 250 mL IVPB  15 mmol IntraVENous PRN Dwayne Slocumb, DO        Or    sodium phosphate 20 mmol in sodium chloride 0.9 % 500 mL IVPB  20 mmol IntraVENous PRN Dwayne Slocumb, DO        digoxin [facility]) injection 125 mcg  125 mcg IntraVENous Daily Dwayne Slocumb, DO   125 mcg at 10/25/22 0813    metoprolol (LOPRESSOR) injection 5 mg  5 mg IntraVENous Q4H Dwayne Slocumb, DO   5 mg at 10/25/22 2032    sodium bicarbonate 50 mEq in dextrose 5 % and 0.2 % NaCl 1,000 mL infusion   IntraVENous Continuous Dwayne Slocumb,  mL/hr at 10/25/22 1811 New Bag at 10/25/22 1811    labetalol (NORMODYNE;TRANDATE) injection 10 mg  10 mg IntraVENous Q4H PRN Domi Caputo MD   10 mg at 10/24/22 1408    metoclopramide (REGLAN) injection 10 mg  10 mg IntraVENous Q6H Domi Caputo MD   10 mg at 10/26/22 0443    thiamine (B-1) injection 100 mg  100 mg IntraVENous Daily Dwayne Slocumb, DO   100 mg at 10/25/22 0813    LORazepam (ATIVAN) injection 0.5 mg  0.5 mg IntraVENous Q6H PRN Dwayne Jomb, DO   0.5 mg at 10/25/22 8611 HYDROmorphone HCl PF (DILAUDID) injection 2 mg  2 mg IntraVENous Q2H PRN Debria Pares, DO   2 mg at 10/25/22 2041    Or    HYDROmorphone HCl PF (DILAUDID) injection 1 mg  1 mg IntraVENous Q2H PRN Debria Pares, DO   1 mg at 10/25/22 6929    Or    HYDROmorphone HCl PF (DILAUDID) injection 0.5 mg  0.5 mg IntraVENous Q2H PRN Debria Pares, DO        HYDROmorphone HCl PF (DILAUDID) injection 2 mg  2 mg IntraVENous Once Debria Pares, DO        sodium chloride flush 0.9 % injection 5-40 mL  5-40 mL IntraVENous 2 times per day Debria Pares, DO   10 mL at 10/25/22 2044    sodium chloride flush 0.9 % injection 5-40 mL  5-40 mL IntraVENous PRN Debria Pares, DO        0.9 % sodium chloride infusion   IntraVENous PRN Debria Pares, DO        famotidine (PEPCID) tablet 20 mg  20 mg Oral BID Debria Pares, DO        Or    famotidine (PEPCID) 20 mg in sodium chloride (PF) 10 mL injection  20 mg IntraVENous BID Debria Pares, DO   20 mg at 10/25/22 2032    glucose chewable tablet 16 g  4 tablet Oral PRN Debria Pares, DO        dextrose bolus 10% 125 mL  125 mL IntraVENous PRN Debria Pares, DO        Or    dextrose bolus 10% 250 mL  250 mL IntraVENous PRN Debria Pares, DO        glucagon (rDNA) injection 1 mg  1 mg SubCUTAneous PRN Debria Pares, DO        dextrose 10 % infusion   IntraVENous Continuous PRN Debria Pares, DO        potassium chloride (KLOR-CON M) extended release tablet 40 mEq  40 mEq Oral PRN Debria Pares, DO        Or    potassium bicarb-citric acid (EFFER-K) effervescent tablet 40 mEq  40 mEq Oral PRN Debria Pares, DO        Or    potassium chloride 10 mEq/100 mL IVPB (Peripheral Line)  10 mEq IntraVENous PRN Debria Pares, DO        ondansetron (ZOFRAN-ODT) disintegrating tablet 4 mg  4 mg Oral Q8H PRN Debria Pares, DO        Or    ondansetron [facility]) injection 4 mg  4 mg IntraVENous Q6H PRN Lebron More, DO        magnesium hydroxide (MILK OF MAGNESIA) 400 MG/5ML suspension 30 mL  30 mL Oral Daily PRN Lebron More, DO   30 mL at 10/24/22 1609    acetaminophen (TYLENOL) tablet 650 mg  650 mg Oral Q6H PRN Lebron More, DO        Or    acetaminophen (TYLENOL) suppository 650 mg  650 mg Rectal Q6H PRN Lebron More, DO   650 mg at 10/25/22 1746    insulin lispro (HUMALOG) injection vial 0-4 Units  0-4 Units SubCUTAneous TID WC Lebron More, DO        insulin lispro (HUMALOG) injection vial 0-4 Units  0-4 Units SubCUTAneous Nightly Lebron More, DO         Allergies  Patient has no known allergies. ASSESSMENT:  #Hepatic flexure colonic mass  -CT scan pelvis with IV contrast on 10/20/2022:  Irregular mass at the hepatic flexure of the colon resulting in high-grade small bowel and proximal large bowel obstruction. Severe fluid and gaseous distention of the entire stomach and small bowel including the distal esophagus. Scattered enlarged retroperitoneal lymph nodes. These are presumably metastatic if the colon mass proves to be malignant. Marked hydropic distention of the gallbladder containing gallstones. No CT evidence of cholecystitis. Hepatic steatosis. Calcified granulomas throughout the liver  Spleen non enlarged and calcified granulomas throughout the spleen.    -Colectomy with gastrostomy placement by Dr. Stella Pineda on 10/21/2022: Operative note indicated hard mass adherent to the liver and small bowel posteriorly. POD #5    Pathology:  Colon, right hemicolectomy:   1. Invasive moderately differentiated colonic adenocarcinoma, measuring 6.1 cm in greatest dimension. 2.  Tumor invades through the muscularis propria into the pericolonic   adipose tissue but does not reach the serosal surface. 3.  Surgical excision margins are negative for evidence of carcinoma and adenomatous change. 4.  Sections of terminal ileum, negative for evidence of malignancy.    5. Sections of the appendix, negative for evidence of malignancy. 6.  6 out of 32 lymph nodes, positive for metastatic adenocarcinoma. AJCC STAGE: pT3, pN2a, pMx     -Baseline CEA 11.0 on 10/21/2022  -Postop CEA 6.9 on 10/23/2022     #Thrombocytopenia-suspect multifactorial to include liver dysfunction, long term alcohol use, consumption from infection        Admission a hemoglobin of 16.9 on 10/20/2022. No active bleeding noted. Hemoglobin stable at 15.0    #Elevated LFTs  CT scan of the abdomen pelvis on 10/20/2022 reported Hepatic steatosis. Calcified granulomas throughout the liver. Spleen non enlarged and calcified granulomas throughout the spleen. Ultrasound gallbladder 10/21/2022:   1. Limited exam.  2.  Diffuse fatty infiltration of the liver with a 1.1 cm calcification in the right hepatic lobe. 3.  Distended gallbladder with several small stones and mild diffuse gallbladder wall thickening. As per GI/general surgery- not a candidate for cholecystectomy    #Hypocalcemia  Calcium 7.0, albumin 2.1  Corrected calcium 8.52    #SONIA  Creatinine 1.9, GFR 38  As per attending    #sepsis, anastomotic leak  Status post laparotomy, repair of anastomotic leak and creation of diverting ileostomy 10/26/2022  WBC 12.9  Receiving Merrem and Zosyn  Temp curve improved, 101.7 on 10/25/2022  Blood cultures 10/25/2020 22x2: In process    PLAN:  10/21/2022: Colectomy with gastrostomy placement by Dr. Marley Tristan. POD#5  10/26/2022: laparotomy, repair of anastomotic leak and creation of diverting ileostomy by Dr. Paulo Smith. POD#0    elevated LFTs, GI following: repeat RUQ US shows cholelithiasis without acute cholecystitis or CBD dilation to explain the rising LFTs.   Patient is not a candidate for cholecystectomy    Monitor CBC  Current pathology is at least stage III, will need further staging as outpatient  Postop CEA 6.9  A. fib with RVR, cardiology following-on Pacheco Feng, APRN    10/26/22  6:15 AM  Physician's attestation/substantial contribution:  I, Dr Lovely Ellison, independently performed an evaluation on Ramon Anderson. I have reviewed relevant medical information/data to include but not limited to medication list, relevant appropriate labs and imaging when applicable. I reviewed other physician's notes, ancillary services and nurses assessment. I have reviewed the above documentation completed by the Nurse Practitioner or Physician Assistant. Please see my additional contributions to the history of present illness, physical examination, and assessment/medical decision-making that reflect my findings and impressions. I have seen and examined the patient and the key elements of all parts of the encounter have been performed by me. I agree with the assessment and plan as outlined by the ARNP/PA. Subjective-critically ill-appearing, intubated, sedated. Discussed bedside diet. Objective-critically appearing, intubated/sedated. On pressors. Acevedo catheter.   Assessment/plan:  Continue current supportive care  Awaiting pathology

## 2022-10-26 NOTE — ANESTHESIA PRE PROCEDURE
Department of Anesthesiology  Preprocedure Note       Name:  Brittany Francisco   Age:  77 y.o.  :  1955                                          MRN:  [id number]         Date:  10/26/2022      Surgeon: Mounika Gonzalez):  Jb Hawthorne MD    Procedure: Procedure(s):  LAPAROTOMY, REPAIR OF ANASTAMOSAL LEAK, CREATION OF DIVERTING ILEOSTOMY    Medications prior to admission:   Prior to Admission medications    Medication Sig Start Date End Date Taking? Authorizing Provider   levothyroxine (SYNTHROID) 88 MCG tablet TAKE ONE TABLET BY MOUTH ONCE DAILY 3/10/22   Kerrie Eris, APRN   carvedilol (COREG) 25 MG tablet Take 1 tablet by mouth 2 times daily 3/9/22   Kerrie Eris, APRN   sacubitril-valsartan (ENTRESTO)  MG per tablet Take 1 tablet by mouth twice daily 3/9/22   Kerrie Eris, APRN   furosemide (LASIX) 40 MG tablet Take 1 tablet by mouth once daily 3/9/22   Kerrie Eris, APRN   spironolactone (ALDACTONE) 25 MG tablet TAKE ONE TABLET BY MOUTH ONCE DAILY 3/9/22   Kerrie Eris, APRN   apixaban (ELIQUIS) 5 MG TABS tablet Take 1 tablet by mouth 2 times daily 3/9/22 10/18/22  Kerrie Schulte, APRN       Current medications:    No current facility-administered medications for this visit. No current outpatient medications on file.      Facility-Administered Medications Ordered in Other Visits   Medication Dose Route Frequency Provider Last Rate Last Admin    chlorhexidine (PERIDEX) 0.12 % solution 15 mL  15 mL Mouth/Throat BID Jb Hawthorne MD        dexmedetomidine [facility]) 400 mcg in sodium chloride 0.9 % 100 mL infusion  0.2-1.4 mcg/kg/hr IntraVENous Continuous Jb Hawthorne MD        BPCO OINT 1 Dose  1 Dose Apply externally BID Jb Hawthorne MD   1 Dose at 10/25/22 2033    dilTIAZem HCl in sodium chloride 125 mg / 125 mL infusion  2.5-15 mg/hr IntraVENous Continuous Jb Hawthorne MD   Stopped at 10/25/22 2226    meropenem (MERREM) 1000 mg in sodium chloride 0.9% 50 mL (duplex) 1,000 mg IntraVENous Q8H Caitlin Aguilar MD        sodium phosphate 33.33 mmol in sodium chloride 0.9 % 250 mL IVPB  0.32 mmol/kg IntraVENous PRN Mikel Castellani, DO        sodium phosphate 10 mmol in sodium chloride 0.9 % 250 mL IVPB  10 mmol IntraVENous PRN Mikel Castellani, DO        Or    sodium phosphate 15 mmol in sodium chloride 0.9 % 250 mL IVPB  15 mmol IntraVENous PRN Mikel Castellani, DO        Or    sodium phosphate 20 mmol in sodium chloride 0.9 % 500 mL IVPB  20 mmol IntraVENous PRN Mikel Castellani, DO        digoxin (LANOXIN) injection 125 mcg  125 mcg IntraVENous Daily Mikel Castellani, DO   125 mcg at 10/25/22 0813    metoprolol (LOPRESSOR) injection 5 mg  5 mg IntraVENous Q4H Imkel Castellani, DO   5 mg at 10/25/22 2032    sodium bicarbonate 50 mEq in dextrose 5 % and 0.2 % NaCl 1,000 mL infusion   IntraVENous Continuous Mikel Castellani,  mL/hr at 10/25/22 1811 New Bag at 10/25/22 1811    labetalol (NORMODYNE;TRANDATE) injection 10 mg  10 mg IntraVENous Q4H PRN Caitlin Aguilar MD   10 mg at 10/24/22 1408    metoclopramide (REGLAN) injection 10 mg  10 mg IntraVENous Q6H Caitlin Aguilar MD   10 mg at 10/25/22 1742    thiamine (B-1) injection 100 mg  100 mg IntraVENous Daily Mikel Castellani, DO   100 mg at 10/25/22 0813    LORazepam (ATIVAN) injection 0.5 mg  0.5 mg IntraVENous Q6H PRN Mikel Castellani, DO   0.5 mg at 10/25/22 0848    HYDROmorphone HCl PF (DILAUDID) injection 2 mg  2 mg IntraVENous Q2H PRN Mikel Castellani, DO   2 mg at 10/25/22 2041    Or    HYDROmorphone HCl PF (DILAUDID) injection 1 mg  1 mg IntraVENous Q2H PRN Mikel Castellani, DO   1 mg at 10/25/22 6423    Or    HYDROmorphone HCl PF (DILAUDID) injection 0.5 mg  0.5 mg IntraVENous Q2H PRN Mikel Castellani, DO        HYDROmorphone HCl PF (DILAUDID) injection 2 mg  2 mg IntraVENous Once Mikel Ross,         sodium chloride flush 0.9 % injection 5-40 mL insulin lispro (HUMALOG) injection vial 0-4 Units  0-4 Units SubCUTAneous Nightly Rowdy Miller,            Allergies:  No Known Allergies    Problem List:    Patient Active Problem List   Diagnosis Code    Shortness of breath R06.02    Essential hypertension I10    Hypothyroidism E03.9    Obesity (BMI 35.0-39.9 without comorbidity) E66.9    CKD (chronic kidney disease), stage III (HCC) N18.30    Acute renal failure (HCC) N17.9    Non-ischemic cardiomyopathy ([facility] I42.8    Ex-cigarette smoker Z87.891    Hypokalemia E87.6    Paroxysmal atrial fibrillation (HCC) I48.0    SBO (small bowel obstruction) ([facility]) K56.609    Hyperglycemia R73.9    Colonic mass K63.89    Thrombocytopenia ([facility]) D69.6    Ileus, postoperative ([facility]) K91.89, K56.7    Palliative care patient Z51.5    Agitation R45.1    [place]. Hannah Fresh       Past Medical History:        Diagnosis Date    Ex-cigarette smoker 10/03/2016    Hypertension     Non-ischemic cardiomyopathy ([facility] [address] 10/03/2016    2016  Echo  EF 25% 10/3/16  Cath  Mild CAD, EF 10%     Palliative care patient 10/24/2022    [facility] 2018       Past Surgical History:        Procedure Laterality Date    FINGER AMPUTATION Left     partial left thumb amputation following trauma    GASTROSTOMY TUBE PLACEMENT  10/23/2022    Dr Javon Baldwin N/A 10/21/2022    RIGHT HEMICOLECTOMY WITH GASTROSTOMY PLACEMENT performed by Syliva Aschoff, MD at [facility] ENDOSCOPY N/A 10/23/2022    EGD ESOPHAGOGASTRODUODENOSCOPY PEG TUBE INSERTION performed by Ryan Hays MD at [address] [place] [address] Endoscopy       Social History:    Social History     Tobacco Use    Smoking status: Former     Packs/day: 1.00     Years: 2.00     Pack years: 2.00     Types: Cigarettes     Start date:      Quit date:      Years since quittin.8    Smokeless tobacco: Never    Tobacco comments:     light smoking history - socially   Substance Use Topics    Alcohol use: Yes                                Counseling given: Not Answered  Tobacco comments: light smoking history - socially      Vital Signs (Current): There were no vitals filed for this visit.                                            BP Readings from Last 3 Encounters:   10/25/22 112/78   10/18/22 124/88   04/15/22 118/78       NPO Status:                                                                                 BMI:   Wt Readings from Last 3 Encounters:   10/25/22 221 lb 14.4 oz (100.7 kg)   10/18/22 227 lb (103 kg)   04/15/22 238 lb (108 kg)     There is no height or weight on file to calculate BMI.    CBC:   Lab Results   Component Value Date/Time    WBC 10.1 10/25/2022 08:12 PM    RBC 4.88 10/25/2022 08:12 PM    HGB 15.0 10/25/2022 08:12 PM    HCT 45.9 10/25/2022 08:12 PM    MCV 94.1 10/25/2022 08:12 PM    RDW 14.5 10/25/2022 08:12 PM    PLT 85 10/25/2022 08:12 PM       CMP:   Lab Results   Component Value Date/Time     10/25/2022 08:12 PM    K 3.5 10/25/2022 08:12 PM    K 3.7 10/25/2022 04:49 AM     10/25/2022 08:12 PM    CO2 22 10/25/2022 08:12 PM    BUN 35 10/25/2022 08:12 PM    CREATININE 1.8 10/25/2022 08:12 PM    GFRAA >59 04/15/2022 11:37 AM    LABGLOM 41 10/25/2022 08:12 PM    GLUCOSE 180 10/25/2022 08:12 PM    PROT 5.2 10/25/2022 08:12 PM    CALCIUM 7.4 10/25/2022 08:12 PM    BILITOT 7.2 10/25/2022 08:12 PM    ALKPHOS 45 10/25/2022 08:12 PM     10/25/2022 08:12 PM    ALT 61 10/25/2022 08:12 PM       POC Tests:   Recent Labs     10/25/22  2043   POCGLU 117*       Coags:   Lab Results   Component Value Date/Time    PROTIME 2.0 03/16/2018 09:28 AM    INR 2.6 09/10/2020 11:03 AM    INR 1.14 01/08/2018 12:30 PM    APTT 36.0 10/25/2022 08:12 PM       HCG (If Applicable): No results found for: PREGTESTUR, PREGSERUM, HCG, HCGQUANT     ABGs:   Lab Results   Component Value Date/Time    PHART 7.440 09/27/2016 09:03 PM    PO2ART 77.0 09/27/2016 09:03 PM    LCN2EVU 34.0 09/27/2016 09:03 PM    XWH2XWM 23.1 09/27/2016 09:03 PM    BEART -0.4 09/27/2016 09:03 PM    U7YUQPEB 93.7 09/27/2016 09:03 PM        Type & Screen (If Applicable):  No results found for: LABABO, LABRH    Drug/Infectious Status (If Applicable):  No results found for: HIV, HEPCAB    COVID-19 Screening (If Applicable):   Lab Results   Component Value Date/Time    COVID19 Not Detected 10/20/2022 02:23 PM           Anesthesia Evaluation  Patient summary reviewed and Nursing notes reviewed no history of anesthetic complications:   Airway: Mallampati: II  TM distance: >3 FB   Neck ROM: full  Mouth opening: > = 3 FB   Dental:    (+) upper dentures      Pulmonary:   (+) shortness of breath:      (-) sleep apnea and not a current smoker                          ROS comment: CXR:  Impression  The NG tube can be traced to the level of distal esophagus. Suggest advancing accordingly, with a follow-up radiograph. Cardiovascular:  Exercise tolerance: no interval change,   (+) hypertension:, dysrhythmias: atrial fibrillation and PVC, hyperlipidemia    (-) pacemaker, past MI, CABG/stent and  angina    ECG reviewed      Echocardiogram reviewed         Beta Blocker:  Dose within 24 Hrs      ROS comment: Echo 2017:   Summary   Normal left ventricular size with preserved LV function and an estimated   ejection fraction of approximately 55-60%. No evidence of left ventricular mass or thrombus noted. E/A flow reversal noted. Suggestive of diastolic dysfunction. Mild concentric left ventricular hypertrophy. Signature     Neuro/Psych:      (-) seizures and CVA           GI/Hepatic/Renal:   (+) renal disease: ARF,          ROS comment: + colonic mass with bowel obstruction    CT abdomen/ pelvis: 1. Irregular mass at the hepatic flexure of the colon resulting in high-grade  small bowel and proximal large bowel obstruction. Severe fluid and gaseous  distention of the entire stomach and small bowel including the distal esophagus. Findings are overall concerning for colonic neoplasm. Recommend GI and surgical  consultation. 2.Scattered enlarged retroperitoneal lymph nodes. These are presumably  metastatic if the colon mass proves to be malignant. 3.Marked hydropic distention of the gallbladder containing gallstones. No CT  evidence of cholecystitis. Recommend correlation with LFTs. .   Endo/Other:    (+) hypothyroidism::., electrolyte abnormalities, . Abdominal:   (+) obese,           Vascular:     - DVT and PE. Other Findings: NGT right nare            Anesthesia Plan      general     ASA 4 - emergent       Induction: intravenous and rapid sequence. arterial line and central line  MIPS: Postoperative opioids intended and Prophylactic antiemetics administered. Anesthetic plan and risks discussed with legal guardian. Use of blood products discussed with legal guardian whom.                      Delfina Guardado, [place] - K[place]   10/26/2022

## 2022-10-26 NOTE — PROGRESS NOTES
Comprehensive Nutrition Assessment    Type and Reason for Visit:  Initial, RD Nutrition Re-Screen/LOS    Nutrition Recommendations/Plan:   If within clinical course start alternate source of nutrition-TPN     Malnutrition Assessment:  Malnutrition Status: Moderate malnutrition (10/26/22 0736)    Context:  Chronic Illness     Findings of the 6 clinical characteristics of malnutrition:  Energy Intake:  Mild decrease in energy intake (Comment)  Weight Loss:  Greater than 10% over 6 months     Body Fat Loss:  Mild body fat loss Orbital   Muscle Mass Loss:  No significant muscle mass loss    Fluid Accumulation:  Mild Generalized   Strength:  Not Performed    Nutrition Assessment:    Following patient for LOS x 6 days. Pt also has been NPO x 6 days. Aware of surgery yesterday and need to remain NPO a bit longer. If within clinical course could alternate source of nutrition be started. Does have access for possible TPN. Nutrition Related Findings:    Jaundice, ileostomy Wound Type: Surgical Incision, Multiple, Deep Tissue Injury       Current Nutrition Intake & Therapies:    Average Meal Intake: NPO  Average Supplements Intake: NPO  Diet NPO Exceptions are: Sips of Water with Meds    Anthropometric Measures:  Height: 5' 10\" (177.8 cm)  Ideal Body Weight (IBW): 166 lbs (75 kg)    Admission Body Weight: 230 lb (104.3 kg) (stated)  Current Body Weight: 199 lb 3.2 oz (90.4 kg), 120 % IBW. Weight Source: Bed Scale  Current BMI (kg/m2): 28.6  Usual Body Weight: 238 lb (108 kg) (4/2022)  % Weight Change (Calculated): -16.3    BMI Categories: Overweight (BMI 25.0-29. 9)    Estimated Daily Nutrient Needs:  Energy Requirements Based On: Kcal/kg     Energy (kcal/day): 5300-6081 kcals (20-25 kcals/kg)  Weight Used for Protein Requirements: Ideal  Protein (g/day): 98=910r  Method Used for Fluid Requirements: 1 ml/kcal  Fluid (ml/day): 5667-9274 ml    Nutrition Diagnosis:   Inadequate oral intake, Increased nutrient needs related to acute injury/trauma, increase demand for energy/nutrients as evidenced by NPO or clear liquid status due to medical condition, wounds, weight loss greater than or equal to 10% in 6 months    Nutrition Interventions:   Food and/or Nutrient Delivery: Continue NPO  Nutrition Education/Counseling: No recommendation at this time  Coordination of Nutrition Care: Continue to monitor while inpatient       Goals:     Goals: Meet at least 75% of estimated needs, Initiate nutrition support       Nutrition Monitoring and Evaluation:   Behavioral-Environmental Outcomes: None Identified  Food/Nutrient Intake Outcomes: Parenteral Nutrition Intake/Tolerance  Physical Signs/Symptoms Outcomes: Biochemical Data, GI Status, Fluid Status or Edema, Weight, Skin    Discharge Planning:     Too soon to determine     Mayur Ladd MS, RD, LD  Contact: [phone]

## 2022-10-27 PROBLEM — R65.21 SEPTIC SHOCK (HCC): Status: ACTIVE | Noted: 2022-01-01

## 2022-10-27 PROBLEM — A41.9 SEPTIC SHOCK (HCC): Status: ACTIVE | Noted: 2022-01-01

## 2022-10-27 PROBLEM — K65.9 PERITONITIS (HCC): Status: ACTIVE | Noted: 2022-01-01

## 2022-10-27 NOTE — PROGRESS NOTES
Hospitalist Progress Note    Patient:  Faylene Spatz  YOB: 1955  Date of Service: 10/27/2022  MRN: [id number]   Acct: [de-identified]   Primary Care Physician: No primary care provider on file. Advance Directive: DNR  Admit Date: 10/20/2022       Hospital Day: 7  Referring Provider: Shereen Claude, MD    Patient Seen, Chart, Consults, Notes, Labs, Radiology studies reviewed. Subjective:     Successfully extubated yesterday. He is confused but able to follow commands. No further fevers overnight. Still hypotensive requiring vasopressor support. Summary:  Faylene Spatz is a 77 y.o. male  whom we are following for SBO status post right hemicolectomy for obstructing colon cancer, complicated by development of septic peritonitis due to anastomotic leak, as well as nonischemic cardiomyopathy, A. fib intermittently in RVR. He underwent an open right hemicolectomy on 10/21 however course complicated after developed sepsis with fevers, leukocytosis with CT showing large volume peritoneal fluid with large pneumoperitoneum, septic shock with peritonitis requiring volume resuscitation, vasopressor support and antibiotics on meropenem. Patient was reintubated for surgery. Additionally intermittently in A. fib with RVR requiring IV AV meaghan blockade. Patient taken back to the OR for repair of anastomotic leak, washout with diverting ileostomy. Had SONIA with metabolic acidosis in setting of sepsis and volume losses. Allergies:  Patient has no known allergies.     Medicines:  Current Facility-Administered Medications   Medication Dose Route Frequency Provider Last Rate Last Admin    PN-Adult Premix 5/15 - Standardard Electrolytes - Central Line   IntraVENous Continuous TPN Guille Saldaña MD        fat emulsion 20 % infusion 250 mL  250 mL IntraVENous Once per day on Mon Thu Guille Saldaña MD        calcium gluconate 1000 mg in sodium chloride 50 mL  1,000 mg IntraVENous Once Mili Carbajal MD        chlorhexidine (PERIDEX) 0.12 % solution 15 mL  15 mL Mouth/Throat BID Merritt Becerra MD   15 mL at 10/27/22 0803    dexmedetomidine (PRECEDEX) 400 mcg in sodium chloride 0.9 % 100 mL infusion  0.2-1.4 mcg/kg/hr IntraVENous Continuous Merritt Becerra MD 5 mL/hr at 10/26/22 0224 0.2 mcg/kg/hr at 10/26/22 0224    phenylephrine (MARIE-SYNEPHRINE) 50 mg in dextrose 5 % 250 mL infusion   mcg/min IntraVENous Continuous Zac Mayes MD 24 mL/hr at 10/27/22 1114 80 mcg/min at 10/27/22 1114    famotidine (PEPCID) 20 mg in sodium chloride (PF) 10 mL injection  20 mg IntraVENous Daily Daisha Yun DO   20 mg at 10/27/22 0802    metoclopramide (REGLAN) injection 5 mg  5 mg IntraVENous Q6H Merritt Becerra MD   5 mg at 10/27/22 1219    meropenem (MERREM) 1000 mg in sodium chloride 0.9% 50 mL (duplex)  1,000 mg IntraVENous Q12H Merritt Becerra MD   Stopped at 10/27/22 0819    0.9 % sodium chloride bolus  500 mL IntraVENous Once Elizabeth Coughlin MD        BPCO OINT 1 Dose  1 Dose Apply externally BID Merritt Becerra MD   1 Dose at 10/27/22 4950    dilTIAZem HCl in sodium chloride 125 mg / 125 mL infusion  2.5-15 mg/hr IntraVENous Continuous Merritt Becerra MD   Stopped at 10/25/22 2226    sodium phosphate 33.33 mmol in sodium chloride 0.9 % 250 mL IVPB  0.32 mmol/kg IntraVENous PRN Daisha Yun, DO        sodium phosphate 10 mmol in sodium chloride 0.9 % 250 mL IVPB  10 mmol IntraVENous PRN Daisha Yun, DO        Or    sodium phosphate 15 mmol in sodium chloride 0.9 % 250 mL IVPB  15 mmol IntraVENous PRN Daisha Yun, DO        Or    sodium phosphate 20 mmol in sodium chloride 0.9 % 500 mL IVPB  20 mmol IntraVENous PRN Daisha Yun, DO        [Held by provider] digoxin (LANOXIN) injection 125 mcg  125 mcg IntraVENous Daily Daisha Yun DO   125 mcg at 10/25/22 0813    metoprolol (LOPRESSOR) injection 5 mg  5 mg IntraVENous Q4H Nilsa Flowers Ann Guadalupe, DO   5 mg at 10/27/22 1220    sodium bicarbonate 50 mEq in dextrose 5 % and 0.2 % NaCl 1,000 mL infusion   IntraVENous Continuous Lemond Shackle,  mL/hr at 10/27/22 1110 New Bag at 10/27/22 1110    labetalol (NORMODYNE;TRANDATE) injection 10 mg  10 mg IntraVENous Q4H PRN Penny Tarango MD   10 mg at 10/24/22 1408    thiamine (B-1) injection 100 mg  100 mg IntraVENous Daily Lemond Shackle, DO   100 mg at 10/27/22 0802    LORazepam (ATIVAN) injection 0.5 mg  0.5 mg IntraVENous Q6H PRN Lemond Shackle, DO   0.5 mg at 10/25/22 0848    HYDROmorphone HCl PF (DILAUDID) injection 2 mg  2 mg IntraVENous Q2H PRN Lemond Shackle, DO   2 mg at 10/27/22 1333    Or    HYDROmorphone HCl PF (DILAUDID) injection 1 mg  1 mg IntraVENous Q2H PRN Lemond Shackle, DO   1 mg at 10/26/22 1457    Or    HYDROmorphone HCl PF (DILAUDID) injection 0.5 mg  0.5 mg IntraVENous Q2H PRN Lemond Shackle, DO   0.5 mg at 10/27/22 0040    HYDROmorphone HCl PF (DILAUDID) injection 2 mg  2 mg IntraVENous Once Lemond Shackle, DO        sodium chloride flush 0.9 % injection 5-40 mL  5-40 mL IntraVENous 2 times per day Lemond Shackle, DO   10 mL at 10/26/22 2008    sodium chloride flush 0.9 % injection 5-40 mL  5-40 mL IntraVENous PRN Lemond Shackle, DO        0.9 % sodium chloride infusion   IntraVENous PRN Lemond Shackle, DO 5 mL/hr at 10/27/22 9191 New Bag at 10/27/22 0511    glucose chewable tablet 16 g  4 tablet Oral PRN Lemond Shackle, DO        dextrose bolus 10% 125 mL  125 mL IntraVENous PRN Lemond Shackle, DO        Or    dextrose bolus 10% 250 mL  250 mL IntraVENous PRN Lemond Shackle, DO        glucagon (rDNA) injection 1 mg  1 mg SubCUTAneous PRN Lemond Shackle, DO        dextrose 10 % infusion   IntraVENous Continuous PRN Lavon Mortensen, DO        potassium chloride (KLOR-CON M) extended release tablet 40 mEq  40 mEq Oral PRN Lavon Mortensen, DO Or    potassium bicarb-citric acid (EFFER-K) effervescent tablet 40 mEq  40 mEq Oral PRN Karen Romp, DO        Or    potassium chloride 10 mEq/100 mL IVPB (Peripheral Line)  10 mEq IntraVENous PRN Karen Romp, DO        ondansetron (ZOFRAN-ODT) disintegrating tablet 4 mg  4 mg Oral Q8H PRN Karen Romp, DO        Or    ondansetron [facility]) injection 4 mg  4 mg IntraVENous Q6H PRN Karen Romp, DO        magnesium hydroxide (MILK OF MAGNESIA) 400 MG/5ML suspension 30 mL  30 mL Oral Daily PRN Karen Romp, DO   30 mL at 10/24/22 1609    acetaminophen (TYLENOL) tablet 650 mg  650 mg Oral Q6H PRN Karen Romp, DO        Or    acetaminophen (TYLENOL) suppository 650 mg  650 mg Rectal Q6H PRN Karen Romp, DO   650 mg at 10/25/22 1746    insulin lispro (HUMALOG) injection vial 0-4 Units  0-4 Units SubCUTAneous TID WC Karen Romp, DO        insulin lispro (HUMALOG) injection vial 0-4 Units  0-4 Units SubCUTAneous Nightly Karen Romp, DO           Past Medical History:  Past Medical History:   Diagnosis Date    Ex-cigarette smoker 10/03/2016    Hypertension     Non-ischemic cardiomyopathy ([facility] [address] 10/03/2016    9/28/2016  Echo  EF 25% 10/3/16  Cath  Mild CAD, EF 10%     Palliative care patient 10/24/2022    Paroxysmal atrial fibrillation ([facility] [address] 03/16/2018       Past Surgical History:  Past Surgical History:   Procedure Laterality Date    FINGER AMPUTATION Left     partial left thumb amputation following trauma    HEMICOLECTOMY N/A 10/21/2022    RIGHT HEMICOLECTOMY WITH GASTROSTOMY PLACEMENT performed by Rodolfo Ludwig MD at [address] N/A 10/25/2022    LAPAROTOMY, REPAIR OF ANASTAMOSAL LEAK, CREATION OF DIVERTING ILEOSTOMY performed by Jose Bucio MD at [facility] [place]    TONSILLECTOMY      UPPER GASTROINTESTINAL ENDOSCOPY N/A 10/23/2022    Dr Hernán De Jesus w/peg placement       Family History  Family History   Problem Relation Age of Onset    High Blood Pressure Mother     Cancer Father         Stomach    Heart Disease Maternal Grandmother     Diabetes Maternal Uncle        Social History  Social History     Socioeconomic History    Marital status:      Spouse name: Not on file    Number of children: Not on file    Years of education: Not on file    Highest education level: Not on file   Occupational History    Not on file   Tobacco Use    Smoking status: Former     Packs/day: 1.00     Years: 2.00     Pack years: 2.00     Types: Cigarettes     Start date:      Quit date:      Years since quittin.8    Smokeless tobacco: Never    Tobacco comments:     light smoking history - socially   Vaping Use    Vaping Use: Never used   Substance and Sexual Activity    Alcohol use: Yes    Drug use: No    Sexual activity: Not on file   Other Topics Concern    Not on file   Social History Narrative    Not on file     Social Determinants of Health     Financial Resource Strain: Not on file   Food Insecurity: Not on file   Transportation Needs: Not on file   Physical Activity: Not on file   Stress: Not on file   Social Connections: Not on file   Intimate Partner Violence: Not on file   Housing Stability: Not on file         Review of Systems:    Review of Systems   Unable to perform ROS: Mental status change         Objective:  Blood pressure 116/83, pulse (!) 103, temperature 96.9 °F (36.1 °C), temperature source Temporal, resp. rate (!) 35, height 5' 10\" (1.778 m), weight 191 lb (86.6 kg), SpO2 98 %. Intake/Output Summary (Last 24 hours) at 10/27/2022 1336  Last data filed at 10/27/2022 1200  Gross per 24 hour   Intake 5108.68 ml   Output 4649 ml   Net 459.68 ml         Physical Exam  Vitals and nursing note reviewed. Constitutional:       Appearance: He is ill-appearing. HENT:      Head: Normocephalic and atraumatic. Eyes:      General: No scleral icterus.      Conjunctiva/sclera: Conjunctivae normal.   Cardiovascular:      Rate and Rhythm: Normal rate. Rhythm irregular. Heart sounds: Normal heart sounds. Pulmonary:      Effort: Pulmonary effort is normal.      Breath sounds: Normal breath sounds. Abdominal:      General: Abdomen is flat. Palpations: Abdomen is soft. Tenderness: There is abdominal tenderness. Genitourinary:     Comments: Catheter in place  Musculoskeletal:         General: No swelling. Cervical back: No rigidity. Right lower leg: No edema. Left lower leg: No edema. Skin:     General: Skin is warm and dry. Neurological:      Comments: Confused, able to follow commands       Labs:  BMP:   Recent Labs     10/25/22  2012 10/26/22  0741 10/26/22  1007 10/27/22  0222    140  --  142   K 3.5 4.0 3.5 3.6    110  --  113*   CO2 22 19*  --  20*   BUN 35* 42*  --  44*   CREATININE 1.8* 1.9*  --  1.3*   CALCIUM 7.4* 7.0*  --  7.0*       CBC:   Recent Labs     10/25/22  2012 10/26/22  0741 10/27/22  0222   WBC 10.1 12.9* 13.7*   HGB 15.0 12.9* 13.0*   HCT 45.9 40.0* 37.7*   MCV 94.1* 95.7* 89.8   PLT 85* 57* 60*       LIVER PROFILE:   Recent Labs     10/25/22  0449 10/25/22  2012 10/26/22  0741 10/27/22  0927   AST 79* 232* 426* 533*   ALT 34 61* 92* 142*   LIPASE 37  --   --   --    BILIDIR 3.8*  --   --  2.8*   BILITOT 5.0*  4.9* 7.2* 6.3* 4.0*   ALKPHOS 49 45 49 73       PT/INR: No results for input(s): PROTIME, INR in the last 72 hours. APTT:   Recent Labs     10/25/22  2012   APTT 36.0       BNP:  No results for input(s): BNP in the last 72 hours. Ionized Calcium:No results for input(s): IONCA in the last 72 hours. Magnesium:  Recent Labs     10/26/22  0741   MG 2.2       Phosphorus:  No results for input(s): PHOS in the last 72 hours. HgbA1C: No results for input(s): LABA1C in the last 72 hours.   Hepatic:   Recent Labs     10/25/22  0449 10/25/22  2012 10/26/22  0741 10/27/22  0927   ALKPHOS 49 45 49 73   ALT 34 61* 92* 142*   AST 79* 232* 426* 533*   PROT 5.4* 5.2* 4.7* 4.8*   BILITOT 5.0* 4. 9* 7.2* 6.3* 4.0*   BILIDIR 3.8*  --   --  2.8*   LABALBU 2.5* 2.3* 2.1* 1.1*       Lactic Acid:   Recent Labs     10/27/22  1026   LACTA 2.4*     Troponin: No results for input(s): CKTOTAL, CKMB, TROPONINT in the last 72 hours. ABGs: No results for input(s): PH, PCO2, PO2, HCO3, O2SAT in the last 72 hours. CRP:  No results for input(s): CRP in the last 72 hours. Sed Rate:  No results for input(s): SEDRATE in the last 72 hours. Cultures:   No results for input(s): CULTURE in the last 72 hours. Recent Labs     10/25/22  1720   BC No Growth to date. Any change in status will be called. BLOODCULT2 No Growth to date. Any change in status will be called. No results for input(s): CXSURG in the last 72 hours. Radiology reports as per the Radiologist  Radiology: CT ABDOMEN PELVIS W IV CONTRAST Additional Contrast? None    Result Date: 10/20/2022  CLINICAL HISTORY: 48-hour history of diffuse abdominal pain distention nausea vomiting some diarrhea no prior history COMPARISON: No comparison TECHNIQUE: Axial images of the abdomen and pelvis were obtained after the administration of IV contrast.Coronal and sagittal multiplanar reconstructions were performed. One or more of the following dose reduction techniques were used: Automated exposure control, adjustment of the mA and/or kV according to patient size, and/or use of iterative reconstruction technique. COMPARISON: None. FINDINGS: Statements: None. Lower Chest: Unremarkable. Hepatobiliary: Hepatic steatosis. Calcified granulomas throughout the liver. No focal lesion is identified. Marked distention of the gallbladder containing gallstones. No CT evidence of cholecystitis however. No biliary ductal dilatation is evident. Pancreas: The pancreatic parenchyma is unremarkable and there is no main duct dilatation. Spleen: The spleen is nonenlarged. Calcified granulomas throughout the spleen. Adrenals: No adrenal glands are symmetric.  Genitourinary: The kidneys are symmetric and enhance appropriately. No suspicious focal parenchymal abnormality is identified in either kidney. There is no collecting system dilatation. The bladder is unremarkable, as imaged. The prostate gland appears unremarkable. Gastrointestinal: Diffuse fluid and air distention of the the entire small bowel and proximal large bowel measuring up to 4.3 cm in the left abdomen and up to 7.6 cm of the proximal colon. Transitional point seen at a site of masslike thickening of the hepatic flexure measuring 3.2 x 3.9 cm (series 2 image 35). Colonic diverticulosis without diverticulitis. The appendix appears unremarkable. Lymphatics: Multiple prominent and enlarged retroperitoneal lymph nodes. Largest is 1.5 cm in the left periaortic region (series 2 image 33). Vascular: Moderate calcific atherosclerosis. The abdominal aorta is normal in caliber. MSK/Body Wall: No concerning bony lesion is identified. Small fat-containing right inguinal hernia. Peritoneum/Other: There is no free fluid or abnormal collection. No free gas. 1.Irregular mass at the hepatic flexure of the colon resulting in high-grade small bowel and proximal large bowel obstruction. Severe fluid and gaseous distention of the entire stomach and small bowel including the distal esophagus. Findings are overall concerning for colonic neoplasm. Recommend GI and surgical consultation. 2.Scattered enlarged retroperitoneal lymph nodes. These are presumably metastatic if the colon mass proves to be malignant. 3.Marked hydropic distention of the gallbladder containing gallstones. No CT evidence of cholecystitis. Recommend correlation with LFTs. US GALLBLADDER RUQ    Result Date: 10/21/2022  NO PRIOR REPORT AVAILABLE Exam: ULTRASOUND OF THE ABDOMEN LIMITED, RIGHT UPPER QUADRANT Clinical data: Elevated LFTs, sepsis, lactic acidosis. The patient is on ventilator. Technique: Real-time grayscale imaging of the right upper quadrant was performed.  Images supplemented with color Doppler technique. Prior studies: CT scan of abdomen and pelvis dated 10/20/22. Findings: Limited exam due to poor acoustic penetration. Diffuse increased echogenicity of the liver parenchyma. A shadowing focus//calcification in the right lobe measuring up 1.1 cm. No intrahepatic biliary distention. The gallbladder is distended with several small stones. The gallbladder wall thickness measures 0.8 cm. No sludge. The common bile duct is normal in caliber measuring 0.4cm. The right kidney [id number] x 6.6 x 5.4 cm. Normal echogenicity and cortical thickness are preserved. No evidence of renal masses, no calculi and no hydronephrosis Diffuse increased echogenicity of the pancreas. Imaged portion of the aorta demonstrates no acute pathology. Visualized portion of the inferior vena cava is unremarkable. 1.  Limited exam for reasons discussed above. 2.  Diffuse fatty infiltration of the liver with a 1.1 cm calcification in the right hepatic lobe. 3.  Distended gallbladder with several small stones and mild diffuse gallbladder wall thickening. 4.  No other significant abnormality. Recommendation: Follow up as clinically indicated. Electronically Signed by Phyllis Medeiros MD at 21-Oct-2022 11:06:19 PM EST             XR CHEST PORTABLE    Result Date: 10/20/2022  NO PRIOR REPORT AVAILABLE Exam: X-RAY OF [facility] Clinical data:Confirmation of course of NG/OG/NE tube and location of tip of tube. Technique:Single view of the chest. Prior studies: Radiograph of the chest done on same day. Findings: The lungs are grossly clear; noevidence of acute infiltrate or pleural effusion. Cardiac silhouette is mildly enlarged. No acute osseous abnormality is detected. The NG tube can be traced to the level of distal esophagus. Scattered nonspecific gas-filled loops of bowel in the upper abdomen. The NG tube can be traced to the level of distal esophagus. Suggest advancing accordingly, with a follow-up radiograph. Recommendation: As above. Electronically Signed by Nora Capone MD at 20-Oct-2022 06:15:09 PM EST             XR CHEST PORTABLE    Result Date: 10/20/2022  NO PRIOR REPORT AVAILABLE Exam: X-RAY OF THECHEST Clinical data:Abdominal pain, vomiting. Technique:Single view of the chest. Prior studies: No prior studies submitted. Findings: The trachea is midline. The heart is enlarged. Mediastinal and pulmonary vascular shadows are normal.  There is no focal consolidation or effusion. The osseous structures are intact. Cardiomegaly. Recommendation: Follow up as clinically indicated.  Electronically Signed by Theressa Severe MD at 20-Oct-2022 03:26:36 PM EST                Assessment     SBO   S/P right hemicolectomy for obstructing colon cancer  Surgical pathology pending  Monitor for return of bowel function    Septic shock due to peritonitis with anastomotic leak  - Status post repair of anastomotic leak, washout and diverting ileostomy  -- Continue volume resuscitation, additional crystalloid bolus today  - Repeat lactic acid  - Continue vasopressor support on Samm-Synephrine to maintain MAP over 60-65, continue to wean vasopressor support as able   (not on Levophed due to issues with A. fib with RVR)  - Continue meropenem    Paroxysmal A. fib intermittently in RVR  - Monitor telemetry  - Rate control with IV Lopressor or alternatively esmolol infusion if needed  while on Samm-Synephrine for pressure support  --Resume IV digoxin given improvement in renal function    SONIA, prerenal versus ATN from sepsis  Metabolic acidosis  -Improving, continue IV fluid with bicarb    Acute hypoxemic respiratory failure requiring intubation/mechanical ventilation  -Extubated 10/26  --Monitor SPO2, supplemental oxygen as needed for adequate gas exchange    Hypocalcemia  - Replete electrolytes and monitor    Chronic liver disease, hepatitis C, alcohol abuse  - Follow LFTs  - [facility] protocol    Malnutrition  --TPN      Total of 36 minutes of critical care time. Time spent exclusive of any procedures.       Naomi López MD

## 2022-10-27 NOTE — CONSULTS
Comprehensive Nutrition Assessment    Type and Reason for Visit:  Reassess    Nutrition Recommendations/Plan:   Received consult for TPN recommendations. To start 5/20 at 40ml x 3 hours and then advance to 75ml/hr. Give 20% lipids 3x/week     Malnutrition Assessment:  Malnutrition Status: Moderate malnutrition (10/26/22 0736)    Context:  Chronic Illness     Findings of the 6 clinical characteristics of malnutrition:  Energy Intake:  Mild decrease in energy intake (Comment)  Weight Loss:  Greater than 10% over 6 months     Body Fat Loss:  Mild body fat loss Orbital   Muscle Mass Loss:  No significant muscle mass loss    Fluid Accumulation:  Mild Generalized   Strength:  Not Performed    Nutrition Assessment:    Pt has been extubated. Remains NPO. Starting day 7. Weight loss continues--20% in 6 months, 4.2% in less than a week    Nutrition Related Findings:    Colostomy, PEG Wound Type: Multiple, Deep Tissue Injury, Surgical Incision       Current Nutrition Intake & Therapies:    Average Meal Intake: NPO  Average Supplements Intake: NPO  Diet NPO Exceptions are: Sips of Water with Meds  PN-Adult Premix 5/15 - Standardard Electrolytes - Central Line    Anthropometric Measures:  Height: 5' 10\" (177.8 cm)  Ideal Body Weight (IBW): 166 lbs (75 kg)    Admission Body Weight: 230 lb (104.3 kg) (stated)  Current Body Weight: 191 lb (86.6 kg), 115.1 % IBW. Weight Source: Bed Scale  Current BMI (kg/m2): 27.4  Usual Body Weight: 238 lb (108 kg) (4/2022)  % Weight Change (Calculated): -16.3  Weight Adjustment For: No Adjustment  BMI Categories: Overweight (BMI 25.0-29. 9)    Estimated Daily Nutrient Needs:  Energy Requirements Based On: Kcal/kg     Energy (kcal/day): 5008-9245 kcals (20-25 kcals/kg)  Weight Used for Protein Requirements: Ideal  Protein (g/day): 69=334f  Method Used for Fluid Requirements: 1 ml/kcal  Fluid (ml/day): 4099-7562 ml    Nutrition Diagnosis:   Inadequate oral intake, Increased nutrient needs related to acute injury/trauma, increase demand for energy/nutrients as evidenced by NPO or clear liquid status due to medical condition, wounds, weight loss, weight loss greater than or equal to 2% in 1 week    Nutrition Interventions:   Food and/or Nutrient Delivery: Continue NPO  Nutrition Education/Counseling: No recommendation at this time  Coordination of Nutrition Care: Continue to monitor while inpatient       Goals:  Previous Goal Met: No Progress toward Goal(s)  Goals: Meet at least 75% of estimated needs, Initiate nutrition support       Nutrition Monitoring and Evaluation:   Behavioral-Environmental Outcomes: None Identified  Food/Nutrient Intake Outcomes: Enteral Nutrition Intake/Tolerance, Parenteral Nutrition Intake/Tolerance, Diet Advancement/Tolerance  Physical Signs/Symptoms Outcomes: Biochemical Data, Fluid Status or Edema, Weight, Skin    Discharge Planning:     Too soon to determine     Clotilde Martinez MS, RD, LD  Contact: [phone]

## 2022-10-27 NOTE — PLAN OF CARE
Problem: Nutrition Deficit:  Goal: Optimize nutritional status  Outcome: Not Progressing  Flowsheets (Taken 10/27/2022 4720)  Nutrient intake appropriate for improving, restoring, or maintaining nutritional needs: Assess nutritional status and recommend course of action

## 2022-10-27 NOTE — CONSULTS
**Physician Signature**  This document was electronically signed by: Valarie Hammans MD  10/26/2022   10:59 PM    **Consult Cover Page**  FROM: Natalia Suarez [address], [phone]  Call Back Number: [phone]  SUBJECT: Consult Recommendations  Date and Time of Report: 10/26/2022 10:59 PM CT  Items Contained in this Document: Critical Care [facility]    **Consult Information**  Member Facility: [address] [place] [address] MRN: [id number]  Visit/Encounter Number: [id number]  Consult ID: [id number]  Facility Time Zone: CT  Date and Time of Request: 10/26/2022 09:34 PM  CT  Requesting Clinician: Nisha Faulkner DO  Patient Name: Queenie Tillman  YOB: 1955  Gender: Male  Patient identity was confirmed at the beginning of the consult with the   patient/family/staff using two personal identifiers: Patient name and       **Reason for Consult**  Reason for Consult: [facility]    **Admission**  Admission Date: 10-  Chief reason for ICU admission: SBO w/ colon mass  Secondary reasons for ICU admission: Sepsis    **Core Metrics**  General orienting sentence for patient: Pt is a 78 y/o M with a PMH of A fib. On 10/20, pt was found to have a SBO due to colon mass and gallstones. A   right hemicolectomy was done with G tube placement. On 10/22, pt was   extubated. He became agitated and started on Precedex. On 10/23, pt pulled   out his G tube. GI placed a PEG tube. On 10/24, a garcia was placed for   urinary retention. On 10/25, pt with 's in a fib, Temp 106.9. Repeat   CT was done which a large amount intraperitoneal fluid consistent with an   anastomic leak. On 10/26, pt went for repair of the leak and diverting   colostomy. He was hypotensive and received 3L of fluid bolus and then placed   on laurie gtt @ 90 mcg. He is on meropenem.   Chief physiologic deterioration: MAP > 60 Pharmacologically enchanced  Is the patient on DVT prophylaxis?: Yes  Prophylaxis type: Mechanical  Is the patient on GI prophylaxis?: Not Indicated  Has this patient reached their nutritional goal?: No and issues are being   addressed  Nutritional Goals Details: Reglan  Are there current issues with pain management in this patient?:   No  Pain management notes: Todd TRACY  Are there issues with skin integrity?: No  Skin Integrity Details: DTI Left upper butt check, R nostril DTI  Are there issues with delirium?: No  Has the patient been mobilized?: No  Is this patient currently intubated?: No  Are there ethical or care philosophy or family issues?: No  Do you recommend an in depth evaluation?: No  Disposition Recommendations: Continue ICU level of care    **Inserted/Removed Devices**  Device Name: Central venous catheter  Site of insertion: Interjugular - Right  Date of insertion: Unknown  Device Name: Arterial Line  Site of insertion: Radial - Right  Date of insertion: Unknown  Device Name: Acevedo Catheter  Site of insertion: Urethra  Date of insertion: Unknown  Device Name: Colostomy  Site of insertion: Abdominal Wall  Date of insertion: Unknown  Device Name: PEG Tube  Site of insertion: Abdominal Wall  Date of insertion: 10-  Device Name: Coby Boyce [facility])  Site of insertion: Abdominal Wall  Date of insertion: 10-    **Physician Signature**  This document was electronically signed by: Edwin Eller MD  10/26/2022   10:59 PM

## 2022-10-27 NOTE — PROGRESS NOTES
Subjective:  No acute events or changes. Still on pressor. More awake today, but also somewhat confused. Objective:  /72   Pulse (!) 111   Temp (!) 96.7 °F (35.9 °C) (Temporal)   Resp 26   Ht 5' 10\" (1.778 m)   Wt 191 lb (86.6 kg)   SpO2 96%   BMI 27.41 kg/m²   Date 10/27/22 0000 - 10/27/22 2359   Shift 7314-4009 9314-8909 4674-2474 24 Hour Total   INTAKE   I.V.(mL/kg) 1197. 3(13.8) 551. 9(6.4)  1749. 3(20.2)   IV Piggyback(mL/kg)  549. 3(6.3)  549. 3(6.3)   Shift Total(mL/kg) 1197. 3(13.8) 1101.2(12.7)  6180.2(68.1)   OUTPUT   Urine(mL/kg/hr) 575(0.8) 75  650   Emesis/NG output(mL/kg) 70(0.8) 17(0.2)  87(1)   Drains(mL/kg) 1100(12.7) 130(1.5)  1230(14.2)   Stool(mL/kg) 100(1.2)   100(1.2)   Shift Total(mL/kg) 1845(21.3) 222(2.6)  1415(83.2)   Weight (kg) 86.6 86.6 86.6 86.6     General: NAD, awake and alert, somewhat confused  Chest: regular, non-labored  Abdomen: Soft, ND, ATTP, incision C/D/I, minimal output in ileostomy, TRISTAN serous    CBC:   Lab Results   Component Value Date/Time    WBC 13.7 10/27/2022 02:22 AM    RBC 4.20 10/27/2022 02:22 AM    HGB 13.0 10/27/2022 02:22 AM    HCT 37.7 10/27/2022 02:22 AM    MCV 89.8 10/27/2022 02:22 AM    MCH 31.0 10/27/2022 02:22 AM    MCHC 34.5 10/27/2022 02:22 AM    RDW 14.6 10/27/2022 02:22 AM    PLT 60 10/27/2022 02:22 AM    MPV 14.0 10/27/2022 02:22 AM     CMP:    Lab Results   Component Value Date/Time     10/27/2022 02:22 AM    K 3.6 10/27/2022 02:22 AM     10/27/2022 02:22 AM    CO2 20 10/27/2022 02:22 AM    BUN 44 10/27/2022 02:22 AM    CREATININE 1.3 10/27/2022 02:22 AM    GFRAA >59 04/15/2022 11:37 AM    LABGLOM >60 10/27/2022 02:22 AM    GLUCOSE 116 10/27/2022 02:22 AM    PROT 4.8 10/27/2022 09:27 AM    LABALBU 1.1 10/27/2022 09:27 AM    CALCIUM 7.0 10/27/2022 02:22 AM    BILITOT 4.0 10/27/2022 09:27 AM    ALKPHOS 73 10/27/2022 09:27 AM     10/27/2022 09:27 AM     10/27/2022 09:27 AM     Assessment and plan:  77year old male s/p open right hemicolectomy, s/p washout with ileostomy creation  1) Hypotension- patient continues to have losses due to low albumin secondary to chronic liver disease.  Continue with fluid resuscitation and boluses  2) Abdominal contamination from leak- continue merrem  3) elevated liver enzymes- new diagnosis of hepatitis c and alcoholism- monitor, ciwa protocol as needed  4) Malnutrition and liver disease- until bowel function really improves, will start TPN  5) Other cares per medicine teams

## 2022-10-27 NOTE — PROGRESS NOTES
Palliative Care Progress Note  10/27/2022 8:08 AM    Patient:  Marielena Vasquez  YOB: 1955  Primary Care Physician: No primary care provider on file. Advance Directive: DNR  Admit Date: 10/20/2022       Hospital Day: 7  Portions of this note have been copied forward, however, changed to reflect the most current clinical status of this patient. CHIEF COMPLAINT/REASON FOR CONSULTATION Goals of care, family support, Code status, symptom management     SUBJECTIVE:  Mr. Mauricio Mcconnell is now extubated. Remains in ICU with pressor support. He is more alert and communicative today. HPI: The patient is a 77 y.o. male with PMH HTN, afib, and non-ischemic cardiomyopathy who presented to [address] [place] [address] ED 10/20/2022 complaining of abdominal pain, distention, and n/v x2 days. CT abdomen/pelvis in ED revealed a colon mass at the hepatic flexure causing obstruction. He was admitted under hospitalist services and initiated on NGT therapy for decompression. GS was consulted in ED and pt underwent right hemicolectomy with gastrostomy tube placement 10/21/2022 with hard mass adherent to liver and small bowel posteriorly. He remained intubated and sedated post op due to concern for trauma to the nasopharynx and pharynx due to a false track created by an NG tube. He underwent scoping with ENT 10/22/2022 to evaluate for safety to extubate and was found to be patent bilaterally with trauma to to the right but no no contraindication to wean to extubation and positive pressure if needed. He was liberated from the ventilator 10/23/2022 and demonstrated increased agitation with need for intermittent precedex/ativan for concern of ETOH withdrawal. He required endoscopic Gtube replacement with GI 10/23/2022 after self removing Gtube once extubated. Oncology has evaluated with elevated CEA and pathology pending with plans for further staging workup to be completed outpatient.     Review of Systems:   14 point review of systems is negative except as specifically addressed above. Objective:   VITALS:  /65   Pulse (!) 120   Temp 97 °F (36.1 °C) (Temporal)   Resp 22   Ht 5' 10\" (1.778 m)   Wt 191 lb (86.6 kg)   SpO2 95%   BMI 27.41 kg/m²   24HR INTAKE/OUTPUT:    Intake/Output Summary (Last 24 hours) at 10/27/2022 2262  Last data filed at 10/27/2022 0746  Gross per 24 hour   Intake 3767.6 ml   Output 4822 ml   Net -1054.4 ml       General appearance: 76 yo male, ill appearing, NAD  Head: Normocephalic, without obvious abnormality, atraumatic  Eyes: Icteric. Pupils sluggish  Ears: normal external ears and nose  Neck: no JVD, supple, symmetrical, trachea midline   Lungs: diminished to auscultation bilaterally, unlabored on room air  Heart: Irregular rhythm and tachycardic, S1, S2 normal, no murmur  Abdomen: Rounded, taut, hypoactive bowel sounds, no grimacing to palpation, G-tube with gastric contents draining.  Ostomy with liquid stool  Extremities: BLE 1+ edema,  No erythema, no tenderness to palpation  Skin: Warm, dry, jaundiced, abdominal incision WNL  Neurologic: Alert, oriented to person/place/year, follows commands, speech fluent, generalized weakness    Medications:      dexmedetomidine 0.2 mcg/kg/hr (10/26/22 0224)    phenylephrine (MARIE-SYNEPHRINE) 50mg/250mL infusion 90 mcg/min (10/27/22 0134)    dilTIAZem Stopped (10/25/22 2226)    IV infusion builder 100 mL/hr at 10/27/22 0027    sodium chloride 5 mL/hr at 10/27/22 0511    dextrose        sodium chloride  500 mL IntraVENous Once    chlorhexidine  15 mL Mouth/Throat BID    famotidine (PEPCID) injection  20 mg IntraVENous Daily    metoclopramide  5 mg IntraVENous Q6H    meropenem  1,000 mg IntraVENous Q12H    sodium chloride  500 mL IntraVENous Once    BPCO  1 Dose Apply externally BID    [Held by provider] digoxin  125 mcg IntraVENous Daily    metoprolol  5 mg IntraVENous Q4H    thiamine  100 mg IntraVENous Daily    HYDROmorphone  2 mg IntraVENous Once    sodium chloride flush  5-40 mL IntraVENous 2 times per day    insulin lispro  0-4 Units SubCUTAneous TID WC    insulin lispro  0-4 Units SubCUTAneous Nightly     sodium phosphate IVPB, sodium phosphate IVPB **OR** sodium phosphate IVPB **OR** sodium phosphate IVPB, labetalol, LORazepam, HYDROmorphone **OR** HYDROmorphone **OR** HYDROmorphone, sodium chloride flush, sodium chloride, glucose, dextrose bolus **OR** dextrose bolus, glucagon (rDNA), dextrose, potassium chloride **OR** potassium alternative oral replacement **OR** potassium chloride, ondansetron **OR** ondansetron, magnesium hydroxide, acetaminophen **OR** acetaminophen  Diet NPO Exceptions are: Sips of Water with Meds     Lab and other Data:     Recent Labs     10/25/22  2012 10/26/22  0741 10/27/22  0222   WBC 10.1 12.9* 13.7*   HGB 15.0 12.9* 13.0*   PLT 85* 57* 60*       Recent Labs     10/25/22  2012 10/26/22  0741 10/26/22  1007 10/27/22  0222    140  --  142   K 3.5 4.0 3.5 3.6    110  --  113*   CO2 22 19*  --  20*   BUN 35* 42*  --  44*   CREATININE 1.8* 1.9*  --  1.3*   GLUCOSE 180* 140*  --  116*       Recent Labs     10/25/22  0449 10/25/22  2012 10/26/22  0741   AST 79* 232* 426*   ALT 34 61* 92*   BILITOT 5.0*  4.9* 7.2* 6.3*   ALKPHOS 49 45 49       RAD:   CT ABDOMEN PELVIS W IV CONTRAST Additional Contrast? None  Result Date: 10/20/2022  1. Irregular mass at the hepatic flexure of the colon resulting in high-grade small bowel and proximal large bowel obstruction. Severe fluid and gaseous distention of the entire stomach and small bowel including the distal esophagus. Findings are overall concerning for colonic neoplasm. Recommend GI and surgical consultation. 2.Scattered enlarged retroperitoneal lymph nodes. These are presumably metastatic if the colon mass proves to be malignant. 3.Marked hydropic distention of the gallbladder containing gallstones. No CT evidence of cholecystitis. Recommend correlation with LFTs.     US GALLBLADDER RUQ  Result Date: 10/21/2022  1. Limited exam for reasons discussed above. 2.  Diffuse fatty infiltration of the liver with a 1.1 cm calcification in the right hepatic lobe. 3.  Distended gallbladder with several small stones and mild diffuse gallbladder wall thickening. 4.  No other significant abnormality. Recommendation: Follow up as clinically indicated. Electronically Signed by Jossie Bagley MD at 21-Oct-2022 11:06:19 PM EST             XR CHEST PORTABLE  Result Date: 10/20/2022  The NG tube can be traced to the level of distal esophagus. Suggest advancing accordingly, with a follow-up radiograph. Recommendation: As above. Electronically Signed by Mya Rodarte MD at 20-Oct-2022 06:15:09 PM EST             XR CHEST PORTABLE  Result Date: 10/20/2022  Cardiomegaly. Recommendation: Follow up as clinically indicated. Electronically Signed by Nasrin Bruno MD at 20-Oct-2022 03:26:36 PM EST             Assessment/Plan   Principal Problem:    SBO (small bowel obstruction) ([address]  Active Problems:    Essential hypertension    Hypothyroidism    Hyperglycemia    Thrombocytopenia (HCC)    Palliative care patient    Agitation    Moderate malnutrition (HCC)    Non-ischemic cardiomyopathy (HCC)    Paroxysmal atrial fibrillation (HCC)    Colonic mass    Ileus, postoperative [facility])    Accident  Resolved Problems:    * No resolved hospital problems. *      Visit Summary:  Chart reviewed. Report obtained from RN with no acute events overnight. Mr. Eriberto Cagle is seen at bedside this morning now off venilator and stable on room air. He is more alert today and able to participate in visit. Continues to require pressor support and IVF resuscitation. Vitals have continued to stabilize post op. I received notification from RN that pts daughter and other family were at bedside visiting pt and his mentation had improved throughout the day. I visited with pt and his family and we discussed pts current status and plan of care.  Reviewed hepatitis panel. Pt answers all questions appropriately and requesting to complete living will. I notified Federica Good who will visit pt this afternoon. Opportunity for questions and emotional support provided. Will continue to follow. Candidate for SCOP: To be determined based on hospital course     Recommendations:      Palliative Care- C prolong life, continue all medical treatments/workup and monitor for improvement. D/c planning based on hospital course. Code status- DNR  SBO 2/2 hepatic flexure colonic mass- 10/21/22 right hemicolectomy with gastrostomy placement by Dr. Amado Ortiz. G-tube replaced 10/23/2022 per GI endoscopically after patient self removed once extubated, remains to gravity until return of bowel function. Reglan continued Baseline CEA 11.0 on 10/21/2022 and postop CEA 6.9. Pathology at least stage III, will need outpt follow up for further staging workup. Septic shock 2/2 anastomotic leak- 10/26/22 laparotomy, repair of leak, washout, and creation of diverting ileostomy by Dr. Donta Mcmahan 10/26/22. Wean pressor support at able. Abx continue  Acute hypoxemic respiratory failure- requiring intubation postop. Weaned FiO2 and PEEP. Off sedation. SBT today. Elevated bilirubin- worsening jaundice with bilirubin 4.9 without associated LFT's elevation. repeat bedside RUQ ultrasound 10/25/22 shows cholelithiasis without acute cholecystitis or CBD dilation to explain the rising LFTs  SONIA- prerenal vs ATN from sepsis. Fluid resuscitation. Follow labs/output  Agitation- concern 2/2 EtOH withdrawal. Precedex restarted postop. PRN ativan available. Restraints as warranted  A. fib with RVR- cardiology following. Cardizem gtt stopped. Lopressor and digoxin IV scheduled. Rate more controlled postop  Hypoxemic respiratory failure- extubated 10/23/2022 and now tolerating room air  Nonischemic cardiomyopathy- noted.  Mgmt per hospitalist    Thank you for consulting Palliative Care and allowing us to participate in the care of this patient.    Time Spent Counseling > 50%:  YES                                   Total Time Spent with patient/family counseling, workup/treatment review, counseling and placement of orders/preparation of this note: 31 minutes    Electronically signed by ALLISON Russell CNP on 10/27/2022 at 8:08 AM    (Please note that portions of this note were completed with a voice recognition program.  Nancy Velazquez made to edit the dictations but occasionally words are mis-transcribed.)

## 2022-10-27 NOTE — PROGRESS NOTES
Family confirms history of longstanding alcohol abuse and remote drug abuse to explain alcohol withdrawal and new discovery of positive serology for Hepatitis C causing cirrhosis to explain persistent elevated LFT's.   Gallstones in GB are now thought to be incidental.  BP improving with fluid boluses, AF rate controlled, oriented, talkative, not passing flatus per patient, still poor quality bowel sounds, continue PEG decompression of GI tract, start IV TPN, check magnesium, TSH.

## 2022-10-27 NOTE — PROGRESS NOTES
Comprehensive Nutrition Assessment    Type and Reason for Visit:  Reassess    Nutrition Recommendations/Plan:   If within clinical course, suggest alternate source of nutrition     Malnutrition Assessment:  Malnutrition Status: Moderate malnutrition (10/26/22 0736)    Context:  Chronic Illness     Findings of the 6 clinical characteristics of malnutrition:  Energy Intake:  Mild decrease in energy intake (Comment)  Weight Loss:  Greater than 10% over 6 months     Body Fat Loss:  Mild body fat loss Orbital   Muscle Mass Loss:  No significant muscle mass loss    Fluid Accumulation:  Mild Generalized   Strength:  Not Performed    Nutrition Assessment:    Pt has been extubated. Remains NPO. Starting day 7. Weight loss continues--20% in 6 months, 4.2% in less than a week    Nutrition Related Findings:    Colostomy, PEG Wound Type: Multiple, Deep Tissue Injury, Surgical Incision       Current Nutrition Intake & Therapies:    Average Meal Intake: NPO  Average Supplements Intake: NPO  Diet NPO Exceptions are: Sips of Water with Meds    Anthropometric Measures:  Height: 5' 10\" (177.8 cm)  Ideal Body Weight (IBW): 166 lbs (75 kg)    Admission Body Weight: 230 lb (104.3 kg) (stated)  Current Body Weight: 191 lb (86.6 kg), 115.1 % IBW. Weight Source: Bed Scale  Current BMI (kg/m2): 27.4  Usual Body Weight: 238 lb (108 kg) (4/2022)  % Weight Change (Calculated): -16.3  Weight Adjustment For: No Adjustment  BMI Categories: Overweight (BMI 25.0-29. 9)    Estimated Daily Nutrient Needs:  Energy Requirements Based On: Kcal/kg     Energy (kcal/day): 7349-4552 kcals (20-25 kcals/kg)  Weight Used for Protein Requirements: Ideal  Protein (g/day): 21=741y  Method Used for Fluid Requirements: 1 ml/kcal  Fluid (ml/day): 4744-1744 ml    Nutrition Diagnosis:   Inadequate oral intake, Increased nutrient needs related to acute injury/trauma, increase demand for energy/nutrients as evidenced by NPO or clear liquid status due to medical condition, wounds, weight loss, weight loss greater than or equal to 2% in 1 week    Nutrition Interventions:   Food and/or Nutrient Delivery: Continue NPO  Nutrition Education/Counseling: No recommendation at this time  Coordination of Nutrition Care: Continue to monitor while inpatient       Goals:  Previous Goal Met: No Progress toward Goal(s)  Goals: Meet at least 75% of estimated needs, Initiate nutrition support       Nutrition Monitoring and Evaluation:   Behavioral-Environmental Outcomes: None Identified  Food/Nutrient Intake Outcomes: Enteral Nutrition Intake/Tolerance, Parenteral Nutrition Intake/Tolerance, Diet Advancement/Tolerance  Physical Signs/Symptoms Outcomes: Biochemical Data, Fluid Status or Edema, Weight, Skin    Discharge Planning:     Too soon to determine     Amado Reeves MS, RD, LD  Contact: [phone]

## 2022-10-27 NOTE — PROGRESS NOTES
Jaden Rodriguez asked this  to assist pt in completing an AD/LW. Pt had the documents and pt's daughter and another family member was present. Pt did not want to complete the document today. Pt's daughter did have some questions about the AD/LW. This  discussed the document and the daughter filled in part of the document. This  will follow up tomorrow to assist pt if he is willing and able to complete. Pt's daughter and family member expressed gratitude for assistance with AD/LW and they want the pt to be able to understand and complete the document.      Electronically signed by Homer Mckeon on 10/27/2022 at 3:03 PM

## 2022-10-27 NOTE — PROGRESS NOTES
Infectious Diseases Progress Note    Patient:  Rea See  YOB: 1955  MRN: [id number]   Admit date: 10/20/2022   Admitting Physician: Marina Grissom MD  Primary Care Physician: No primary care provider on file. Chief Complaint/Interval History: He is more awake and communicative this a.m. His mouth is dry. He would like to eat/drink. Explained he just had abdominal surgery. Nursing is going to assist him with swabs. He is without fever. He had about 800 out of urine overnight. He has serous output in TRISTAN drains. He remains on Samm-Synephrine. He is on room air with oxygen saturation at 98%.   In/Out    Intake/Output Summary (Last 24 hours) at 10/27/2022 0753  Last data filed at 10/27/2022 0746  Gross per 24 hour   Intake 3767.6 ml   Output 4897 ml   Net -1129.4 ml     Allergies: No Known Allergies  Current Meds: 0.9 % sodium chloride bolus, Once  chlorhexidine (PERIDEX) 0.12 % solution 15 mL, BID  dexmedetomidine (PRECEDEX) 400 mcg in sodium chloride 0.9 % 100 mL infusion, Continuous  phenylephrine (SAMM-SYNEPHRINE) 50 mg in dextrose 5 % 250 mL infusion, Continuous  famotidine (PEPCID) 20 mg in sodium chloride (PF) 10 mL injection, Daily  metoclopramide (REGLAN) injection 5 mg, Q6H  meropenem (MERREM) 1000 mg in sodium chloride 0.9% 50 mL (duplex), Q12H  0.9 % sodium chloride bolus, Once  BPCO OINT 1 Dose, BID  dilTIAZem HCl in sodium chloride 125 mg / 125 mL infusion, Continuous  sodium phosphate 33.33 mmol in sodium chloride 0.9 % 250 mL IVPB, PRN  sodium phosphate 10 mmol in sodium chloride 0.9 % 250 mL IVPB, PRN   Or  sodium phosphate 15 mmol in sodium chloride 0.9 % 250 mL IVPB, PRN   Or  sodium phosphate 20 mmol in sodium chloride 0.9 % 500 mL IVPB, PRN  [Held by provider] digoxin (LANOXIN) injection 125 mcg, Daily  metoprolol (LOPRESSOR) injection 5 mg, Q4H  sodium bicarbonate 50 mEq in dextrose 5 % and 0.2 % NaCl 1,000 mL infusion, Continuous  labetalol (NORMODYNE;TRANDATE) injection 10 mg, Q4H PRN  thiamine (B-1) injection 100 mg, Daily  LORazepam (ATIVAN) injection 0.5 mg, Q6H PRN  HYDROmorphone HCl PF (DILAUDID) injection 2 mg, Q2H PRN   Or  HYDROmorphone HCl PF (DILAUDID) injection 1 mg, Q2H PRN   Or  HYDROmorphone HCl PF (DILAUDID) injection 0.5 mg, Q2H PRN  HYDROmorphone HCl PF (DILAUDID) injection 2 mg, Once  sodium chloride flush 0.9 % injection 5-40 mL, 2 times per day  sodium chloride flush 0.9 % injection 5-40 mL, PRN  0.9 % sodium chloride infusion, PRN  glucose chewable tablet 16 g, PRN  dextrose bolus 10% 125 mL, PRN   Or  dextrose bolus 10% 250 mL, PRN  glucagon (rDNA) injection 1 mg, PRN  dextrose 10 % infusion, Continuous PRN  potassium chloride (KLOR-CON M) extended release tablet 40 mEq, PRN   Or  potassium bicarb-citric acid (EFFER-K) effervescent tablet 40 mEq, PRN   Or  potassium chloride 10 mEq/100 mL IVPB (Peripheral Line), PRN  ondansetron (ZOFRAN-ODT) disintegrating tablet 4 mg, Q8H PRN   Or  ondansetron (ZOFRAN) injection 4 mg, Q6H PRN  magnesium hydroxide (MILK OF MAGNESIA) 400 MG/5ML suspension 30 mL, Daily PRN  acetaminophen (TYLENOL) tablet 650 mg, Q6H PRN   Or  acetaminophen (TYLENOL) suppository 650 mg, Q6H PRN  insulin lispro (HUMALOG) injection vial 0-4 Units, TID WC  insulin lispro (HUMALOG) injection vial 0-4 Units, Nightly      Review of Systems see HPI    VitalSigns:  /65   Pulse (!) 120   Temp 97 °F (36.1 °C) (Temporal)   Resp 22   Ht 5' 10\" (1.778 m)   Wt 191 lb (86.6 kg)   SpO2 95%   BMI 27.41 kg/m²      Physical Exam  Line/IV site: No erythema, warmth, induration, or tenderness.   Lungs without crackles or wheezes  Skin without rash  Ostomy pink  Seen without drug rash    Lab Results:  CBC:   Recent Labs     10/25/22  2012 10/26/22  0741 10/27/22  0222   WBC 10.1 12.9* 13.7*   HGB 15.0 12.9* 13.0*   PLT 85* 57* 60*     BMP:  Recent Labs     10/25/22  2012 10/26/22  0741 10/26/22  [facility] 10/27/22  0222    140  --  142   K 3.5 4.0 3.5 3.6    110  --  113*   CO2 22 19*  --  20*   BUN 35* 42*  --  44*   CREATININE 1.8* 1.9*  --  1.3*   GLUCOSE 180* 140*  --  116*     CultureResults:  Blood cultures October 25, 2022-no growth  Blood cultures October 25, 2022-no growth    Radiology:   Chest x-ray yesterday:    Impression   Tubes and lines, as above. Mild dependent atelectasis at the left base. The right lung is relatively clear. No pneumothorax or significant pleural effusions. Recommendation: Follow up as clinically indicated. Electronically Signed by Darnell Stokes MD at 26-Oct-2022 07:52:25 AM EST       Additional Studies Reviewed:  None    Impression:  1. Hypotension-he has had a lot of TRISTAN output. Feel he still has some mild intravascular volume depletion. He does have adequate urine output. He is without fever. Seems to be improving. 2.  Fever-he underwent surgery for anastomotic leak. No further temperature elevation at present. 3.  Colon cancer  4.   Paroxysmal atrial fibrillation    Recommendations:  Continue meropenem  Will give additional fluid bolus  Continue's supportive care  Wean pressors as tolerated  Continue to follow    Radha Gamez MD

## 2022-10-27 NOTE — PLAN OF CARE
Problem: Chronic Conditions and Co-morbidities  Goal: Patient's chronic conditions and co-morbidity symptoms are monitored and maintained or improved  Outcome: Progressing     Problem: Skin/Tissue Integrity  Goal: Absence of new skin breakdown  Description: 1. Monitor for areas of redness and/or skin breakdown  2. Assess vascular access sites hourly  3. Every 4-6 hours minimum:  Change oxygen saturation probe site  4. Every 4-6 hours:  If on nasal continuous positive airway pressure, respiratory therapy assess nares and determine need for appliance change or resting period. Outcome: Progressing     Problem: Safety - Medical Restraint  Goal: Remains free of injury from restraints (Restraint for Interference with Medical Device)  Description: INTERVENTIONS:  1. Determine that other, less restrictive measures have been tried or would not be effective before applying the restraint  2. Evaluate the patient's condition at the time of restraint application  3. Inform patient/family regarding the reason for restraint  4.  Q2H: Monitor safety, psychosocial status, comfort, nutrition and hydration  Outcome: Progressing     Problem: Nutrition Deficit:  Goal: Optimize nutritional status  10/27/2022 1511 by Mounika Nieto RN  Outcome: Progressing  10/27/2022 0736 by Mónica Tineo, MS, RD, LD  Outcome: Not Progressing  Flowsheets (Taken 10/27/2022 0715)  Nutrient intake appropriate for improving, restoring, or maintaining nutritional needs: Assess nutritional status and recommend course of action     Problem: Nutrition Deficit:  Goal: Optimize nutritional status  10/27/2022 1511 by Mounika Nieto RN  Outcome: Progressing  10/27/2022 0736 by Mónica Tineo, MS, RD, LD  Outcome: Not Progressing  Flowsheets (Taken 10/27/2022 0715)  Nutrient intake appropriate for improving, restoring, or maintaining nutritional needs: Assess nutritional status and recommend course of action

## 2022-10-28 NOTE — PROGRESS NOTES
Followed-up with pt but his nurse was in the room and says pt pulled his colostomy bag off of the stoma. Nurse also says pt is agitated. This  was attempting to assist pt in completing an AD/LW. Pt is not available at this moment to complete. Palliative care will continue to follow to assist with ACP document, and goals of care.     Electronically signed by Bisi Rowell on 10/28/2022 at 2:22 PM

## 2022-10-28 NOTE — PLAN OF CARE
Problem: Chronic Conditions and Co-morbidities  Goal: Patient's chronic conditions and co-morbidity symptoms are monitored and maintained or improved  Outcome: Progressing     Problem: Skin/Tissue Integrity  Goal: Absence of new skin breakdown  Description: 1. Monitor for areas of redness and/or skin breakdown  2. Assess vascular access sites hourly  3. Every 4-6 hours minimum:  Change oxygen saturation probe site  4. Every 4-6 hours:  If on nasal continuous positive airway pressure, respiratory therapy assess nares and determine need for appliance change or resting period. Outcome: Progressing     Problem: Safety - Medical Restraint  Goal: Remains free of injury from restraints (Restraint for Interference with Medical Device)  Description: INTERVENTIONS:  1. Determine that other, less restrictive measures have been tried or would not be effective before applying the restraint  2. Evaluate the patient's condition at the time of restraint application  3. Inform patient/family regarding the reason for restraint  4.  Q2H: Monitor safety, psychosocial status, comfort, nutrition and hydration  Outcome: Progressing     Problem: Nutrition Deficit:  Goal: Optimize nutritional status  Outcome: Progressing  Flowsheets (Taken 10/28/2022 0753 by Rakan Serna, MS, RD, LD)  Nutrient intake appropriate for improving, restoring, or maintaining nutritional needs:   Assess nutritional status and recommend course of action   Recommend, monitor, and adjust tube feedings and TPN/PPN based on assessed needs     Problem: ABCDS Injury Assessment  Goal: Absence of physical injury  Outcome: Progressing

## 2022-10-28 NOTE — PROGRESS NOTES
Cardiology Daily Note Drew Moe MD      Patient:  Marcella Careying  [id number]    Patient Active Problem List    Diagnosis Date Noted    Septic shock ([address] 10/27/2022    Peritonitis ([address] 10/27/2022    Acute renal failure ([address] 10/02/2016    Moderate malnutrition ([address] 10/26/2022    Palliative care patient 10/24/2022    Agitation 10/24/2022    Thrombocytopenia ([address] 10/23/2022    SBO (small bowel obstruction) ([address] 10/20/2022    Hyperglycemia 10/20/2022    CKD (chronic kidney disease), stage III ([address] 10/02/2016    Shortness of breath 09/29/2016    Essential hypertension 09/29/2016    Hypothyroidism 09/29/2016    Colonic mass 10/20/2022    Ileus, postoperative ([address] 10/20/2022    Accident 10/20/2022    Paroxysmal atrial fibrillation ([address] 03/16/2018    Hypokalemia 10/04/2016    Non-ischemic cardiomyopathy ([address] 10/03/2016    Ex-cigarette smoker 10/03/2016    Obesity (BMI 35.0-39.9 without comorbidity) 09/29/2016       Admit Date:  10/20/2022    Admission Problem List: Present on Admission:   SBO (small bowel obstruction) (HCC)   Essential hypertension   Hypothyroidism   Non-ischemic cardiomyopathy (HCC)   Paroxysmal atrial fibrillation (HCC)   Hyperglycemia   Thrombocytopenia (HCC)   Palliative care patient   Agitation   Moderate malnutrition ([address]   Septic shock ([address]   Peritonitis ([address]      Cardiac Specific Data:  Specialty Problems          Cardiology Problems    Essential hypertension        Non-ischemic cardiomyopathy ([address]        Paroxysmal atrial fibrillation ([address]           Subjective:  Mr. Blanca Paula seen today resting comfortably. Pressure 94/55 heart rate 103. May be transferred to the floor later today. No complaints of chest pain. Somewhat agitated. OB/GYN over the next week coverage available Dr. Lois Sellers.     Objective:   BP (!) 94/55   Pulse (!) 103   Temp 97.5 °F (36.4 °C) (Temporal)   Resp 18   Ht 5' 10\" (1.778 m)   Wt 186 lb 4 oz (84.5 kg)   SpO2 94%   BMI 26.72 kg/m² Intake/Output Summary (Last 24 hours) at 10/28/2022 0940  Last data filed at 10/28/2022 0800  Gross per 24 hour   Intake 5580.83 ml   Output 5642 ml   Net -61.17 ml       Prior to Admission medications    Medication Sig Start Date End Date Taking?  Authorizing Provider   levothyroxine (SYNTHROID) 88 MCG tablet TAKE ONE TABLET BY MOUTH ONCE DAILY 3/10/22   ALLISON Carmona   carvedilol (COREG) 25 MG tablet Take 1 tablet by mouth 2 times daily 3/9/22   ALLISON Carmona   sacubitril-valsartan (ENTRESTO)  MG per tablet Take 1 tablet by mouth twice daily 3/9/22   ALLISON Carmona   furosemide (LASIX) 40 MG tablet Take 1 tablet by mouth once daily 3/9/22   ALLISON Carmona   spironolactone (ALDACTONE) 25 MG tablet TAKE ONE TABLET BY MOUTH ONCE DAILY 3/9/22   ALLISON Carmona   apixaban (ELIQUIS) 5 MG TABS tablet Take 1 tablet by mouth 2 times daily 3/9/22 10/18/22  ALLISON Carmona        meropenem  1,000 mg IntraVENous Q8H    fat emulsion  250 mL IntraVENous Once per day on Mon Thu    chlorhexidine  15 mL Mouth/Throat BID    famotidine (PEPCID) injection  20 mg IntraVENous Daily    metoclopramide  5 mg IntraVENous Q6H    sodium chloride  500 mL IntraVENous Once    BPCO  1 Dose Apply externally BID    digoxin  125 mcg IntraVENous Daily    metoprolol  5 mg IntraVENous Q4H    thiamine  100 mg IntraVENous Daily    HYDROmorphone  2 mg IntraVENous Once    sodium chloride flush  5-40 mL IntraVENous 2 times per day    insulin lispro  0-4 Units SubCUTAneous TID     insulin lispro  0-4 Units SubCUTAneous Nightly       TELEMETRY: Atrial fibrillation    Physical Exam:      Physical Exam      General:  Awake, alert, somewhat agitated  Skin:  Warm and dry  Neck:  no jvd , no carotid bruits  Chest:  Clear to auscultation, no wheezing or rales  Cardiovascular:  IRRR [id number] no murmurs, clicks, gallups, or rubs  Abdomen:  Soft nontender, nondistended, bowel sounds present  Extremities:  Edema: none      Lab Data:  CBC:   Recent Labs     10/26/22  0741 10/27/22  0222 10/28/22  0514   WBC 12.9* 13.7* 13.2*   HGB 12.9* 13.0* 13.7*   HCT 40.0* 37.7* 40.5*   MCV 95.7* 89.8 91.2   PLT 57* 60* 63*     BMP:   Recent Labs     10/26/22  0741 10/26/22  1007 10/27/22  0222 10/28/22  0514     --  142 145   K 4.0 3.5 3.6 3.6     --  113* 111   CO2 19*  --  20* 24   PHOS  --   --   --  1.8*   BUN 42*  --  44* 35*   CREATININE 1.9*  --  1.3* 1.0     LIVER PROFILE:   Recent Labs     10/26/22  0741 10/27/22  0927 10/28/22  0514   * 533* 371*   ALT 92* 142* 122*   BILIDIR  --  2.8*  --    BILITOT 6.3* 4.0* 4.5*   ALKPHOS 49 73 72     PT/INR: No results for input(s): PROTIME, INR in the last 72 hours. APTT:   Recent Labs     10/25/22  2012   APTT 36.0     BNP:  No results for input(s): BNP in the last 72 hours. CK, CKMB, Troponin: @LABRCNT (CKTOTAL:3, CKMB:3, TROPONINI:3)@    IMAGING:  CT ABDOMEN PELVIS W IV CONTRAST Additional Contrast? None    Result Date: 10/25/2022  NO PRIOR REPORT AVAILABLE Exam: CT OF THE ABDOMEN/PELVIS WITH IV CONTRAST Clinical data: Abdominal pain, fever. Recent abdominal surgery. Technique:Direct contiguousaxial CT images were acquired through the abdomen and pelvis with intravenouscontrastusing soft tissue and bone algorithms. Oral contrast was not administered. Reformatted/MPR images were performed. Radiation dose: CTDIvol =28.81 mGy, DLP =1627 mGy x cm. Limitations: Lack of oral contrast limits evaluation of the bowel loops. Prior Studies: Ultrasound of the right upper quadrant done on same day. CT scan of the abdomen/pelvis dated 10/20/22. Findings: Lung bases: Trace bilateral pleural effusion is noted with passive subsegmental bibasal atelectasis. Liver:Unremarkable size andcontour. Normal density. A 9 mm sized calcified nodule is seen in the segment V of liver, in keeping with calcified granuloma. No evidence of mass. No evidence of dilated ducts.  Gallbladder Fossa: Multiple hyperdense, calcified gallbladder lumen calculi are noted with largest 9 mm in size. Spleen: Multiple tiny calcified nodules are seen in the spleen, in keeping with an infective calcified granulomas. Pancreas:  Grossly unremarkable. Adrenal glands: Grossly unremarkable size, contour and density. Kidneys: In anatomic position. Grossly unremarkablerenal size, contour and density. No renal or ureteral calculi. No evidence of a renal mass or cyst. Perinephric space is unremarkable. Retroperitoneum: No enlarged retroperitoneal lymphadenopathy. Atheromatous changes are noted in the abdominal aorta. The IVC appears unremarkable. Peritoneal cavity: Extensive ascites is seen. Diffuse pneumoperitoneum is noted. Gastrointestinal tract: Post gastrostomy tube insertion status is seen. An abdominal drain is noted. Multiple diverticuli are seen in the sigmoid colon without any evidence of acute diverticulitis. Partial hemicolectomy. Numerous loops of small bowel demonstrate circumferential wall thickening and edema. Moderate colonic fecal contents, in keeping with constipation. No obstruction. Appendix:Nonvisualized appendix Pelvis: Solid and hollow viscera grossly unremarkable. Osseous structures: Spondylotic changes are seen in the lumbar spine. No acute or destructive bony process identified. Postsurgical status with midline anterior abdominal wall wound with surgical staples in situ. Acevedo's catheter is seen. 1. Post-surgery status in the abdomen. Moderate pneumoperitoneum 2. Status post partial colectomy. Diffuse wall thickening and edema of the small bowel suggesting infectious or inflammatory or ischemic process. Recommendation: Follow up as clinically indicated. All CT scans at this facility utilize dose modulation, iterative reconstruction, and/or weight based dosing when appropriate to reduce radiation dose to as low as reasonably achievable.  Electronically Signed by Melia Duvall DO at 25-Oct-2022 10:02:05 PM EST CT ABDOMEN PELVIS W IV CONTRAST Additional Contrast? None    Result Date: 10/20/2022  CLINICAL HISTORY: 48-hour history of diffuse abdominal pain distention nausea vomiting some diarrhea no prior history COMPARISON: No comparison TECHNIQUE: Axial images of the abdomen and pelvis were obtained after the administration of IV contrast.Coronal and sagittal multiplanar reconstructions were performed. One or more of the following dose reduction techniques were used: Automated exposure control, adjustment of the mA and/or kV according to patient size, and/or use of iterative reconstruction technique. COMPARISON: None. FINDINGS: Statements: None. Lower Chest: Unremarkable. Hepatobiliary: Hepatic steatosis. Calcified granulomas throughout the liver. No focal lesion is identified. Marked distention of the gallbladder containing gallstones. No CT evidence of cholecystitis however. No biliary ductal dilatation is evident. Pancreas: The pancreatic parenchyma is unremarkable and there is no main duct dilatation. Spleen: The spleen is nonenlarged. Calcified granulomas throughout the spleen. Adrenals: No adrenal glands are symmetric. Genitourinary: The kidneys are symmetric and enhance appropriately. No suspicious focal parenchymal abnormality is identified in either kidney. There is no collecting system dilatation. The bladder is unremarkable, as imaged. The prostate gland appears unremarkable. Gastrointestinal: Diffuse fluid and air distention of the the entire small bowel and proximal large bowel measuring up to 4.3 cm in the left abdomen and up to 7.6 cm of the proximal colon. Transitional point seen at a site of masslike thickening of the hepatic flexure measuring 3.2 x 3.9 cm (series 2 image 35). Colonic diverticulosis without diverticulitis. The appendix appears unremarkable. Lymphatics: Multiple prominent and enlarged retroperitoneal lymph nodes. Largest is 1.5 cm in the left periaortic region (series 2 image 33). Vascular:  Moderate calcific atherosclerosis. The abdominal aorta is normal in caliber. MSK/Body Wall: No concerning bony lesion is identified. Small fat-containing right inguinal hernia. Peritoneum/Other: There is no free fluid or abnormal collection. No free gas. 1.Irregular mass at the hepatic flexure of the colon resulting in high-grade small bowel and proximal large bowel obstruction. Severe fluid and gaseous distention of the entire stomach and small bowel including the distal esophagus. Findings are overall concerning for colonic neoplasm. Recommend GI and surgical consultation. 2.Scattered enlarged retroperitoneal lymph nodes. These are presumably metastatic if the colon mass proves to be malignant. 3.Marked hydropic distention of the gallbladder containing gallstones. No CT evidence of cholecystitis. Recommend correlation with LFTs. US GALLBLADDER RUQ    Result Date: 10/25/2022  Exam: Right upper quadrant ultrasound History: Elevated liver enzymes Comparison: Ultrasound dated October 21, 2022. Technique: Grayscale and color Doppler ultrasound images of the right upper quadrant were obtained. Findings: Pancreas: Visualized portions are unremarkable. Aorta and IVC: Visualized portions are unremarkable. Liver: Normal in size. Normal echogenicity. There is a calcification in the right hepatic lobe. Portal Vein: Patent with Doppler confirmed hepatopedal flow. Gallbladder: Gallstones are present. No significant wall thickening or pericholecystic fluid. Common bile duct: Normal. Right kidney: Normal in size. No hydronephrosis or focal lesion. Peritoneum: Small amount of free fluid in the right upper quadrant. 1.Cholelithiasis without sonographic signs of acute cholecystitis. 2.Small amount of right upper quadrant free fluid. US GALLBLADDER RUQ    Result Date: 10/21/2022  NO PRIOR REPORT AVAILABLE Exam: ULTRASOUND OF THE ABDOMEN LIMITED, RIGHT UPPER QUADRANT Clinical data: Elevated LFTs, sepsis, lactic acidosis.   The patient is on ventilator. Technique: Real-time grayscale imaging of the right upper quadrant was performed. Images supplemented with color Doppler technique. Prior studies: CT scan of abdomen and pelvis dated 10/20/22. Findings: Limited exam due to poor acoustic penetration. Diffuse increased echogenicity of the liver parenchyma. A shadowing focus//calcification in the right lobe measuring up 1.1 cm. No intrahepatic biliary distention. The gallbladder is distended with several small stones. The gallbladder wall thickness measures 0.8 cm. No sludge. The common bile duct is normal in caliber measuring 0.4cm. The right kidney [id number] x 6.6 x 5.4 cm. Normal echogenicity and cortical thickness are preserved. No evidence of renal masses, no calculi and no hydronephrosis Diffuse increased echogenicity of the pancreas. Imaged portion of the aorta demonstrates no acute pathology. Visualized portion of the inferior vena cava is unremarkable. 1.  Limited exam for reasons discussed above. 2.  Diffuse fatty infiltration of the liver with a 1.1 cm calcification in the right hepatic lobe. 3.  Distended gallbladder with several small stones and mild diffuse gallbladder wall thickening. 4.  No other significant abnormality. Recommendation: Follow up as clinically indicated. Electronically Signed by Ajit Rosado MD at 21-Oct-2022 11:06:19 PM EST             XR CHEST PORTABLE    Result Date: 10/27/2022  Patient: Kenney Wayne  Time Out: 00:59Exam(s): FILM CXR 1 VIEW EXAM:  XR Chest, 1 ViewCLINICAL HISTORY:   Reason for exam: CENTRAL LINE PLACEMENT CONFIRMATION. TECHNIQUE:  Frontal view of the chest.COMPARISON:  CXR 10/26/22. FINDINGS:  Lungs: Mild left basilar opacities. Pleural space: Possible small effusions. No pneumothorax. Heart: Stable heart size. No vascular congestion. Bones/joints:  No acute fracture. No dislocation.   Tubes, lines and devices:  Right internal jugular central venous catheter extends to the distal right internal jugular vein. Consider advancing 4-5 cm into the SVC. Previously seen endotracheal tube has been removed. 1.  Right internal jugular central venous catheter extends to the distal right internal jugular vein. Consider advancing 4-5 cm into the SVC. 2. Possible small effusions. Mild left basilar opacities. Electronically signed by Harmony Chamberlain M.D. on 10-28-22 at 0059    XR CHEST PORTABLE    Result Date: 10/26/2022  NO PRIOR REPORT AVAILABLE Exam: X-RAY OF [facility] Clinical data:Post-op intubation. Technique:Single view of the chest. Prior studies: Radiographs of the chest dated 10/20/2022. Findings:Interval ETT terminates 4.3 cm above the alfonso. Interval right IJ line terminates in the proximal SVC. Cardiomegaly, as before. Mild dependent atelectasis at the left base. The right lung is relatively clear. No pneumothorax or significant pleural effusions. Tubes and lines, as above. Mild dependent atelectasis at the left base. The right lung is relatively clear. No pneumothorax or significant pleural effusions. Recommendation: Follow up as clinically indicated. Electronically Signed by Mylene Hartley MD at 26-Oct-2022 07:52:25 AM EST             XR CHEST PORTABLE    Result Date: 10/20/2022  NO PRIOR REPORT AVAILABLE Exam: X-RAY OF [facility] Clinical data:Confirmation of course of NG/OG/NE tube and location of tip of tube. Technique:Single view of the chest. Prior studies: Radiograph of the chest done on same day. Findings: The lungs are grossly clear; noevidence of acute infiltrate or pleural effusion. Cardiac silhouette is mildly enlarged. No acute osseous abnormality is detected. The NG tube can be traced to the level of distal esophagus. Scattered nonspecific gas-filled loops of bowel in the upper abdomen. The NG tube can be traced to the level of distal esophagus. Suggest advancing accordingly, with a follow-up radiograph. Recommendation: As above.  Electronically Signed by Nikolai Daly PIPER MALAEV at 20-Oct-2022 06:15:09 PM EST             XR CHEST PORTABLE    Result Date: 10/20/2022  NO PRIOR REPORT AVAILABLE [facility] Clinical data:Abdominal pain, vomiting. Technique:Single view of the chest. Prior studies: No prior studies submitted. Findings: The trachea is midline. The heart is enlarged. Mediastinal and pulmonary vascular shadows are normal.  There is no focal consolidation or effusion. The osseous structures are intact. Cardiomegaly. Recommendation: Follow up as clinically indicated. Electronically Signed by Felix Ramos MD at 20-Oct-2022 03:26:36 PM EST             EGD    Result Date: 10/23/2022  No dictation         Assessment:  ?Chronic atrial fibrillation with rapid ventricular response rate  Percutaneous endoscopic gastrostomy placement 10/23/2022  EGD 10/23/2022  Status post right hemicolectomy 10/21/2022  Small bowel obstruction  Hypertension  Hypothyroidism  Thrombocytopenia  History of alcohol abuse possible withdrawal  Febrile illness to 106 degrees today  Nonischemic cardiomyopathy  Colon mass  Postoperative ileus  Chronic kidney disease  Shortness of breath  Prior tobacco abuse  Obesity  Echocardiogram 3/14/2017 normal left ventricular function EF 55 to 84% grade 1 diastolic dysfunction mild concentric LVH  Admission 10/20/2022 for abdominal pain nausea and vomiting for 48 hours  Status post laparotomy with repair of anastomosis or leak 10/26/2022 continue current treatment cardiovascular status stable    Plan:  Continue current treatment cardiovascular status stable to be transferred to the seventh floor today I believe. I will be gone over the next week coverage available with Dr. Eugenie Barraza.     Vivien Topete MD, MD 10/28/2022 9:40 AM

## 2022-10-28 NOTE — PROGRESS NOTES
Comprehensive Nutrition Assessment    Type and Reason for Visit:  Reassess    Nutrition Recommendations/Plan:   Aware pt has pulled IJ and so PN has been stopped. If IJ is adjusted or PICC placed continue current nutritional intervention. If able recommend increasing rate to 75ml/hr to meet protein needs     Malnutrition Assessment:  Malnutrition Status: Moderate malnutrition (10/28/22 0757)    Context:  Chronic Illness     Findings of the 6 clinical characteristics of malnutrition:  Energy Intake:  Mild decrease in energy intake (Comment)  Weight Loss:  Greater than 10% over 6 months     Body Fat Loss:  Mild body fat loss Orbital   Muscle Mass Loss:  No significant muscle mass loss    Fluid Accumulation:  Mild Generalized   Strength:  Not Performed    Nutrition Assessment:    TPN has been started. Receiving PN 5/15 @ 50ml/hr with 20% lipids twice weekly. Accuchek's     Nutrition Related Findings:    Colostomy, PEG Wound Type: Deep Tissue Injury, Surgical Incision       Current Nutrition Intake & Therapies:    Average Meal Intake: NPO  Average Supplements Intake: NPO  Current Parenteral Nutrition Orders:  Type and Formula: Premix Central   Lipids: 250ml, Two times weekly  Duration: Continuous  Rate/Volume: 50ml/hr = 1200 ml  Current PN Order Provides: PN 5/15 @ 50ml/hr = 852 kcals with 60g protein, 180g CHO. Lipids give an average of 142NP kcals /day or 500kcals on day given. GUR = 1.93  Goal PN Orders Provides: Recommend PN 5/15 at 75ml/hr to meet protein needs = 1278 kcals with 90g protein 270g CHO. Lipids remain the same at average 142NP kcals/day. GUR = 2.21    Anthropometric Measures:  Height: 5' 10\" (177.8 cm)  Ideal Body Weight (IBW): 166 lbs (75 kg)    Admission Body Weight: 230 lb (104.3 kg) (stated)  Current Body Weight: 186 lb 4 oz (84.5 kg), 112.2 % IBW.  Weight Source: Bed Scale  Current BMI (kg/m2): 26.7  Usual Body Weight: 238 lb (108 kg) (4/2022)  % Weight Change (Calculated): -16.3  Weight Adjustment For: No Adjustment  BMI Categories: Overweight (BMI 25.0-29. 9)    Estimated Daily Nutrient Needs:  Energy Requirements Based On: Kcal/kg     Energy (kcal/day): 5643-6733 kcals (20-25 kcals/kg)  Weight Used for Protein Requirements: Ideal  Protein (g/day): 96=421c  Method Used for Fluid Requirements: 1 ml/kcal  Fluid (ml/day): 4338-4838 ml    Nutrition Diagnosis:   Inadequate oral intake, Increased nutrient needs related to acute injury/trauma, increase demand for energy/nutrients as evidenced by NPO or clear liquid status due to medical condition, nutrition support - parenteral nutrition, weight loss greater than or equal to 2% in 1 week, weight loss    Nutrition Interventions:   Food and/or Nutrient Delivery: Continue NPO, Continue Current Parenteral Nutrition  Nutrition Education/Counseling: No recommendation at this time  Coordination of Nutrition Care: Continue to monitor while inpatient       Goals:  Previous Goal Met: Progressing toward Goal(s)  Goals: Meet at least 75% of estimated needs, Initiate nutrition support       Nutrition Monitoring and Evaluation:   Behavioral-Environmental Outcomes: None Identified  Food/Nutrient Intake Outcomes: Parenteral Nutrition Intake/Tolerance  Physical Signs/Symptoms Outcomes: Biochemical Data, Fluid Status or Edema, Weight, Skin    Discharge Planning:     Too soon to determine     Razia Recinos MS, RD, LD  Contact: [phone]

## 2022-10-28 NOTE — PROGRESS NOTES
Infectious Diseases Progress Note    Patient:  Donald Dallas  YOB: 1955  MRN: [id number]   Admit date: 10/20/2022   Admitting Physician: Seema Engel MD  Primary Care Physician: No primary care provider on file. Chief Complaint/Interval History: He has had some confusion/delirium. He is without fever. Blood pressure is stable. He is off pressors. He remains on room air. Oxygen saturations in the mid 90s.     In/Out    Intake/Output Summary (Last 24 hours) at 10/28/2022 1018  Last data filed at 10/28/2022 0800  Gross per 24 hour   Intake 4823.68 ml   Output 5490 ml   Net -666.32 ml     Allergies: No Known Allergies  Current Meds: meropenem (MERREM) 1000 mg in sodium chloride 0.9% 50 mL (duplex), Q8H  PN-Adult Premix 5/15 - Standardard Electrolytes - Central Line, Continuous TPN  fat emulsion 20 % infusion 250 mL, Once per day on Mon Thu  chlorhexidine (PERIDEX) 0.12 % solution 15 mL, BID  dexmedetomidine (PRECEDEX) 400 mcg in sodium chloride 0.9 % 100 mL infusion, Continuous  phenylephrine (MARIE-SYNEPHRINE) 50 mg in dextrose 5 % 250 mL infusion, Continuous  famotidine (PEPCID) 20 mg in sodium chloride (PF) 10 mL injection, Daily  metoclopramide (REGLAN) injection 5 mg, Q6H  0.9 % sodium chloride bolus, Once  BPCO OINT 1 Dose, BID  sodium phosphate 33.33 mmol in sodium chloride 0.9 % 250 mL IVPB, PRN  sodium phosphate 10 mmol in sodium chloride 0.9 % 250 mL IVPB, PRN   Or  sodium phosphate 15 mmol in sodium chloride 0.9 % 250 mL IVPB, PRN   Or  sodium phosphate 20 mmol in sodium chloride 0.9 % 500 mL IVPB, PRN  digoxin (LANOXIN) injection 125 mcg, Daily  metoprolol (LOPRESSOR) injection 5 mg, Q4H  sodium bicarbonate 50 mEq in dextrose 5 % and 0.2 % NaCl 1,000 mL infusion, Continuous  labetalol (NORMODYNE;TRANDATE) injection 10 mg, Q4H PRN  thiamine (B-1) injection 100 mg, Daily  LORazepam (ATIVAN) injection 0.5 mg, Q6H PRN  HYDROmorphone HCl PF (DILAUDID) injection 2 mg, Q2H PRN Or  HYDROmorphone HCl PF (DILAUDID) injection 1 mg, Q2H PRN   Or  HYDROmorphone HCl PF (DILAUDID) injection 0.5 mg, Q2H PRN  HYDROmorphone HCl PF (DILAUDID) injection 2 mg, Once  sodium chloride flush 0.9 % injection 5-40 mL, 2 times per day  sodium chloride flush 0.9 % injection 5-40 mL, PRN  0.9 % sodium chloride infusion, PRN  glucose chewable tablet 16 g, PRN  dextrose bolus 10% 125 mL, PRN   Or  dextrose bolus 10% 250 mL, PRN  glucagon (rDNA) injection 1 mg, PRN  dextrose 10 % infusion, Continuous PRN  potassium chloride (KLOR-CON M) extended release tablet 40 mEq, PRN   Or  potassium bicarb-citric acid (EFFER-K) effervescent tablet 40 mEq, PRN   Or  potassium chloride 10 mEq/100 mL IVPB (Peripheral Line), PRN  ondansetron (ZOFRAN-ODT) disintegrating tablet 4 mg, Q8H PRN   Or  ondansetron (ZOFRAN) injection 4 mg, Q6H PRN  magnesium hydroxide (MILK OF MAGNESIA) 400 MG/5ML suspension 30 mL, Daily PRN  acetaminophen (TYLENOL) tablet 650 mg, Q6H PRN   Or  acetaminophen (TYLENOL) suppository 650 mg, Q6H PRN  insulin lispro (HUMALOG) injection vial 0-4 Units, TID WC  insulin lispro (HUMALOG) injection vial 0-4 Units, Nightly      Review of Systems see HPI    VitalSigns:  BP (!) 94/55   Pulse (!) 103   Temp 97.5 °F (36.4 °C) (Temporal)   Resp 18   Ht 5' 10\" (1.778 m)   Wt 186 lb 4 oz (84.5 kg)   SpO2 94%   BMI 26.72 kg/m²      Physical Exam  Line/IV site: No erythema, warmth, induration, or tenderness.   Abdomen mild tenderness  Ostomy pink and functioning  Lungs without crackles or wheezes    Lab Results:  CBC:   Recent Labs     10/26/22  0741 10/27/22  0222 10/28/22  0514   WBC 12.9* 13.7* 13.2*   HGB 12.9* 13.0* 13.7*   PLT 57* 60* 63*     BMP:  Recent Labs     10/26/22  0741 10/26/22  1007 10/27/22  0222 10/28/22  0514     --  142 145   K 4.0 3.5 3.6 3.6     --  113* 111   CO2 19*  --  20* 24   BUN 42*  --  44* 35*   CREATININE 1.9*  --  1.3* 1.0   GLUCOSE 140*  --  116* 114* CultureResults:  No new results    Radiology: None    Additional Studies Reviewed:  None    Impression:  1. Hypotension-resolved  2. Fever-resolved  3. Colon cancer  4. Paroxysmal atrial fibrillation  5.   Mild delirium-multifactorial    Recommendations:  Continue meropenem  Continue supportive care  Continue to follow    Farooq Prince MD

## 2022-10-28 NOTE — PROGRESS NOTES
Palliative Care Progress Note  10/28/2022 8:10 AM    Patient:  Jo-Ann Govea  YOB: 1955  Primary Care Physician: No primary care provider on file. Advance Directive: DNR  Admit Date: 10/20/2022       Hospital Day: 8  Portions of this note have been copied forward, however, changed to reflect the most current clinical status of this patient. CHIEF COMPLAINT/REASON FOR CONSULTATION Goals of care, family support, Code status, symptom management     SUBJECTIVE:  Mr. Sima De Souza remains in ICU. Required prn pain medications due increased restlessness and agitation. Now resting comfortably. HPI: The patient is a 77 y.o. male with PMH HTN, afib, and non-ischemic cardiomyopathy who presented to [address] [place] [address] ED 10/20/2022 complaining of abdominal pain, distention, and n/v x2 days. CT abdomen/pelvis in ED revealed a colon mass at the hepatic flexure causing obstruction. He was admitted under hospitalist services and initiated on NGT therapy for decompression. GS was consulted in ED and pt underwent right hemicolectomy with gastrostomy tube placement 10/21/2022 with hard mass adherent to liver and small bowel posteriorly. He remained intubated and sedated post op due to concern for trauma to the nasopharynx and pharynx due to a false track created by an NG tube. He underwent scoping with ENT 10/22/2022 to evaluate for safety to extubate and was found to be patent bilaterally with trauma to to the right but no no contraindication to wean to extubation and positive pressure if needed. He was liberated from the ventilator 10/23/2022 and demonstrated increased agitation with need for intermittent precedex/ativan for concern of ETOH withdrawal. He required endoscopic Gtube replacement with GI 10/23/2022 after self removing Gtube once extubated. Oncology has evaluated with elevated CEA and pathology pending with plans for further staging workup to be completed outpatient.     Review of Systems:   14 point review of systems is negative except as specifically addressed above. Objective:   VITALS:  /88   Pulse 99   Temp 97.1 °F (36.2 °C) (Temporal)   Resp 18   Ht 5' 10\" (1.778 m)   Wt 186 lb 4 oz (84.5 kg)   SpO2 96%   BMI 26.72 kg/m²   24HR INTAKE/OUTPUT:    Intake/Output Summary (Last 24 hours) at 10/28/2022 0810  Last data filed at 10/28/2022 0505  Gross per 24 hour   Intake 5101.43 ml   Output 4962 ml   Net 139.43 ml       General appearance: 78 yo male, ill appearing, no acute distress  Head: Normocephalic, without obvious abnormality, atraumatic  Eyes: Icteric. Pupils sluggish  Ears: normal external ears and nose  Neck: no JVD, supple, symmetrical, trachea midline   Lungs: diminished to auscultation bilaterally, unlabored on room air  Heart: irregular  rhythm and tachycardic, S1, S2 normal, no murmur  Abdomen: Rounded, taut, hypoactive bowel sounds, no grimacing to palpation, G-tube with gastric contents draining.  Ostomy with liquid stool  Extremities: BLE 1+ edema,  No erythema, no tenderness to palpation  Skin: Warm, dry, jaundiced, abdominal incision WNL  Neurologic: Alert, oriented to person only today, follows commands, speech fluent, generalized weakness    Medications:      PN-Adult Premix 5/15 - Standard Electrolytes - Central Line Stopped (10/28/22 0030)    dexmedetomidine 0.2 mcg/kg/hr (10/26/22 0224)    phenylephrine (MARIE-SYNEPHRINE) 50mg/250mL infusion 30 mcg/min (10/28/22 0521)    IV infusion builder 100 mL/hr at 10/27/22 2114    sodium chloride 5 mL/hr at 10/27/22 0511    dextrose        meropenem  1,000 mg IntraVENous Q8H    fat emulsion  250 mL IntraVENous Once per day on Mon Thu    chlorhexidine  15 mL Mouth/Throat BID    famotidine (PEPCID) injection  20 mg IntraVENous Daily    metoclopramide  5 mg IntraVENous Q6H    sodium chloride  500 mL IntraVENous Once    BPCO  1 Dose Apply externally BID    digoxin  125 mcg IntraVENous Daily    metoprolol  5 mg IntraVENous Q4H    thiamine  100 mg IntraVENous Daily    HYDROmorphone  2 mg IntraVENous Once    sodium chloride flush  5-40 mL IntraVENous 2 times per day    insulin lispro  0-4 Units SubCUTAneous TID WC    insulin lispro  0-4 Units SubCUTAneous Nightly     sodium phosphate IVPB, sodium phosphate IVPB **OR** sodium phosphate IVPB **OR** sodium phosphate IVPB, labetalol, LORazepam, HYDROmorphone **OR** HYDROmorphone **OR** HYDROmorphone, sodium chloride flush, sodium chloride, glucose, dextrose bolus **OR** dextrose bolus, glucagon (rDNA), dextrose, potassium chloride **OR** potassium alternative oral replacement **OR** potassium chloride, ondansetron **OR** ondansetron, magnesium hydroxide, acetaminophen **OR** acetaminophen  Diet NPO Exceptions are: Sips of Water with Meds  PN-Adult Premix 5/15 - Standardard Electrolytes - Central Line     Lab and other Data:     Recent Labs     10/26/22  0741 10/27/22  0222 10/28/22  0514   WBC 12.9* 13.7* 13.2*   HGB 12.9* 13.0* 13.7*   PLT 57* 60* 63*       Recent Labs     10/26/22  0741 10/26/22  1007 10/27/22  0222 10/28/22  0514     --  142 145   K 4.0 3.5 3.6 3.6     --  113* 111   CO2 19*  --  20* 24   BUN 42*  --  44* 35*   CREATININE 1.9*  --  1.3* 1.0   GLUCOSE 140*  --  116* 114*       Recent Labs     10/26/22  0741 10/27/22  0927 10/28/22  0514   * 533* 371*   ALT 92* 142* 122*   BILITOT 6.3* 4.0* 4.5*   ALKPHOS 49 73 72       RAD:   CT ABDOMEN PELVIS W IV CONTRAST Additional Contrast? None  Result Date: 10/20/2022  1. Irregular mass at the hepatic flexure of the colon resulting in high-grade small bowel and proximal large bowel obstruction. Severe fluid and gaseous distention of the entire stomach and small bowel including the distal esophagus. Findings are overall concerning for colonic neoplasm. Recommend GI and surgical consultation. 2.Scattered enlarged retroperitoneal lymph nodes. These are presumably metastatic if the colon mass proves to be malignant.  3.Marked hydropic distention of the gallbladder containing gallstones. No CT evidence of cholecystitis. Recommend correlation with LFTs. US GALLBLADDER RUQ  Result Date: 10/21/2022  1. Limited exam for reasons discussed above. 2.  Diffuse fatty infiltration of the liver with a 1.1 cm calcification in the right hepatic lobe. 3.  Distended gallbladder with several small stones and mild diffuse gallbladder wall thickening. 4.  No other significant abnormality. Recommendation: Follow up as clinically indicated. Electronically Signed by Ladell Kayser MD at 21-Oct-2022 11:06:19 PM EST             XR CHEST PORTABLE  Result Date: 10/20/2022  The NG tube can be traced to the level of distal esophagus. Suggest advancing accordingly, with a follow-up radiograph. Recommendation: As above. Electronically Signed by Gerard Webb MD at 20-Oct-2022 06:15:09 PM EST             XR CHEST PORTABLE  Result Date: 10/20/2022  Cardiomegaly. Recommendation: Follow up as clinically indicated. Electronically Signed by Rosa Rodriguez MD at 20-Oct-2022 03:26:36 PM EST             Assessment/Plan   Principal Problem:    SBO (small bowel obstruction) ([address]  Active Problems:    Septic shock ([address]    Peritonitis ([address]    Essential hypertension    Hypothyroidism    Hyperglycemia    Thrombocytopenia (HCC)    Palliative care patient    Agitation    Moderate malnutrition (HCC)    Non-ischemic cardiomyopathy (HCC)    Paroxysmal atrial fibrillation (HCC)    Colonic mass    Ileus, postoperative [facility])    Accident  Resolved Problems:    * No resolved hospital problems. *      Visit Summary:  Chart reviewed. Mr. Aj Rajan is seen at bedside with RN present this morning. He has had some increased agitation/restlessness overnight but is resting comfortably after receiving as needed IV pain medication. He is more confused today but communicative. He has been weaned from vasopressor support.   Plans for midline placement as patient has pulled at his IJ with position change noted on imaging. UOP has picked up and vitals have continued to stabilize post op. Will hopefully downgrade from ICU soon. Goals remain unchanged at this time. Will follow. Candidate for SCOP: To be determined based on hospital course     Recommendations:      Palliative Care- GOC prolong life, continue all medical treatments/workup and monitor for improvement. D/c planning based on hospital course. Code status- DNR  SBO 2/2 hepatic flexure colonic mass- 10/21/22 right hemicolectomy with gastrostomy placement by Dr. Marley Tristan. G-tube replaced 10/23/2022 per GI endoscopically after patient self removed once extubated, remains to gravity until return of bowel function. Reglan continued Baseline CEA 11.0 on 10/21/2022 and postop CEA 6.9. Pathology at least stage III, will need outpt follow up for further staging workup. Septic shock 2/2 anastomotic leak- 10/26/22 laparotomy, repair of leak, washout, and creation of diverting ileostomy by Dr. Paulo Smith 10/26/22. Weaned from pressor support. Abx continue  Acute hypoxemic respiratory failure postop- originally extubated 10/23/22 and required ventilation postop but successful extubation again 10/26/22. Stable on room air. Elevated bilirubin- GI following. Follow LFTS. Bedside RUQ ultrasound 10/25/22 shows cholelithiasis without acute cholecystitis or CBD dilation  SONIA- renal function improving. Agitation- concern 2/2 EtOH withdrawal. Intermittent agitation continues without overt signs of alcohol withdrawal. Restraints as warranted. A. fib with RVR- cardiology following. Cardizem gtt stopped. Lopressor and digoxin IV scheduled. Rate more controlled postop  Nonischemic cardiomyopathy- noted. Mgmt per hospitalist  Malnutrition- TPN, dietician following. Thank you for consulting Palliative Care and allowing us to participate in the care of this patient.    Time Spent Counseling > 50%:  YES                                   Total Time Spent with patient/family counseling, workup/treatment review, counseling and placement of orders/preparation of this note: 25 minutes    Electronically signed by ALLISON Osorio CNP on 10/28/2022 at 8:10 AM    (Please note that portions of this note were completed with a voice recognition program.  Donal Garza made to edit the dictations but occasionally words are mis-transcribed.)

## 2022-10-28 NOTE — PROGRESS NOTES
Occupational Therapy Initial Assessment  Date: 10/28/2022   Patient Name: Malu Segovia  MRN: [id number]     : 1955    Date of Service: 10/28/2022    Discharge Recommendations:  Patient would benefit from continued therapy after discharge       Assessment   Assessment: Evaluation completed and tx initiated. The patient would benefit from further therapy to upgrade functional status. Treatment Diagnosis: post op Bowel Obstruction, respiratory failure  History: hepatic flexure mass s/p open right hemicolectomy with open g tube placement, intubated for respiratory failure, then extubated. Patient pulled out g tube, had procedureto replace, developed fever and had surgery to repair an anastomic leak 10/25 pm, extubated from surgery 10/26. REQUIRES OT FOLLOW-UP: Yes  Activity Tolerance  Activity Tolerance: Treatment limited secondary to decreased cognition           Patient Diagnosis(es): The primary encounter diagnosis was Colonic mass. Diagnoses of Large bowel obstruction ([address] Colonic mass, and Ileus, postoperative ([address] were also pertinent to this visit. has a past medical history of Ex-cigarette smoker, Hypertension, Non-ischemic cardiomyopathy ([address] Palliative care patient, and Paroxysmal atrial fibrillation ([address] has a past surgical history that includes Tonsillectomy; Finger amputation (Left); hemicolectomy (N/A, 10/21/2022); Upper gastrointestinal endoscopy (N/A, 10/23/2022); and laparotomy (N/A, 10/25/2022). Treatment Diagnosis: post op Bowel Obstruction, respiratory failure      Restrictions  Restrictions/Precautions  Restrictions/Precautions: Fall Risk  Required Braces or Orthoses?: No  Position Activity Restriction  Other position/activity restrictions: TRISTAN drain, mulitple IVs, garcia, G tube, telemetry, BP cuff. Gait belt precautions due to abdominal tubes and surgical wounds.     Subjective   General  Chart Reviewed: Yes  Patient assessed for rehabilitation services?: Yes  Family / Caregiver Present: No  Pain Assessment  Pain Assessment: None - Denies Pain  Pre Treatment Pain Screening  Pain at present: 0 (resting comfortably, no apparent distress, had pain meds prior)  Scale Used: Faces  Intervention List: Patient able to continue with treatment  Comments / Details: 6/10 pain per faces--abdominal.  Activity modified to decrease agitation  Vital Signs  Temp: 97 °F (36.1 °C)  Temp Source: Temporal  Heart Rate: 97  Heart Rate Source: Monitor  Resp: 27  BP: 98/74  MAP (Calculated): 82  Level of Consciousness: Alert (0)  MEWS Score: 3  Oxygen Therapy  SpO2: 96 %  O2 Device: None (Room air)    Social/Functional History  Social/Functional History  Lives With: Alone  Type of Home: House  Home Access:  (pt able to manage stairs)  Bathroom Shower/Tub: Tub/Shower unit  Bathroom Accessibility: Accessible  Home Equipment:  (none)  Receives Help From: Family  Homemaking Assistance: Independent  Homemaking Responsibilities: Yes  Ambulation Assistance: Independent  Transfer Assistance: Independent  Active : Yes  Mode of Transportation: Car  Occupation: Retired  Additional Comments: pt unable to answer most history questions, some info taken from case management       Objective   Vision Exceptions: Wears glasses at all times  Hearing: Within functional limits       Observation/Palpation  Posture: Poor  Observation: TRISTAN drain, multiple IVs, garcia, G tube, telemetry, BP cuff, ileostomy, PEG, scds, O2 sat monitor     ADL  Feeding: Dependent/Total  Grooming: Dependent/Total  UE Bathing: Dependent/Total  LE Bathing: Dependent/Total  UE Dressing: Dependent/Total  LE Dressing: Dependent/Total  Toileting: Dependent/Total        Bed mobility  Supine to Sit: Unable to assess (attempted to assist patient to sidelying to assist supine to sit--began to get agitiated and was not fully comprehending the task.   Discontinued the activity.)  Scooting: Maximal assistance;Dependent/Total;2 Person assistance  Bed Mobility Comments: Pt. [facility]. Pt. able to sit up in longsitting x 5 mins with pillows propped behind pt. with good head and neck control. Minimally able to use arms in functional manner due to weakness and decreased cognition  Transfers  Stand Step Transfers: Unable to assess  Transfer Comments: Not able to get patient to EOB nor progress to standing/transfers due to beginning to get agitated. Cognition  Overall Cognitive Status: Exceptions  Arousal/Alertness: Delayed responses to stimuli  Following Commands: Follows one step commands with repetition; Follows one step commands with increased time  Attention Span: Difficulty attending to directions  Memory: Decreased recall of precautions;Decreased recall of recent events  Safety Judgement: Decreased awareness of need for assistance;Decreased awareness of need for safety  Problem Solving: Assistance required to generate solutions;Assistance required to implement solutions  Insights: Decreased awareness of deficits  Initiation: Requires cues for all  Sequencing: Requires cues for all  Cognition Comment: The patient is awake and engaging in limited conversation. Eyes are glassed over somewhat and he responds to only occasional directions.                  LUE AROM (degrees)  LUE General AROM: Requires AAROM to move all joints  RUE AROM (degrees)  RUE General AROM: Requires AAROM to move all joints                    Plan   Occupational Therapy Plan  Times Per Week: 3-5    Goals  Short Term Goals  Short Term Goal 1: The patient will make appropriate attempt to follow simple direction at least 50% of time  Short Term Goal 2: Sit EOB for functional task with assist as needed  Short Term Goal 3: Progress activity to include standing and transfers     Tx initiated:  cognitive stim, AAROM, attempted functional activity in supported tall sitting.  (15 mins)          Noy Nam OT/ROBSON  Electronically signed by Heather Pena Destinee Shorten OT/L on 10/28/2022 at 1:24 PM.

## 2022-10-28 NOTE — PROGRESS NOTES
Pharmacy Adjustment per [facility] protocol    Xuan Burks is a 77 y.o. male. Pharmacy has adjusted medications per [facility] protocol. Recent Labs     10/27/22  0222 10/28/22  0514   BUN 44* 35*       Recent Labs     10/27/22  0222 10/28/22  0514   CREATININE 1.3* 1.0       Estimated Creatinine Clearance: 75 mL/min (based on SCr of 1 mg/dL). Height:   Ht Readings from Last 1 Encounters:   10/27/22 5' 10\" (1.778 m)     Weight:  Wt Readings from Last 1 Encounters:   10/28/22 186 lb 4 oz (84.5 kg)         Plan: Adjust the following medications based on [facility] protocol:  Change Meropenem to 1000mg IV q 8 hours to finish 7 days.     SHIVAM BREWER, PHARM D, 10/28/2022, 7:46 AM

## 2022-10-28 NOTE — PROGRESS NOTES
Subjective:  Patient with confusion and pulling out his IV, pulled off his ostomy bag. PICC line placed. No longer on pressors. Objective:  /77   Pulse 84   Temp 97 °F (36.1 °C) (Temporal)   Resp 20   Ht 5' 10\" (1.778 m)   Wt 186 lb 4 oz (84.5 kg)   SpO2 97%   BMI 26.72 kg/m²   General: NAD, Awake  Chest: Regular, non-labored  Abdomen: Soft, ND, ATTP, incision intact with some serous drainage from inferior portion, no erythema    CBC:   Lab Results   Component Value Date/Time    WBC 13.2 10/28/2022 05:14 AM    RBC 4.44 10/28/2022 05:14 AM    HGB 13.7 10/28/2022 05:14 AM    HCT 40.5 10/28/2022 05:14 AM    MCV 91.2 10/28/2022 05:14 AM    MCH 30.9 10/28/2022 05:14 AM    MCHC 33.8 10/28/2022 05:14 AM    RDW 14.6 10/28/2022 05:14 AM    PLT 63 10/28/2022 05:14 AM    MPV 14.0 10/27/2022 02:22 AM     CMP:    Lab Results   Component Value Date/Time     10/28/2022 05:14 AM    K 3.6 10/28/2022 05:14 AM     10/28/2022 05:14 AM    CO2 24 10/28/2022 05:14 AM    BUN 35 10/28/2022 05:14 AM    CREATININE 1.0 10/28/2022 05:14 AM    GFRAA >59 04/15/2022 11:37 AM    LABGLOM >60 10/28/2022 05:14 AM    GLUCOSE 114 10/28/2022 05:14 AM    PROT 4.5 10/28/2022 05:14 AM    LABALBU 2.1 10/28/2022 05:14 AM    CALCIUM 7.1 10/28/2022 05:14 AM    BILITOT 4.5 10/28/2022 05:14 AM    ALKPHOS 72 10/28/2022 05:14 AM     10/28/2022 05:14 AM     10/28/2022 05:14 AM     Assessment and plan:  77year old male s/p right oswald colectomy for obstructing colon cancer, s/p washout with repair and diverting ileostomy for anastomotic leak  1) Alcoholic liver disease and hepatitis C- contributing to delirium. Bilirubin mostly stable. Monitor labs, check ammonia. 2) Low phos, calcium- replace  3) Malnutrition- multifactorial- continue TPN  4) Expected POI- having a little ostomy output.  Will have G tube taken off garcia bag/gravity to help get gastric contents moving down stream, hopefully start PO soon  5) Continue merrem for contamination from leak  6) Continue pepcid prophylaxis, hgb has been stable, start lovenox prophylaxis  7) Other cares per medicine teams. Dr. Rosalia Angeles will be covering over the weekend.

## 2022-10-28 NOTE — PROGRESS NOTES
99  ABDOMEN: Midline abdominal dressing intact. Diverting ileostomy with dark liquid drainage. TRISTAN drain to suction with large amount serosanguineous drainage. G-tube to drainage. EXTREMITIES: warm, trace edema  SKIN: warm, dry with no rashes or lesions  NEUROLOGIC: Confused  PSYCH: Confused    BMP:   Recent Labs     10/26/22  0741 10/26/22  1007 10/27/22  0222     --  142   K 4.0 3.5 3.6     --  113*   CO2 19*  --  20*   BUN 42*  --  44*   CREATININE 1.9*  --  1.3*   GLUCOSE 140*  --  116*   CALCIUM 7.0*  --  7.0*     Recent Labs     10/27/22  0222 10/26/22  0741 10/25/22  2012   WBC 13.7* 12.9* 10.1   HGB 13.0* 12.9* 15.0   HCT 37.7* 40.0* 45.9   MCV 89.8 95.7* 94.1*   PLT 60* 57* 85*     CMP:    Recent Labs     10/26/22  0741 10/26/22  1007 10/27/22  0222 10/27/22  0927     --  142  --    K 4.0 3.5 3.6  --      --  113*  --    CO2 19*  --  20*  --    BUN 42*  --  44*  --    CREATININE 1.9*  --  1.3*  --    LABGLOM 38*  --  >60  --    GLUCOSE 140*  --  116*  --    PROT 4.7*  --   --  4.8*   LABALBU 2.1*  --   --  1.1*   CALCIUM 7.0*  --  7.0*  --    BILITOT 6.3*  --   --  4.0*   ALKPHOS 49  --   --  73   *  --   --  533*   ALT 92*  --   --  142*     30Day lookback of cultures:    Blood Culture Recent:   Recent Labs     10/25/22  1720   BC No Growth to date. Any change in status will be called. Gram Stain Recent: No results for input(s): LABGRAM in the last 720 hours. Resp Culture Recent: No results for input(s): CULTRESP in the last 720 hours. Body Fluid Recent : No results for input(s): BFCX in the last 720 hours. MRSA Recent : No results for input(s): [facility] in the last 720 hours. Urine Culture Recent : No results for input(s): LABURIN in the last 720 hours. Organism Recent : No results for input(s): ORG in the last 720 hours.      Radiology:   CT ABDOMEN PELVIS W IV CONTRAST Additional Contrast? None    Result Date: 10/20/2022  CLINICAL HISTORY: 48-hour history of diffuse abdominal pain distention nausea vomiting some diarrhea no prior history COMPARISON: No comparison TECHNIQUE: Axial images of the abdomen and pelvis were obtained after the administration of IV contrast.Coronal and sagittal multiplanar reconstructions were performed. One or more of the following dose reduction techniques were used: Automated exposure control, adjustment of the mA and/or kV according to patient size, and/or use of iterative reconstruction technique. COMPARISON: None. FINDINGS: Statements: None. Lower Chest: Unremarkable. Hepatobiliary: Hepatic steatosis. Calcified granulomas throughout the liver. No focal lesion is identified. Marked distention of the gallbladder containing gallstones. No CT evidence of cholecystitis however. No biliary ductal dilatation is evident. Pancreas: The pancreatic parenchyma is unremarkable and there is no main duct dilatation. Spleen: The spleen is nonenlarged. Calcified granulomas throughout the spleen. Adrenals: No adrenal glands are symmetric. Genitourinary: The kidneys are symmetric and enhance appropriately. No suspicious focal parenchymal abnormality is identified in either kidney. There is no collecting system dilatation. The bladder is unremarkable, as imaged. The prostate gland appears unremarkable. Gastrointestinal: Diffuse fluid and air distention of the the entire small bowel and proximal large bowel measuring up to 4.3 cm in the left abdomen and up to 7.6 cm of the proximal colon. Transitional point seen at a site of masslike thickening of the hepatic flexure measuring 3.2 x 3.9 cm (series 2 image 35). Colonic diverticulosis without diverticulitis. The appendix appears unremarkable. Lymphatics: Multiple prominent and enlarged retroperitoneal lymph nodes. Largest is 1.5 cm in the left periaortic region (series 2 image 33). Vascular: Moderate calcific atherosclerosis. The abdominal aorta is normal in caliber. MSK/Body Wall: No concerning bony lesion is identified. Small fat-containing right inguinal hernia. Peritoneum/Other: There is no free fluid or abnormal collection. No free gas. 1.Irregular mass at the hepatic flexure of the colon resulting in high-grade small bowel and proximal large bowel obstruction. Severe fluid and gaseous distention of the entire stomach and small bowel including the distal esophagus. Findings are overall concerning for colonic neoplasm. Recommend GI and surgical consultation. 2.Scattered enlarged retroperitoneal lymph nodes. These are presumably metastatic if the colon mass proves to be malignant. 3.Marked hydropic distention of the gallbladder containing gallstones. No CT evidence of cholecystitis. Recommend correlation with LFTs. US GALLBLADDER RUQ    Result Date: 10/21/2022  NO PRIOR REPORT AVAILABLE Exam: ULTRASOUND OF THE ABDOMEN LIMITED, RIGHT UPPER QUADRANT Clinical data: Elevated LFTs, sepsis, lactic acidosis. The patient is on ventilator. Technique: Real-time grayscale imaging of the right upper quadrant was performed. Images supplemented with color Doppler technique. Prior studies: CT scan of abdomen and pelvis dated 10/20/22. Findings: Limited exam due to poor acoustic penetration. Diffuse increased echogenicity of the liver parenchyma. A shadowing focus//calcification in the right lobe measuring up 1.1 cm. No intrahepatic biliary distention. The gallbladder is distended with several small stones. The gallbladder wall thickness measures 0.8 cm. No sludge. The common bile duct is normal in caliber measuring 0.4cm. The right kidney [id number] x 6.6 x 5.4 cm. Normal echogenicity and cortical thickness are preserved. No evidence of renal masses, no calculi and no hydronephrosis Diffuse increased echogenicity of the pancreas. Imaged portion of the aorta demonstrates no acute pathology. Visualized portion of the inferior vena cava is unremarkable. 1.  Limited exam for reasons discussed above.  2.  Diffuse fatty infiltration of the liver with a 1.1 cm calcification in the right hepatic lobe. 3.  Distended gallbladder with several small stones and mild diffuse gallbladder wall thickening. 4.  No other significant abnormality. Recommendation: Follow up as clinically indicated. Electronically Signed by Jacob Tyler MD at 21-Oct-2022 11:06:19 PM EST             XR CHEST PORTABLE    Result Date: 10/20/2022  NO PRIOR REPORT AVAILABLE Exam: X-RAY OF THECHEST Clinical data:Confirmation of course of NG/OG/NE tube and location of tip of tube. Technique:Single view of the chest. Prior studies: Radiograph of the chest done on same day. Findings: The lungs are grossly clear; noevidence of acute infiltrate or pleural effusion. Cardiac silhouette is mildly enlarged. No acute osseous abnormality is detected. The NG tube can be traced to the level of distal esophagus. Scattered nonspecific gas-filled loops of bowel in the upper abdomen. The NG tube can be traced to the level of distal esophagus. Suggest advancing accordingly, with a follow-up radiograph. Recommendation: As above. Electronically Signed by Gentry Raya MD at 20-Oct-2022 06:15:09 PM EST             XR CHEST PORTABLE    Result Date: 10/20/2022  NO PRIOR REPORT AVAILABLE Exam: X-RAY OF THECHEST Clinical data:Abdominal pain, vomiting. Technique:Single view of the chest. Prior studies: No prior studies submitted. Findings: The trachea is midline. The heart is enlarged. Mediastinal and pulmonary vascular shadows are normal.  There is no focal consolidation or effusion. The osseous structures are intact. Cardiomegaly. Recommendation: Follow up as clinically indicated.  Electronically Signed by Gulshan Pettit MD at 20-Oct-2022 03:26:36 PM EST               Medications  Current Facility-Administered Medications   Medication Dose Route Frequency Provider Last Rate Last Admin    PN-Adult Premix 5/15 - Standardard Electrolytes - Central Line   IntraVENous Continuous TPN Yoel Mahajan MD Stopped at 10/28/22 0030    fat emulsion 20 % infusion 250 mL  250 mL IntraVENous Once per day on Mon Thu Landon Hammans, MD   Stopped at 10/28/22 0030    chlorhexidine (PERIDEX) 0.12 % solution 15 mL  15 mL Mouth/Throat BID Krish Richard MD   15 mL at 10/27/22 2028    dexmedetomidine (PRECEDEX) 400 mcg in sodium chloride 0.9 % 100 mL infusion  0.2-1.4 mcg/kg/hr IntraVENous Continuous Krish Richard MD 5 mL/hr at 10/26/22 0224 0.2 mcg/kg/hr at 10/26/22 0224    phenylephrine (MARIE-SYNEPHRINE) 50 mg in dextrose 5 % 250 mL infusion   mcg/min IntraVENous Continuous Cristal Jim MD 9 mL/hr at 10/28/22 0521 30 mcg/min at 10/28/22 0521    famotidine (PEPCID) 20 mg in sodium chloride (PF) 10 mL injection  20 mg IntraVENous Daily Yumiko Jhaveri DO   20 mg at 10/27/22 0802    metoclopramide (REGLAN) injection 5 mg  5 mg IntraVENous Q6H Krish Richard MD   5 mg at 10/28/22 0025    meropenem (MERREM) 1000 mg in sodium chloride 0.9% 50 mL (duplex)  1,000 mg IntraVENous Q12H Krish Richard MD 16.7 mL/hr at 10/28/22 0425 1,000 mg at 10/28/22 0425    0.9 % sodium chloride bolus  500 mL IntraVENous Once Kaleb Li MD        BPCO OINT 1 Dose  1 Dose Apply externally BID Krish Richard MD   1 Dose at 10/27/22 2027    sodium phosphate 33.33 mmol in sodium chloride 0.9 % 250 mL IVPB  0.32 mmol/kg IntraVENous PRN Hermenia Jhaveri, DO        sodium phosphate 10 mmol in sodium chloride 0.9 % 250 mL IVPB  10 mmol IntraVENous PRN Hermenia Jhaveri, DO        Or    sodium phosphate 15 mmol in sodium chloride 0.9 % 250 mL IVPB  15 mmol IntraVENous PRN Hermenia Jhaveri, DO        Or    sodium phosphate 20 mmol in sodium chloride 0.9 % 500 mL IVPB  20 mmol IntraVENous PRN Hermenia Jhaveri, DO        digoxin (LANOXIN) injection 125 mcg  125 mcg IntraVENous Daily Hermenia Jhaveri, DO   125 mcg at 10/25/22 0813    metoprolol (LOPRESSOR) injection 5 mg  5 mg IntraVENous Q4H Yumiko Jhaveri, DO   5 mg at 10/28/22 0530    sodium bicarbonate 50 mEq in dextrose 5 % and 0.2 % NaCl 1,000 mL infusion   IntraVENous Continuous Hermenia Jhaveri,  mL/hr at 10/27/22 2114 New Bag at 10/27/22 2114    labetalol (NORMODYNE;TRANDATE) injection 10 mg  10 mg IntraVENous Q4H PRN Krish Richard MD   10 mg at 10/24/22 1408    thiamine (B-1) injection 100 mg  100 mg IntraVENous Daily Hermenia Jhaveri, DO   100 mg at 10/27/22 0802    LORazepam (ATIVAN) injection 0.5 mg  0.5 mg IntraVENous Q6H PRN Hermenia Jhaveri, DO   0.5 mg at 10/25/22 0848    HYDROmorphone HCl PF (DILAUDID) injection 2 mg  2 mg IntraVENous Q2H PRN Hermenia Jhaveri, DO   2 mg at 10/27/22 1333    Or    HYDROmorphone HCl PF (DILAUDID) injection 1 mg  1 mg IntraVENous Q2H PRN Hermenia Jhaveri, DO   1 mg at 10/26/22 1457    Or    HYDROmorphone HCl PF (DILAUDID) injection 0.5 mg  0.5 mg IntraVENous Q2H PRN Hermenia Jhaveri, DO   0.5 mg at 10/28/22 0025    HYDROmorphone HCl PF (DILAUDID) injection 2 mg  2 mg IntraVENous Once Hermenia Jhaveri, DO        sodium chloride flush 0.9 % injection 5-40 mL  5-40 mL IntraVENous 2 times per day Hermenia Jhaveri, DO   10 mL at 10/27/22 2027    sodium chloride flush 0.9 % injection 5-40 mL  5-40 mL IntraVENous PRN Hermenia Jhaveri, DO        0.9 % sodium chloride infusion   IntraVENous PRN Hermenia Jhaveri, DO 5 mL/hr at 10/27/22 0827 New Bag at 10/27/22 0511    glucose chewable tablet 16 g  4 tablet Oral PRN Hermenia Jhaveri, DO        dextrose bolus 10% 125 mL  125 mL IntraVENous PRN Hermenia Jhaveri, DO        Or    dextrose bolus 10% 250 mL  250 mL IntraVENous PRN Hermenia Jhaveri, DO        glucagon (rDNA) injection 1 mg  1 mg SubCUTAneous PRN Hermenia Jhaveri, DO        dextrose 10 % infusion   IntraVENous Continuous PRN Hermenia Jhaveri, DO        potassium chloride Clemente REYNOLDS extended release tablet 40 mEq  40 mEq Oral PRN Yusraenia Jhaveri, DO        Or    potassium bicarb-citric acid (EFFER-K) effervescent tablet 40 mEq  40 mEq Oral PRN Drucella Hives, DO        Or    potassium chloride 10 mEq/100 mL IVPB (Peripheral Line)  10 mEq IntraVENous PRN Drucella Hives, DO        ondansetron (ZOFRAN-ODT) disintegrating tablet 4 mg  4 mg Oral Q8H PRN Drucella Hives, DO        Or    ondansetron [facility]) injection 4 mg  4 mg IntraVENous Q6H PRN Drucella Hives, DO        magnesium hydroxide (MILK OF MAGNESIA) 400 MG/5ML suspension 30 mL  30 mL Oral Daily PRN Drucella Hives, DO   30 mL at 10/24/22 1609    acetaminophen (TYLENOL) tablet 650 mg  650 mg Oral Q6H PRN Drucella Hives, DO        Or    acetaminophen (TYLENOL) suppository 650 mg  650 mg Rectal Q6H PRN Drucella Hives, DO   650 mg at 10/25/22 1746    insulin lispro (HUMALOG) injection vial 0-4 Units  0-4 Units SubCUTAneous TID WC Drucella Hives, DO        insulin lispro (HUMALOG) injection vial 0-4 Units  0-4 Units SubCUTAneous Nightly Drucella Hives, DO         Allergies  Patient has no known allergies. ASSESSMENT:  #Hepatic flexure colonic mass  -CT scan pelvis with IV contrast on 10/20/2022:  Irregular mass at the hepatic flexure of the colon resulting in high-grade small bowel and proximal large bowel obstruction. Severe fluid and gaseous distention of the entire stomach and small bowel including the distal esophagus. Scattered enlarged retroperitoneal lymph nodes. These are presumably metastatic if the colon mass proves to be malignant. Marked hydropic distention of the gallbladder containing gallstones. No CT evidence of cholecystitis. Hepatic steatosis. Calcified granulomas throughout the liver  Spleen non enlarged and calcified granulomas throughout the spleen.    -Colectomy with gastrostomy placement by Dr. Magdalene Bernheim on 10/21/2022: Operative note indicated hard mass adherent to the liver and small bowel posteriorly. POD #7    Pathology:  Colon, right hemicolectomy:   1. Invasive moderately differentiated colonic adenocarcinoma, measuring 6.1 cm in greatest dimension. 2.  Tumor invades through the muscularis propria into the pericolonic   adipose tissue but does not reach the serosal surface. 3.  Surgical excision margins are negative for evidence of carcinoma and adenomatous change. 4.  Sections of terminal ileum, negative for evidence of malignancy. 5.  Sections of the appendix, negative for evidence of malignancy. 6.  6 out of 32 lymph nodes, positive for metastatic adenocarcinoma. AJCC STAGE: pT3, pN2a, pMx     -Baseline CEA 11.0 on 10/21/2022  -Postop CEA 6.9 on 10/23/2022     #Thrombocytopenia-suspect multifactorial to include liver dysfunction, long term alcohol use, consumption from infection        Admission hemoglobin of 16.9 on 10/20/2022. No active bleeding noted. Hemoglobin stable at 13.0    #Elevated LFTs  CT scan of the abdomen pelvis on 10/20/2022 reported Hepatic steatosis. Calcified granulomas throughout the liver. Spleen non enlarged and calcified granulomas throughout the spleen. Ultrasound gallbladder 10/21/2022:   1. Limited exam.  2.  Diffuse fatty infiltration of the liver with a 1.1 cm calcification in the right hepatic lobe. 3.  Distended gallbladder with several small stones and mild diffuse gallbladder wall thickening. As per GI/general surgery- not a candidate for cholecystectomy, gallstones felt to be incidental    Hepatitis C reactive, 10/26/2022  History of EtOH  Followed by GI    #Hypocalcemia  Calcium 7.0 on 10/27/2022  Albumin 1.1  Corrected calcium 8.52    #SONIA, improved      #sepsis, anastomotic leak  Status post laparotomy, repair of anastomotic leak and creation of diverting ileostomy 10/26/2022  WBC 13.7  Receiving Merrem  Afebrile/temp curve improved; 101.7 on 10/25/2022  Blood cultures 10/25/2020 22x2: NGTD    #malnutrition  Receiving TPN    PLAN:  10/21/2022: Colectomy with gastrostomy placement by Dr. Qing Espinal. POD#7  10/26/2022: laparotomy, repair of anastomotic leak and creation of diverting ileostomy by Dr. Kareem Resendez. POD#1    elevated LFTs, GI following: repeat RUQ US shows cholelithiasis without acute cholecystitis or CBD dilation to explain the rising LFTs. Patient is not a candidate for cholecystectomy    Monitor CBC  Current pathology is at least stage III, will need further staging as outpatient  Postop CEA 6.9    Discussed with patient's nurse. Attempted to contact patient's sister, Demond Richard [phone] this AM to discuss pathology. Phone busy. Will try again later. Walter Pena, [place]    10/28/22  5:54 AM  Physician's attestation/substantial contribution:  I, Dr Sharath Sanders, independently performed an evaluation on Malu Segovia. I have reviewed relevant medical information/data to include but not limited to medication list, relevant appropriate labs and imaging when applicable. I reviewed other physician's notes, ancillary services and nurses assessment. I have reviewed the above documentation completed by the Nurse Practitioner or Physician Assistant. Please see my additional contributions to the history of present illness, physical examination, and assessment/medical decision-making that reflect my findings and impressions. I have seen and examined the patient and the key elements of all parts of the encounter have been performed by me. I agree with the assessment and plan as outlined by the ARNP/PA. Subjective-confused this morning. Objective-chronically appearing. Extubated today. Discussed bedside RN  Assessment/plan:  Discussed with the patient and inform about diagnosis. Will continue to follow. We will discuss options of chemotherapy when the patient is more oriented.   Continue to monitor

## 2022-10-28 NOTE — PROGRESS NOTES
Hospitalist Progress Note    Patient:  Faylene Spatz  YOB: 1955  Date of Service: 10/28/2022  MRN: [id number]   Acct: [de-identified]   Primary Care Physician: No primary care provider on file. Advance Directive: DNR  Admit Date: 10/20/2022       Hospital Day: 8  Referring Provider: Shereen Claude, MD    Patient Seen, Chart, Consults, Notes, Labs, Radiology studies reviewed. Subjective:     He had some agitation yesterday evening. More comfortable and calm this a.m. Remains confused and disoriented, however hemodynamics have improved, weaned off vasopressor support but still with relatively low BP. Abdominal pain improved. No fevers overnight. Summary:  Faylene Spatz is a 77 y.o. male  whom we are following for SBO status post right hemicolectomy for obstructing colon cancer, complicated by development of septic peritonitis due to anastomotic leak, as well as nonischemic cardiomyopathy, A. fib intermittently in RVR. He underwent an open right hemicolectomy on 10/21 however course complicated after developed sepsis with fevers, leukocytosis with CT showing large volume peritoneal fluid with large pneumoperitoneum, septic shock with peritonitis requiring volume resuscitation, vasopressor support and antibiotics on meropenem. Patient was reintubated for surgery. Additionally intermittently in A. fib with RVR requiring IV AV meaghan blockade. Patient taken back to the OR for repair of anastomotic leak, washout with diverting ileostomy. Had SONIA with metabolic acidosis in setting of sepsis and volume losses. Allergies:  Patient has no known allergies.     Medicines:  Current Facility-Administered Medications   Medication Dose Route Frequency Provider Last Rate Last Admin    meropenem (MERREM) 1000 mg in sodium chloride 0.9% 50 mL (duplex)  1,000 mg IntraVENous Q8H John Hi MD        lidocaine PF 1 % injection 5 mL  5 mL IntraDERmal Once Shereen Claude, MD sodium chloride flush 0.9 % injection 5-40 mL  5-40 mL IntraVENous 2 times per day Hieu Callahan MD        sodium chloride flush 0.9 % injection 5-40 mL  5-40 mL IntraVENous PRN Hieu Callahan MD        0.9 % sodium chloride infusion  25 mL IntraVENous PRN Hieu Callahan MD        PN-Adult Premix 5/15 - Standardard Electrolytes - Central Line   IntraVENous Continuous TPN Heather Nunez MD   Stopped at 10/28/22 0030    fat emulsion 20 % infusion 250 mL  250 mL IntraVENous Once per day on Mon Thu Heather Nunez MD   Stopped at 10/28/22 0030    chlorhexidine (PERIDEX) 0.12 % solution 15 mL  15 mL Mouth/Throat BID Den Wang MD   15 mL at 10/28/22 0834    dexmedetomidine (PRECEDEX) 400 mcg in sodium chloride 0.9 % 100 mL infusion  0.2-1.4 mcg/kg/hr IntraVENous Continuous Den Wang MD 5 mL/hr at 10/26/22 0224 0.2 mcg/kg/hr at 10/26/22 0224    phenylephrine (MARIE-SYNEPHRINE) 50 mg in dextrose 5 % 250 mL infusion   mcg/min IntraVENous Continuous Mari Fonseca MD 9 mL/hr at 10/28/22 0521 30 mcg/min at 10/28/22 0521    famotidine (PEPCID) 20 mg in sodium chloride (PF) 10 mL injection  20 mg IntraVENous Daily Augusta Quiet, DO   20 mg at 10/28/22 3693    metoclopramide (REGLAN) injection 5 mg  5 mg IntraVENous Q6H Den Wang MD   5 mg at 10/28/22 0636    0.9 % sodium chloride bolus  500 mL IntraVENous Once Geroldine MD Rosie        BPCO OINT 1 Dose  1 Dose Apply externally BID Den Wang MD   1 Dose at 10/28/22 2865    sodium phosphate 33.33 mmol in sodium chloride 0.9 % 250 mL IVPB  0.32 mmol/kg IntraVENous PRN Augusta Quiet, DO        sodium phosphate 10 mmol in sodium chloride 0.9 % 250 mL IVPB  10 mmol IntraVENous PRN Augusta Quiet, DO        Or    sodium phosphate 15 mmol in sodium chloride 0.9 % 250 mL IVPB  15 mmol IntraVENous PRN Augusta Quiet, DO        Or    sodium phosphate 20 mmol in sodium chloride 0.9 % 500 mL IVPB  20 mmol IntraVENous PRN Shakir Mass, DO        digoxin Yousif Christine) injection 125 mcg  125 mcg IntraVENous Daily Shakir Mass, DO   125 mcg at 10/28/22 0849    metoprolol (LOPRESSOR) injection 5 mg  5 mg IntraVENous Q4H Shakir Mass, DO   5 mg at 10/28/22 0530    sodium bicarbonate 50 mEq in dextrose 5 % and 0.2 % NaCl 1,000 mL infusion   IntraVENous Continuous Shakir Mass,  mL/hr at 10/27/22 2114 New Bag at 10/27/22 2114    labetalol (NORMODYNE;TRANDATE) injection 10 mg  10 mg IntraVENous Q4H PRN Johnie Lancaster MD   10 mg at 10/24/22 1408    thiamine (B-1) injection 100 mg  100 mg IntraVENous Daily Shakir Mass, DO   100 mg at 10/28/22 0831    LORazepam (ATIVAN) injection 0.5 mg  0.5 mg IntraVENous Q6H PRN Shakir Mass, DO   0.5 mg at 10/25/22 0848    HYDROmorphone HCl PF (DILAUDID) injection 2 mg  2 mg IntraVENous Q2H PRN Shakir Mass, DO   2 mg at 10/27/22 1333    Or    HYDROmorphone HCl PF (DILAUDID) injection 1 mg  1 mg IntraVENous Q2H PRN Shakir Mass, DO   1 mg at 10/28/22 0284    Or    HYDROmorphone HCl PF (DILAUDID) injection 0.5 mg  0.5 mg IntraVENous Q2H PRN Shakir Mass, DO   0.5 mg at 10/28/22 0025    HYDROmorphone HCl PF (DILAUDID) injection 2 mg  2 mg IntraVENous Once Shakir Mass, DO        sodium chloride flush 0.9 % injection 5-40 mL  5-40 mL IntraVENous 2 times per day Shakir Mass, DO   10 mL at 10/28/22 2206    sodium chloride flush 0.9 % injection 5-40 mL  5-40 mL IntraVENous PRN Shakir Mass, DO        0.9 % sodium chloride infusion   IntraVENous PRN Shakir Mass, DO 5 mL/hr at 10/27/22 7433 New Bag at 10/27/22 0511    glucose chewable tablet 16 g  4 tablet Oral PRN Shakir Mass, DO        dextrose bolus 10% 125 mL  125 mL IntraVENous PRN Shakir Mass, DO        Or    dextrose bolus 10% 250 mL  250 mL IntraVENous PRN Shakir Mass, DO        glucagon (rDNA) injection 1 mg  1 mg SubCUTAneous PRN Faiza Abrahan, DO        dextrose 10 % infusion   IntraVENous Continuous PRN Faiza Abrahan, DO        potassium chloride (KLOR-CON M) extended release tablet 40 mEq  40 mEq Oral PRN Faiza Abrahan, DO        Or    potassium bicarb-citric acid (EFFER-K) effervescent tablet 40 mEq  40 mEq Oral PRN Faiza Abrahan, DO        Or    potassium chloride 10 mEq/100 mL IVPB (Peripheral Line)  10 mEq IntraVENous PRN Faiza [place], DO        ondansetron (ZOFRAN-ODT) disintegrating tablet 4 mg  4 mg Oral Q8H PRN Faiza [place], DO        Or    ondansetron [facility]) injection 4 mg  4 mg IntraVENous Q6H PRN Faiza [place], DO        magnesium hydroxide (MILK OF MAGNESIA) 400 MG/5ML suspension 30 mL  30 mL Oral Daily PRN Faiza [place], DO   30 mL at 10/24/22 1609    acetaminophen (TYLENOL) tablet 650 mg  650 mg Oral Q6H PRN Faiza [place], DO        Or    acetaminophen (TYLENOL) suppository 650 mg  650 mg Rectal Q6H PRN Faiza [place], DO   650 mg at 10/25/22 1746    insulin lispro (HUMALOG) injection vial 0-4 Units  0-4 Units SubCUTAneous TID WC Faiza [place], DO        insulin lispro (HUMALOG) injection vial 0-4 Units  0-4 Units SubCUTAneous Nightly Faiza [place], DO           Past Medical History:  Past Medical History:   Diagnosis Date    Ex-cigarette smoker 10/03/2016    Hypertension     Non-ischemic cardiomyopathy ([facility] [address] 10/03/2016    9/28/2016  Echo  EF 25% 10/3/16  Cath  Mild CAD, EF 10%     Palliative care patient 10/24/2022    Paroxysmal atrial fibrillation ([facility] [address] 03/16/2018       Past Surgical History:  Past Surgical History:   Procedure Laterality Date    FINGER AMPUTATION Left     partial left thumb amputation following trauma    HEMICOLECTOMY N/A 10/21/2022    RIGHT HEMICOLECTOMY WITH GASTROSTOMY PLACEMENT performed by Isabelle Lopez MD at [address] N/A 10/25/2022    LAPAROTOMY, REPAIR OF ANASTAMOSAL LEAK, CREATION OF DIVERTING ILEOSTOMY performed by Alix Munguia MD at [address] OR    TONSILLECTOMY      UPPER GASTROINTESTINAL ENDOSCOPY N/A 10/23/2022    Dr Wren Standard w/peg placement       Family History  Family History   Problem Relation Age of Onset    High Blood Pressure Mother     Cancer Father         Stomach    Heart Disease Maternal Grandmother     Diabetes Maternal Uncle        Social History  Social History     Socioeconomic History    Marital status:      Spouse name: Not on file    Number of children: Not on file    Years of education: Not on file    Highest education level: Not on file   Occupational History    Not on file   Tobacco Use    Smoking status: Former     Packs/day: 1.00     Years: 2.00     Pack years: 2.00     Types: Cigarettes     Start date:      Quit date:      Years since quittin.8    Smokeless tobacco: Never    Tobacco comments:     light smoking history - socially   Vaping Use    Vaping Use: Never used   Substance and Sexual Activity    Alcohol use: Yes    Drug use: No    Sexual activity: Not on file   Other Topics Concern    Not on file   Social History Narrative    Not on file     Social Determinants of Health     Financial Resource Strain: Not on file   Food Insecurity: Not on file   Transportation Needs: Not on file   Physical Activity: Not on file   Stress: Not on file   Social Connections: Not on file   Intimate Partner Violence: Not on file   Housing Stability: Not on file         Review of Systems:    Review of Systems   Unable to perform ROS: Mental status change         Objective:  Blood pressure 92/60, pulse (!) 101, temperature 97.5 °F (36.4 °C), temperature source Temporal, resp. rate 19, height 5' 10\" (1.778 m), weight 186 lb 4 oz (84.5 kg), SpO2 95 %. Intake/Output Summary (Last 24 hours) at 10/28/2022 1206  Last data filed at 10/28/2022 1000  Gross per 24 hour   Intake 4383.84 ml   Output 5655 ml   Net -1271.16 ml         Physical Exam  Vitals and nursing note reviewed. Constitutional:       Appearance: He is ill-appearing. HENT:      Head: Normocephalic and atraumatic. Eyes:      General: No scleral icterus. Conjunctiva/sclera: Conjunctivae normal.   Cardiovascular:      Rate and Rhythm: Normal rate. Rhythm irregular. Heart sounds: Normal heart sounds. Pulmonary:      Effort: Pulmonary effort is normal.      Breath sounds: Normal breath sounds. Abdominal:      General: Abdomen is flat. Palpations: Abdomen is soft. Tenderness: There is abdominal tenderness. There is no rebound. Genitourinary:     Comments: Catheter in place  Musculoskeletal:         General: No swelling. Cervical back: No rigidity. Right lower leg: No edema. Left lower leg: No edema. Skin:     General: Skin is warm and dry. Neurological:      Comments: Confused, able to follow commands       Labs:  BMP:   Recent Labs     10/26/22  0741 10/26/22  1007 10/27/22  0222 10/28/22  0514     --  142 145   K 4.0 3.5 3.6 3.6     --  113* 111   CO2 19*  --  20* 24   PHOS  --   --   --  1.8*   BUN 42*  --  44* 35*   CREATININE 1.9*  --  1.3* 1.0   CALCIUM 7.0*  --  7.0* 7.1*       CBC:   Recent Labs     10/26/22  0741 10/27/22  0222 10/28/22  0514   WBC 12.9* 13.7* 13.2*   HGB 12.9* 13.0* 13.7*   HCT 40.0* 37.7* 40.5*   MCV 95.7* 89.8 91.2   PLT 57* 60* 63*       LIVER PROFILE:   Recent Labs     10/26/22  0741 10/27/22  0927 10/28/22  0514   * 533* 371*   ALT 92* 142* 122*   BILIDIR  --  2.8*  --    BILITOT 6.3* 4.0* 4.5*   ALKPHOS 49 73 72       PT/INR: No results for input(s): PROTIME, INR in the last 72 hours. APTT:   Recent Labs     10/25/22  2012   APTT 36.0       BNP:  No results for input(s): BNP in the last 72 hours. Ionized Calcium:No results for input(s): IONCA in the last 72 hours.   Magnesium:  Recent Labs     10/26/22  0741   MG 2.2       Phosphorus:  Recent Labs     10/28/22  0514   PHOS 1.8*       HgbA1C: No results for input(s): LABA1C in the last 72 hours. Hepatic:   Recent Labs     10/26/22  0741 10/27/22  0927 10/28/22  0514   ALKPHOS 49 73 72   ALT 92* 142* 122*   * 533* 371*   PROT 4.7* 4.8* 4.5*   BILITOT 6.3* 4.0* 4.5*   BILIDIR  --  2.8*  --    LABALBU 2.1* 1.1* 2.1*       Lactic Acid:   Recent Labs     10/27/22  1026   LACTA 2.4*       Troponin: No results for input(s): CKTOTAL, CKMB, TROPONINT in the last 72 hours. ABGs: No results for input(s): PH, PCO2, PO2, HCO3, O2SAT in the last 72 hours. CRP:  No results for input(s): CRP in the last 72 hours. Sed Rate:  No results for input(s): SEDRATE in the last 72 hours. Cultures:   No results for input(s): CULTURE in the last 72 hours. Recent Labs     10/25/22  1720   BC No Growth to date. Any change in status will be called. BLOODCULT2 No Growth to date. Any change in status will be called. No results for input(s): CXSURG in the last 72 hours. Radiology reports as per the Radiologist  Radiology: CT ABDOMEN PELVIS W IV CONTRAST Additional Contrast? None    Result Date: 10/20/2022  CLINICAL HISTORY: 48-hour history of diffuse abdominal pain distention nausea vomiting some diarrhea no prior history COMPARISON: No comparison TECHNIQUE: Axial images of the abdomen and pelvis were obtained after the administration of IV contrast.Coronal and sagittal multiplanar reconstructions were performed. One or more of the following dose reduction techniques were used: Automated exposure control, adjustment of the mA and/or kV according to patient size, and/or use of iterative reconstruction technique. COMPARISON: None. FINDINGS: Statements: None. Lower Chest: Unremarkable. Hepatobiliary: Hepatic steatosis. Calcified granulomas throughout the liver. No focal lesion is identified. Marked distention of the gallbladder containing gallstones. No CT evidence of cholecystitis however. No biliary ductal dilatation is evident. Pancreas:  The pancreatic parenchyma is unremarkable and there is no main duct dilatation. Spleen: The spleen is nonenlarged. Calcified granulomas throughout the spleen. Adrenals: No adrenal glands are symmetric. Genitourinary: The kidneys are symmetric and enhance appropriately. No suspicious focal parenchymal abnormality is identified in either kidney. There is no collecting system dilatation. The bladder is unremarkable, as imaged. The prostate gland appears unremarkable. Gastrointestinal: Diffuse fluid and air distention of the the entire small bowel and proximal large bowel measuring up to 4.3 cm in the left abdomen and up to 7.6 cm of the proximal colon. Transitional point seen at a site of masslike thickening of the hepatic flexure measuring 3.2 x 3.9 cm (series 2 image 35). Colonic diverticulosis without diverticulitis. The appendix appears unremarkable. Lymphatics: Multiple prominent and enlarged retroperitoneal lymph nodes. Largest is 1.5 cm in the left periaortic region (series 2 image 33). Vascular: Moderate calcific atherosclerosis. The abdominal aorta is normal in caliber. MSK/Body Wall: No concerning bony lesion is identified. Small fat-containing right inguinal hernia. Peritoneum/Other: There is no free fluid or abnormal collection. No free gas. 1.Irregular mass at the hepatic flexure of the colon resulting in high-grade small bowel and proximal large bowel obstruction. Severe fluid and gaseous distention of the entire stomach and small bowel including the distal esophagus. Findings are overall concerning for colonic neoplasm. Recommend GI and surgical consultation. 2.Scattered enlarged retroperitoneal lymph nodes. These are presumably metastatic if the colon mass proves to be malignant. 3.Marked hydropic distention of the gallbladder containing gallstones. No CT evidence of cholecystitis. Recommend correlation with LFTs.     US GALLBLADDER RUQ    Result Date: 10/21/2022  NO PRIOR REPORT AVAILABLE Exam: ULTRASOUND OF THE ABDOMEN LIMITED, RIGHT UPPER QUADRANT Clinical data: Elevated LFTs, sepsis, lactic acidosis. The patient is on ventilator. Technique: Real-time grayscale imaging of the right upper quadrant was performed. Images supplemented with color Doppler technique. Prior studies: CT scan of abdomen and pelvis dated 10/20/22. Findings: Limited exam due to poor acoustic penetration. Diffuse increased echogenicity of the liver parenchyma. A shadowing focus//calcification in the right lobe measuring up 1.1 cm. No intrahepatic biliary distention. The gallbladder is distended with several small stones. The gallbladder wall thickness measures 0.8 cm. No sludge. The common bile duct is normal in caliber measuring 0.4cm. The right kidney nbelawdy95.7 x 6.6 x 5.4 cm. Normal echogenicity and cortical thickness are preserved. No evidence of renal masses, no calculi and no hydronephrosis Diffuse increased echogenicity of the pancreas. Imaged portion of the aorta demonstrates no acute pathology. Visualized portion of the inferior vena cava is unremarkable. 1.  Limited exam for reasons discussed above. 2.  Diffuse fatty infiltration of the liver with a 1.1 cm calcification in the right hepatic lobe. 3.  Distended gallbladder with several small stones and mild diffuse gallbladder wall thickening. 4.  No other significant abnormality. Recommendation: Follow up as clinically indicated. Electronically Signed by So Marrufo MD at 21-Oct-2022 11:06:19 PM EST             XR CHEST PORTABLE    Result Date: 10/20/2022  NO PRIOR REPORT AVAILABLE Exam: X-RAY OF [facility] Clinical data:Confirmation of course of NG/OG/NE tube and location of tip of tube. Technique:Single view of the chest. Prior studies: Radiograph of the chest done on same day. Findings: The lungs are grossly clear; noevidence of acute infiltrate or pleural effusion. Cardiac silhouette is mildly enlarged. No acute osseous abnormality is detected. The NG tube can be traced to the level of distal esophagus.  Scattered nonspecific gas-filled loops of bowel in the upper abdomen. The NG tube can be traced to the level of distal esophagus. Suggest advancing accordingly, with a follow-up radiograph. Recommendation: As above. Electronically Signed by Gerard Webb MD at 20-Oct-2022 06:15:09 PM EST             XR CHEST PORTABLE    Result Date: 10/20/2022  NO PRIOR REPORT AVAILABLE Exam: X-RAY OF [facility] Clinical data:Abdominal pain, vomiting. Technique:Single view of the chest. Prior studies: No prior studies submitted. Findings: The trachea is midline. The heart is enlarged. Mediastinal and pulmonary vascular shadows are normal.  There is no focal consolidation or effusion. The osseous structures are intact. Cardiomegaly. Recommendation: Follow up as clinically indicated.  Electronically Signed by Rosa Rodriguez MD at 20-Oct-2022 03:26:36 PM EST                Assessment     SBO   S/P right hemicolectomy for obstructing colon cancer  Surgical pathology --noted at least stage III, further staging as outpatient per oncology  Monitor for return of bowel function    Septic shock due to peritonitis with anastomotic leak  - Status post repair of anastomotic leak, washout and diverting ileostomy  --Improved with further crystalloid resuscitation  -Weaned off vasopressor support, still with some intermittent hypotension, monitor hemodynamics closely  -IV antibiotics with intra-abdominal coverage, ID following    Paroxysmal A. fib intermittently in RVR  --Improved with rate control measures  - Intermittent IV Lopressor if able to tolerate hemodynamically off vasopressor support  - Continue IV digoxin    Acute encephalopathy, multifactorial secondary to sepsis and metabolic disturbance in setting of kidney injury, chronic liver disease, hepatitis C, alcohol abuse  - Follow LFTs  -Occasional agitation but not having overt signs/symptoms of alcohol withdrawal, would avoid Ativan at this time  -- Supportive care, reorient as able, give thiamine  -- Limit sedating meds    SONIA, prerenal versus ATN from sepsis  Metabolic acidosis-resolved  -Renal function improving    Acute hypoxemic respiratory failure requiring intubation/mechanical ventilation  --Resolved  -Extubated 10/26  --Monitor SPO2, supplemental oxygen as needed for adequate gas exchange    Hypocalcemia, hypophosphatemia  -Continue repletion of electrolytes and follow    Malnutrition  --TPN        Naomi López MD

## 2022-10-28 NOTE — PROCEDURES
[facility] Vascular Access Team:  PICC Line Insertion Procedure Note      Patient: Jo-Ann Govea  YOB: 1955   MRN: [id number]  Room: [address] [place] [address]     Attending Physician - Briana Simmons MD  Ordering Physician - Briana Simmons MD    Diagnosis:   SBO (small bowel obstruction) ([facility] [address] [J64.634]  Large bowel obstruction ([facility] [address] [S86.637]  Colonic mass [K63.89]       Procedure(s): Insertion of 6 Moldovan Triple Lumen PICC    Indication(s):   TPN, Critical Gtts, and Frequent Lab Draws    Date of Procedure: 10/28/2022   Start Time: 1200  End Time: 1250    Proceduralist: Park Blackman RN    Anesthesia: Local Injection <=5 mL 1% Lidocaine without Epinephrine    Estimated Blood Loss (mL): Minimal    Complications: None    PICC Triple Lumen 05/71/25 Left Basilic (Active)   Central Line Being Utilized Yes 10/28/22 1250   Criteria for Appropriate Use Total parenteral nutrition 10/28/22 1250   Site Assessment Clean, dry & intact 10/28/22 1250   Phlebitis Assessment No symptoms 10/28/22 1250   Infiltration Assessment 0 10/28/22 1250   External Catheter Length (cm) 0 cm 10/28/22 1250   Proximal Lumen Color/Status White;Flushed;Normal saline locked; Blood return noted; Alcohol cap applied 10/28/22 1250   Medial Lumen Status Blue;Flushed;Normal saline locked; Blood return noted; Alcohol cap applied 10/28/22 1250   Distal Lumen Color/Status Pink;Flushed;Normal saline locked; Blood return noted; Alcohol cap applied 10/28/22 1250   Alcohol Cap Used Yes 10/28/22 1250   Date of Last Dressing Change 10/28/22 10/28/22 1250   Dressing Type Transparent; Antimicrobial;Securing device 10/28/22 1250   Dressing Status New dressing applied;Clean, dry & intact 10/28/22 1250   Dressing Intervention New 10/28/22 1250         Findings:   1. Successful insertion of PICC line. 2. CXR for confirmation of placement - CAJ OK to use  3. PICC Line is ready to be used. 4. Please change tubing prior to using new PICC line.  Make sure to label, date and use alcohol caps on new tubing and alcohol caps on unused ports. Detailed Description of Procedure: The Left Basilic vein was visualized using the ultrasound and deemed a suitable vessel. The area was prepped with Chlorhexidine and draped in sterile fashion using full max barrier draping. The area was anesthetized with 3 cc's of 1% Lidocaine. The vein was accessed using US guidance. The guidewire was advanced through the needle to secure the vein. The needle was removed and a peel-a-way Sheath was placed over the guidewire. Guidewire was removed with tip intact. The 6 Nigerien Triple Lumen PICC was trimmed at 47 cm and inserted into the Sheath. Using VPS technology, the PICC line was advanced until PICC tip was in the SVC. The peel-a-way sheath was removed and PICC was inserted until the CAJ was reached. Unable to obtain optimal P wave amplitude or P wave deflection on VPS due to atrial fibrillation. Chest XR for confirmation of placement will be ordered. Left approximately 0 cm exposed. Internal components of the PICC were removed, all lumens had brisk blood return and was flushed with 10 cc of NS. The PICC was secured using a securement device and a Biopatch was placed over the insertion site. A Tegaderm was placed over the securement device and insertion site. Dressing was dated and initialed with external measurement marked. Patient did tolerate procedure well. Narrative   CLINICAL HISTORY: Verify PICC   TECHNIQUE: Single AP view of the chest   COMPARISON: CXR from   10/28/2022   FINDINGS:   Lines and tubes: Right IJ CVC catheter with tip in the SVC brachiocephalic   confluence. Left upper extremity PICC with distal tip terminating in the   cavoatrial junction. Cardiomediastinal: Normal size and configuration of the cardiomediastinal   silhouette. No pneumomediastinum. Lungs and pleura: The lungs are grossly unremarkable. No pleural effusion. No   pneumothorax.    Musculoskeletal: No acute osseous abnormalities. Osseous degenerative changes. Other: None. Impression   Left upper extremity PICC with distal tip terminating in the cavoatrial   junction.            Electronically signed by Ford Phalen, RN on 10/28/2022 at 12:52 PM

## 2022-10-28 NOTE — PROGRESS NOTES
Physical Therapy  Facility/Department: [facility] ICU  Physical Therapy Initial Assessment    Name: Mary Cortes  : 1955  MRN: [id number]  Date of Service: 10/28/2022    Discharge Recommendations:  Continue to assess pending progress, 24 hour supervision or assist, Patient would benefit from continued therapy after discharge          Patient Diagnosis(es): The primary encounter diagnosis was Colonic mass. Diagnoses of Large bowel obstruction ([facility] Colonic mass, and Ileus, postoperative ([facility] were also pertinent to this visit. Past Medical History:  has a past medical history of Ex-cigarette smoker, Hypertension, Non-ischemic cardiomyopathy ([facility] [address] Palliative care patient, and Paroxysmal atrial fibrillation ([facility] Past Surgical History:  has a past surgical history that includes Tonsillectomy; Finger amputation (Left); hemicolectomy (N/A, 10/21/2022); Upper gastrointestinal endoscopy (N/A, 10/23/2022); and laparotomy (N/A, 10/25/2022). Assessment   Body Structures, Functions, Activity Limitations Requiring Skilled Therapeutic Intervention: Decreased functional mobility ; Decreased strength;Decreased balance; Increased pain;Decreased posture;Decreased ROM; Decreased body mechanics; Decreased tolerance to work activity; Decreased safe awareness;Decreased cognition  Assessment: Pt. will benefit from cont PT to decrease impairments. Pt. a fall risk and should not attempt mobility on his own at this time. He has limited ability to follow commands. Pt. will need to be progressed with caution due to easily over stimulated and confusion. Anticipate pt will benefit from cont. PT and need 24 hr care. May need to use SS with 2A to practice sit to stands initially. Will need to be cautious of gait belt placement due to muliple lines in abdomen. Treatment Diagnosis: impaired gait and mobility  Therapy Prognosis: Guarded  Decision Making:  Mod Complexity  Barriers to Learning: cognition, confusion  Requires PT Follow-Up: Yes  Activity Tolerance  Activity Tolerance: Patient limited by fatigue;Treatment limited secondary to decreased cognition     Plan   Physcial Therapy Plan  General Plan: 5-7 times per week  Current Treatment Recommendations: Strengthening, ROM, Balance training, Functional mobility training, Transfer training, Gait training, Endurance training, Safety education & training, Positioning, Equipment evaluation, education, & procurement, Patient/Caregiver education & training, Therapeutic activities  Safety Devices  Type of Devices: All fall risk precautions in place, Patient at risk for falls, Nurse notified, Gait belt, Left in bed, Bed alarm in place, Call light within reach     Restrictions  Restrictions/Precautions  Restrictions/Precautions: Fall Risk  Required Braces or Orthoses?: No  Position Activity Restriction  Other position/activity restrictions: TRISTAN drain, multiple IVs, garcia, G tube, telemetry, BP cuff, ileostomy, PEG, scds, O2 sat monitor     Subjective   Pain: denies  General  Chart Reviewed: Yes  Patient assessed for rehabilitation services?: Yes  Response To Previous Treatment: Not applicable  Family / Caregiver Present: No  Referring Practitioner: Sarah De Leon DO  Referral Date : 10/24/22  Diagnosis: colonic mass, lg bowel obstruction  Follows Commands: Impaired  Other (Comment): needs frequent redirection, pt unable to focus on tasks  General Comment  Comments: 10/21 R HEMICOLECTOMY WITH GASTROSTOMY PLACEMENT, 10/23 EGD with PEG tube, 10/26 laparotomy, repair anastomotic leak and creation of diverting loop ileostomy  Subjective  Subjective: Pt. willing to try sitting up.  \"What are we doing??\"         Social/Functional History  Social/Functional History  Lives With: Alone  Type of Home: House  Home Access:  (pt able to manage stairs)  Bathroom Shower/Tub: Tub/Shower unit  Bathroom Accessibility: Accessible  Home Equipment:  (none)  Receives Help From: Family  Homemaking Assistance: Independent  Homemaking Responsibilities: Yes  Ambulation Assistance: Independent  Transfer Assistance: Independent  Active : Yes  Mode of Transportation: Car  Occupation: Retired  Additional Comments: pt unable to answer most history questions, some info taken from case management  Vision/Hearing  Vision  Vision: Impaired  Vision Exceptions: Wears glasses at all times  Hearing  Hearing: Within functional limits    Cognition   Orientation  Overall Orientation Status: Impaired  Orientation Level: Oriented to person;Disoriented to situation;Disoriented to time;Disoriented to place  Cognition  Overall Cognitive Status: Exceptions  Arousal/Alertness: Delayed responses to stimuli  Following Commands: Follows one step commands with repetition; Follows one step commands with increased time  Attention Span: Difficulty attending to directions  Memory: Decreased recall of precautions;Decreased recall of recent events  Safety Judgement: Decreased awareness of need for assistance;Decreased awareness of need for safety  Problem Solving: Assistance required to generate solutions;Assistance required to implement solutions  Insights: Decreased awareness of deficits  Initiation: Requires cues for all  Sequencing: Requires cues for all     Objective   Heart Rate: 97  Heart Rate Source: Monitor  BP: 98/74  BP Location: Left upper arm  BP Method: Automatic  MAP (Calculated): 82  Resp: 27  SpO2: 96 %  O2 Device: None (Room air)     Observation/Palpation  Posture: Poor  Observation: TRISTAN drain, multiple IVs, garcia, G tube, telemetry, BP cuff, ileostomy, PEG, scds, O2 sat monitor        AROM RLE (degrees)  RLE AROM: Exceptions  RLE General AROM: AAROM 0-90, ankles to neutral DF  AROM LLE (degrees)  LLE AROM : Exceptions  LLE General AROM: AAROM 0-90, ankles to neutral DF  Strength RLE  Strength RLE: Exception  Comment: grossly 3-/5  Strength LLE  Strength LLE: Exception  Comment: grossly 3-/5           Bed mobility  Scooting: Maximal assistance;Dependent/Total;2 Person assistance  Bed Mobility Comments: attempted to scoot pt to L side of bed and roll to R side. Pt. fearful and declined rolling onto R side and sitting EOB. Pt. sc[place]ed [place] [facility] then raised to longsitting with bed controls. Pt. able to sit up in longsitting x 5 mins with pillows propped behind pt. Pt. unable to pull self forward with hands on foot of bed rails. Transfers  Sit to Stand: Unable to assess  Stand to Sit: Unable to assess  Bed to Chair: Unable to assess        Balance  Posture: Fair  Sitting - Static: Poor;-  Sitting - Dynamic: -;Poor           OutComes Score                                                  AM-PAC Score             Tinneti Score       Goals  Short Term Goals  Time Frame for Short Term Goals: 2 wks  Short Term Goal 1: supine to sit indep  Short Term Goal 2: sit to stand indep  Short Term Goal 3: amb. 200' with RW SBA  Short Term Goal 4: bed to chair SBA  Patient Goals   Patient Goals : not stated       Education  Patient Education  Education Given To: Patient  Education Provided: Role of Therapy;Plan of Care;Transfer Training  Education Method: Verbal  Barriers to Learning: Cognition  Education Outcome: Continued education needed; Unable to verbalize; Unable to demonstrate understanding      Therapy Time   Individual Concurrent Group Co-treatment   Time In           Time Out           Minutes                   Lincoln Cummings PT   Electronically signed by Lincoln Cummings PT on 10/28/2022 at 12:33 PM

## 2022-10-28 NOTE — PROGRESS NOTES
I spoke with patient's daughter, Consuelo Ball and his Mark Essex via telephone. They were informed of pathology of colon resection from 10/21/2022. All questions answered.     Bhupinder Page, APRN  10/28/22  1:42 PM

## 2022-10-29 PROBLEM — K91.89 ILEOCOLIC ANASTOMOTIC LEAK: Status: ACTIVE | Noted: 2022-01-01

## 2022-10-29 NOTE — CONSULTS
**Physician Signature**  This document was electronically signed by: Chris Valentin MD  10/28/2022   10:08 PM    **Consult Cover Page**  FROM: Natalia Su[address], [phone]  Call Back Number: [phone]  SUBJECT: Consult Recommendations  Date and Time of Report: 10/28/2022 10:08 PM CT  Items Contained in this Document: Critical Care [facility]    **Consult Information**  Member Facility: [address] [place] [address] MRN: [id number]  Visit/Encounter Number: [id number]  Consult ID: [id number]  Facility Time Zone: CT  Date and Time of Request: 10/28/2022 09:18 PM  CT  Requesting Clinician: Soraya Leos DO  Patient Name: Marita Conte  YOB: 1955  Gender: Male  Patient identity was confirmed at the beginning of the consult with the   patient/family/staff using two personal identifiers: Patient name and       **Reason for Consult**  Reason for Consult: [facility]    **Admission**  Admission Date: 10-  Chief reason for ICU admission: SBO w/ colon mass  Secondary reasons for ICU admission: Sepsis    **Core Metrics**  General orienting sentence for patient: Pt is a 76 y/o M with a PMH of A fib. On 10/20, pt was found to have a SBO due to colon mass and gallstones. A   right hemicolectomy was done with G tube placement. On 10/22, pt was   extubated. He became agitated and started on Precedex. On 10/23, pt pulled   out his G tube. GI placed a PEG tube. On 10/24, a garcia was placed for   urinary retention. On 10/25, pt with 's in a fib, Temp 106.9. Repeat   CT was done which a large amount intraperitoneal fluid consistent with an   anastomic leak. On 10/26, pt went for repair of the leak and diverting   colostomy. He was hypotensive and received 3L of fluid bolus and then placed   on laurie gtt @ 90 mcg. He is on meropenem. improved.   Chief physiologic deterioration: MAP > 60 Pharmacologically enchanced  Is the patient on DVT prophylaxis?: Yes  Prophylaxis type: Pharmacological  DVT Prophylaxis Comments: lovenox  Is the patient on GI prophylaxis?: Yes  Gi Prophylaxis Comments: pepcid  Has this patient reached their nutritional goal?: Yes  Nutritional Goals Details: tpn  Are there current issues with pain management in this patient?:   No  Pain management notes: Dilaudid PRN  Are there issues with skin integrity?: No  Skin Integrity Details: DTI Left upper butt check, R nostril DTI, surgical   wounds  Are there issues with delirium?: No  Has the patient been mobilized?: No  Is this patient currently intubated?: No  Are there ethical or care philosophy or family issues?: No  Do you recommend an in depth evaluation?: No  Disposition Recommendations: Continue ICU level of care    **Inserted/Removed Devices**  Device Name: Central venous catheter  Site of insertion: Interjugular - Right  Date of insertion: Unknown  Date of device removal: 10-  Device Name: Arterial Line  Site of insertion: Radial - Right  Date of insertion: Unknown  Date of device removal: 10-  Device Name: Acevedo Catheter  Site of insertion: Urethra  Date of insertion: Unknown  Device Name: Colostomy  Site of insertion: Abdominal Wall  Date of insertion: Unknown  Comment: ileostomy  Device Name: PEG Tube  Site of insertion: Abdominal Wall  Date of insertion: 10-  Device Name: Kierra Mcnulty [facility])  Site of insertion: Abdominal Wall  Date of insertion: 10-    **Physician Signature**  This document was electronically signed by: Christie Bergman MD  10/28/2022   10:08 PM

## 2022-10-29 NOTE — PROGRESS NOTES
10/29/22 1500   Subjective   Subjective Patient in SS TR to chair by LEONOR. Patient needs to stand for clean up patient on paddles.    Vitals   Heart Rate (!) 106   Heart Rate Source Monitor   BP 90/71   BP Location Right upper arm   MAP (Calculated) 77.33   Resp 21   SpO2 95 %   O2 Device None (Room air)   Transfer Training   Transfer Training Yes   Sit to Stand Maximum assistance;Assist X2  (Patient stood in SS about 1 minute for clean up.)   Stand to Sit Maximum assistance;Assist X2   PT Plan of Care   Saturday X               Electronically signed by Manav Mendoza PTA on 10/29/2022 at 3:27 PM

## 2022-10-29 NOTE — PLAN OF CARE
Problem: Nutrition Deficit:  Goal: Optimize nutritional status  Outcome: Progressing  Flowsheets (Taken 10/29/2022 0900)  Nutrient intake appropriate for improving, restoring, or maintaining nutritional needs:   Assess nutritional status and recommend course of action   Recommend, monitor, and adjust tube feedings and TPN/PPN based on assessed needs

## 2022-10-29 NOTE — PROGRESS NOTES
Infectious Diseases Progress Note    Patient:  Zhane Barone  YOB: 1955  MRN: [id number]   Admit date: 10/20/2022   Admitting Physician: Piedad Pacheco MD  Primary Care Physician: No primary care provider on file. Chief Complaint/Interval History: He is more alert and communicative today. He was not agitated this morning. He was calm overnight. He is without fever. He still has a lot of TRISTAN output. It is serous. He is afebrile. He has been hemodynamically stable. He remains on room air.     In/Out    Intake/Output Summary (Last 24 hours) at 10/29/2022 1149  Last data filed at 10/29/2022 1000  Gross per 24 hour   Intake 2009.6 ml   Output 5085 ml   Net -3075.4 ml     Allergies: No Known Allergies  Current Meds: meropenem (MERREM) 1000 mg in sodium chloride 0.9% 50 mL (duplex), Q8H  lidocaine PF 1 % injection 5 mL, Once  sodium chloride flush 0.9 % injection 5-40 mL, 2 times per day  sodium chloride flush 0.9 % injection 5-40 mL, PRN  0.9 % sodium chloride infusion, PRN  lactated ringers infusion, Continuous  metoprolol (LOPRESSOR) injection 5 mg, Q6H PRN  PN-Adult Premix 5/15 - Standardard Electrolytes - Central Line, Continuous TPN  enoxaparin (LOVENOX) injection 40 mg, Daily  fat emulsion 20 % infusion 250 mL, Once per day on Mon Thu  phenylephrine (MARIE-SYNEPHRINE) 50 mg in dextrose 5 % 250 mL infusion, Continuous  famotidine (PEPCID) 20 mg in sodium chloride (PF) 10 mL injection, Daily  metoclopramide (REGLAN) injection 5 mg, Q6H  BPCO OINT 1 Dose, BID  sodium phosphate 33.33 mmol in sodium chloride 0.9 % 250 mL IVPB, PRN  sodium phosphate 10 mmol in sodium chloride 0.9 % 250 mL IVPB, PRN   Or  sodium phosphate 15 mmol in sodium chloride 0.9 % 250 mL IVPB, PRN   Or  sodium phosphate 20 mmol in sodium chloride 0.9 % 500 mL IVPB, PRN  digoxin (LANOXIN) injection 125 mcg, Daily  labetalol (NORMODYNE;TRANDATE) injection 10 mg, Q4H PRN  thiamine (B-1) injection 100 mg, Daily  LORazepam (ATIVAN) injection 0.5 mg, Q6H PRN  HYDROmorphone HCl PF (DILAUDID) injection 2 mg, Q2H PRN   Or  HYDROmorphone HCl PF (DILAUDID) injection 1 mg, Q2H PRN   Or  HYDROmorphone HCl PF (DILAUDID) injection 0.5 mg, Q2H PRN  HYDROmorphone HCl PF (DILAUDID) injection 2 mg, Once  sodium chloride flush 0.9 % injection 5-40 mL, 2 times per day  sodium chloride flush 0.9 % injection 5-40 mL, PRN  0.9 % sodium chloride infusion, PRN  glucose chewable tablet 16 g, PRN  dextrose bolus 10% 125 mL, PRN   Or  dextrose bolus 10% 250 mL, PRN  glucagon (rDNA) injection 1 mg, PRN  dextrose 10 % infusion, Continuous PRN  potassium chloride (KLOR-CON M) extended release tablet 40 mEq, PRN   Or  potassium bicarb-citric acid (EFFER-K) effervescent tablet 40 mEq, PRN   Or  potassium chloride 10 mEq/100 mL IVPB (Peripheral Line), PRN  ondansetron (ZOFRAN-ODT) disintegrating tablet 4 mg, Q8H PRN   Or  ondansetron (ZOFRAN) injection 4 mg, Q6H PRN  magnesium hydroxide (MILK OF MAGNESIA) 400 MG/5ML suspension 30 mL, Daily PRN  acetaminophen (TYLENOL) tablet 650 mg, Q6H PRN   Or  acetaminophen (TYLENOL) suppository 650 mg, Q6H PRN  insulin lispro (HUMALOG) injection vial 0-4 Units, TID WC  insulin lispro (HUMALOG) injection vial 0-4 Units, Nightly      Review of Systems see HPI    VitalSigns:  /76   Pulse (!) 104   Temp 97.3 °F (36.3 °C) (Temporal)   Resp 18   Ht 5' 10\" (1.778 m)   Wt 184 lb 4.8 oz (83.6 kg)   SpO2 96%   BMI 26.44 kg/m²      Physical Exam  Line/IV site: No erythema, warmth, induration, or tenderness.   Lungs without crackles  Ostomy pink and functioning    Lab Results:  CBC:   Recent Labs     10/28/22  0514 10/29/22  0130 10/29/22  0604   WBC 13.2* 11.2* 11.1*   HGB 13.7* 12.7* 13.0*   PLT 63* 51* 51*     BMP:  Recent Labs     10/28/22  0514 10/29/22  0130 10/29/22  0604    141 142   K 3.6 3.3* 3.5    109 109   CO2 24 24 27   BUN 35* 33* 35*   CREATININE 1.0 0.6 0.7   GLUCOSE 114* 145* 152* CultureResults:  Blood cultures October 25, 2022-no growth    Radiology: None    Additional Studies Reviewed:  None    Impression:  1. Hypotension-remains resolved  2. Fever-resolved  3. Colon cancer  4. Paroxysmal atrial fibrillation  5.   Delirium-improving    Recommendations:  Seems to be showing improvement  Continue treatment with Merrem  Continue supportive care  Continue to follow    Shannon Cheatham MD

## 2022-10-29 NOTE — PROGRESS NOTES
Patient name: Rea See  Patient : 1955  10/29/2022  7:34 AM  ROOM 146    Portions of this note have been copied forward, however, changed to reflect the most current clinical status of this patient. Subjective: POD #8 Colectomy with gastrostomy placement. POD #2 laparotomy, repair of anastomotic leak and creation of diverting ileostomy. Remains off pressor support and off ventilator. Patient confused yesterday. More cooperative through the night per nursing. Did not require analgesia. TRISTAN drain continues to have copious amount of serosanguineous drainage. Nurse states emptying 2-3 times per hour. Afebrile. HISTORY OF PRESENT ILLNESS:   Lux Zepeda was last seen by Zachary Waters on 10/1/2022. The patient has a history of hypertension, atrial fibrillation. He presented ER department with complaints of worsening abdominal pain with associated nausea and vomiting. 10/20/2022-CT abdomen pelvis showed diffuse fluid and air distention of the the entire small bowel and proximal large bowel measuring up to 4.3 cm in the left abdomen and up to 7.6 cm of the proximal colon. Transitional point seen at a site of masslike thickening of the hepatic flexure measuring 3.2 x 3.9 cm (series 2 image 35). Multiple prominent and enlarged retroperitoneal lymph nodes. Largest is 1.5 cm in the left periaortic region (series 2 image 33).      10/21/2022: Colectomy with gastrostomy placement by Dr. Tania Traore   10/26/2022: laparotomy, repair of anastomotic leak and creation of diverting ileostomy by Dr. Balwinder Pereira    OBJECTIVE:  VITALS: /79   Pulse (!) 102   Temp 97.8 °F (36.6 °C) (Temporal)   Resp 27   Ht 5' 10\" (1.778 m)   Wt 184 lb 4.8 oz (83.6 kg)   SpO2 96%   BMI 26.44 kg/m²   I&O:   Intake/Output Summary (Last 24 hours) at 10/29/2022 0734  Last data filed at 10/29/2022 0600  Gross per 24 hour   Intake 2489.03 ml   Output 5520 ml   Net -3030.97 ml     PHYSICAL EXAM:  CONSTITUTIONAL: Resting quietly, confused. TPN infusing  EYES: pupils equal round   NECK: Supple, no masses. No JVD  CHEST/LUNGS: Diminished bases, otherwise clear  CARDIOVASCULAR: History of atrial fibrillation,   ABDOMEN: Midline abdominal dressing intact. Diverting ileostomy with dark liquid drainage. TRISTAN drain to suction with large amount serosanguineous drainage. PEG tube to drainage. EXTREMITIES: warm, trace edema  SKIN: warm, dry with no rashes or lesions  NEUROLOGIC: Confused  PSYCH: Confused    BMP:   Recent Labs     10/29/22  0130 10/29/22  0604    142   K 3.3* 3.5    109   CO2 24 27   BUN 33* 35*   CREATININE 0.6 0.7   GLUCOSE 145* 152*   CALCIUM 7.4* 7.4*     Recent Labs     10/29/22  0604 10/29/22  0130 10/28/22  0514   WBC 11.1* 11.2* 13.2*   HGB 13.0* 12.7* 13.7*   HCT 37.6* 37.3* 40.5*   MCV 90.0 89.9 91.2   PLT 51* 51* 63*     CMP:    Recent Labs     10/28/22  0514 10/29/22  0130 10/29/22  0604    141 142   K 3.6 3.3* 3.5    109 109   CO2 24 24 27   BUN 35* 33* 35*   CREATININE 1.0 0.6 0.7   LABGLOM >60 >60 >60   GLUCOSE 114* 145* 152*   PROT 4.5* 4.8*  --    LABALBU 2.1* 1.9*  --    CALCIUM 7.1* 7.4* 7.4*   BILITOT 4.5* 4.6*  --    ALKPHOS 72 75  --    * 206*  --    * 90*  --      30Day lookback of cultures:    Blood Culture Recent:   Recent Labs     10/25/22  1720   BC No Growth to date. Any change in status will be called. Gram Stain Recent: No results for input(s): LABGRAM in the last 720 hours. Resp Culture Recent: No results for input(s): CULTRESP in the last 720 hours. Body Fluid Recent : No results for input(s): BFCX in the last 720 hours. MRSA Recent : No results for input(s): [facility] in the last 720 hours. Urine Culture Recent : No results for input(s): LABURIN in the last 720 hours. Organism Recent : No results for input(s): ORG in the last 720 hours.      Radiology:   CT ABDOMEN PELVIS W IV CONTRAST Additional Contrast? None    Result Date: 10/20/2022  CLINICAL HISTORY: 48-hour history of diffuse abdominal pain distention nausea vomiting some diarrhea no prior history COMPARISON: No comparison TECHNIQUE: Axial images of the abdomen and pelvis were obtained after the administration of IV contrast.Coronal and sagittal multiplanar reconstructions were performed. One or more of the following dose reduction techniques were used: Automated exposure control, adjustment of the mA and/or kV according to patient size, and/or use of iterative reconstruction technique. COMPARISON: None. FINDINGS: Statements: None. Lower Chest: Unremarkable. Hepatobiliary: Hepatic steatosis. Calcified granulomas throughout the liver. No focal lesion is identified. Marked distention of the gallbladder containing gallstones. No CT evidence of cholecystitis however. No biliary ductal dilatation is evident. Pancreas: The pancreatic parenchyma is unremarkable and there is no main duct dilatation. Spleen: The spleen is nonenlarged. Calcified granulomas throughout the spleen. Adrenals: No adrenal glands are symmetric. Genitourinary: The kidneys are symmetric and enhance appropriately. No suspicious focal parenchymal abnormality is identified in either kidney. There is no collecting system dilatation. The bladder is unremarkable, as imaged. The prostate gland appears unremarkable. Gastrointestinal: Diffuse fluid and air distention of the the entire small bowel and proximal large bowel measuring up to 4.3 cm in the left abdomen and up to 7.6 cm of the proximal colon. Transitional point seen at a site of masslike thickening of the hepatic flexure measuring 3.2 x 3.9 cm (series 2 image 35). Colonic diverticulosis without diverticulitis. The appendix appears unremarkable. Lymphatics: Multiple prominent and enlarged retroperitoneal lymph nodes. Largest is 1.5 cm in the left periaortic region (series 2 image 33). Vascular: Moderate calcific atherosclerosis. The abdominal aorta is normal in caliber.  MSK/Body Wall: No concerning bony lesion is identified. Small fat-containing right inguinal hernia. Peritoneum/Other: There is no free fluid or abnormal collection. No free gas. 1.Irregular mass at the hepatic flexure of the colon resulting in high-grade small bowel and proximal large bowel obstruction. Severe fluid and gaseous distention of the entire stomach and small bowel including the distal esophagus. Findings are overall concerning for colonic neoplasm. Recommend GI and surgical consultation. 2.Scattered enlarged retroperitoneal lymph nodes. These are presumably metastatic if the colon mass proves to be malignant. 3.Marked hydropic distention of the gallbladder containing gallstones. No CT evidence of cholecystitis. Recommend correlation with LFTs. US GALLBLADDER RUQ    Result Date: 10/21/2022  NO PRIOR REPORT AVAILABLE Exam: ULTRASOUND OF THE ABDOMEN LIMITED, RIGHT UPPER QUADRANT Clinical data: Elevated LFTs, sepsis, lactic acidosis. The patient is on ventilator. Technique: Real-time grayscale imaging of the right upper quadrant was performed. Images supplemented with color Doppler technique. Prior studies: CT scan of abdomen and pelvis dated 10/20/22. Findings: Limited exam due to poor acoustic penetration. Diffuse increased echogenicity of the liver parenchyma. A shadowing focus//calcification in the right lobe measuring up 1.1 cm. No intrahepatic biliary distention. The gallbladder is distended with several small stones. The gallbladder wall thickness measures 0.8 cm. No sludge. The common bile duct is normal in caliber measuring 0.4cm. The right kidney [id number] x 6.6 x 5.4 cm. Normal echogenicity and cortical thickness are preserved. No evidence of renal masses, no calculi and no hydronephrosis Diffuse increased echogenicity of the pancreas. Imaged portion of the aorta demonstrates no acute pathology. Visualized portion of the inferior vena cava is unremarkable.     1.  Limited exam for reasons discussed above. 2.  Diffuse fatty infiltration of the liver with a 1.1 cm calcification in the right hepatic lobe. 3.  Distended gallbladder with several small stones and mild diffuse gallbladder wall thickening. 4.  No other significant abnormality. Recommendation: Follow up as clinically indicated. Electronically Signed by Elizabeth Burch MD at 21-Oct-2022 11:06:19 PM EST             XR CHEST PORTABLE    Result Date: 10/20/2022  NO PRIOR REPORT AVAILABLE [facility] Clinical data:Confirmation of course of NG/OG/NE tube and location of tip of tube. Technique:Single view of the chest. Prior studies: Radiograph of the chest done on same day. Findings: The lungs are grossly clear; noevidence of acute infiltrate or pleural effusion. Cardiac silhouette is mildly enlarged. No acute osseous abnormality is detected. The NG tube can be traced to the level of distal esophagus. Scattered nonspecific gas-filled loops of bowel in the upper abdomen. The NG tube can be traced to the level of distal esophagus. Suggest advancing accordingly, with a follow-up radiograph. Recommendation: As above. Electronically Signed by Carli Reyes MD at 20-Oct-2022 06:15:09 PM EST             XR CHEST PORTABLE    Result Date: 10/20/2022  NO PRIOR REPORT AVAILABLE Exam: X-RAY OF THECHEST Clinical data:Abdominal pain, vomiting. Technique:Single view of the chest. Prior studies: No prior studies submitted. Findings: The trachea is midline. The heart is enlarged. Mediastinal and pulmonary vascular shadows are normal.  There is no focal consolidation or effusion. The osseous structures are intact. Cardiomegaly. Recommendation: Follow up as clinically indicated.  Electronically Signed by Livia Roman MD at 20-Oct-2022 03:26:36 PM EST               Medications  Current Facility-Administered Medications   Medication Dose Route Frequency Provider Last Rate Last Admin    meropenem (MERREM) 1000 mg in sodium chloride 0.9% 50 mL (duplex)  1,000 mg IntraVENous Q8H Mayela Cee MD 16.7 mL/hr at 10/29/22 0439 1,000 mg at 10/29/22 0439    lidocaine PF 1 % injection 5 mL  5 mL IntraDERmal Once Nakul Arnold MD        sodium chloride flush 0.9 % injection 5-40 mL  5-40 mL IntraVENous 2 times per day Nakul Arnold MD   10 mL at 10/28/22 2028    sodium chloride flush 0.9 % injection 5-40 mL  5-40 mL IntraVENous PRN Nakul Arnold MD        0.9 % sodium chloride infusion  25 mL IntraVENous PRN Nakul Arnold MD        lactated ringers infusion   IntraVENous Continuous Nakul Arnold MD 75 mL/hr at 10/28/22 1447 New Bag at 10/28/22 1447    metoprolol (LOPRESSOR) injection 5 mg  5 mg IntraVENous Q6H PRN Nakul Arnold MD   5 mg at 10/28/22 1737    PN-Adult Premix 5/15 - Standardard Electrolytes - Central Line   IntraVENous Continuous TPN Brian Jensen MD 50 mL/hr at 10/29/22 0230 Rate Verify at 10/29/22 0230    enoxaparin (LOVENOX) injection 40 mg  40 mg SubCUTAneous Daily Brian Jensen MD   40 mg at 10/28/22 2027    fat emulsion 20 % infusion 250 mL  250 mL IntraVENous Once per day on Mon Thu Brian Jensen MD   Stopped at 10/28/22 0030    chlorhexidine (PERIDEX) 0.12 % solution 15 mL  15 mL Mouth/Throat BID Mayela Cee MD   15 mL at 10/28/22 2028    dexmedetomidine (PRECEDEX) 400 mcg in sodium chloride 0.9 % 100 mL infusion  0.2-1.4 mcg/kg/hr IntraVENous Continuous Mayela Cee MD 5 mL/hr at 10/26/22 0224 0.2 mcg/kg/hr at 10/26/22 0224    phenylephrine (MARIE-SYNEPHRINE) 50 mg in dextrose 5 % 250 mL infusion   mcg/min IntraVENous Continuous Mona Sorto MD   Stopped at 10/28/22 0917    famotidine (PEPCID) 20 mg in sodium chloride (PF) 10 mL injection  20 mg IntraVENous Daily Rossy Juares DO   20 mg at 10/28/22 8029    metoclopramide (REGLAN) injection 5 mg  5 mg IntraVENous Q6H Mayela Cee MD   5 mg at 10/29/22 0614    BPCO OINT 1 Dose  1 Dose Apply externally BID Mayela Cee MD   1 Dose at 10/28/22 2027    sodium phosphate 33.33 mmol in sodium chloride 0.9 % 250 mL IVPB  0.32 mmol/kg IntraVENous PRN Pearletha Fanning, DO        sodium phosphate 10 mmol in sodium chloride 0.9 % 250 mL IVPB  10 mmol IntraVENous PRN Pearletha Fanning, DO        Or    sodium phosphate 15 mmol in sodium chloride 0.9 % 250 mL IVPB  15 mmol IntraVENous PRN Pearletha Fanning, DO        Or    sodium phosphate 20 mmol in sodium chloride 0.9 % 500 mL IVPB  20 mmol IntraVENous PRN Pearletha Fanning, DO        digoxin (LANOXIN) injection 125 mcg  125 mcg IntraVENous Daily Pearletha Fanning, DO   125 mcg at 10/28/22 0849    labetalol (NORMODYNE;TRANDATE) injection 10 mg  10 mg IntraVENous Q4H PRN Sharyle Croon, MD   10 mg at 10/24/22 1408    thiamine (B-1) injection 100 mg  100 mg IntraVENous Daily Pearletha Fanning, DO   100 mg at 10/28/22 0831    LORazepam (ATIVAN) injection 0.5 mg  0.5 mg IntraVENous Q6H PRN Pearletha Fanning, DO   0.5 mg at 10/25/22 0848    HYDROmorphone HCl PF (DILAUDID) injection 2 mg  2 mg IntraVENous Q2H PRN Pearletha Fanning, DO   2 mg at 10/27/22 1333    Or    HYDROmorphone HCl PF (DILAUDID) injection 1 mg  1 mg IntraVENous Q2H PRN Pearletha Fanning, DO   1 mg at 10/28/22 2947    Or    HYDROmorphone HCl PF (DILAUDID) injection 0.5 mg  0.5 mg IntraVENous Q2H PRN Pearletha Fanning, DO   0.5 mg at 10/28/22 0025    HYDROmorphone HCl PF (DILAUDID) injection 2 mg  2 mg IntraVENous Once Pearletha Fanning, DO        sodium chloride flush 0.9 % injection 5-40 mL  5-40 mL IntraVENous 2 times per day Pearletha Fanning, DO   10 mL at 10/28/22 2029    sodium chloride flush 0.9 % injection 5-40 mL  5-40 mL IntraVENous PRN Pearletha Fanning, DO        0.9 % sodium chloride infusion   IntraVENous PRN Pearletha Fanning, DO 5 mL/hr at 10/27/22 0511 New Bag at 10/27/22 0511    glucose chewable tablet 16 g  4 tablet Oral PRN Craig Fanning, DO        dextrose bolus 10% 125 mL  125 mL IntraVENous PRN Toby Leal, DO        Or    dextrose bolus 10% 250 mL  250 mL IntraVENous PRN Toby Leal, DO        glucagon (rDNA) injection 1 mg  1 mg SubCUTAneous PRN Toby Leal, DO        dextrose 10 % infusion   IntraVENous Continuous PRN Toby Leal, DO        potassium chloride (KLOR-CON M) extended release tablet 40 mEq  40 mEq Oral PRN Toby Leal, DO   40 mEq at 10/29/22 0320    Or    potassium bicarb-citric acid (EFFER-K) effervescent tablet 40 mEq  40 mEq Oral PRN Toby Leal, DO        Or    potassium chloride 10 mEq/100 mL IVPB (Peripheral Line)  10 mEq IntraVENous PRN Toby Leal, DO        ondansetron (ZOFRAN-ODT) disintegrating tablet 4 mg  4 mg Oral Q8H PRN Toby Leal, DO        Or    ondansetron [facility]) injection 4 mg  4 mg IntraVENous Q6H PRN Toby Leal, DO        magnesium hydroxide (MILK OF MAGNESIA) 400 MG/5ML suspension 30 mL  30 mL Oral Daily PRN Toby Leal, DO   30 mL at 10/24/22 1609    acetaminophen (TYLENOL) tablet 650 mg  650 mg Oral Q6H PRN Toby Leal, DO        Or    acetaminophen (TYLENOL) suppository 650 mg  650 mg Rectal Q6H PRN Toby Leal, DO   650 mg at 10/25/22 1746    insulin lispro (HUMALOG) injection vial 0-4 Units  0-4 Units SubCUTAneous TID WC Toby Leal, DO        insulin lispro (HUMALOG) injection vial 0-4 Units  0-4 Units SubCUTAneous Nightly Toby Leal, DO         Allergies  Patient has no known allergies. ASSESSMENT:  #Hepatic flexure colonic mass  -CT scan pelvis with IV contrast on 10/20/2022:  Irregular mass at the hepatic flexure of the colon resulting in high-grade small bowel and proximal large bowel obstruction. Severe fluid and gaseous distention of the entire stomach and small bowel including the distal esophagus. Scattered enlarged retroperitoneal lymph nodes. These are presumably metastatic if the colon mass proves to be malignant. Marked hydropic distention of the gallbladder containing gallstones. No CT evidence of cholecystitis. Hepatic steatosis. Calcified granulomas throughout the liver  Spleen non enlarged and calcified granulomas throughout the spleen.    -Colectomy with gastrostomy placement by Dr. Lee Cisneros on 10/21/2022: Operative note indicated hard mass adherent to the liver and small bowel posteriorly. Pathology:  Colon, right hemicolectomy:   1. Invasive moderately differentiated colonic adenocarcinoma, measuring 6.1 cm in greatest dimension. 2.  Tumor invades through the muscularis propria into the pericolonic   adipose tissue but does not reach the serosal surface. 3.  Surgical excision margins are negative for evidence of carcinoma and adenomatous change. 4.  Sections of terminal ileum, negative for evidence of malignancy. 5.  Sections of the appendix, negative for evidence of malignancy. 6.  6 out of 32 lymph nodes, positive for metastatic adenocarcinoma. AJCC STAGE: pT3, pN2a, pMx     -Baseline CEA 11.0 on 10/21/2022  -Postop CEA 6.9 on 10/23/2022  -Pathology discussed with patient's daughter, BODØ on 10/28/2022    #Thrombocytopenia-suspect multifactorial to include liver dysfunction, long term alcohol use, consumption from infection  No bleeding        Admission hemoglobin of 16.9 on 10/20/2022. No active bleeding noted. Hemoglobin stable at 13.0    #Elevated LFTs  CT scan of the abdomen pelvis on 10/20/2022 reported Hepatic steatosis. Calcified granulomas throughout the liver. Spleen non enlarged and calcified granulomas throughout the spleen. Ultrasound gallbladder 10/21/2022:   1. Limited exam.  2.  Diffuse fatty infiltration of the liver with a 1.1 cm calcification in the right hepatic lobe. 3.  Distended gallbladder with several small stones and mild diffuse gallbladder wall thickening.     As per GI/general surgery- not a candidate for cholecystectomy, gallstones felt to be incidental    Hepatitis C reactive, 10/26/2022  History of EtOH  Followed by GI    #Electrolyte imbalances  Magnesium 2.5  Phosphorus 2.4  Calcium 7.4/albumin 1.9  As per attending    #SONIA, improved      #sepsis, anastomotic leak  Status post laparotomy, repair of anastomotic leak and creation of diverting ileostomy 10/26/2022  WBC 11.1  Receiving Merrem  Afebrile/temp curve improved; 101.7 on 10/25/2022  Blood cultures 10/25/2022 x2: NGTD    #malnutrition  Receiving TPN    PLAN:  10/21/2022: Colectomy with gastrostomy placement by Dr. Carolee Montiel. POD#8  10/26/2022: laparotomy, repair of anastomotic leak and creation of diverting ileostomy by Dr. Karen Rock. POD#2    Monitor CBC -platelets 09,006, no bleeding  Current pathology is at least stage III, will need further staging as outpatient  Postop CEA 6.9    Discussed with patient's nurse, [address]. Discussed patient's pathology with his sister, Ermias Rivera [phone] and step-daughter, Miki Gan on 10/28/2022. Further treatment recommendations pending further staging and when patient more oriented    ALLISON Soliman    10/29/22  7:34 AM     Physician's attestation and contribution:  I, Dr Rhina Palmer, personally and independently performed an evaluation on  [facility]        I have reviewed relevant medical information/data to include but not limited to the medication list, relevant appropriate lab work and imaging when applicable. I reviewed other physician's notes, ancillary services and nurses assessments. I have reviewed the above documentation completed by Carley COOPER   Please see my additional addended and/or modified contributions to the history of present illness, physical examination, and assessment/medical decision-making and plan that reflects my findings and impressions. I discussed essential elements of the care plan with Carley COOEPR and the patient.    I have encouraged and answered all the questions raised to the patient's understanding and satisfaction. I concur with the above stated. Subjective- awake, still a bit confused but interactive, in no acute distress    Objective-ill in a-fib . Good breath sounds bilaterally  Right mid abdomen ostomy working well    Assessment/plan:  POD# 8 colectomy with gastrostomy placement-per Dr. Dalila Mark  POD#2 laparotomy, repair of anastomotic leak and creation of diverting ileostomy    Pathology  (AJCC STAGE: pT3, pN2a, pMx) was discussed with family by ALLISON Hernandez, on 28 2022    CBC today, 10/29/2022 with WBC of 11.1, hemoglobin 13.0 and a platelet count 99,804. Platelets are relatively stable.         Electronically signed by Ryan Valentine MD on 10/29/22 at 8:08 AM CDT

## 2022-10-29 NOTE — PROGRESS NOTES
Hospitalist Progress Note    Patient:  Marcella Miller  YOB: 1955  Date of Service: 10/29/2022  MRN: [id number]   Acct: [de-identified]   Primary Care Physician: No primary care provider on file. Advance Directive: DNR  Admit Date: 10/20/2022       Hospital Day: 9  Referring Provider: Merced Chowdhury MD    Patient Seen, Chart, Consults, Notes, Labs, Radiology studies reviewed. Subjective:     No further agitation. He still has some confusion but seems improved from yesterday. Hemodynamically improved. No fevers or chills overnight. Denies any worsening abdominal pain. No chest pain, shortness of breath or palpitations. Summary:  Marcella Miller is a 77 y.o. male  whom we are following for SBO status post right hemicolectomy for obstructing colon cancer, complicated by development of septic peritonitis due to anastomotic leak, as well as nonischemic cardiomyopathy, A. fib intermittently in RVR. He underwent an open right hemicolectomy on 10/21 however course complicated after developed sepsis with fevers, leukocytosis with CT showing large volume peritoneal fluid with large pneumoperitoneum, septic shock with peritonitis requiring volume resuscitation, vasopressor support and antibiotics on meropenem. Patient was reintubated for surgery. Additionally intermittently in A. fib with RVR requiring IV AV meaghan blockade. Patient taken back to the OR for repair of anastomotic leak, washout with diverting ileostomy. Had SONIA with metabolic acidosis in setting of sepsis and volume losses. Sepsis, encephalopathy and renal function gradually improved following surgery with further resuscitation, antibiotics and weaned off vasopressor support. A. fib rate controlled on metoprolol and digoxin. Allergies:  Patient has no known allergies.     Medicines:  Current Facility-Administered Medications   Medication Dose Route Frequency Provider Last Rate Last Admin    meropenem (MERREM) 1000 mg in sodium chloride 0.9% 50 mL (duplex)  1,000 mg IntraVENous Q8H Melvi Ruano MD 16.7 mL/hr at 10/29/22 1149 1,000 mg at 10/29/22 1149    lidocaine PF 1 % injection 5 mL  5 mL IntraDERmal Once Jared Bosch MD        sodium chloride flush 0.9 % injection 5-40 mL  5-40 mL IntraVENous 2 times per day Jared Bosch MD   10 mL at 10/29/22 0857    sodium chloride flush 0.9 % injection 5-40 mL  5-40 mL IntraVENous PRN Jared Bosch MD        0.9 % sodium chloride infusion  25 mL IntraVENous PRN Jared Bosch MD        lactated ringers infusion   IntraVENous Continuous Jared Bosch MD 75 mL/hr at 10/28/22 1447 New Bag at 10/28/22 1447    metoprolol (LOPRESSOR) injection 5 mg  5 mg IntraVENous Q6H PRN Jared Bosch MD   5 mg at 10/28/22 1737    PN-Adult Premix 5/15 - Standardard Electrolytes - Central Line   IntraVENous Continuous TPN Jared Bosch MD 75 mL/hr at 10/29/22 1108 Rate Change at 10/29/22 1108    enoxaparin (LOVENOX) injection 40 mg  40 mg SubCUTAneous Daily Rose Mireles MD   40 mg at 10/28/22 2027    fat emulsion 20 % infusion 250 mL  250 mL IntraVENous Once per day on Mon Thu Rose Mireles MD   Stopped at 10/28/22 0030    phenylephrine (MARIE-SYNEPHRINE) 50 mg in dextrose 5 % 250 mL infusion   mcg/min IntraVENous Continuous Chelo Ferreira MD   Stopped at 10/28/22 0917    famotidine (PEPCID) 20 mg in sodium chloride (PF) 10 mL injection  20 mg IntraVENous Daily Haynes Kawasaki, DO   20 mg at 10/29/22 0856    metoclopramide (REGLAN) injection 5 mg  5 mg IntraVENous Q6H Melvi Ruano MD   5 mg at 10/29/22 1147    BPCO OINT 1 Dose  1 Dose Apply externally BID Melvi Ruano MD   1 Dose at 10/29/22 0856    sodium phosphate 33.33 mmol in sodium chloride 0.9 % 250 mL IVPB  0.32 mmol/kg IntraVENous PRN Mainor Kawasaki, DO        sodium phosphate 10 mmol in sodium chloride 0.9 % 250 mL IVPB  10 mmol IntraVENous PRN Mainor Kawasaki, DO        Or sodium phosphate 15 mmol in sodium chloride 0.9 % 250 mL IVPB  15 mmol IntraVENous PRN Faiza Abrahan, DO        Or    sodium phosphate 20 mmol in sodium chloride 0.9 % 500 mL IVPB  20 mmol IntraVENous PRN Faiza Abrahan, DO        digoxin (LANOXIN) injection 125 mcg  125 mcg IntraVENous Daily Faiza Stewart, DO   125 mcg at 10/29/22 0855    labetalol (NORMODYNE;TRANDATE) injection 10 mg  10 mg IntraVENous Q4H PRN Killian Mcfadden MD   10 mg at 10/24/22 1408    thiamine (B-1) injection 100 mg  100 mg IntraVENous Daily Faiza Abrahan, DO   100 mg at 10/29/22 0856    LORazepam (ATIVAN) injection 0.5 mg  0.5 mg IntraVENous Q6H PRN Faiza Stewart, DO   0.5 mg at 10/25/22 0848    HYDROmorphone HCl PF (DILAUDID) injection 2 mg  2 mg IntraVENous Q2H PRN Faiza Abrahan, DO   2 mg at 10/27/22 1333    Or    HYDROmorphone HCl PF (DILAUDID) injection 1 mg  1 mg IntraVENous Q2H PRN Faiza Stewart, DO   1 mg at 10/28/22 9256    Or    HYDROmorphone HCl PF (DILAUDID) injection 0.5 mg  0.5 mg IntraVENous Q2H PRN Faiza Stewart, DO   0.5 mg at 10/28/22 0025    HYDROmorphone HCl PF (DILAUDID) injection 2 mg  2 mg IntraVENous Once Faiza Abrahan, DO        sodium chloride flush 0.9 % injection 5-40 mL  5-40 mL IntraVENous 2 times per day Faiza Stewart, DO   10 mL at 10/28/22 2029    sodium chloride flush 0.9 % injection 5-40 mL  5-40 mL IntraVENous PRN Faiza Abrahan, DO        0.9 % sodium chloride infusion   IntraVENous PRN Faiza Stewart, DO 5 mL/hr at 10/29/22 1147 New Bag at 10/29/22 1147    glucose chewable tablet 16 g  4 tablet Oral PRN Faiza Abrahan, DO        dextrose bolus 10% 125 mL  125 mL IntraVENous PRN Faiza Abrahan, DO        Or    dextrose bolus 10% 250 mL  250 mL IntraVENous PRN Faiza Stewart, DO        glucagon (rDNA) injection 1 mg  1 mg SubCUTAneous PRN Faiza Stewart, DO        dextrose 10 % infusion   IntraVENous Continuous PRN Alasim Duhamel, DO        potassium chloride (KLOR-CON M) extended release tablet 40 mEq  40 mEq Oral PRN Alyne Duhamel, DO   40 mEq at 10/29/22 0320    Or    potassium bicarb-citric acid (EFFER-K) effervescent tablet 40 mEq  40 mEq Oral PRN Alyne Duhamel, DO        Or    potassium chloride 10 mEq/100 mL IVPB (Peripheral Line)  10 mEq IntraVENous PRN Alasim Duhamel, DO        ondansetron (ZOFRAN-ODT) disintegrating tablet 4 mg  4 mg Oral Q8H PRN Alyne Duhamel, DO        Or    ondansetron [facility]) injection 4 mg  4 mg IntraVENous Q6H PRN Alyne Duhamel, DO        magnesium hydroxide (MILK OF MAGNESIA) 400 MG/5ML suspension 30 mL  30 mL Oral Daily PRN Alasim Duhamel, DO   30 mL at 10/24/22 1609    acetaminophen (TYLENOL) tablet 650 mg  650 mg Oral Q6H PRN Alyne Duhamel, DO        Or    acetaminophen (TYLENOL) suppository 650 mg  650 mg Rectal Q6H PRN Alyne Duhamel, DO   650 mg at 10/25/22 1746    insulin lispro (HUMALOG) injection vial 0-4 Units  0-4 Units SubCUTAneous TID WC Alyne Duhamel, DO        insulin lispro (HUMALOG) injection vial 0-4 Units  0-4 Units SubCUTAneous Nightly Alasim Dukvngel, DO           Past Medical History:  Past Medical History:   Diagnosis Date    Ex-cigarette smoker 10/03/2016    Hypertension     Non-ischemic cardiomyopathy ([facility] [address] 10/03/2016    9/28/2016  Echo  EF 25% 10/3/16  Cath  Mild CAD, EF 10%     Palliative care patient 10/24/2022    Paroxysmal atrial fibrillation ([facility] [address] 03/16/2018       Past Surgical History:  Past Surgical History:   Procedure Laterality Date    FINGER AMPUTATION Left     partial left thumb amputation following trauma    HEMICOLECTOMY N/A 10/21/2022    RIGHT HEMICOLECTOMY WITH GASTROSTOMY PLACEMENT performed by Moy Vale MD at [address] N/A 10/25/2022    LAPAROTOMY, REPAIR OF ANASTAMOSAL LEAK, CREATION OF DIVERTING ILEOSTOMY performed by Sofia Lovett MD at [address] [place] [address] UPPER GASTROINTESTINAL ENDOSCOPY N/A 10/23/2022    Dr Michelle Brock w/peg placement       Family History  Family History   Problem Relation Age of Onset    High Blood Pressure Mother     Cancer Father         Stomach    Heart Disease Maternal Grandmother     Diabetes Maternal Uncle        Social History  Social History     Socioeconomic History    Marital status:      Spouse name: Not on file    Number of children: Not on file    Years of education: Not on file    Highest education level: Not on file   Occupational History    Not on file   Tobacco Use    Smoking status: Former     Packs/day: 1.00     Years: 2.00     Pack years: 2.00     Types: Cigarettes     Start date:      Quit date:      Years since quittin.8    Smokeless tobacco: Never    Tobacco comments:     light smoking history - socially   Vaping Use    Vaping Use: Never used   Substance and Sexual Activity    Alcohol use: Yes    Drug use: No    Sexual activity: Not on file   Other Topics Concern    Not on file   Social History Narrative    Not on file     Social Determinants of Health     Financial Resource Strain: Not on file   Food Insecurity: Not on file   Transportation Needs: Not on file   Physical Activity: Not on file   Stress: Not on file   Social Connections: Not on file   Intimate Partner Violence: Not on file   Housing Stability: Not on file         Review of Systems:    Review of Systems   Unable to perform ROS: Mental status change         Objective:  Blood pressure 117/76, pulse (!) 104, temperature 97.3 °F (36.3 °C), temperature source Temporal, resp. rate 18, height 5' 10\" (1.778 m), weight 184 lb 4.8 oz (83.6 kg), SpO2 96 %. Intake/Output Summary (Last 24 hours) at 10/29/2022 1231  Last data filed at 10/29/2022 1000  Gross per 24 hour   Intake 2009.6 ml   Output 5085 ml   Net -3075.4 ml         Physical Exam  Vitals and nursing note reviewed. Constitutional:       Appearance: He is ill-appearing.    HENT:      Head: Normocephalic and atraumatic. Eyes:      General: No scleral icterus. Conjunctiva/sclera: Conjunctivae normal.   Cardiovascular:      Rate and Rhythm: Normal rate. Rhythm irregular. Heart sounds: Normal heart sounds. Pulmonary:      Effort: Pulmonary effort is normal.      Breath sounds: Normal breath sounds. Abdominal:      General: Abdomen is flat. Palpations: Abdomen is soft. Tenderness: There is no rebound. Comments: Minimal abdominal tenderness   Genitourinary:     Comments: Catheter in place  Musculoskeletal:         General: No swelling. Cervical back: No rigidity. Right lower leg: No edema. Left lower leg: No edema. Skin:     General: Skin is warm and dry. Neurological:      Comments: Confused, able to follow commands       Labs:  BMP:   Recent Labs     10/28/22  0514 10/29/22  0130 10/29/22  0604    141 142   K 3.6 3.3* 3.5    109 109   CO2 24 24 27   PHOS 1.8* 2.3* 2.4*   BUN 35* 33* 35*   CREATININE 1.0 0.6 0.7   CALCIUM 7.1* 7.4* 7.4*       CBC:   Recent Labs     10/28/22  0514 10/29/22  0130 10/29/22  0604   WBC 13.2* 11.2* 11.1*   HGB 13.7* 12.7* 13.0*   HCT 40.5* 37.3* 37.6*   MCV 91.2 89.9 90.0   PLT 63* 51* 51*       LIVER PROFILE:   Recent Labs     10/27/22  0927 10/28/22  0514 10/29/22  0130   * 371* 206*   * 122* 90*   BILIDIR 2.8*  --   --    BILITOT 4.0* 4.5* 4.6*   ALKPHOS 73 72 75       PT/INR: No results for input(s): PROTIME, INR in the last 72 hours. APTT: No results for input(s): APTT in the last 72 hours. BNP:  No results for input(s): BNP in the last 72 hours. Ionized Calcium:No results for input(s): IONCA in the last 72 hours. Magnesium:  Recent Labs     10/28/22  1956 10/29/22  0130   MG 2.6* 2.5*       Phosphorus:  Recent Labs     10/28/22  0514 10/29/22  0130 10/29/22  0604   PHOS 1.8* 2.3* 2.4*       HgbA1C: No results for input(s): LABA1C in the last 72 hours.   Hepatic:   Recent Labs 10/27/22  0927 10/28/22  0514 10/29/22  0130   ALKPHOS 73 72 75   * 122* 90*   * 371* 206*   PROT 4.8* 4.5* 4.8*   BILITOT 4.0* 4.5* 4.6*   BILIDIR 2.8*  --   --    LABALBU 1.1* 2.1* 1.9*       Lactic Acid:   Recent Labs     10/28/22  1955   LACTA 1.8       Troponin: No results for input(s): CKTOTAL, CKMB, TROPONINT in the last 72 hours. ABGs: No results for input(s): PH, PCO2, PO2, HCO3, O2SAT in the last 72 hours. CRP:  No results for input(s): CRP in the last 72 hours. Sed Rate:  No results for input(s): SEDRATE in the last 72 hours. Cultures:   No results for input(s): CULTURE in the last 72 hours. No results for input(s): BC, Loren Breech in the last 72 hours. No results for input(s): CXSURG in the last 72 hours. Radiology reports as per the Radiologist  Radiology: CT ABDOMEN PELVIS W IV CONTRAST Additional Contrast? None    Result Date: 10/20/2022  CLINICAL HISTORY: 48-hour history of diffuse abdominal pain distention nausea vomiting some diarrhea no prior history COMPARISON: No comparison TECHNIQUE: Axial images of the abdomen and pelvis were obtained after the administration of IV contrast.Coronal and sagittal multiplanar reconstructions were performed. One or more of the following dose reduction techniques were used: Automated exposure control, adjustment of the mA and/or kV according to patient size, and/or use of iterative reconstruction technique. COMPARISON: None. FINDINGS: Statements: None. Lower Chest: Unremarkable. Hepatobiliary: Hepatic steatosis. Calcified granulomas throughout the liver. No focal lesion is identified. Marked distention of the gallbladder containing gallstones. No CT evidence of cholecystitis however. No biliary ductal dilatation is evident. Pancreas: The pancreatic parenchyma is unremarkable and there is no main duct dilatation. Spleen: The spleen is nonenlarged. Calcified granulomas throughout the spleen. Adrenals: No adrenal glands are symmetric.  Genitourinary: The kidneys are symmetric and enhance appropriately. No suspicious focal parenchymal abnormality is identified in either kidney. There is no collecting system dilatation. The bladder is unremarkable, as imaged. The prostate gland appears unremarkable. Gastrointestinal: Diffuse fluid and air distention of the the entire small bowel and proximal large bowel measuring up to 4.3 cm in the left abdomen and up to 7.6 cm of the proximal colon. Transitional point seen at a site of masslike thickening of the hepatic flexure measuring 3.2 x 3.9 cm (series 2 image 35). Colonic diverticulosis without diverticulitis. The appendix appears unremarkable. Lymphatics: Multiple prominent and enlarged retroperitoneal lymph nodes. Largest is 1.5 cm in the left periaortic region (series 2 image 33). Vascular: Moderate calcific atherosclerosis. The abdominal aorta is normal in caliber. MSK/Body Wall: No concerning bony lesion is identified. Small fat-containing right inguinal hernia. Peritoneum/Other: There is no free fluid or abnormal collection. No free gas. 1.Irregular mass at the hepatic flexure of the colon resulting in high-grade small bowel and proximal large bowel obstruction. Severe fluid and gaseous distention of the entire stomach and small bowel including the distal esophagus. Findings are overall concerning for colonic neoplasm. Recommend GI and surgical consultation. 2.Scattered enlarged retroperitoneal lymph nodes. These are presumably metastatic if the colon mass proves to be malignant. 3.Marked hydropic distention of the gallbladder containing gallstones. No CT evidence of cholecystitis. Recommend correlation with LFTs. US GALLBLADDER RUQ    Result Date: 10/21/2022  NO PRIOR REPORT AVAILABLE Exam: ULTRASOUND OF THE ABDOMEN LIMITED, RIGHT UPPER QUADRANT Clinical data: Elevated LFTs, sepsis, lactic acidosis. The patient is on ventilator. Technique: Real-time grayscale imaging of the right upper quadrant was performed.  Images supplemented with color Doppler technique. Prior studies: CT scan of abdomen and pelvis dated 10/20/22. Findings: Limited exam due to poor acoustic penetration. Diffuse increased echogenicity of the liver parenchyma. A shadowing focus//calcification in the right lobe measuring up 1.1 cm. No intrahepatic biliary distention. The gallbladder is distended with several small stones. The gallbladder wall thickness measures 0.8 cm. No sludge. The common bile duct is normal in caliber measuring 0.4cm. The right kidney [id number] x 6.6 x 5.4 cm. Normal echogenicity and cortical thickness are preserved. No evidence of renal masses, no calculi and no hydronephrosis Diffuse increased echogenicity of the pancreas. Imaged portion of the aorta demonstrates no acute pathology. Visualized portion of the inferior vena cava is unremarkable. 1.  Limited exam for reasons discussed above. 2.  Diffuse fatty infiltration of the liver with a 1.1 cm calcification in the right hepatic lobe. 3.  Distended gallbladder with several small stones and mild diffuse gallbladder wall thickening. 4.  No other significant abnormality. Recommendation: Follow up as clinically indicated. Electronically Signed by Michael Peña MD at 21-Oct-2022 11:06:19 PM EST             XR CHEST PORTABLE    Result Date: 10/20/2022  NO PRIOR REPORT AVAILABLE [facility] Clinical data:Confirmation of course of NG/OG/NE tube and location of tip of tube. Technique:Single view of the chest. Prior studies: Radiograph of the chest done on same day. Findings: The lungs are grossly clear; noevidence of acute infiltrate or pleural effusion. Cardiac silhouette is mildly enlarged. No acute osseous abnormality is detected. The NG tube can be traced to the level of distal esophagus. Scattered nonspecific gas-filled loops of bowel in the upper abdomen. The NG tube can be traced to the level of distal esophagus.  Suggest advancing accordingly, with a follow-up radiograph. Recommendation: As above. Electronically Signed by Darnell Stokes MD at 20-Oct-2022 06:15:09 PM EST             XR CHEST PORTABLE    Result Date: 10/20/2022  NO PRIOR REPORT AVAILABLE Exam: X-RAY OF THECHEST Clinical data:Abdominal pain, vomiting. Technique:Single view of the chest. Prior studies: No prior studies submitted. Findings: The trachea is midline. The heart is enlarged. Mediastinal and pulmonary vascular shadows are normal.  There is no focal consolidation or effusion. The osseous structures are intact. Cardiomegaly. Recommendation: Follow up as clinically indicated.  Electronically Signed by Shy Marinelli MD at 20-Oct-2022 03:26:36 PM EST                Assessment     SBO   S/P right hemicolectomy for obstructing colon cancer  Surgical pathology --noted at least stage III, further staging as outpatient per oncology  Advance diet as per surgery    Septic shock due to peritonitis with anastomotic leak  ---Resolved  - Status post repair of anastomotic leak, washout and diverting ileostomy  --Adequately resuscitated, not requiring any vasopressor support  -IV antibiotics with intra-abdominal coverage, ID following    Paroxysmal A. fib intermittently in RVR  --Improved with rate control measures  - Continue Lopressor and digoxin, just further as needed    Acute encephalopathy, multifactorial secondary to sepsis and metabolic disturbance in setting of kidney injury, chronic liver disease, hepatitis C, alcohol abuse  - Follow LFTs  - Not having any evident signs of alcohol withdrawal and should be out of the window for this, would avoid benzodiazepines at this time  -- Supportive care, reorient as able, give thiamine  -- Limit sedating meds    SONIA, prerenal   Metabolic acidosis-resolved  -Renal function continues to improve, continue to follow urine output and labs    Acute hypoxemic respiratory failure requiring intubation/mechanical ventilation  --Resolved  -Extubated 10/26  --Monitor SPO2, supplemental oxygen as needed for adequate gas exchange    Monitor and replete electrolytes as needed    Malnutrition  --TPN    DVT prophylaxis SCDs, hold chemical prophylaxis due to thrombocytopenia, platelets around 70,664      Anupama Maciel MD

## 2022-10-29 NOTE — PROGRESS NOTES
[address] General Surgery    Progress Note    POD # 8/3    Subjective:    Awake and alert. Reports feeling better than yesterday. A bit hungry and would like some fruit. Denies shortness of breath. Denies significant abdominal pain. Objective:    Vitals:    10/29/22 0800 10/29/22 0900 10/29/22 1000 10/29/22 1100   BP: (!) 124/96 116/83 111/80 117/76   Pulse: (!) 103 92 98 (!) 104   Resp: 21 27 15 18   Temp: 97.3 °F (36.3 °C)      TempSrc: Temporal      SpO2: 97% 97% 95% 96%   Weight:       Height:  5' 10\" (1.778 m)       I/O last 3 completed shifts: In: 4169.3 [I.V.:2803.7; IV Piggyback:458.6]  Out: 8395 [GRWNA:4104; Emesis/NG output:160; Drains:4775; Stool:1720]     Abdomen is obese but soft. Minimal tenderness. Incision clean and dry. Gastrostomy tube in position with gastric contents in the drainage bag. Ileostomy is pink and healthy with liquid in the ileostomy bag.  TRISTAN drain with thin serous fluid. Bowel sounds present. LABS:   CBC:   Recent Labs     10/28/22  0514 10/29/22  0130 10/29/22  0604   WBC 13.2* 11.2* 11.1*   RBC 4.44* 4.15* 4.18*   HGB 13.7* 12.7* 13.0*   HCT 40.5* 37.3* 37.6*   PLT 63* 51* 51*   LYMPHOPCT 7.4* 6.4* 5.8*   MONOPCT 4.0 4.9 5.6   BASOPCT 0.2 0.1 0.1   MONOSABS 0.50 0.60 0.60   LYMPHSABS 1.0* 0.7* 0.6*   EOSABS 0.00 0.00 0.00   BASOSABS 0.00 0.00 0.00      BMP:   Recent Labs     10/28/22  0514 10/29/22  0130 10/29/22  0604    141 142   K 3.6 3.3* 3.5    109 109   CO2 24 24 27   ANIONGAP 10 8 6*   GLUCOSE 114* 145* 152*   CREATININE 1.0 0.6 0.7   LABGLOM >60 >60 >60   CALCIUM 7.1* 7.4* 7.4*     LFT:   Recent Labs     10/27/22  0927 10/28/22  0514 10/29/22  0130   PROT 4.8* 4.5* 4.8*   LABALBU 1.1* 2.1* 1.9*   BILITOT 4.0* 4.5* 4.6*   ALKPHOS 73 72 75   * 122* 90*   * 371* 206*       Assessment:    1. Continued satisfactory recovery post laparotomy, repair of anastomotic leak and creation of diverting ileostomy.   Postop ileus seems improved. 2.  Stage III adenocarcinoma of the hepatic flexure of the colon. 3.  Alcoholic liver disease with superimposed hepatitis C infection. Bilirubin and other liver function tests remain stable. 4.  Malnutrition, multifactorial.    Plan:    1. Begin clear liquid diet. I have asked him to just take sips and the see how this goes over the next 24 hours. 2.  Otherwise continue current care. I would like to leave him in the intensive care unit today. Surgical floor nursing staffed at 10 patients per nurse and he will not get the care he needs at this time.

## 2022-10-29 NOTE — PROGRESS NOTES
Up to bedside chair, alert, no complaints, afebrile on Meropenum, BP good off Samm since yesterday, return of some bowel sounds, PEG capped, continue IV TPN, clear liquid diet as tolerated, chronic liver disease same, no threatening liver failure or hepatic encephalopathy.

## 2022-10-29 NOTE — PROGRESS NOTES
Comprehensive Nutrition Assessment    Type and Reason for Visit:  Reassess    Nutrition Recommendations/Plan:   If within clinical course increase PN to 75ml/hr to meet protein needs     Malnutrition Assessment:  Malnutrition Status: Moderate malnutrition (10/29/22 0911)    Context:  Chronic Illness     Findings of the 6 clinical characteristics of malnutrition:  Energy Intake:  Mild decrease in energy intake (Comment)  Weight Loss:  Greater than 10% over 6 months     Body Fat Loss:  Mild body fat loss Orbital   Muscle Mass Loss:  No significant muscle mass loss    Fluid Accumulation:  Mild Generalized   Strength:  Not Performed    Nutrition Assessment:    PICC line has been placed and PN has been restarted at 50ml/hr with 20% lipds biweekly. Accuchek's     Nutrition Related Findings:    Colostomy, PEG Wound Type: Surgical Incision, Multiple, Deep Tissue Injury       Current Nutrition Intake & Therapies:    Average Meal Intake: NPO  Average Supplements Intake: NPO  Current Parenteral Nutrition Orders:  Type and Formula: Premix Central   Lipids: 250ml, Two times weekly  Duration: Continuous  Rate/Volume: 50ml/hr = 1200 ml  Current PN Order Provides: PN 5/15 @ 50ml/hr with lipids biweekly = 994 kcals with 60g protein, 180g CHO . GUR = 1.49mgCHO/kg/min  Goal PN Orders Provides: Recommend PN 5/15 @ 75ml/hr to meet protein needs = 1420 kcals with 90g protein and 270g CHO. GUR = 2.69 mgCHO/kg/min    Anthropometric Measures:  Height: 5' 10\" (177.8 cm)  Ideal Body Weight (IBW): 166 lbs (75 kg)    Admission Body Weight: 230 lb (104.3 kg) (stated)  Current Body Weight: 184 lb 4.8 oz (83.6 kg), 111 % IBW. Weight Source: Bed Scale  Current BMI (kg/m2): 26.4  Usual Body Weight: 238 lb (108 kg) (4/2022)  % Weight Change (Calculated): -16.3  Weight Adjustment For: No Adjustment    BMI Categories: Overweight (BMI 25.0-29. 9)    Estimated Daily Nutrient Needs:  Energy Requirements Based On: Kcal/kg     Energy (kcal/day): 0375-5413 kcals (20-25 kcals/kg)  Weight Used for Protein Requirements: Ideal  Protein (g/day): 48=221p  Method Used for Fluid Requirements: 1 ml/kcal  Fluid (ml/day): 9329-7144 ml    Nutrition Diagnosis:   Inadequate oral intake, Increased nutrient needs related to acute injury/trauma, increase demand for energy/nutrients as evidenced by nutrition support - parenteral nutrition, NPO or clear liquid status due to medical condition, weight loss greater than or equal to 2% in 1 week    Nutrition Interventions:   Food and/or Nutrient Delivery: Continue NPO, Continue Current Parenteral Nutrition  Nutrition Education/Counseling: No recommendation at this time  Coordination of Nutrition Care: Continue to monitor while inpatient       Goals:  Previous Goal Met: Progressing toward Goal(s)  Goals: Meet at least 75% of estimated needs, Initiate nutrition support       Nutrition Monitoring and Evaluation:   Behavioral-Environmental Outcomes: None Identified  Food/Nutrient Intake Outcomes: Parenteral Nutrition Intake/Tolerance  Physical Signs/Symptoms Outcomes: Biochemical Data, Weight, Skin, Fluid Status or Edema    Discharge Planning:     Too soon to determine     Lindsay Ann MS, RD, LD  Contact: [phone]

## 2022-10-30 NOTE — PROGRESS NOTES
Hospitalist Progress Note    Patient:  Sergo Dominguez  YOB: 1955  Date of Service: 10/30/2022  MRN: [id number]   Acct: [de-identified]   Primary Care Physician: No primary care provider on file. Advance Directive: DNR  Admit Date: 10/20/2022       Hospital Day: 10  Referring Provider: Dina Beltran MD    Patient Seen, Chart, Consults, Notes, Labs, Radiology studies reviewed. Subjective:     No further agitation. Comfortable. Tolerating liquids. Hemodynamically stable. Summary:  Sergo Dominguez is a 77 y.o. male  whom we are following for SBO status post right hemicolectomy for obstructing colon cancer, complicated by development of septic peritonitis due to anastomotic leak, as well as nonischemic cardiomyopathy, A. fib intermittently in RVR. He underwent an open right hemicolectomy on 10/21 however course complicated after developed sepsis with fevers, leukocytosis with CT showing large volume peritoneal fluid with large pneumoperitoneum, septic shock with peritonitis requiring volume resuscitation, vasopressor support and antibiotics on meropenem. Patient was reintubated for surgery. Additionally intermittently in A. fib with RVR requiring IV AV meaghan blockade. Patient taken back to the OR for repair of anastomotic leak, washout with diverting ileostomy. Had SONIA with metabolic acidosis in setting of sepsis and volume losses. Sepsis, encephalopathy and renal function gradually improved following surgery with further resuscitation, antibiotics and weaned off vasopressor support. A. fib rate controlled on metoprolol and digoxin. Required TPN for nutritional support. Eventually oral diet advanced per surgery. Allergies:  Patient has no known allergies.     Medicines:  Current Facility-Administered Medications   Medication Dose Route Frequency Provider Last Rate Last Admin    metoprolol (LOPRESSOR) injection 5 mg  5 mg IntraVENous Q12H Dina Beltran MD famotidine (PEPCID) tablet 20 mg  20 mg Oral BID Amrit Burroughs MD        thiamine mononitrate tablet 100 mg  100 mg Oral Daily Amrit Burroughs MD        meropenem (MERREM) 1000 mg in sodium chloride 0.9% 50 mL (duplex)  1,000 mg IntraVENous Q8H Darius Christie MD   Stopped at 10/30/22 1426    sodium chloride flush 0.9 % injection 5-40 mL  5-40 mL IntraVENous 2 times per day Anupama Maciel MD   10 mL at 10/30/22 0757    sodium chloride flush 0.9 % injection 5-40 mL  5-40 mL IntraVENous PRN Anupama Maciel MD        0.9 % sodium chloride infusion  25 mL IntraVENous PRN Anupama Maciel MD        lactated ringers infusion   IntraVENous Continuous Anupama Maciel MD 75 mL/hr at 10/30/22 1522 New Bag at 10/30/22 1522    metoprolol (LOPRESSOR) injection 5 mg  5 mg IntraVENous Q6H PRN Anupama Maciel MD   5 mg at 10/30/22 9684    PN-Adult Premix 5/15 - Standardard Electrolytes - Central Line   IntraVENous Continuous TPN Anupama Maciel MD 75 mL/hr at 10/29/22 2049 New Bag at 10/29/22 2049    enoxaparin (LOVENOX) injection 40 mg  40 mg SubCUTAneous Daily Kenny Carr MD   40 mg at 10/30/22 0744    fat emulsion 20 % infusion 250 mL  250 mL IntraVENous Once per day on Mon Thu Kenny Carr MD   Stopped at 10/28/22 0030    phenylephrine (MARIE-SYNEPHRINE) 50 mg in dextrose 5 % 250 mL infusion   mcg/min IntraVENous Continuous Pa Greene MD   Stopped at 10/28/22 9402    metoclopramide (REGLAN) injection 5 mg  5 mg IntraVENous Q6H Amanda Arambula MD   5 mg at 10/30/22 1650    BPCO OINT 1 Dose  1 Dose Apply externally BID Amanda Arambula MD   1 Dose at 10/30/22 0756    sodium phosphate 33.33 mmol in sodium chloride 0.9 % 250 mL IVPB  0.32 mmol/kg IntraVENous PRN Novi Ginger, DO        sodium phosphate 10 mmol in sodium chloride 0.9 % 250 mL IVPB  10 mmol IntraVENous PRN Marv Miles DO        Or    sodium phosphate 15 mmol in sodium chloride 0.9 % 250 mL IVPB  15 mmol IntraVENous PRN Ruy Blush, DO        Or    sodium phosphate 20 mmol in sodium chloride 0.9 % 500 mL IVPB  20 mmol IntraVENous PRN Ruy Blush, DO        digoxin [facility]) injection 125 mcg  125 mcg IntraVENous Daily Ruy Blush, DO   125 mcg at 10/30/22 2777    HYDROmorphone HCl PF (DILAUDID) injection 2 mg  2 mg IntraVENous Q2H PRN Ruy Blush, DO   2 mg at 10/27/22 1333    Or    HYDROmorphone HCl PF (DILAUDID) injection 1 mg  1 mg IntraVENous Q2H PRN Ruy Blush, DO   1 mg at 10/29/22 1314    Or    HYDROmorphone HCl PF (DILAUDID) injection 0.5 mg  0.5 mg IntraVENous Q2H PRN Ruy Blush, DO   0.5 mg at 10/30/22 1649    sodium chloride flush 0.9 % injection 5-40 mL  5-40 mL IntraVENous 2 times per day Ruy Blush, DO   10 mL at 10/28/22 2029    sodium chloride flush 0.9 % injection 5-40 mL  5-40 mL IntraVENous PRN Ruy Blush, DO        0.9 % sodium chloride infusion   IntraVENous PRN Ruy Blush, DO 5 mL/hr at 10/29/22 1147 New Bag at 10/29/22 1147    glucose chewable tablet 16 g  4 tablet Oral PRN Ruy Blush, DO        dextrose bolus 10% 125 mL  125 mL IntraVENous PRN Ruy Blush, DO        Or    dextrose bolus 10% 250 mL  250 mL IntraVENous PRN Ruy Blush, DO        glucagon (rDNA) injection 1 mg  1 mg SubCUTAneous PRN Ruy Blush, DO        dextrose 10 % infusion   IntraVENous Continuous PRN Ruy Blush, DO        potassium chloride (KLOR-CON M) extended release tablet 40 mEq  40 mEq Oral PRN Ruy Blush, DO   40 mEq at 10/29/22 0320    Or    potassium bicarb-citric acid (EFFER-K) effervescent tablet 40 mEq  40 mEq Oral PRN Ruy Blush, DO   40 mEq at 10/30/22 1630    Or    potassium chloride 10 mEq/100 mL IVPB (Peripheral Line)  10 mEq IntraVENous PRN Ruy Blush, DO        ondansetron (ZOFRAN-ODT) disintegrating tablet 4 mg  4 mg Oral Q8H PRN Ruy Blush, DO        Or ondansetron (ZOFRAN) injection 4 mg  4 mg IntraVENous Q6H PRN Lemond Shackle, DO        magnesium hydroxide (MILK OF MAGNESIA) 400 MG/5ML suspension 30 mL  30 mL Oral Daily PRN Lemond Shackle, DO   30 mL at 10/24/22 1609    insulin lispro (HUMALOG) injection vial 0-4 Units  0-4 Units SubCUTAneous TID WC Lemond Shackle, DO        insulin lispro (HUMALOG) injection vial 0-4 Units  0-4 Units SubCUTAneous Nightly Lemond Shackle, DO           Past Medical History:  Past Medical History:   Diagnosis Date    Ex-cigarette smoker 10/03/2016    Hypertension     Non-ischemic cardiomyopathy ([facility] [address] 10/03/2016    9/28/2016  Echo  EF 25% 10/3/16  Cath  Mild CAD, EF 10%     Palliative care patient 10/24/2022    [facility]ox[facility] 03/16/2018       Past Surgical History:  Past Surgical History:   Procedure Laterality Date    FINGER AMPUTATION Left     partial left thumb amputation following trauma    HEMICOLECTOMY N/A 10/21/2022    RIGHT HEMICOLECTOMY WITH GASTROSTOMY PLACEMENT performed by Yoel Mahajan MD at [address] N/A 10/25/2022    LAPAROTOMY, REPAIR OF ANASTAMOSAL LEAK, CREATION OF DIVERTING ILEOSTOMY performed by Penny Tarango MD at [facility] OR    TONSILLECTOMY      UPPER GASTROINTESTINAL ENDOSCOPY N/A 10/23/2022    Dr Zahraa Martinez w/peg placement       Family History  Family History   Problem Relation Age of Onset    High Blood Pressure Mother     Cancer Father         Stomach    Heart Disease Maternal Grandmother     Diabetes Maternal Uncle        Social History  Social History     Socioeconomic History    Marital status:      Spouse name: Not on file    Number of children: Not on file    Years of education: Not on file    Highest education level: Not on file   Occupational History    Not on file   Tobacco Use    Smoking status: Former     Packs/day: 1.00     Years: 2.00     Pack years: 2.00     Types: Cigarettes     Start date: 200     Quit date: 1992     Years since quittin.8    Smokeless tobacco: Never    Tobacco comments:     light smoking history - socially   Vaping Use    Vaping Use: Never used   Substance and Sexual Activity    Alcohol use: Yes    Drug use: No    Sexual activity: Not on file   Other Topics Concern    Not on file   Social History Narrative    Not on file     Social Determinants of Health     Financial Resource Strain: Not on file   Food Insecurity: Not on file   Transportation Needs: Not on file   Physical Activity: Not on file   Stress: Not on file   Social Connections: Not on file   Intimate Partner Violence: Not on file   Housing Stability: Not on file         Review of Systems:  14 point review of systems completed and is negative except as otherwise noted    Objective:  Blood pressure 111/71, pulse 92, temperature 97.6 °F (36.4 °C), temperature source Temporal, resp. rate 23, height 5' 10\" (1.778 m), weight 185 lb 3.2 oz (84 kg), SpO2 96 %. Intake/Output Summary (Last 24 hours) at 10/30/2022 1731  Last data filed at 10/30/2022 1600  Gross per 24 hour   Intake 5244.47 ml   Output 5550 ml   Net -305.53 ml         Physical Exam  Vitals and nursing note reviewed. Constitutional:       General: He is not in acute distress. Appearance: He is not toxic-appearing. HENT:      Head: Normocephalic and atraumatic. Eyes:      General: No scleral icterus. Conjunctiva/sclera: Conjunctivae normal.   Cardiovascular:      Rate and Rhythm: Normal rate. Rhythm irregular. Heart sounds: Normal heart sounds. Pulmonary:      Effort: Pulmonary effort is normal.      Breath sounds: Normal breath sounds. Abdominal:      General: Abdomen is flat. Palpations: Abdomen is soft. Tenderness: There is no rebound. Comments: Minimal abdominal tenderness   Musculoskeletal:         General: No swelling. Cervical back: No rigidity. Right lower leg: No edema. Left lower leg: No edema. Skin:     General: Skin is warm and dry. the Radiologist  Radiology: CT ABDOMEN PELVIS W IV CONTRAST Additional Contrast? None    Result Date: 10/20/2022  CLINICAL HISTORY: 48-hour history of diffuse abdominal pain distention nausea vomiting some diarrhea no prior history COMPARISON: No comparison TECHNIQUE: Axial images of the abdomen and pelvis were obtained after the administration of IV contrast.Coronal and sagittal multiplanar reconstructions were performed. One or more of the following dose reduction techniques were used: Automated exposure control, adjustment of the mA and/or kV according to patient size, and/or use of iterative reconstruction technique. COMPARISON: None. FINDINGS: Statements: None. Lower Chest: Unremarkable. Hepatobiliary: Hepatic steatosis. Calcified granulomas throughout the liver. No focal lesion is identified. Marked distention of the gallbladder containing gallstones. No CT evidence of cholecystitis however. No biliary ductal dilatation is evident. Pancreas: The pancreatic parenchyma is unremarkable and there is no main duct dilatation. Spleen: The spleen is nonenlarged. Calcified granulomas throughout the spleen. Adrenals: No adrenal glands are symmetric. Genitourinary: The kidneys are symmetric and enhance appropriately. No suspicious focal parenchymal abnormality is identified in either kidney. There is no collecting system dilatation. The bladder is unremarkable, as imaged. The prostate gland appears unremarkable. Gastrointestinal: Diffuse fluid and air distention of the the entire small bowel and proximal large bowel measuring up to 4.3 cm in the left abdomen and up to 7.6 cm of the proximal colon. Transitional point seen at a site of masslike thickening of the hepatic flexure measuring 3.2 x 3.9 cm (series 2 image 35). Colonic diverticulosis without diverticulitis. The appendix appears unremarkable. Lymphatics: Multiple prominent and enlarged retroperitoneal lymph nodes. Largest is 1.5 cm in the left periaortic region (series 2 image pathology. Visualized portion of the inferior vena cava is unremarkable. 1.  Limited exam for reasons discussed above. 2.  Diffuse fatty infiltration of the liver with a 1.1 cm calcification in the right hepatic lobe. 3.  Distended gallbladder with several small stones and mild diffuse gallbladder wall thickening. 4.  No other significant abnormality. Recommendation: Follow up as clinically indicated. Electronically Signed by Phyllis Medeiros MD at 21-Oct-2022 11:06:19 PM EST             XR CHEST PORTABLE    Result Date: 10/20/2022  NO PRIOR REPORT AVAILABLE Exam: X-RAY OF [facility] Clinical data:Confirmation of course of NG/OG/NE tube and location of tip of tube. Technique:Single view of the chest. Prior studies: Radiograph of the chest done on same day. Findings: The lungs are grossly clear; noevidence of acute infiltrate or pleural effusion. Cardiac silhouette is mildly enlarged. No acute osseous abnormality is detected. The NG tube can be traced to the level of distal esophagus. Scattered nonspecific gas-filled loops of bowel in the upper abdomen. The NG tube can be traced to the level of distal esophagus. Suggest advancing accordingly, with a follow-up radiograph. Recommendation: As above. Electronically Signed by Abdulkadir Correa MD at 20-Oct-2022 06:15:09 PM EST             XR CHEST PORTABLE    Result Date: 10/20/2022  NO PRIOR REPORT AVAILABLE Exam: X-RAY OF THECHEST Clinical data:Abdominal pain, vomiting. Technique:Single view of the chest. Prior studies: No prior studies submitted. Findings: The trachea is midline. The heart is enlarged. Mediastinal and pulmonary vascular shadows are normal.  There is no focal consolidation or effusion. The osseous structures are intact. Cardiomegaly. Recommendation: Follow up as clinically indicated.  Electronically Signed by Page Mccurdy MD at 20-Oct-2022 03:26:36 PM EST                Assessment     SBO   S/P right hemicolectomy for obstructing colon cancer  Surgical pathology --noted at least stage III, further staging as outpatient per oncology  Advance diet as per surgery    Septic shock due to peritonitis with anastomotic leak  ---Resolved  - Status post repair of anastomotic leak, washout and diverting ileostomy  --Adequately resuscitated, weaned off vasopressor support  -IV antibiotics with intra-abdominal coverage, ID following    Paroxysmal A. fib intermittently in RVR  --Improved with rate control measures  - Continue Lopressor and digoxin, adjust further as needed    Acute encephalopathy, multifactorial secondary to sepsis and metabolic disturbance in setting of kidney injury, chronic liver disease, hepatitis C, alcohol abuse  - Follow LFTs  - Not having any evident signs of alcohol withdrawal and should be out of the window for this  -- Supportive care, reorient as able, give thiamine  -- Limit sedating meds    SONIA, prerenal   Metabolic acidosis-resolved  - Resolved, continue to follow urine output    Acute hypoxemic respiratory failure requiring intubation/mechanical ventilation  --Resolved  -Extubated 10/26  --Monitor SPO2, supplemental oxygen as needed for adequate gas exchange    Monitor and replete electrolytes as needed    Malnutrition  --TPN, oral diet advanced per surgery    DVT prophylaxis SCDs, okay for prophylactic Lovenox as long as platelet count stays above 50,000        Abhishek Aguirre MD

## 2022-10-30 NOTE — PROGRESS NOTES
10/30/22 1200   Subjective   Subjective Patient in bed agrrees to TR to chair needs a clean up first.   Cognition   Overall Cognitive Status WFL   Orientation   Overall Orientation Status WFL   Vitals   O2 Device None (Room air)   Bed Mobility Training   Bed Mobility Training Yes   Rolling Maximum assistance   Supine to Sit Maximum assistance   Balance   Sitting   (Patient sat EOB x 10 minutes MIN assist.)   Standing With support  (SS)   Transfer Training   Transfer Training Yes   Sit to Stand Moderate assistance  (SS)   Stand to Sit Moderate assistance  (SS)   Bed to Chair Total assistance  (SS)   PT Plan of Care   Sunday X         Electronically signed by Merlin Catalina, PTA on 10/30/2022 at 12:12 PM

## 2022-10-30 NOTE — PROGRESS NOTES
Patient name: Gildardo Almendarez  Patient : 1955  10/30/2022  8:50 AM  ROOM 146    Portions of this note have been copied forward, however, changed to reflect the most current clinical status of this patient. Subjective: POD #8 Colectomy with gastrostomy placement. POD #3 laparotomy, repair of anastomotic leak and creation of diverting ileostomy. Remains off pressor support and off ventilator. Slept very well last night according to ICU nursing    TRISTAN drain continues to have copious amount of serosanguineous drainage. HISTORY OF PRESENT ILLNESS:   Alma Redman was last seen by Dona Gay on 10/1/2022. The patient has a history of hypertension, atrial fibrillation. He presented ER department with complaints of worsening abdominal pain with associated nausea and vomiting. 10/20/2022-CT abdomen pelvis showed diffuse fluid and air distention of the the entire small bowel and proximal large bowel measuring up to 4.3 cm in the left abdomen and up to 7.6 cm of the proximal colon. Transitional point seen at a site of masslike thickening of the hepatic flexure measuring 3.2 x 3.9 cm (series 2 image 35). Multiple prominent and enlarged retroperitoneal lymph nodes. Largest is 1.5 cm in the left periaortic region (series 2 image 33). 10/21/2022: Colectomy with gastrostomy placement by Dr. Hailee Matos   10/26/2022: laparotomy, repair of anastomotic leak and creation of diverting ileostomy by Dr. Marilu Tolentino    OBJECTIVE:  VITALS: BP 80/65   Pulse 91   Temp 97 °F (36.1 °C) (Temporal)   Resp 19   Ht 5' 10\" (1.778 m)   Wt 185 lb 3.2 oz (84 kg)   SpO2 97%   BMI 26.57 kg/m²   I&O:   Intake/Output Summary (Last 24 hours) at 10/30/2022 0850  Last data filed at 10/30/2022 0800  Gross per 24 hour   Intake 3016.34 ml   Output 4700 ml   Net -1683.66 ml     PHYSICAL EXAM:  CONSTITUTIONAL: Resting quietly, confused. TPN infusing  EYES: pupils equal round   NECK: Supple, no masses.   No JVD  CHEST/LUNGS: Diminished bases, otherwise clear  CARDIOVASCULAR: History of atrial fibrillation,   ABDOMEN: Midline abdominal dressing intact. Diverting ileostomy with dark liquid drainage. TRISTAN drain to suction with large amount serosanguineous drainage. PEG tube to drainage. EXTREMITIES: warm, trace edema  SKIN: warm, dry with no rashes or lesions  NEUROLOGIC: Confused  PSYCH: Confused    BMP:   Recent Labs     10/29/22  0604 10/30/22  0424    141   K 3.5 3.5    109   CO2 27 25   BUN 35* 38*   CREATININE 0.7 0.6   GLUCOSE 152* 143*   CALCIUM 7.4* 7.3*     Recent Labs     10/30/22  0424 10/29/22  0604 10/29/22  0130   WBC 10.2 11.1* 11.2*   HGB 13.0* 13.0* 12.7*   HCT 37.8* 37.6* 37.3*   MCV 89.6 90.0 89.9   PLT 63* 51* 51*     CMP:    Recent Labs     10/29/22  0130 10/29/22  0604 10/30/22  0424    142 141   K 3.3* 3.5 3.5    109 109   CO2 24 27 25   BUN 33* 35* 38*   CREATININE 0.6 0.7 0.6   LABGLOM >60 >60 >60   GLUCOSE 145* 152* 143*   PROT 4.8*  --  4.8*   LABALBU 1.9*  --  1.9*   CALCIUM 7.4* 7.4* 7.3*   BILITOT 4.6*  --  4.7*   ALKPHOS 75  --  82   *  --  108*   ALT 90*  --  67*     30Day lookback of cultures:    Blood Culture Recent:   Recent Labs     10/25/22  1720   BC No Growth to date. Any change in status will be called. Gram Stain Recent: No results for input(s): LABGRAM in the last 720 hours. Resp Culture Recent: No results for input(s): CULTRESP in the last 720 hours. Body Fluid Recent : No results for input(s): BFCX in the last 720 hours. MRSA Recent : No results for input(s): [facility] in the last 720 hours. Urine Culture Recent : No results for input(s): LABURIN in the last 720 hours. Organism Recent : No results for input(s): ORG in the last 720 hours.      Radiology:   CT ABDOMEN PELVIS W IV CONTRAST Additional Contrast? None    Result Date: 10/20/2022  CLINICAL HISTORY: 48-hour history of diffuse abdominal pain distention nausea vomiting some diarrhea no prior history COMPARISON: No comparison TECHNIQUE: Axial images of the abdomen and pelvis were obtained after the administration of IV contrast.Coronal and sagittal multiplanar reconstructions were performed. One or more of the following dose reduction techniques were used: Automated exposure control, adjustment of the mA and/or kV according to patient size, and/or use of iterative reconstruction technique. COMPARISON: None. FINDINGS: Statements: None. Lower Chest: Unremarkable. Hepatobiliary: Hepatic steatosis. Calcified granulomas throughout the liver. No focal lesion is identified. Marked distention of the gallbladder containing gallstones. No CT evidence of cholecystitis however. No biliary ductal dilatation is evident. Pancreas: The pancreatic parenchyma is unremarkable and there is no main duct dilatation. Spleen: The spleen is nonenlarged. Calcified granulomas throughout the spleen. Adrenals: No adrenal glands are symmetric. Genitourinary: The kidneys are symmetric and enhance appropriately. No suspicious focal parenchymal abnormality is identified in either kidney. There is no collecting system dilatation. The bladder is unremarkable, as imaged. The prostate gland appears unremarkable. Gastrointestinal: Diffuse fluid and air distention of the the entire small bowel and proximal large bowel measuring up to 4.3 cm in the left abdomen and up to 7.6 cm of the proximal colon. Transitional point seen at a site of masslike thickening of the hepatic flexure measuring 3.2 x 3.9 cm (series 2 image 35). Colonic diverticulosis without diverticulitis. The appendix appears unremarkable. Lymphatics: Multiple prominent and enlarged retroperitoneal lymph nodes. Largest is 1.5 cm in the left periaortic region (series 2 image 33). Vascular: Moderate calcific atherosclerosis. The abdominal aorta is normal in caliber. MSK/Body Wall: No concerning bony lesion is identified. Small fat-containing right inguinal hernia. hepatic lobe. 3.  Distended gallbladder with several small stones and mild diffuse gallbladder wall thickening. 4.  No other significant abnormality. Recommendation: Follow up as clinically indicated. Electronically Signed by Lior Beckwith MD at 21-Oct-2022 11:06:19 PM EST             XR CHEST PORTABLE    Result Date: 10/20/2022  NO PRIOR REPORT AVAILABLE Exam: X-RAY OF [facility] Clinical data:Confirmation of course of NG/OG/NE tube and location of tip of tube. Technique:Single view of the chest. Prior studies: Radiograph of the chest done on same day. Findings: The lungs are grossly clear; noevidence of acute infiltrate or pleural effusion. Cardiac silhouette is mildly enlarged. No acute osseous abnormality is detected. The NG tube can be traced to the level of distal esophagus. Scattered nonspecific gas-filled loops of bowel in the upper abdomen. The NG tube can be traced to the level of distal esophagus. Suggest advancing accordingly, with a follow-up radiograph. Recommendation: As above. Electronically Signed by Renee Jamison MD at 20-Oct-2022 06:15:09 PM EST             XR CHEST PORTABLE    Result Date: 10/20/2022  NO PRIOR REPORT AVAILABLE Exam: X-RAY OF THECHEST Clinical data:Abdominal pain, vomiting. Technique:Single view of the chest. Prior studies: No prior studies submitted. Findings: The trachea is midline. The heart is enlarged. Mediastinal and pulmonary vascular shadows are normal.  There is no focal consolidation or effusion. The osseous structures are intact. Cardiomegaly. Recommendation: Follow up as clinically indicated.  Electronically Signed by Antolin Gee MD at 20-Oct-2022 03:26:36 PM EST               Medications  Current Facility-Administered Medications   Medication Dose Route Frequency Provider Last Rate Last Admin    metoprolol (LOPRESSOR) injection 5 mg  5 mg IntraVENous Q12H Merry Gaytan MD        meropenem (MERREM) 1000 mg in sodium chloride 0.9% 50 mL (duplex) 1,000 mg IntraVENous Q8H Kyler Case MD   Stopped at 10/30/22 0740    sodium chloride flush 0.9 % injection 5-40 mL  5-40 mL IntraVENous 2 times per day Naomi López MD   10 mL at 10/30/22 0757    sodium chloride flush 0.9 % injection 5-40 mL  5-40 mL IntraVENous PRN Naomi López MD        0.9 % sodium chloride infusion  25 mL IntraVENous PRN Naomi López MD        lactated ringers infusion   IntraVENous Continuous Naomi López MD 75 mL/hr at 10/29/22 1552 New Bag at 10/29/22 1552    metoprolol (LOPRESSOR) injection 5 mg  5 mg IntraVENous Q6H PRN Naomi López MD   5 mg at 10/30/22 8522    PN-Adult Premix 5/15 - Standardard Electrolytes - Central Line   IntraVENous Continuous TPN Naomi López MD 75 mL/hr at 10/29/22 2049 New Bag at 10/29/22 2049    enoxaparin (LOVENOX) injection 40 mg  40 mg SubCUTAneous Daily Chandrika Espinosa MD   40 mg at 10/30/22 0744    fat emulsion 20 % infusion 250 mL  250 mL IntraVENous Once per day on Mon Thu Chandrika Espinosa MD   Stopped at 10/28/22 0030    phenylephrine (MARIE-SYNEPHRINE) 50 mg in dextrose 5 % 250 mL infusion   mcg/min IntraVENous Continuous Savita Quick MD   Stopped at 10/28/22 0917    famotidine (PEPCID) 20 mg in sodium chloride (PF) 10 mL injection  20 mg IntraVENous Daily Tj Daily, DO   20 mg at 10/30/22 9087    metoclopramide (REGLAN) injection 5 mg  5 mg IntraVENous Q6H Kyler Case MD   5 mg at 10/30/22 2114    BPCO OINT 1 Dose  1 Dose Apply externally BID Kyler Case MD   1 Dose at 10/30/22 0756    sodium phosphate 33.33 mmol in sodium chloride 0.9 % 250 mL IVPB  0.32 mmol/kg IntraVENous PRN Tj Daily, DO        sodium phosphate 10 mmol in sodium chloride 0.9 % 250 mL IVPB  10 mmol IntraVENous PRN Tj Daily, DO        Or    sodium phosphate 15 mmol in sodium chloride 0.9 % 250 mL IVPB  15 mmol IntraVENous PRN Tj Daily, DO        Or    sodium phosphate 20 mmol in sodium chloride 0.9 % 500 mL IVPB  20 mmol IntraVENous PRN Toby Leal, DO        digoxin [facility]) injection 125 mcg  125 mcg IntraVENous Daily Toby Leal, DO   125 mcg at 10/30/22 0139    thiamine (B-1) injection 100 mg  100 mg IntraVENous Daily Toby Leal, DO   100 mg at 10/30/22 1326    LORazepam (ATIVAN) injection 0.5 mg  0.5 mg IntraVENous Q6H PRN Toby Leal, DO   0.5 mg at 10/29/22 1939    HYDROmorphone HCl PF (DILAUDID) injection 2 mg  2 mg IntraVENous Q2H PRN Toby Leal, DO   2 mg at 10/27/22 1333    Or    HYDROmorphone HCl PF (DILAUDID) injection 1 mg  1 mg IntraVENous Q2H PRN Toby Leal, DO   1 mg at 10/29/22 1314    Or    HYDROmorphone HCl PF (DILAUDID) injection 0.5 mg  0.5 mg IntraVENous Q2H PRN Toby Leal, DO   0.5 mg at 10/28/22 0025    sodium chloride flush 0.9 % injection 5-40 mL  5-40 mL IntraVENous 2 times per day Toby Leal, DO   10 mL at 10/28/22 2029    sodium chloride flush 0.9 % injection 5-40 mL  5-40 mL IntraVENous PRN Toby Leal, DO        0.9 % sodium chloride infusion   IntraVENous PRN Toby Leal, DO 5 mL/hr at 10/29/22 1147 New Bag at 10/29/22 1147    glucose chewable tablet 16 g  4 tablet Oral PRN Toby Leal, DO        dextrose bolus 10% 125 mL  125 mL IntraVENous PRN Toby Leal, DO        Or    dextrose bolus 10% 250 mL  250 mL IntraVENous PRN Toby Maxkins, DO        glucagon (rDNA) injection 1 mg  1 mg SubCUTAneous PRN Toby Leal, DO        dextrose 10 % infusion   IntraVENous Continuous PRN Toby Leal, DO        potassium chloride Rita Nate M) extended release tablet 40 mEq  40 mEq Oral PRN Toby Maxkins, DO   40 mEq at 10/29/22 0320    Or    potassium bicarb-citric acid (EFFER-K) effervescent tablet 40 mEq  40 mEq Oral PRN Toby Leal, DO        Or    potassium chloride 10 mEq/100 mL IVPB (Peripheral Line)  10 mEq IntraVENous PRN Toby Leal, DO ondansetron (ZOFRAN-ODT) disintegrating tablet 4 mg  4 mg Oral Q8H PRN Shakir Mass, DO        Or    [facility]ansetron [facility]) injection 4 mg  4 mg IntraVENous Q6H PRN Shakir Mass, DO        magnesium hydroxide (MILK OF MAGNESIA) 400 MG/5ML suspension 30 mL  30 mL Oral Daily PRN Shakir Mass, DO   30 mL at 10/24/22 1609    acetaminophen (TYLENOL) tablet 650 mg  650 mg Oral Q6H PRN Shakir Mass, DO        Or    acetaminophen (TYLENOL) suppository 650 mg  650 mg Rectal Q6H PRN Shakir Mass, DO   650 mg at 10/25/22 1746    insulin lispro (HUMALOG) injection vial 0-4 Units  0-4 Units SubCUTAneous TID WC Shakir Mass, DO        insulin lispro (HUMALOG) injection vial 0-4 Units  0-4 Units SubCUTAneous Nightly Shakir Mass, DO         Allergies  Patient has no known allergies. ASSESSMENT:  #Hepatic flexure colonic mass  -CT scan pelvis with IV contrast on 10/20/2022:  Irregular mass at the hepatic flexure of the colon resulting in high-grade small bowel and proximal large bowel obstruction. Severe fluid and gaseous distention of the entire stomach and small bowel including the distal esophagus. Scattered enlarged retroperitoneal lymph nodes. These are presumably metastatic if the colon mass proves to be malignant. Marked hydropic distention of the gallbladder containing gallstones. No CT evidence of cholecystitis. Hepatic steatosis. Calcified granulomas throughout the liver  Spleen non enlarged and calcified granulomas throughout the spleen.    -Colectomy with gastrostomy placement by Dr. Tania Traore on 10/21/2022: Operative note indicated hard mass adherent to the liver and small bowel posteriorly. Pathology:  Colon, right hemicolectomy:   1. Invasive moderately differentiated colonic adenocarcinoma, measuring 6.1 cm in greatest dimension. 2.  Tumor invades through the muscularis propria into the pericolonic   adipose tissue but does not reach the serosal surface. 3.  Surgical excision margins are negative for evidence of carcinoma and adenomatous change. 4.  Sections of terminal ileum, negative for evidence of malignancy. 5.  Sections of the appendix, negative for evidence of malignancy. 6.  6 out of 32 lymph nodes, positive for metastatic adenocarcinoma. AJCC STAGE: pT3, pN2a, pMx     -Baseline CEA 11.0 on 10/21/2022  -Postop CEA 6.9 on 10/23/2022  -Pathology discussed with patient's daughter, Evelyn Najera on 10/28/2022    #Thrombocytopenia-suspect multifactorial to include liver dysfunction, long term alcohol use, consumption from infection  No bleeding        Admission hemoglobin of 16.9 on 10/20/2022. No active bleeding noted. Hemoglobin stable at 13.0    #Elevated LFTs  CT scan of the abdomen pelvis on 10/20/2022 reported Hepatic steatosis. Calcified granulomas throughout the liver. Spleen non enlarged and calcified granulomas throughout the spleen. Ultrasound gallbladder 10/21/2022:   1. Limited exam.  2.  Diffuse fatty infiltration of the liver with a 1.1 cm calcification in the right hepatic lobe. 3.  Distended gallbladder with several small stones and mild diffuse gallbladder wall thickening.     As per GI/general surgery- not a candidate for cholecystectomy, gallstones felt to be incidental    Hepatitis C reactive, 10/26/2022  History of EtOH  Followed by GI    #Electrolyte imbalances  Magnesium 2.5  Phosphorus 2.4  Calcium 7.4/albumin 1.9  As per attending    #SONIA, improved      #sepsis, anastomotic leak  Status post laparotomy, repair of anastomotic leak and creation of diverting ileostomy 10/26/2022  WBC 11.1  Receiving Merrem  Afebrile/temp curve improved; 101.7 on 10/25/2022  Blood cultures 10/25/2022 x2: NGTD    #malnutrition  Receiving TPN    PLAN:  POD# 9 colectomy with gastrostomy placement-per Dr. Moreno List 10/21/2022  POD#3 laparotomy, repair of anastomotic leak and creation of diverting ileostomy by Dr. Dejah Shepherd 10/27/2022    Monitor CBC -platelets 48,350, no bleeding  Current pathology is at least stage III, will need further staging as outpatient  Postop CEA 6.9    Discussed with patient's nurse    Pathology  (AJCC STAGE: pT3, pN2a, pMx) was discussed with family by ALLISON Disla, on 28 2022    CBC today, 10/30/2022 with WBC of 10.2, hemoglobin 13.0 and a platelet count 38,484, improved.     Glee Snellen, MD    10/30/22  8:50 AM

## 2022-10-30 NOTE — PROGRESS NOTES
[address] General Surgery    Progress Note    POD # 9/4    Subjective:    Resting comfortably in his bedside chair. Nursing reports that he is tolerating full liquids without difficulty. Had a large bowel movement per rectum earlier today. Otherwise no complaints. Objective:    Vitals:    10/30/22 1200 10/30/22 1300 10/30/22 1400 10/30/22 1500   BP: 102/80 120/76 88/64 93/63   Pulse: (!) 104 (!) 108 (!) 104 100   Resp: 20 30 28   Temp: 97 °F (36.1 °C)      TempSrc: Temporal      SpO2: 97% 94% 96% 97%   Weight:       Height:         I/O last 3 completed shifts: In: 4567.3 [I.V.:2177.2; IV Piggyback:519.9]  Out: 9412 [Urine:1330; Drains:4325; Stool:2240]     Abdomen is soft. Bowel sounds present. Incision clean and dry with staples intact. Ileostomy stoma is pink and healthy. Liquid output in the back. TRISTAN drain continues with large volume serous output. G-tube site unremarkable. LABS:   CBC:   Recent Labs     10/29/22  0130 10/29/22  0604 10/30/22  0424   WBC 11.2* 11.1* 10.2   RBC 4.15* 4.18* 4.22*   HGB 12.7* 13.0* 13.0*   HCT 37.3* 37.6* 37.8*   PLT 51* 51* 63*   LYMPHOPCT 6.4* 5.8* 6.5*   MONOPCT 4.9 5.6 7.0   BASOPCT 0.1 0.1 0.2   MONOSABS 0.60 0.60 0.70   LYMPHSABS 0.7* 0.6* 0.7*   EOSABS 0.00 0.00 0.00   BASOSABS 0.00 0.00 0.00      BMP:   Recent Labs     10/29/22  0130 10/29/22  0604 10/30/22  0424    142 141   K 3.3* 3.5 3.5    109 109   CO2 24 27 25   ANIONGAP 8 6* 7   GLUCOSE 145* 152* 143*   CREATININE 0.6 0.7 0.6   LABGLOM >60 >60 >60   CALCIUM 7.4* 7.4* 7.3*     LFT:   Recent Labs     10/28/22  0514 10/29/22  0130 10/30/22  0424   PROT 4.5* 4.8* 4.8*   LABALBU 2.1* 1.9* 1.9*   BILITOT 4.5* 4.6* 4.7*   ALKPHOS 72 75 82   * 90* 67*   * 206* 108*       Assessment:    1. Continued satisfactory recovery post laparotomy, repair of anastomotic leak and creation of diverting ileostomy. Postop ileus seems resolved.   2.  Stage III adenocarcinoma of the hepatic flexure of the colon. 3.  Alcoholic liver disease with superimposed hepatitis C infection. Bilirubin and other liver function tests remain stable, perhaps slightly improved. 4.  Malnutrition, multifactorial.  Receiving TPN and now resuming diet. Plan:    1. Continue full liquid diet. He may be able to advance to a regular diet in the next day or so.  2.  Continue TPN until his oral intake is adequate to meet needs. 3.  Otherwise continue current care. Again leave him in the intensive care unit today since the surgical floor is understaffed and I fear that he would not get his needs met on the floor at this time. 4.  Dr. Ghislaine Madrid will return and resume his surgical care tomorrow morning.

## 2022-10-30 NOTE — PROGRESS NOTES
Infectious Diseases Progress Note    Patient:  Jose Santana  YOB: 1955  MRN: [id number]   Admit date: 10/20/2022   Admitting Physician: Naomi López MD  Primary Care Physician: No primary care provider on file. Chief Complaint/Interval History: He is postop day #4 following exploratory laparotomy for repair of anastomotic leak and creation of diverting ileostomy. Still maintains a lot of serous/straw-colored output via TRISTAN drain.     In/Out    Intake/Output Summary (Last 24 hours) at 10/30/2022 1119  Last data filed at 10/30/2022 1000  Gross per 24 hour   Intake 3655.48 ml   Output 5115 ml   Net -1459.52 ml     Allergies: No Known Allergies  Current Meds: metoprolol (LOPRESSOR) injection 5 mg, Q12H  famotidine (PEPCID) tablet 20 mg, BID  thiamine mononitrate tablet 100 mg, Daily  meropenem (MERREM) 1000 mg in sodium chloride 0.9% 50 mL (duplex), Q8H  sodium chloride flush 0.9 % injection 5-40 mL, 2 times per day  sodium chloride flush 0.9 % injection 5-40 mL, PRN  0.9 % sodium chloride infusion, PRN  lactated ringers infusion, Continuous  metoprolol (LOPRESSOR) injection 5 mg, Q6H PRN  PN-Adult Premix 5/15 - Standardard Electrolytes - Central Line, Continuous TPN  enoxaparin (LOVENOX) injection 40 mg, Daily  fat emulsion 20 % infusion 250 mL, Once per day on Mon Thu  phenylephrine (MARIE-SYNEPHRINE) 50 mg in dextrose 5 % 250 mL infusion, Continuous  metoclopramide (REGLAN) injection 5 mg, Q6H  BPCO OINT 1 Dose, BID  sodium phosphate 33.33 mmol in sodium chloride 0.9 % 250 mL IVPB, PRN  sodium phosphate 10 mmol in sodium chloride 0.9 % 250 mL IVPB, PRN   Or  sodium phosphate 15 mmol in sodium chloride 0.9 % 250 mL IVPB, PRN   Or  sodium phosphate 20 mmol in sodium chloride 0.9 % 500 mL IVPB, PRN  digoxin (LANOXIN) injection 125 mcg, Daily  HYDROmorphone HCl PF (DILAUDID) injection 2 mg, Q2H PRN   Or  HYDROmorphone HCl PF (DILAUDID) injection 1 mg, Q2H PRN   Or  HYDROmorphone HCl PF (DILAUDID) injection 0.5 mg, Q2H PRN  sodium chloride flush 0.9 % injection 5-40 mL, 2 times per day  sodium chloride flush 0.9 % injection 5-40 mL, PRN  0.9 % sodium chloride infusion, PRN  glucose chewable tablet 16 g, PRN  dextrose bolus 10% 125 mL, PRN   Or  dextrose bolus 10% 250 mL, PRN  glucagon (rDNA) injection 1 mg, PRN  dextrose 10 % infusion, Continuous PRN  potassium chloride (KLOR-CON M) extended release tablet 40 mEq, PRN   Or  potassium bicarb-citric acid (EFFER-K) effervescent tablet 40 mEq, PRN   Or  potassium chloride 10 mEq/100 mL IVPB (Peripheral Line), PRN  ondansetron (ZOFRAN-ODT) disintegrating tablet 4 mg, Q8H PRN   Or  ondansetron (ZOFRAN) injection 4 mg, Q6H PRN  magnesium hydroxide (MILK OF MAGNESIA) 400 MG/5ML suspension 30 mL, Daily PRN  insulin lispro (HUMALOG) injection vial 0-4 Units, TID WC  insulin lispro (HUMALOG) injection vial 0-4 Units, Nightly      Review of Systems see HPI    VitalSigns:  /89   Pulse 85   Temp 97 °F (36.1 °C) (Temporal)   Resp 25   Ht 5' 10\" (1.778 m)   Wt 185 lb 3.2 oz (84 kg)   SpO2 95%   BMI 26.57 kg/m²      Physical Exam  Line/IV site: No erythema, warmth, induration, or tenderness. Ostomy pink  There is some liquid output into the ostomy bag  Skin without rash    Lab Results:  CBC:   Recent Labs     10/29/22  0130 10/29/22  0604 10/30/22  0424   WBC 11.2* 11.1* 10.2   HGB 12.7* 13.0* 13.0*   PLT 51* 51* 63*     BMP:  Recent Labs     10/29/22  0130 10/29/22  0604 10/30/22  0424    142 141   K 3.3* 3.5 3.5    109 109   CO2 24 27 25   BUN 33* 35* 38*   CREATININE 0.6 0.7 0.6   GLUCOSE 145* 152* 143*     CultureResults:  Blood cultures x2 on October 25, 2022 no growth    Radiology: None    Additional Studies Reviewed:  None    Impression:  1. Postop day #4 following repair of anastomotic leak and creation of ileostomy. 2.  Fever-resolved  3. Colon cancer  4. Leukocytosis-resolved  5. Paroxysmal atrial fibrillation  6.   Improve delirium    Recommendations:  Would continue treatment with meropenem for 7 days postop following irrigation/anastomotic leak repair/creation of ileostomy  Discontinue meropenem after the November 2, 2022 dose  Orders to discontinue meropenem placed in epic  Will sign off for now  I would be happy to reassess if no ongoing improvement or new problems develop  Please call if additional questions for infectious diseases    Viral Pierre MD

## 2022-10-30 NOTE — PROGRESS NOTES
Alcoholic cirrhosis with Hep C remains compensated with improvement of LFT's and no suspected portal hypertension or ascites, Cholelithiasis incidental, return of bowel function, no BM yet, emphasize nutrition, advance to full liquids today, continue IV TPN today then possibly switch to PEG tube feeding tomorrow, give thiamine and folate orally longtime, transfer to chow tomorrow if no setbacks, mobilize with rehab.

## 2022-10-31 NOTE — PROGRESS NOTES
Palliative Care Progress Note  10/31/2022 8:33 AM    Patient:  Ishaan Mcclendon  YOB: 1955  Primary Care Physician: No primary care provider on file. Advance Directive: DNR  Admit Date: 10/20/2022       Hospital Day: 6  Portions of this note have been copied forward, however, changed to reflect the most current clinical status of this patient. CHIEF COMPLAINT/REASON FOR CONSULTATION Goals of care, family support, Code status, symptom management     SUBJECTIVE:  Mr. Mulu Michaud remains in ICU and resting comfortably in bed. Still confused but following simple commands. Denies pain when asked. No s/s distress noted    HPI: The patient is a 77 y.o. male with PMH HTN, afib, and non-ischemic cardiomyopathy who presented to [facility] ED 10/20/2022 complaining of abdominal pain, distention, and n/v x2 days. CT abdomen/pelvis in ED revealed a colon mass at the hepatic flexure causing obstruction. He was admitted under hospitalist services and initiated on NGT therapy for decompression. GS was consulted in ED and pt underwent right hemicolectomy with gastrostomy tube placement 10/21/2022 with hard mass adherent to liver and small bowel posteriorly. He remained intubated and sedated post op due to concern for trauma to the nasopharynx and pharynx due to a false track created by an NG tube. He underwent scoping with ENT 10/22/2022 to evaluate for safety to extubate and was found to be patent bilaterally with trauma to to the right but no no contraindication to wean to extubation and positive pressure if needed. He was liberated from the ventilator 10/23/2022 and demonstrated increased agitation with need for intermittent precedex/ativan for concern of ETOH withdrawal. He required endoscopic Gtube replacement with GI 10/23/2022 after self removing Gtube once extubated. Oncology has evaluated with elevated CEA and pathology pending with plans for further staging workup to be completed outpatient.     Review of Systems:   14 point review of systems is negative except as specifically addressed above. Objective:   VITALS:  /83   Pulse (!) 116   Temp 97.8 °F (36.6 °C) (Temporal)   Resp 25   Ht 5' 10\" (1.778 m)   Wt 186 lb 8 oz (84.6 kg)   SpO2 (!) 74%   BMI 26.76 kg/m²   24HR INTAKE/OUTPUT:    Intake/Output Summary (Last 24 hours) at 10/31/2022 0961  Last data filed at 10/31/2022 0600  Gross per 24 hour   Intake 3702.8 ml   Output 5435 ml   Net -1732.2 ml       General appearance: 76 yo male, ill appearing, NAD  Head: Normocephalic, without obvious abnormality, atraumatic  Eyes: Icteric. Pupils sluggish  Ears: normal external ears and nose  Neck: no JVD, supple, symmetrical, trachea midline   Lungs: diminished to auscultation bilaterally, unlabored on room air  Heart: irregular rhythm and tachycardic, S1, S2 normal, no murmur  Abdomen: Rounded, taut, hypoactive bowel sounds, no grimacing to palpation, G-tube clamped. Ostomy with liquid stool.  TRISTNA with serous output  Extremities: BLE 1+ edema,  No erythema, no tenderness to palpation  Skin: Warm, dry, jaundiced, abdominal incision WNL  Neurologic: Wakes to voice, oriented to person/place, follows commands, speech fluent, generalized weakness    Medications:      sodium chloride      lactated ringers 150 mL/hr at 10/31/22 0339    phenylephrine (MARIE-SYNEPHRINE) 50mg/250mL infusion Stopped (10/28/22 0917)    sodium chloride 5 mL/hr at 10/29/22 1147    dextrose        metoprolol  5 mg IntraVENous Q12H    famotidine  20 mg Oral BID    thiamine mononitrate  100 mg Oral Daily    meropenem  1,000 mg IntraVENous Q8H    sodium chloride flush  5-40 mL IntraVENous 2 times per day    enoxaparin  40 mg SubCUTAneous Daily    fat emulsion  250 mL IntraVENous Once per day on Mon Thu    metoclopramide  5 mg IntraVENous Q6H    BPCO  1 Dose Apply externally BID    digoxin  125 mcg IntraVENous Daily    sodium chloride flush  5-40 mL IntraVENous 2 times per day    insulin lispro  0-4 Units SubCUTAneous TID WC    insulin lispro  0-4 Units SubCUTAneous Nightly     sodium chloride flush, sodium chloride, metoprolol, sodium phosphate IVPB, sodium phosphate IVPB **OR** sodium phosphate IVPB **OR** sodium phosphate IVPB, HYDROmorphone **OR** HYDROmorphone **OR** HYDROmorphone, sodium chloride flush, sodium chloride, glucose, dextrose bolus **OR** dextrose bolus, glucagon (rDNA), dextrose, potassium chloride **OR** potassium alternative oral replacement **OR** potassium chloride, ondansetron **OR** ondansetron, magnesium hydroxide  ADULT DIET; Full Liquid  ADULT ORAL NUTRITION SUPPLEMENT; Breakfast, Lunch, Dinner; Wound Healing Oral Supplement     Lab and other Data:     Recent Labs     10/29/22  0604 10/30/22  0424 10/31/22  0232   WBC 11.1* 10.2 13.1*   HGB 13.0* 13.0* 13.0*   PLT 51* 63* 89*       Recent Labs     10/29/22  0604 10/30/22  0424 10/31/22  0232    141 134*   K 3.5 3.5 5.0    109 101   CO2 27 25 23   BUN 35* 38* 46*   CREATININE 0.7 0.6 1.0   GLUCOSE 152* 143* 85       Recent Labs     10/29/22  0130 10/30/22  0424 10/31/22  0232   * 108* 91*   ALT 90* 67* 55*   BILITOT 4.6* 4.7* 6.5*   ALKPHOS 75 82 101       RAD:   CT ABDOMEN PELVIS W IV CONTRAST Additional Contrast? None  Result Date: 10/20/2022  1. Irregular mass at the hepatic flexure of the colon resulting in high-grade small bowel and proximal large bowel obstruction. Severe fluid and gaseous distention of the entire stomach and small bowel including the distal esophagus. Findings are overall concerning for colonic neoplasm. Recommend GI and surgical consultation. 2.Scattered enlarged retroperitoneal lymph nodes. These are presumably metastatic if the colon mass proves to be malignant. 3.Marked hydropic distention of the gallbladder containing gallstones. No CT evidence of cholecystitis. Recommend correlation with LFTs. US GALLBLADDER RUQ  Result Date: 10/21/2022  1. Limited exam for reasons discussed above.  2. Diffuse fatty infiltration of the liver with a 1.1 cm calcification in the right hepatic lobe. 3.  Distended gallbladder with several small stones and mild diffuse gallbladder wall thickening. 4.  No other significant abnormality. Recommendation: Follow up as clinically indicated. Electronically Signed by Jacky Guzman MD at 21-Oct-2022 11:06:19 PM EST             XR CHEST PORTABLE  Result Date: 10/20/2022  The NG tube can be traced to the level of distal esophagus. Suggest advancing accordingly, with a follow-up radiograph. Recommendation: As above. Electronically Signed by Zheng Mata MD at 20-Oct-2022 06:15:09 PM EST             XR CHEST PORTABLE  Result Date: 10/20/2022  Cardiomegaly. Recommendation: Follow up as clinically indicated. Electronically Signed by Claudia Whatley MD at 20-Oct-2022 03:26:36 PM EST             Assessment/Plan   Principal Problem:    SBO (small bowel obstruction) ([address]  Active Problems:    Septic shock ([address]    Peritonitis ([address]    Essential hypertension    Hypothyroidism    Hyperglycemia    Thrombocytopenia (HCC)    Palliative care patient    Agitation    Moderate malnutrition (HCC)    Ileocolic anastomotic leak    Non-ischemic cardiomyopathy (HCC)    Paroxysmal atrial fibrillation (HCC)    Colonic mass    Ileus, postoperative [facility])    Accident  Resolved Problems:    * No resolved hospital problems. *      Visit Summary:  Chart reviewed. Mr. Mauricio Mcconnell is seen at bedside with RN present this morning and no acute events over the weekend. He remains alert but confused. Tolerating a liquid diet and abdominal pain controlled with as needed regimen. Potential plans to initiate gentle tube feeds through PEG tube to assist with nutrition as he is no longer receiving TPN. Copious serous drainage reported from TRISTAN site and requiring fluid resuscitation for BP support. Patient may potentially downgrade to [facility] floor today.   I called his daughter, Barak Gimenez, to update on patient's status and plan of care. She reports that she recently tested positive for flu so cannot visit at bedside but has been getting updates from nursing staff. Discussed potential for move out of ICU today and she verbalized understanding. Opportunity for questions and emotional support provided. Will follow. Candidate for SCOP: To be determined based on hospital course     Recommendations:      Palliative Care- GOC prolong life, continue all medical treatments/workup and monitor for improvement. D/c planning based on hospital course. Code status- DNR  SBO 2/2 hepatic flexure colonic mass- 10/21/22 right hemicolectomy with gastrostomy placement by Dr. Qing Espinal. G-tube replaced 10/23/2022 per GI endoscopically after patient self removed once extubated, now clamped and potentially initiated gently TF today. Reglan continued. Baseline CEA 11.0 on 10/21/2022 and postop CEA 6.9. Pathology at least stage III, will need outpt follow up with oncology for further staging workup. Septic shock 2/2 anastomotic leak- 10/26/22 laparotomy, repair of leak, washout, and creation of diverting ileostomy by Dr. Mary Currie. Weaned from pressor support. Abx continue. Monitor TRISTAN and ostomy output  Acute hypoxemic respiratory failure postop- originally extubated 10/23/22 and required ventilation postop but successful extubation again 10/26/22. Stable on room air. Elevated bilirubin- GI following. Follow LFTs. Bili back up to 6.5 today. Bedside RUQ ultrasound 10/25/22 shows cholelithiasis without acute cholecystitis or CBD dilation  SONIA- renal function improving. IVF replacement for hypovolemia   Agitation- concern 2/2 EtOH withdrawal. Intermittent agitation imrpoving without overt signs of alcohol withdrawal. Restraints as warranted. A. fib with RVR- cardiology following. Cardizem gtt stopped. Lopressor and digoxin IV scheduled. Rate more controlled postop  Nonischemic cardiomyopathy- noted.  Mgmt per hospitalist  Malnutrition- TPN stopped, Gently TF per [employer], dietician following. Thank you for consulting Palliative Care and allowing us to participate in the care of this patient.    Time Spent Counseling > 50%:  YES                                   Total Time Spent with patient/family counseling, workup/treatment review, counseling and placement of orders/preparation of this note: 31 minutes    Electronically signed by ALLISON Wheeler CNP on 10/31/2022 at 8:33 AM    (Please note that portions of this note were completed with a voice recognition program.  Yariel Ends made to edit the dictations but occasionally words are mis-transcribed.)

## 2022-10-31 NOTE — CARE COORDINATION
GAMAL visited with Pt and his sister, at bedside, re: d/c planning; she is aware that he will likely need some form of rehabilitation prior to going home;   GAMAL placed call to Pt's dtr, Belen Novak to further discuss d/c planning; (initial assessment completed with Pt's parent); she indicated that she would defer this to her cousin, Dracy Buchanan that has been taking care of Pt as of recently and she also knows the area better; GAMAL placed call to Darcy Buchanan; discussed SNF choice; she would like referrals to [facility] and [employer] [facility] signed by ANDRESSA Welsh on 10/31/2022 at 4:00 PM WOUND CARE CONSULT NOTE    Patient: Teri Sorenson Date: 2020   : 1963 Attending: Nat Woods MD   56 year old female      Resaon for admission: Septic Shock  Reason For Wound RN consult: Scattered wounds to the abdomen and groin related to moisuture    Wound Measurements:      Wound Abdomen Skin fold Moisture Associated Skin Damage (MASD) (Active)   Wound Care Team Consult Date 20 10:19 AM   Wound Depth (cm) 1 cm 2020 10:19 AM   Number of days: 1       Wound Groin Skin fold Moisture Associated Skin Damage (MASD) (Active)   Wound Care Team Consult Date 20 10:19 AM   Wound Depth (cm) 1 cm 2020 10:19 AM   Number of days: 1         Wound Characteristics:  Wound Edge  Wound Abdomen Skin fold Moisture Associated Skin Damage (MASD)-Wound Edge: Poorly defined  Wound Groin Skin fold Moisture Associated Skin Damage (MASD)-Wound Edge: Poorly defined    Wound Exudate  Wound Abdomen Skin fold Moisture Associated Skin Damage (MASD)-Wound Exudate: Minimal, Serosanguineous, No odor  Wound Groin Skin fold Moisture Associated Skin Damage (MASD)-Wound Exudate: Minimal, Serosanguineous, No odor    Wound Bed/Tissue Type  Wound Abdomen Skin fold Moisture Associated Skin Damage (MASD)-Wound Bed/Tissue Type: Pink, Red, Friable  Wound Groin Skin fold Moisture Associated Skin Damage (MASD)-Wound Bed/Tissue Type: Pink, Brown    Monik-wound Condition  Wound Abdomen Skin fold Moisture Associated Skin Damage (MASD)-Periwound Condition: Friable, Hyperpigmented, Erythema, blanchable, Moist, Pain increase  Wound Groin Skin fold Moisture Associated Skin Damage (MASD)-Periwound Condition: Friable, Erythema, blanchable, Hyperpigmented, Moist          PLAN:  Writer at bedside with Cally FARNSWORTH.  A full head to toe skin assessment completed.  Skin break down to the abdominal skin fold and groin skin folds are noted.  Patient has multiple scattered open areas, wounds are at different stages of  healing.  The deepest wounds are approximately 1 cm.  Patient reports that wounds similar to this have been treated in the past by dermatology with I and  D.  Pillow cases placed under the skin folds to assist with moisture management, Nystatin powder on hold.      Assessment of the skin discussed with BILLY KERR.  He is agreeable that the patient would benefit from a wound MD consult.      Writer contacted Dr. Corbin to notify his about consult.    Wound care will continue to follow.    Sheba Evans RN, WCC, OMS

## 2022-10-31 NOTE — PROGRESS NOTES
Occupational Therapy     10/31/22 1600   Subjective   Subjective Nursing with patient, getting ready to get the patient back to bed using the [employer]. Pt requiring more assistance due to fatigue. Cognition   Overall Cognitive Status Exceptions   Orientation   Overall Orientation Status X   Orientation Level Unable to assess   Bed Mobility Training   Bed Mobility Training Yes   Overall Level of Assistance Maximum assistance   Interventions Verbal cues; Tactile cues;Manual cues   Rolling Maximum assistance   Sit to Supine Maximum assistance   Scooting Maximum assistance;Assist X2   Transfer Training   Transfer Training Yes   Overall Level of Assistance Maximum assistance;Assist X2   Interventions Verbal cues; Tactile cues;Manual cues   Sit to Stand Maximum assistance;Assist X2   Stand to Sit Maximum assistance;Assist X2   Bed to Chair Maximum assistance;Assist X2   Balance   Sitting With support   Standing With support   Assessment   Assessment Tx focused on transfer back to bed from recliner using lani eduardo this pm. Pt required increased assistance due to fatigue and aggitation.    Activity Tolerance Patient limited by fatigue;Treatment limited secondary to agitation   Discharge Recommendations Patient would benefit from continued therapy after discharge   Occupational Therapy Plan   Times Per Week 3-5   Times Per Day Once a day   OT Plan of Care   Monday X   Electronically signed by SOPIHE Mata on 10/31/2022 at 4:13 PM

## 2022-10-31 NOTE — PROGRESS NOTES
Report called to [facility] on 5th. Verified with Dr Ximena Barakat that it is ok to discont garcia. Pt will be due to void up stairs. Verified with Dr Ximena Barakat that will not start tube feeds for now. Pt took 100% full liquid diet for breakfast and lunch today. Pt has been awake and alert up in chair. Transferred back to bed to transfer on [place]in bed . Pt has drained 1000cc serious fluid from TRISTAN today this past 8 hours.

## 2022-10-31 NOTE — PROGRESS NOTES
Persistent large volume of clear yellow fluid from TRISTAN drain possibly due to ascites with hypoalbuminemia, hypotension last PM responded to IV fluid bolus, afebrile, good bowel sounds, Continue IV TPN and starting PEG TF at slow rated then increase rate as tolerated, full liquid diet.

## 2022-10-31 NOTE — PROGRESS NOTES
Hospitalist Progress Note    Patient:  Xuan Burks  YOB: 1955  Date of Service: 10/31/2022  MRN: [id number]   Acct: [de-identified]   Primary Care Physician: No primary care provider on file. Advance Directive: DNR  Admit Date: 10/20/2022       Hospital Day: 11  Referring Provider: Jessica Vizcaino MD    Patient Seen, Chart, Consults, Notes, Labs, Radiology studies reviewed. Subjective:     No further agitation. Still has some confusion. Comfortable. Tolerating liquids. Hemodynamically stable. Abdominal pain improved. No fevers or chills. Summary:  Xuan Burks is a 77 y.o. male  whom we are following for SBO status post right hemicolectomy for obstructing colon cancer, complicated by development of septic peritonitis due to anastomotic leak, as well as nonischemic cardiomyopathy, A. fib intermittently in RVR. He underwent an open right hemicolectomy on 10/21 however course complicated after developed sepsis with fevers, leukocytosis with CT showing large volume peritoneal fluid with large pneumoperitoneum, septic shock with peritonitis requiring volume resuscitation, vasopressor support and antibiotics on meropenem. Patient was reintubated for surgery. Additionally intermittently in A. fib with RVR requiring IV AV meaghan blockade. Patient taken back to the OR for repair of anastomotic leak, washout with diverting ileostomy. Had SONIA with metabolic acidosis in setting of sepsis and volume losses. Sepsis, encephalopathy and renal function gradually improved following surgery with further resuscitation, antibiotics and weaned off vasopressor support. A. fib rate controlled on metoprolol and digoxin. Required TPN for nutritional support. Eventually oral diet advanced per surgery. Allergies:  Patient has no known allergies.     Medicines:  Current Facility-Administered Medications   Medication Dose Route Frequency Provider Last Rate Last Admin metoprolol (LOPRESSOR) injection 5 mg  5 mg IntraVENous Q12H Roselyn Mistry MD   5 mg at 10/31/22 1015    famotidine (PEPCID) tablet 20 mg  20 mg Oral BID Brisa Bernard MD   20 mg at 10/31/22 1001    thiamine mononitrate tablet 100 mg  100 mg Oral Daily Brisa Bernard MD   100 mg at 10/31/22 1001    meropenem (MERREM) 1000 mg in sodium chloride 0.9% 50 mL (duplex)  1,000 mg IntraVENous Q8H Roxane Joshi MD   Stopped at 10/31/22 0997    sodium chloride flush 0.9 % injection 5-40 mL  5-40 mL IntraVENous 2 times per day Roselyn Mistry MD   10 mL at 10/31/22 1001    sodium chloride flush 0.9 % injection 5-40 mL  5-40 mL IntraVENous PRN Roselyn Mistry MD        0.9 % sodium chloride infusion  25 mL IntraVENous MAIDAN Roselyn Mistry MD        lactated ringers infusion   IntraVENous Continuous Christy Livingston  mL/hr at 10/31/22 0339 Rate Change at 10/31/22 0339    metoprolol (LOPRESSOR) injection 5 mg  5 mg IntraVENous Q6H PRN Roselyn Mistry MD   5 mg at 10/30/22 0152    enoxaparin (LOVENOX) injection 40 mg  40 mg SubCUTAneous Daily Payam Aggarwal MD   40 mg at 10/30/22 0744    fat emulsion 20 % infusion 250 mL  250 mL IntraVENous Once per day on Mon Thu Payam Aggarwal MD   Stopped at 10/28/22 0030    phenylephrine (MARIE-SYNEPHRINE) 50 mg in dextrose 5 % 250 mL infusion   mcg/min IntraVENous Continuous Roslyn Dsila MD   Stopped at 10/28/22 5689    metoclopramide (REGLAN) injection 5 mg  5 mg IntraVENous Q6H Deepika Thompson MD   5 mg at 10/31/22 0540    BPCO OINT 1 Dose  1 Dose Apply externally BID Deepika Thompson MD   1 Dose at 10/31/22 1001    sodium phosphate 33.33 mmol in sodium chloride 0.9 % 250 mL IVPB  0.32 mmol/kg IntraVENous PRN Wilfred Germán,         sodium phosphate 10 mmol in sodium chloride 0.9 % 250 mL IVPB  10 mmol IntraVENous PRN Wilfred DO Germán        Or    sodium phosphate 15 mmol in sodium chloride 0.9 % 250 mL IVPB  15 mmol IntraVENous PRN Fremont Ginger, DO        Or    sodium phosphate 20 mmol in sodium chloride 0.9 % 500 mL IVPB  20 mmol IntraVENous PRN Marv Ginger, DO        digoxin [facility]) injection 125 mcg  125 mcg IntraVENous Daily Fremont Ginger, DO   125 mcg at 10/31/22 1001    HYDROmorphone HCl PF (DILAUDID) injection 2 mg  2 mg IntraVENous Q2H PRN Marv Ginger, DO   2 mg at 10/27/22 1333    Or    HYDROmorphone HCl PF (DILAUDID) injection 1 mg  1 mg IntraVENous Q2H PRN [place] Ginger, DO   1 mg at 10/29/22 1314    Or    HYDROmorphone HCl PF (DILAUDID) injection 0.5 mg  0.5 mg IntraVENous Q2H PRN [place] Ginger, DO   0.5 mg at 10/31/22 0318    sodium chloride flush 0.9 % injection 5-40 mL  5-40 mL IntraVENous 2 times per day Marv Ginger, DO   10 mL at 10/31/22 1002    sodium chloride flush 0.9 % injection 5-40 mL  5-40 mL IntraVENous PRN Marv Ginger, DO        0.9 % sodium chloride infusion   IntraVENous PRN [place] Ginger, DO 5 mL/hr at 10/29/22 1147 New Bag at 10/29/22 1147    glucose chewable tablet 16 g  4 tablet Oral PRN Marv Ginger, DO        dextrose bolus 10% 125 mL  125 mL IntraVENous PRN Marv Ginger, DO   Stopped at 10/31/22 1019    Or    dextrose bolus 10% 250 mL  250 mL IntraVENous PRN Marv Ginger, DO        glucagon (rDNA) injection 1 mg  1 mg SubCUTAneous PRN Fremont Ginger, DO        dextrose 10 % infusion   IntraVENous Continuous PRN Fremont Ginger, DO        potassium chloride Manning Daily M) extended release tablet 40 mEq  40 mEq Oral PRN Fremont Ginger, DO   40 mEq at 10/29/22 0320    Or    potassium bicarb-citric acid (EFFER-K) effervescent tablet 40 mEq  40 mEq Oral PRN Fremont Ginger, DO   40 mEq at 10/30/22 1630    Or    potassium chloride 10 mEq/100 mL IVPB (Peripheral Line)  10 mEq IntraVENous PRN Fremont Ginger, DO        ondansetron (ZOFRAN-ODT) disintegrating tablet 4 mg  4 mg Oral Q8H PRN Marv Miles DO Or    ondansetron (ZOFRAN) injection 4 mg  4 mg IntraVENous Q6H PRN Khloe Isaac DO        magnesium hydroxide (MILK OF MAGNESIA) 400 MG/5ML suspension 30 mL  30 mL Oral Daily PRN Khloe Isaac DO   30 mL at 10/24/22 1609    insulin lispro (HUMALOG) injection vial 0-4 Units  0-4 Units SubCUTAneous TID WC Khloe Isaac DO        insulin lispro (HUMALOG) injection vial 0-4 Units  0-4 Units SubCUTAneous Nightly Khloe Isaac DO           Past Medical History:  Past Medical History:   Diagnosis Date    Ex-cigarette smoker 10/03/2016    Hypertension     Non-ischemic cardiomyopathy ([facility] [address] 10/03/2016    9/28/2016  Echo  EF 25% 10/3/16  Cath  Mild CAD, EF 10%     Palliative care patient 10/24/2022    [facility] 03/16/2018       Past Surgical History:  Past Surgical History:   Procedure Laterality Date    FINGER AMPUTATION Left     partial left thumb amputation following trauma    HEMICOLECTOMY N/A 10/21/2022    RIGHT HEMICOLECTOMY WITH GASTROSTOMY PLACEMENT performed by Eleni Rose MD at [address] N/A 10/25/2022    LAPAROTOMY, REPAIR OF ANASTAMOSAL LEAK, CREATION OF DIVERTING ILEOSTOMY performed by Bibiana Milton MD at [address] OR    TONSILLECTOMY      UPPER GASTROINTESTINAL ENDOSCOPY N/A 10/23/2022    Dr Myla Loo w/peg placement       Family History  Family History   Problem Relation Age of Onset    High Blood Pressure Mother     Cancer Father         Stomach    Heart Disease Maternal Grandmother     Diabetes Maternal Uncle        Social History  Social History     Socioeconomic History    Marital status:      Spouse name: Not on file    Number of children: Not on file    Years of education: Not on file    Highest education level: Not on file   Occupational History    Not on file   Tobacco Use    Smoking status: Former     Packs/day: 1.00     Years: 2.00     Pack years: 2.00     Types: Cigarettes     Start date: 200     Quit date: 1992 Years since quittin.8    Smokeless tobacco: Never    Tobacco comments:     light smoking history - socially   Vaping Use    Vaping Use: Never used   Substance and Sexual Activity    Alcohol use: Yes    Drug use: No    Sexual activity: Not on file   Other Topics Concern    Not on file   Social History Narrative    Not on file     Social Determinants of Health     Financial Resource Strain: Not on file   Food Insecurity: Not on file   Transportation Needs: Not on file   Physical Activity: Not on file   Stress: Not on file   Social Connections: Not on file   Intimate Partner Violence: Not on file   Housing Stability: Not on file         Review of Systems:  14 point review of systems completed and is negative except as otherwise noted    Objective:  Blood pressure (!) 124/49, pulse 89, temperature 96.9 °F (36.1 °C), temperature source Temporal, resp. rate 19, height 5' 10\" (1.778 m), weight 186 lb 8 oz (84.6 kg), SpO2 (!) 75 %. Intake/Output Summary (Last 24 hours) at 10/31/2022 1315  Last data filed at 10/31/2022 0800  Gross per 24 hour   Intake 2643.33 ml   Output 4455 ml   Net -1811.67 ml         Physical Exam  Vitals and nursing note reviewed. Constitutional:       General: He is not in acute distress. Appearance: He is not toxic-appearing. HENT:      Head: Normocephalic and atraumatic. Eyes:      General: No scleral icterus. Conjunctiva/sclera: Conjunctivae normal.   Cardiovascular:      Rate and Rhythm: Normal rate. Rhythm irregular. Heart sounds: Normal heart sounds. Pulmonary:      Effort: Pulmonary effort is normal.      Breath sounds: Normal breath sounds. Abdominal:      General: Abdomen is flat. Palpations: Abdomen is soft. Tenderness: There is no rebound. Comments: Minimal abdominal tenderness   Musculoskeletal:         General: No swelling. Cervical back: No rigidity. Right lower leg: No edema. Left lower leg: No edema.    Skin:     General: Skin is warm and dry. Neurological:      Mental Status: He is disoriented. Comments: able to follow commands       Labs:  BMP:   Recent Labs     10/29/22  0604 10/30/22  0424 10/31/22  0232    141 134*   K 3.5 3.5 5.0    109 101   CO2 27 25 23   PHOS 2.4* 3.1 3.7   BUN 35* 38* 46*   CREATININE 0.7 0.6 1.0   CALCIUM 7.4* 7.3* 7.7*       CBC:   Recent Labs     10/29/22  0604 10/30/22  0424 10/31/22  0232   WBC 11.1* 10.2 13.1*   HGB 13.0* 13.0* 13.0*   HCT 37.6* 37.8* 37.9*   MCV 90.0 89.6 89.6   PLT 51* 63* 89*       LIVER PROFILE:   Recent Labs     10/29/22  0130 10/30/22  0424 10/31/22  0232   * 108* 91*   ALT 90* 67* 55*   BILITOT 4.6* 4.7* 6.5*   ALKPHOS 75 82 101       PT/INR: No results for input(s): PROTIME, INR in the last 72 hours. APTT: No results for input(s): APTT in the last 72 hours. BNP:  No results for input(s): BNP in the last 72 hours. Ionized Calcium:No results for input(s): IONCA in the last 72 hours. Magnesium:  Recent Labs     10/29/22  0130 10/30/22  0424 10/31/22  0232   MG 2.5* 2.1 2.0       Phosphorus:  Recent Labs     10/29/22  0604 10/30/22  0424 10/31/22  0232   PHOS 2.4* 3.1 3.7       HgbA1C: No results for input(s): LABA1C in the last 72 hours. Hepatic:   Recent Labs     10/29/22  0130 10/30/22  0424 10/31/22  0232   ALKPHOS 75 82 101   ALT 90* 67* 55*   * 108* 91*   PROT 4.8* 4.8* 5.6*   BILITOT 4.6* 4.7* 6.5*   LABALBU 1.9* 1.9* 1.7*       Lactic Acid:   Recent Labs     10/28/22  1955   LACTA 1.8       Troponin: No results for input(s): CKTOTAL, CKMB, TROPONINT in the last 72 hours. ABGs: No results for input(s): PH, PCO2, PO2, HCO3, O2SAT in the last 72 hours. CRP:  No results for input(s): CRP in the last 72 hours. Sed Rate:  No results for input(s): SEDRATE in the last 72 hours. Cultures:   No results for input(s): CULTURE in the last 72 hours. No results for input(s): BC, Manuel Lynn in the last 72 hours.     No results for input(s): CXSURG in the last 72 hours. Radiology reports as per the Radiologist  Radiology: CT ABDOMEN PELVIS W IV CONTRAST Additional Contrast? None    Result Date: 10/20/2022  CLINICAL HISTORY: 48-hour history of diffuse abdominal pain distention nausea vomiting some diarrhea no prior history COMPARISON: No comparison TECHNIQUE: Axial images of the abdomen and pelvis were obtained after the administration of IV contrast.Coronal and sagittal multiplanar reconstructions were performed. One or more of the following dose reduction techniques were used: Automated exposure control, adjustment of the mA and/or kV according to patient size, and/or use of iterative reconstruction technique. COMPARISON: None. FINDINGS: Statements: None. Lower Chest: Unremarkable. Hepatobiliary: Hepatic steatosis. Calcified granulomas throughout the liver. No focal lesion is identified. Marked distention of the gallbladder containing gallstones. No CT evidence of cholecystitis however. No biliary ductal dilatation is evident. Pancreas: The pancreatic parenchyma is unremarkable and there is no main duct dilatation. Spleen: The spleen is nonenlarged. Calcified granulomas throughout the spleen. Adrenals: No adrenal glands are symmetric. Genitourinary: The kidneys are symmetric and enhance appropriately. No suspicious focal parenchymal abnormality is identified in either kidney. There is no collecting system dilatation. The bladder is unremarkable, as imaged. The prostate gland appears unremarkable. Gastrointestinal: Diffuse fluid and air distention of the the entire small bowel and proximal large bowel measuring up to 4.3 cm in the left abdomen and up to 7.6 cm of the proximal colon. Transitional point seen at a site of masslike thickening of the hepatic flexure measuring 3.2 x 3.9 cm (series 2 image 35). Colonic diverticulosis without diverticulitis. The appendix appears unremarkable. Lymphatics: Multiple prominent and enlarged retroperitoneal lymph nodes. Largest is 1.5 cm in the left periaortic region (series 2 image 33). Vascular: Moderate calcific atherosclerosis. The abdominal aorta is normal in caliber. MSK/Body Wall: No concerning bony lesion is identified. Small fat-containing right inguinal hernia. Peritoneum/Other: There is no free fluid or abnormal collection. No free gas. 1.Irregular mass at the hepatic flexure of the colon resulting in high-grade small bowel and proximal large bowel obstruction. Severe fluid and gaseous distention of the entire stomach and small bowel including the distal esophagus. Findings are overall concerning for colonic neoplasm. Recommend GI and surgical consultation. 2.Scattered enlarged retroperitoneal lymph nodes. These are presumably metastatic if the colon mass proves to be malignant. 3.Marked hydropic distention of the gallbladder containing gallstones. No CT evidence of cholecystitis. Recommend correlation with LFTs. US GALLBLADDER RUQ    Result Date: 10/21/2022  NO PRIOR REPORT AVAILABLE Exam: ULTRASOUND OF THE ABDOMEN LIMITED, RIGHT UPPER QUADRANT Clinical data: Elevated LFTs, sepsis, lactic acidosis. The patient is on ventilator. Technique: Real-time grayscale imaging of the right upper quadrant was performed. Images supplemented with color Doppler technique. Prior studies: CT scan of abdomen and pelvis dated 10/20/22. Findings: Limited exam due to poor acoustic penetration. Diffuse increased echogenicity of the liver parenchyma. A shadowing focus//calcification in the right lobe measuring up 1.1 cm. No intrahepatic biliary distention. The gallbladder is distended with several small stones. The gallbladder wall thickness measures 0.8 cm. No sludge. The common bile duct is normal in caliber measuring 0.4cm. The right kidney [id number] x 6.6 x 5.4 cm. Normal echogenicity and cortical thickness are preserved. No evidence of renal masses, no calculi and no hydronephrosis Diffuse increased echogenicity of the pancreas.  Imaged portion of the aorta demonstrates no acute pathology. Visualized portion of the inferior vena cava is unremarkable. 1.  Limited exam for reasons discussed above. 2.  Diffuse fatty infiltration of the liver with a 1.1 cm calcification in the right hepatic lobe. 3.  Distended gallbladder with several small stones and mild diffuse gallbladder wall thickening. 4.  No other significant abnormality. Recommendation: Follow up as clinically indicated. Electronically Signed by Phyllis Medeiros MD at 21-Oct-2022 11:06:19 PM EST             XR CHEST PORTABLE    Result Date: 10/20/2022  NO PRIOR REPORT AVAILABLE Exam: X-RAY OF [facility] Clinical data:Confirmation of course of NG/OG/NE tube and location of tip of tube. Technique:Single view of the chest. Prior studies: Radiograph of the chest done on same day. Findings: The lungs are grossly clear; noevidence of acute infiltrate or pleural effusion. Cardiac silhouette is mildly enlarged. No acute osseous abnormality is detected. The NG tube can be traced to the level of distal esophagus. Scattered nonspecific gas-filled loops of bowel in the upper abdomen. The NG tube can be traced to the level of distal esophagus. Suggest advancing accordingly, with a follow-up radiograph. Recommendation: As above. Electronically Signed by Abdulkadir Correa MD at 20-Oct-2022 06:15:09 PM EST             XR CHEST PORTABLE    Result Date: 10/20/2022  NO PRIOR REPORT AVAILABLE Exam: X-RAY OF THECHEST Clinical data:Abdominal pain, vomiting. Technique:Single view of the chest. Prior studies: No prior studies submitted. Findings: The trachea is midline. The heart is enlarged. Mediastinal and pulmonary vascular shadows are normal.  There is no focal consolidation or effusion. The osseous structures are intact. Cardiomegaly. Recommendation: Follow up as clinically indicated.  Electronically Signed by Page Mccurdy MD at 20-Oct-2022 03:26:36 PM EST                Assessment     SBO   S/P right hemicolectomy for obstructing colon cancer  Surgical pathology --noted at least stage III, further staging as outpatient per oncology  Advance diet as per surgery    Septic shock due to peritonitis with anastomotic leak  ---Resolved  - Status post repair of anastomotic leak, washout and diverting ileostomy  --Adequately resuscitated, weaned off vasopressor support  - Continue meropenem for 7-day duration postop per infectious diseases  -- Monitor output from ostomy and TRISTAN drain  -- Additional resuscitation given increased TRISTAN output, hypovolemia with SONIA    Paroxysmal A. fib intermittently in RVR  --Improved over hospital course  - Continue Lopressor and digoxin, adjust further as needed    Acute encephalopathy, multifactorial with sepsis and liver disease, along with some hospital delirium  - Follow LFTs  - Not having any evident signs of alcohol withdrawal and should be out of the window for this  -- Supportive care, reorient as able, give thiamine  -- Limit sedating meds    SONIA, prerenal, hypovolemia  Metabolic acidosis-resolved  - Additional crystalloid resuscitation    Acute hypoxemic respiratory failure requiring intubation/mechanical ventilation  --Resolved  -Extubated 10/26  --Monitor SPO2, supplemental oxygen as needed     Monitor and replete electrolytes as needed    Malnutrition  --TPN, oral diet advanced per surgery    DVT prophylaxis SCDs, okay for prophylactic Lovenox as long as platelet count stays above 50,000        Goran Broderick MD

## 2022-10-31 NOTE — PROGRESS NOTES
Subjective:  No acute events or changes. Patient does not currently have any TPN, UOP down overnight. More awake, denies any pain or nausea. Objective:  /83   Pulse (!) 116   Temp 97.8 °F (36.6 °C) (Temporal)   Resp 25   Ht 5' 10\" (1.778 m)   Wt 186 lb 8 oz (84.6 kg)   SpO2 (!) 74%   BMI 26.76 kg/m²   Date 10/31/22 0000 - 10/31/22 2359   Shift 7638-1394 6448-4890 4923-2464 24 Hour Total   INTAKE   P.O.(mL/kg/hr) 460(0.7)   460   I. V.(mL/kg) 557. 3(6.6)   557. 3(6.6)   NG/GT(mL/kg) 20(0.2)   20(0.2)   IV Piggyback(mL/kg) 71.6(0.8)   71.6(0.8)   Shift Total(mL/kg) 1108.9(13.1)   1108.9(13.1)   OUTPUT   Urine(mL/kg/hr) 105(0.2)   105   Emesis/NG output(mL/kg) 0(0)   0(0)   Drains(mL/kg) 1250(14.8)   1250(14.8)   Stool(mL/kg) 500(5.9)   500(5.9)   Shift Total(mL/kg) 1855(21.9)   1855(21.9)   Weight (kg) 84.6 84.6 84.6 84.6     General: NAD, Awake and alert  Chest: regular, non-labored  Abdomen: Soft, ND, Min TTP, TRITSAN with serosang    CBC:   Lab Results   Component Value Date/Time    WBC 13.1 10/31/2022 02:32 AM    RBC 4.23 10/31/2022 02:32 AM    HGB 13.0 10/31/2022 02:32 AM    HCT 37.9 10/31/2022 02:32 AM    MCV 89.6 10/31/2022 02:32 AM    MCH 30.7 10/31/2022 02:32 AM    MCHC 34.3 10/31/2022 02:32 AM    RDW 16.1 10/31/2022 02:32 AM    PLT 89 10/31/2022 02:32 AM    MPV 13.2 10/31/2022 02:32 AM     CMP:    Lab Results   Component Value Date/Time     10/31/2022 02:32 AM    K 5.0 10/31/2022 02:32 AM     10/31/2022 02:32 AM    CO2 23 10/31/2022 02:32 AM    BUN 46 10/31/2022 02:32 AM    CREATININE 1.0 10/31/2022 02:32 AM    GFRAA >59 04/15/2022 11:37 AM    LABGLOM >60 10/31/2022 02:32 AM    GLUCOSE 85 10/31/2022 02:32 AM    PROT 5.6 10/31/2022 02:32 AM    LABALBU 1.7 10/31/2022 02:32 AM    CALCIUM 7.7 10/31/2022 02:32 AM    BILITOT 6.5 10/31/2022 02:32 AM    ALKPHOS 101 10/31/2022 02:32 AM    AST 91 10/31/2022 02:32 AM    ALT 55 10/31/2022 02:32 AM     Assessment and plan:  77year old male  s/p right hemicolectomy, s/p washout and repair of anastomotic leak with diversion  1) Patient having some ostomy output. Okay to start trickle TF  2) Decreased UOP- continue with increased IVF, will give a bolus.  Patient still having a lot of TRISTAN output and needs that replaced  3) Increase activity  4) Continue IV abx, PPI, lovenox  5) If does okay this morning, likely okay to transfer to the floor

## 2022-10-31 NOTE — PROGRESS NOTES
Update:      Shortly after transfer to floor, around 1700 patient became more encephalopathic, tremulous, difficulty answering questions. worsening Afib w/ RVR with rates up to 140s-160s. SBP down to 90s. Called by RN. Assessed at bedside. Discussed with family and staff. Transfer back to ICU    CIWA protocol. Precedex drip for severe agitation/withdrawal    Give dose of IV lopressor and digoxin now    Continue IV lopressor q6h as long as SBP >90, MAP >60  Continue IV digoxin  Repeat Digoxin level in AM    Continue crystalloid resuscitation  Resume Neosynephrine if MAP remains <60 despite adequate fluid resuscitation      Critical care time 39 minutes. Time exclusive of any procedures.        Briana Simmons MD

## 2022-10-31 NOTE — PROGRESS NOTES
Patient being transferred to: 5th floor unit via  bed    Report called to: Ariella Irvin RN    Patient condition: stable  Telemetry monitoring in place: Yes  LDA's continued upon transfer:  yes    Family notified: no          (Name: , Time: N/A)    All personal belongings sent with patient.

## 2022-10-31 NOTE — PROGRESS NOTES
Patient name: Ishaan Mcclendon  Patient : 1955  10/31/2022  6:41 AM  ROOM 146    Portions of this note have been copied forward, however, changed to reflect the most current clinical status of this patient. Subjective: POD #10 Colectomy with gastrostomy placement. POD #4 laparotomy, repair of anastomotic leak and creation of diverting ileostomy. Continuing in ICU. TPN discontinued. Currently on liquids. Decrease in urine output this morning-getting IV boluses. HISTORY OF PRESENT ILLNESS:   Sheridan Clemente was last seen by Zachary Bowers on 10/1/2022. The patient has a history of hypertension, atrial fibrillation. He presented ER department with complaints of worsening abdominal pain with associated nausea and vomiting. 10/20/2022-CT abdomen pelvis showed diffuse fluid and air distention of the the entire small bowel and proximal large bowel measuring up to 4.3 cm in the left abdomen and up to 7.6 cm of the proximal colon. Transitional point seen at a site of masslike thickening of the hepatic flexure measuring 3.2 x 3.9 cm (series 2 image 35). Multiple prominent and enlarged retroperitoneal lymph nodes. Largest is 1.5 cm in the left periaortic region (series 2 image 33). 10/21/2022: Colectomy with gastrostomy placement by Dr. Marley Tristan   10/26/2022: laparotomy, repair of anastomotic leak and creation of diverting ileostomy by Dr. Paulo Smith    OBJECTIVE:  VITALS: /89   Pulse (!) 112   Temp 97.8 °F (36.6 °C) (Temporal)   Resp 24   Ht 5' 10\" (1.778 m)   Wt 186 lb 8 oz (84.6 kg)   SpO2 (!) 80%   BMI 26.76 kg/m²   I&O:   Intake/Output Summary (Last 24 hours) at 10/31/2022 0641  Last data filed at 10/31/2022 0400  Gross per 24 hour   Intake 3973.21 ml   Output 5355 ml   Net -1381.79 ml     PHYSICAL EXAM:  CONSTITUTIONAL: Resting okay-in no acute distress  NECK: Supple, no masses.   No JVD  CHEST/LUNGS: Diminished bases, otherwise clear  CARDIOVASCULAR:   ABDOMEN: Midline abdominal dressing intact. Diverting ileostomy with dark liquid drainage. Drains noted  EXTREMITIES: warm, trace edema  SKIN: warm, dry with no rashes or lesions  NEUROLOGIC: Resting      BMP:   Recent Labs     10/30/22  0424 10/31/22  0232    134*   K 3.5 5.0    101   CO2 25 23   BUN 38* 46*   CREATININE 0.6 1.0   GLUCOSE 143* 85   CALCIUM 7.3* 7.7*     Recent Labs     10/31/22  0232 10/30/22  0424 10/29/22  0604   WBC 13.1* 10.2 11.1*   HGB 13.0* 13.0* 13.0*   HCT 37.9* 37.8* 37.6*   MCV 89.6 89.6 90.0   PLT 89* 63* 51*     CMP:    Recent Labs     10/30/22  0424 10/31/22  0232    134*   K 3.5 5.0    101   CO2 25 23   BUN 38* 46*   CREATININE 0.6 1.0   LABGLOM >60 >60   GLUCOSE 143* 85   PROT 4.8* 5.6*   LABALBU 1.9* 1.7*   CALCIUM 7.3* 7.7*   BILITOT 4.7* 6.5*   ALKPHOS 82 101   * 91*   ALT 67* 55*     30Day lookback of cultures:    Blood Culture Recent:   Recent Labs     10/25/22  1720   BC No growth after 5 days of incubation. Gram Stain Recent: No results for input(s): LABGRAM in the last 720 hours. Resp Culture Recent: No results for input(s): CULTRESP in the last 720 hours. Body Fluid Recent : No results for input(s): BFCX in the last 720 hours. MRSA Recent : No results for input(s): [address] in the last 720 hours. Urine Culture Recent : No results for input(s): LABURIN in the last 720 hours. Organism Recent : No results for input(s): ORG in the last 720 hours. Radiology:   CT ABDOMEN PELVIS W IV CONTRAST Additional Contrast? None    Result Date: 10/20/2022  CLINICAL HISTORY: 48-hour history of diffuse abdominal pain distention nausea vomiting some diarrhea no prior history COMPARISON: No comparison TECHNIQUE: Axial images of the abdomen and pelvis were obtained after the administration of IV contrast.Coronal and sagittal multiplanar reconstructions were performed. One or more of the following dose reduction techniques were used:  Automated exposure control, adjustment of the mA and/or kV according to patient size, and/or use of iterative reconstruction technique. COMPARISON: None. FINDINGS: Statements: None. Lower Chest: Unremarkable. Hepatobiliary: Hepatic steatosis. Calcified granulomas throughout the liver. No focal lesion is identified. Marked distention of the gallbladder containing gallstones. No CT evidence of cholecystitis however. No biliary ductal dilatation is evident. Pancreas: The pancreatic parenchyma is unremarkable and there is no main duct dilatation. Spleen: The spleen is nonenlarged. Calcified granulomas throughout the spleen. Adrenals: No adrenal glands are symmetric. Genitourinary: The kidneys are symmetric and enhance appropriately. No suspicious focal parenchymal abnormality is identified in either kidney. There is no collecting system dilatation. The bladder is unremarkable, as imaged. The prostate gland appears unremarkable. Gastrointestinal: Diffuse fluid and air distention of the the entire small bowel and proximal large bowel measuring up to 4.3 cm in the left abdomen and up to 7.6 cm of the proximal colon. Transitional point seen at a site of masslike thickening of the hepatic flexure measuring 3.2 x 3.9 cm (series 2 image 35). Colonic diverticulosis without diverticulitis. The appendix appears unremarkable. Lymphatics: Multiple prominent and enlarged retroperitoneal lymph nodes. Largest is 1.5 cm in the left periaortic region (series 2 image 33). Vascular: Moderate calcific atherosclerosis. The abdominal aorta is normal in caliber. MSK/Body Wall: No concerning bony lesion is identified. Small fat-containing right inguinal hernia. Peritoneum/Other: There is no free fluid or abnormal collection. No free gas. 1.Irregular mass at the hepatic flexure of the colon resulting in high-grade small bowel and proximal large bowel obstruction. Severe fluid and gaseous distention of the entire stomach and small bowel including the distal esophagus.  Findings are overall concerning for colonic neoplasm. Recommend GI and surgical consultation. 2.Scattered enlarged retroperitoneal lymph nodes. These are presumably metastatic if the colon mass proves to be malignant. 3.Marked hydropic distention of the gallbladder containing gallstones. No CT evidence of cholecystitis. Recommend correlation with LFTs. US GALLBLADDER RUQ    Result Date: 10/21/2022  NO PRIOR REPORT AVAILABLE Exam: ULTRASOUND OF THE ABDOMEN LIMITED, RIGHT UPPER QUADRANT Clinical data: Elevated LFTs, sepsis, lactic acidosis. The patient is on ventilator. Technique: Real-time grayscale imaging of the right upper quadrant was performed. Images supplemented with color Doppler technique. Prior studies: CT scan of abdomen and pelvis dated 10/20/22. Findings: Limited exam due to poor acoustic penetration. Diffuse increased echogenicity of the liver parenchyma. A shadowing focus//calcification in the right lobe measuring up 1.1 cm. No intrahepatic biliary distention. The gallbladder is distended with several small stones. The gallbladder wall thickness measures 0.8 cm. No sludge. The common bile duct is normal in caliber measuring 0.4cm. The right kidney [id number] x 6.6 x 5.4 cm. Normal echogenicity and cortical thickness are preserved. No evidence of renal masses, no calculi and no hydronephrosis Diffuse increased echogenicity of the pancreas. Imaged portion of the aorta demonstrates no acute pathology. Visualized portion of the inferior vena cava is unremarkable. 1.  Limited exam for reasons discussed above. 2.  Diffuse fatty infiltration of the liver with a 1.1 cm calcification in the right hepatic lobe. 3.  Distended gallbladder with several small stones and mild diffuse gallbladder wall thickening. 4.  No other significant abnormality. Recommendation: Follow up as clinically indicated.  Electronically Signed by Yani Basurto MD at 21-Oct-2022 11:06:19 PM EST             XR CHEST PORTABLE    Result Date: 10/20/2022  NO PRIOR REPORT AVAILABLE Exam: X-RAY OF [facility] Clinical data:Confirmation of course of NG/OG/NE tube and location of tip of tube. Technique:Single view of the chest. Prior studies: Radiograph of the chest done on same day. Findings: The lungs are grossly clear; noevidence of acute infiltrate or pleural effusion. Cardiac silhouette is mildly enlarged. No acute osseous abnormality is detected. The NG tube can be traced to the level of distal esophagus. Scattered nonspecific gas-filled loops of bowel in the upper abdomen. The NG tube can be traced to the level of distal esophagus. Suggest advancing accordingly, with a follow-up radiograph. Recommendation: As above. Electronically Signed by Myron Engle MD at 20-Oct-2022 06:15:09 PM EST             XR CHEST PORTABLE    Result Date: 10/20/2022  NO PRIOR REPORT AVAILABLE Exam: X-RAY OF [facility] Clinical data:Abdominal pain, vomiting. Technique:Single view of the chest. Prior studies: No prior studies submitted. Findings: The trachea is midline. The heart is enlarged. Mediastinal and pulmonary vascular shadows are normal.  There is no focal consolidation or effusion. The osseous structures are intact. Cardiomegaly. Recommendation: Follow up as clinically indicated.  Electronically Signed by Jamar Robertson MD at 20-Oct-2022 03:26:36 PM EST               Medications  Current Facility-Administered Medications   Medication Dose Route Frequency Provider Last Rate Last Admin    albumin human 25 % IV solution 50 g  50 g IntraVENous Once Franca Bergeron  mL/hr at 10/31/22 0626 50 g at 10/31/22 0626    metoprolol (LOPRESSOR) injection 5 mg  5 mg IntraVENous Q12H Frank Resendiz MD   5 mg at 10/30/22 1934    famotidine (PEPCID) tablet 20 mg  20 mg Oral BID Jose Fabian MD   20 mg at 10/30/22 1934    thiamine mononitrate tablet 100 mg  100 mg Oral Daily Jose Fabian MD        [facility] 1000 mg in sodium chloride 0.9% 50 mL (duplex)  1,000 mg IntraVENous Q8H Katerin Pinto MD 16.7 mL/hr at 10/31/22 0452 1,000 mg at 10/31/22 0452    sodium chloride flush 0.9 % injection 5-40 mL  5-40 mL IntraVENous 2 times per day Contreras Ross MD   10 mL at 10/30/22 1934    sodium chloride flush 0.9 % injection 5-40 mL  5-40 mL IntraVENous PRN Contreras Ross MD        0.9 % sodium chloride infusion  25 mL IntraVENous PRN Contreras Ross MD        lactated ringers infusion   IntraVENous Continuous Selestino MD Ethan 150 mL/hr at 10/31/22 0339 Rate Change at 10/31/22 0339    metoprolol (LOPRESSOR) injection 5 mg  5 mg IntraVENous Q6H PRN Contreras Ross MD   5 mg at 10/30/22 0152    enoxaparin (LOVENOX) injection 40 mg  40 mg SubCUTAneous Daily Wendy Edmondson MD   40 mg at 10/30/22 0744    fat emulsion 20 % infusion 250 mL  250 mL IntraVENous Once per day on Mon Thu Wendy Edmondson MD   Stopped at 10/28/22 0030    phenylephrine (MARIE-SYNEPHRINE) 50 mg in dextrose 5 % 250 mL infusion   mcg/min IntraVENous Continuous Mando Randolph MD   Stopped at 10/28/22 3341    metoclopramide (REGLAN) injection 5 mg  5 mg IntraVENous Q6H Albaro Roy MD   5 mg at 10/31/22 0540    BPCO OINT 1 Dose  1 Dose Apply externally BID Albaro Roy MD   1 Dose at 10/30/22 1934    sodium phosphate 33.33 mmol in sodium chloride 0.9 % 250 mL IVPB  0.32 mmol/kg IntraVENous PRN Perico Valdez, DO        sodium phosphate 10 mmol in sodium chloride 0.9 % 250 mL IVPB  10 mmol IntraVENous PRN Perico Valdez DO        Or    sodium phosphate 15 mmol in sodium chloride 0.9 % 250 mL IVPB  15 mmol IntraVENous PRN Perico Valdez, DO        Or    sodium phosphate 20 mmol in sodium chloride 0.9 % 500 mL IVPB  20 mmol IntraVENous PRN Perico Valdez DO        digoxin (LANOXIN) injection 125 mcg  125 mcg IntraVENous Daily Perico Valdez DO   125 mcg at 10/30/22 0756    HYDROmorphone HCl PF (DILAUDID) injection 2 mg  2 mg IntraVENous Q2H PRN Delisa Lung, DO   2 mg at 10/27/22 1333    Or    HYDROmorphone HCl PF (DILAUDID) injection 1 mg  1 mg IntraVENous Q2H PRN Delisa Lung, DO   1 mg at 10/29/22 1314    Or    HYDROmorphone HCl PF (DILAUDID) injection 0.5 mg  0.5 mg IntraVENous Q2H PRN Delisa Lung, DO   0.5 mg at 10/31/22 1240    sodium chloride flush 0.9 % injection 5-40 mL  5-40 mL IntraVENous 2 times per day Delisa Lung, DO   10 mL at 10/28/22 2029    sodium chloride flush 0.9 % injection 5-40 mL  5-40 mL IntraVENous PRN Delisa Lung, DO        0.9 % sodium chloride infusion   IntraVENous PRN Delisa Lung, DO 5 mL/hr at 10/29/22 1147 New Bag at 10/29/22 1147    glucose chewable tablet 16 g  4 tablet Oral PRN Delisa Lung, DO        dextrose bolus 10% 125 mL  125 mL IntraVENous PRN Delisa Lung, DO        Or    dextrose bolus 10% 250 mL  250 mL IntraVENous PRN Delisa Lung, DO        glucagon (rDNA) injection 1 mg  1 mg SubCUTAneous PRN Delisa Lung, DO        dextrose 10 % infusion   IntraVENous Continuous PRN Delisa Lung, DO        potassium chloride (KLOR-CON M) extended release tablet 40 mEq  40 mEq Oral PRN Delisa Lung, DO   40 mEq at 10/29/22 0320    Or    potassium bicarb-citric acid (EFFER-K) effervescent tablet 40 mEq  40 mEq Oral PRN Delisa Lung, DO   40 mEq at 10/30/22 1630    Or    potassium chloride 10 mEq/100 mL IVPB (Peripheral Line)  10 mEq IntraVENous PRN Delisa Lung, DO        ondansetron (ZOFRAN-ODT) disintegrating tablet 4 mg  4 mg Oral Q8H PRN Delisa Lung, DO        Or    ondansetron [facility]) injection 4 mg  4 mg IntraVENous Q6H PRN Delisa Lung, DO        magnesium hydroxide (MILK OF MAGNESIA) 400 MG/5ML suspension 30 mL  30 mL Oral Daily PRN Delisa Lung, DO   30 mL at 10/24/22 1609    insulin lispro (HUMALOG) injection vial 0-4 Units  0-4 Units SubCUTAneous TID [facility] Bard Numbers, DO        insulin lispro (HUMALOG) injection vial 0-4 Units  0-4 Units SubCUTAneous Nightly Heidi Mcduffie, DO         Allergies  Patient has no known allergies. 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 Severe fluid and gaseous distention of the entire stomach and small bowel including the distal esophagus. Scattered enlarged retroperitoneal lymph nodes. These are presumably metastatic if the colon mass proves to be malignant. Marked hydropic distention of the gallbladder containing gallstones. No CT evidence of cholecystitis. Hepatic steatosis. Calcified granulomas throughout the liver  Spleen non enlarged and calcified granulomas throughout the spleen.    -Colectomy with gastrostomy placement by Dr. Seven Esquivel on 10/21/2022: Operative note indicated hard mass adherent to the liver and small bowel posteriorly. Pathology:  Colon, right hemicolectomy:   1. Invasive moderately differentiated colonic adenocarcinoma, measuring 6.1 cm in greatest dimension. 2.  Tumor invades through the muscularis propria into the pericolonic   adipose tissue but does not reach the serosal surface. 3.  Surgical excision margins are negative for evidence of carcinoma and adenomatous change. 4.  Sections of terminal ileum, negative for evidence of malignancy. 5.  Sections of the appendix, negative for evidence of malignancy. 6.  6 out of 32 lymph nodes, positive for metastatic adenocarcinoma.      AJCC STAGE: pT3, pN2a, pMx     -Baseline CEA 11.0 on 10/21/2022  -Postop CEA 6.9 on 10/23/2022  -Pathology  (AJCC STAGE: pT3, pN2a, pMx) was discussed with family by ALLISON Jackson, on 28 2022    #Thrombocytopenia-suspect multifactorial to include liver dysfunction, long term alcohol use, consumption from infection    Recent Labs     10/31/22  0232 10/30/22  0424 10/29/22  0604   WBC 13.1* 10.2 11.1*   HGB 13.0* 13.0* 13.0*   HCT 37.9* 37.8* 37.6*   MCV 89.6 89.6 90.0   PLT 89* 63* 51*        PLT improved to 89,000. #Elevated LFTs  CT scan of the abdomen pelvis on 10/20/2022 reported Hepatic steatosis. Calcified granulomas throughout the liver. Spleen non enlarged and calcified granulomas throughout the spleen. Ultrasound gallbladder 10/21/2022:   1. Limited exam.  2.  Diffuse fatty infiltration of the liver with a 1.1 cm calcification in the right hepatic lobe. 3.  Distended gallbladder with several small stones and mild diffuse gallbladder wall thickening. As per GI/general surgery- not a candidate for cholecystectomy, gallstones felt to be incidental    Hepatitis C reactive, 10/26/2022  History of EtOH            Followed by GI    #SONIA, improved      #sepsis, anastomotic leak  Status post laparotomy, repair of anastomotic leak and creation of diverting ileostomy 10/26/2022  Blood cultures 10/25/2022 x2: NGTD    Afebrile 115/83    Meropenem 1 g IV every 8 hours continues    #malnutrition  TPN completed. Now on clear liquids. PLAN:  POD# 10 colectomy with gastrostomy placement-per Dr. Randi Santacruz 10/21/2022  POD# 4 laparotomy, repair of anastomotic leak and creation of diverting ileostomy by Dr. Billy Light 10/27/2022    Current pathology is at least stage III, will need further staging as outpatient    Continue current support        Jenifer Valdez PA-C    10/31/22  6:41 AM  Physician's attestation/substantial contribution:  I, Dr Darius Baer, independently performed an evaluation on Faylene Spatz. I have reviewed relevant medical information/data to include but not limited to medication list, relevant appropriate labs and imaging when applicable. I reviewed other physician's notes, ancillary services and nurses assessment.  I have reviewed the above documentation completed by the Nurse Practitioner or Physician Assistant. Please see my additional contributions to the history of present illness, physical examination, and assessment/medical decision-making that reflect my findings and impressions. I have seen and examined the patient and the key elements of all parts of the encounter have been performed by me. I agree with the assessment and plan as outlined by the ARNP/PA. Subjective-continues to be in the ICU. Postop day 4. TPN has been discontinued. Currently liquids. Objective-chronically appearing  Assessment/plan:  Colon cancer-further discussion of treatment will be approached as outpatient. Continue current supportive care    Carlotta Rosado MD

## 2022-11-01 NOTE — PROGRESS NOTES
[address] [facility] Pharmacokinetic Monitoring Service - Vancomycin     Cely Murray is a 77 y.o. male starting on vancomycin therapy for sepsis. Pharmacy consulted by Dr. Regan Hunter for monitoring and adjustment. Target Concentration: Dosing based on anticipated concentration <15 mg/L due to renal impairment/insufficiency    Additional Antimicrobials: Merrem    Pertinent Laboratory Values: Wt Readings from Last 1 Encounters:   10/31/22 186 lb 8 oz (84.6 kg)     Temp Readings from Last 1 Encounters:   10/31/22 97.3 °F (36.3 °C) (Temporal)     Estimated Creatinine Clearance: 44 mL/min (A) (based on SCr of 1.7 mg/dL (H)). Recent Labs     10/31/22  0232 10/31/22  2235   CREATININE 1.0 1.7*   WBC 13.1* 20.9*       Pertinent Cultures:  Culture Date Source Results   10/25/22 Blood No growth   10/31/22 Urine No result   MRSA Nasal Swab: N/A. Non-respiratory infection.     Plan:  Concentration-guided dosing due to renal impairment/insufficiency  Start vancomycin 2000 mg IV x 1 dose  Renal labs as indicated   Vancomycin concentration ordered for 11/2 @ [address] will continue to monitor patient and adjust therapy as indicated    Thank you for the consult,  RICHIE [facility] [place], BCPS  11/1/2022 1:04 AM

## 2022-11-01 NOTE — PLAN OF CARE
Nutrition Problem #1: Inadequate oral intake, Increased nutrient needs  Intervention: Food and/or Nutrient Delivery: Continue Current Diet, Continue Oral Nutrition Supplement  Nutritional

## 2022-11-01 NOTE — PROGRESS NOTES
Hospitalist Progress Note    Patient:  Hermelinda Albarado  YOB: 1955  Date of Service: 11/1/2022  MRN: [id number]   Acct: [de-identified]   Primary Care Physician: No primary care provider on file. Advance Directive: DNR  Admit Date: 10/20/2022       Hospital Day: 12  Referring Provider: Jaun Vargas MD    Patient Seen, Chart, Consults, Notes, Labs, Radiology studies reviewed. Subjective:     Tremulousness and tachycardia have improved some today. Resting more comfortably. Hemodynamically stable. Summary:  Hermelinda Albarado is a 77 y.o. male  whom we are following for SBO status post right hemicolectomy for obstructing colon cancer, complicated by development of septic peritonitis due to anastomotic leak, as well as nonischemic cardiomyopathy, A. fib intermittently in RVR. He underwent an open right hemicolectomy on 10/21 however course complicated after developed sepsis with fevers, leukocytosis with CT showing large volume peritoneal fluid with large pneumoperitoneum, septic shock with peritonitis requiring volume resuscitation, vasopressor support and antibiotics on meropenem. Patient was reintubated for surgery. Additionally intermittently in A. fib with RVR requiring IV AV meaghan blockade. Patient taken back to the OR for repair of anastomotic leak, washout with diverting ileostomy. Had SONIA with metabolic acidosis in setting of sepsis and volume losses. Sepsis, encephalopathy and renal function gradually improved following surgery with further resuscitation, antibiotics and weaned off vasopressor support. A. fib rate controlled on metoprolol and digoxin. Required TPN for nutritional support for period of time. Eventually oral diet advanced per surgery. He developed more hypovolemia related to large volume of TRISTAN drain output with third spacing/liver disease likely contributing, requiring additional boluses of fluid.           Allergies:  Patient has no known allergies.     Medicines:  Current Facility-Administered Medications   Medication Dose Route Frequency Provider Last Rate Last Admin    meropenem (MERREM) 1000 mg in sodium chloride 0.9% 50 mL (duplex)  1,000 mg IntraVENous Q12H Karen Beebe MD   Stopped at 11/01/22 1318    sodium phosphate 25.59 mmol in sodium chloride 0.9 % 250 mL IVPB  0.32 mmol/kg IntraVENous PRN Donna Baum DO        HYDROmorphone HCl PF (DILAUDID) injection 1 mg  1 mg IntraVENous Q4H PRN Merced Fernando MD        Or    HYDROmorphone HCl PF (DILAUDID) injection 0.5 mg  0.5 mg IntraVENous Q4H PRN Merced Fernando MD   0.5 mg at 11/01/22 1544    [Held by provider] digoxin (LANOXIN) injection 125 mcg  125 mcg IntraVENous Daily Merced Fernando MD        0.9 % sodium chloride bolus  1,000 mL IntraVENous Once Moy Vale MD   Stopped at 11/01/22 1616    LORazepam (ATIVAN) tablet 1 mg  1 mg Oral Q1H PRN Merced Fernando MD        Or    LORazepam (ATIVAN) injection 1 mg  1 mg IntraVENous Q1H PRN Merced Fernando MD        Or    LORazepam (ATIVAN) tablet 2 mg  2 mg Oral Q1H PRN Merced Fernando MD        Or    LORazepam (ATIVAN) injection 2 mg  2 mg IntraVENous Q1H PRN Merced Fernando MD        Or    LORazepam (ATIVAN) tablet 3 mg  3 mg Oral Q1H PRN Merced Fernando MD        Or    LORazepam (ATIVAN) injection 3 mg  3 mg IntraVENous Q1H PRN Merced Fernando MD        Or    LORazepam (ATIVAN) tablet 4 mg  4 mg Oral Q1H PRN Merced Fernando MD        Or    LORazepam (ATIVAN) injection 4 mg  4 mg IntraVENous Q1H PRN Merced Fernando MD        metoprolol (LOPRESSOR) injection 5 mg  5 mg IntraVENous Q6H Merced Fernando MD   5 mg at 11/01/22 1421    dextrose 5 % in lactated ringers infusion   IntraVENous Continuous Merced Fernando  mL/hr at 11/01/22 1000 Rate Change at 11/01/22 1000    vitamin D (ERGOCALCIFEROL) capsule 50,000 Units  50,000 Units Oral Weekly Tamica Francisco MD   50,000 Units at 11/01/22 0805    famotidine (PEPCID) tablet 20 mg  20 mg Oral BID Akanksha Vera MD   20 mg at 11/01/22 0005    thiamine mononitrate tablet 100 mg  100 mg Oral Daily Akanksha Vera MD   100 mg at 11/01/22 0737    sodium chloride flush 0.9 % injection 5-40 mL  5-40 mL IntraVENous 2 times per day Claude Hankins MD   10 mL at 11/01/22 0801    sodium chloride flush 0.9 % injection 5-40 mL  5-40 mL IntraVENous PRN Claude Hankins MD        0.9 % sodium chloride infusion  25 mL IntraVENous PRN Claude Hankins MD        metoprolol (LOPRESSOR) injection 5 mg  5 mg IntraVENous Q6H PRN Claude Hankins MD   5 mg at 10/31/22 2120    enoxaparin (LOVENOX) injection 40 mg  40 mg SubCUTAneous Daily Siddharth Stover MD   40 mg at 10/30/22 0744    phenylephrine (MARIE-SYNEPHRINE) 50 mg in dextrose 5 % 250 mL infusion   mcg/min IntraVENous Continuous Lalito Santos MD   Stopped at 11/01/22 0552    BPCO OINT 1 Dose  1 Dose Apply externally BID Jose Petty MD   1 Dose at 11/01/22 9908    sodium phosphate 10 mmol in sodium chloride 0.9 % 250 mL IVPB  10 mmol IntraVENous PRN Martell Merck, DO        Or    sodium phosphate 15 mmol in sodium chloride 0.9 % 250 mL IVPB  15 mmol IntraVENous PRN Martell Merck, DO        Or    sodium phosphate 20 mmol in sodium chloride 0.9 % 500 mL IVPB  20 mmol IntraVENous PRN Martell Merck, DO        sodium chloride flush 0.9 % injection 5-40 mL  5-40 mL IntraVENous 2 times per day Martell Merck, DO   10 mL at 11/01/22 0801    sodium chloride flush 0.9 % injection 5-40 mL  5-40 mL IntraVENous PRN Martell Merck, DO        0.9 % sodium chloride infusion   IntraVENous PRN Martell Merck, DO 5 mL/hr at 10/29/22 1147 New Bag at 10/29/22 1147    glucose chewable tablet 16 g  4 tablet Oral PRN Martell Merck, DO        dextrose bolus 10% 125 mL  125 mL IntraVENous PRN Martell Merck, DO   Stopped at 10/31/22 2142    Or    dextrose bolus 10% 250 mL  250 mL IntraVENous PRN Lemond Shackle, DO        glucagon (rDNA) injection 1 mg  1 mg SubCUTAneous PRN Lemond Shackle, DO        dextrose 10 % infusion   IntraVENous Continuous PRN Lemond Shackle, DO   Stopped at 10/31/22 2239    potassium chloride (KLOR-CON M) extended release tablet 40 mEq  40 mEq Oral PRN Lemond Shackle, DO   40 mEq at 10/29/22 0320    Or    potassium bicarb-citric acid (EFFER-K) effervescent tablet 40 mEq  40 mEq Oral PRN Lemond Shackle, DO   40 mEq at 10/30/22 1630    Or    potassium chloride 10 mEq/100 mL IVPB (Peripheral Line)  10 mEq IntraVENous PRN Lemond Shackle, DO        ondansetron (ZOFRAN-ODT) disintegrating tablet 4 mg  4 mg Oral Q8H PRN Lemond Shackle, DO        Or    [facility]etron [facility]) injection 4 mg  4 mg IntraVENous Q6H PRN Lemond Shackle, DO        magnesium hydroxide (MILK OF MAGNESIA) 400 MG/5ML suspension 30 mL  30 mL Oral Daily PRN Lemond Shackle, DO   30 mL at 10/24/22 1609       Past Medical History:  Past Medical History:   Diagnosis Date    Ex-cigarette smoker 10/03/2016    Hypertension     Non-ischemic cardiomyopathy ([facility] [address] 10/03/2016    9/28/2016  Echo  EF 25% 10/3/16  Cath  Mild CAD, EF 10%     Palliative care patient 10/24/2022    Paroxysmal atrial fibrillation ([facility] [address] 03/16/2018       Past Surgical History:  Past Surgical History:   Procedure Laterality Date    FINGER AMPUTATION Left     partial left thumb amputation following trauma    HEMICOLECTOMY N/A 10/21/2022    RIGHT HEMICOLECTOMY WITH GASTROSTOMY PLACEMENT performed by Yoel Mahajan MD at [address] N/A 10/25/2022    LAPAROTOMY, REPAIR OF ANASTAMOSAL LEAK, CREATION OF DIVERTING ILEOSTOMY performed by Penny Tarango MD at [place] [address] ENDOSCOPY N/A 10/23/2022    Dr Zahraa Martinez w/peg placement       Family History  Family History   Problem Relation Age of Onset    High Blood Pressure Mother     Cancer Father         Stomach    Heart Disease Maternal Grandmother     Diabetes Maternal Uncle        Social History  Social History     Socioeconomic History    Marital status:      Spouse name: Not on file    Number of children: Not on file    Years of education: Not on file    Highest education level: Not on file   Occupational History    Not on file   Tobacco Use    Smoking status: Former     Packs/day: 1.00     Years: 2.00     Pack years: 2.00     Types: Cigarettes     Start date:      Quit date:      Years since quittin.8    Smokeless tobacco: Never    Tobacco comments:     light smoking history - socially   Vaping Use    Vaping Use: Never used   Substance and Sexual Activity    Alcohol use: Yes    Drug use: No    Sexual activity: Not on file   Other Topics Concern    Not on file   Social History Narrative    Not on file     Social Determinants of Health     Financial Resource Strain: Not on file   Food Insecurity: Not on file   Transportation Needs: Not on file   Physical Activity: Not on file   Stress: Not on file   Social Connections: Not on file   Intimate Partner Violence: Not on file   Housing Stability: Not on file         Review of Systems:  14 point review of systems completed and is negative except as otherwise noted    Objective:  Blood pressure 113/74, pulse 80, temperature 97.7 °F (36.5 °C), temperature source Temporal, resp. rate 24, height 5' 10\" (1.778 m), weight 176 lb 4.8 oz (80 kg), SpO2 97 %. Intake/Output Summary (Last 24 hours) at 2022 1551  Last data filed at 2022 1400  Gross per 24 hour   Intake 5605.4 ml   Output 4099 ml   Net 1506.4 ml         Physical Exam  Vitals and nursing note reviewed. Constitutional:       General: He is not in acute distress. Appearance: He is not toxic-appearing. HENT:      Head: Normocephalic and atraumatic. Eyes:      General: No scleral icterus. Conjunctiva/sclera: Conjunctivae normal.   Cardiovascular:      Rate and Rhythm: Tachycardia present. Rhythm irregular. Heart sounds: Normal heart sounds. Pulmonary:      Effort: Pulmonary effort is normal.      Breath sounds: Normal breath sounds. Abdominal:      General: Abdomen is flat. Palpations: Abdomen is soft. Tenderness: There is no rebound. Comments: Minimal abdominal tenderness   Musculoskeletal:         General: No swelling. Cervical back: No rigidity. Right lower leg: No edema. Left lower leg: No edema. Skin:     General: Skin is warm and dry. Neurological:      Mental Status: He is disoriented. Comments: able to follow commands       Labs:  BMP:   Recent Labs     10/30/22  0424 10/31/22  0232 10/31/22  2225 10/31/22  2235 11/01/22  0213 11/01/22  0539    134*  --  130*  --  131*   K 3.5 5.0   < > 5.1* 4.9 4.5    101  --  99  --  100   CO2 25 23  --  19*  --  21*   PHOS 3.1 3.7  --   --   --   --    BUN 38* 46*  --  59*  --  54*   CREATININE 0.6 1.0  --  1.7*  --  1.1   CALCIUM 7.3* 7.7*  --  8.2*  --  8.3*    < > = values in this interval not displayed. CBC:   Recent Labs     10/31/22  0232 10/31/22  2235 11/01/22  0539   WBC 13.1* 20.9* 14.2*   HGB 13.0* 13.6* 11.6*   HCT 37.9* 40.0* 33.5*   MCV 89.6 92.2 90.5   PLT 89* 85* 80*       LIVER PROFILE:   Recent Labs     10/31/22  0232 10/31/22  2235 11/01/22  0539   AST 91* 79* 63*   ALT 55* 43* 34   BILITOT 6.5* 8.8* 9.7*   ALKPHOS 101 87 74       PT/INR: No results for input(s): PROTIME, INR in the last 72 hours. APTT: No results for input(s): APTT in the last 72 hours. BNP:  No results for input(s): BNP in the last 72 hours. Ionized Calcium:No results for input(s): IONCA in the last 72 hours. Magnesium:  Recent Labs     10/30/22  0424 10/31/22  0232 10/31/22  2235   MG 2.1 2.0 2.1       Phosphorus:  Recent Labs     10/30/22  0424 10/31/22  0232   PHOS 3.1 3.7       HgbA1C: No results for input(s): LABA1C in the last 72 hours.   Hepatic:   Recent Labs     10/31/22  0232 10/31/22  2235 11/01/22  0539   ALKPHOS 101 87 74   ALT 55* 43* 34   AST 91* 79* 63*   PROT 5.6* 5.3* 5.4*   BILITOT 6.5* 8.8* 9.7*   LABALBU 1.7* 2.3* 2.9*       Lactic Acid:   Recent Labs     10/31/22  2235   LACTA 4.3*       Troponin: No results for input(s): CKTOTAL, CKMB, TROPONINT in the last 72 hours. ABGs: No results for input(s): PH, PCO2, PO2, HCO3, O2SAT in the last 72 hours. CRP:  No results for input(s): CRP in the last 72 hours. Sed Rate:  No results for input(s): SEDRATE in the last 72 hours. Cultures:   No results for input(s): CULTURE in the last 72 hours. No results for input(s): BC, Serge Staple in the last 72 hours. No results for input(s): CXSURG in the last 72 hours. Radiology reports as per the Radiologist  Radiology: CT ABDOMEN PELVIS W IV CONTRAST Additional Contrast? None    Result Date: 10/20/2022  CLINICAL HISTORY: 48-hour history of diffuse abdominal pain distention nausea vomiting some diarrhea no prior history COMPARISON: No comparison TECHNIQUE: Axial images of the abdomen and pelvis were obtained after the administration of IV contrast.Coronal and sagittal multiplanar reconstructions were performed. One or more of the following dose reduction techniques were used: Automated exposure control, adjustment of the mA and/or kV according to patient size, and/or use of iterative reconstruction technique. COMPARISON: None. FINDINGS: Statements: None. Lower Chest: Unremarkable. Hepatobiliary: Hepatic steatosis. Calcified granulomas throughout the liver. No focal lesion is identified. Marked distention of the gallbladder containing gallstones. No CT evidence of cholecystitis however. No biliary ductal dilatation is evident. Pancreas: The pancreatic parenchyma is unremarkable and there is no main duct dilatation. Spleen: The spleen is nonenlarged. Calcified granulomas throughout the spleen. Adrenals: No adrenal glands are symmetric.  Genitourinary: The kidneys are symmetric and enhance appropriately. No suspicious focal parenchymal abnormality is identified in either kidney. There is no collecting system dilatation. The bladder is unremarkable, as imaged. The prostate gland appears unremarkable. Gastrointestinal: Diffuse fluid and air distention of the the entire small bowel and proximal large bowel measuring up to 4.3 cm in the left abdomen and up to 7.6 cm of the proximal colon. Transitional point seen at a site of masslike thickening of the hepatic flexure measuring 3.2 x 3.9 cm (series 2 image 35). Colonic diverticulosis without diverticulitis. The appendix appears unremarkable. Lymphatics: Multiple prominent and enlarged retroperitoneal lymph nodes. Largest is 1.5 cm in the left periaortic region (series 2 image 33). Vascular: Moderate calcific atherosclerosis. The abdominal aorta is normal in caliber. MSK/Body Wall: No concerning bony lesion is identified. Small fat-containing right inguinal hernia. Peritoneum/Other: There is no free fluid or abnormal collection. No free gas. 1.Irregular mass at the hepatic flexure of the colon resulting in high-grade small bowel and proximal large bowel obstruction. Severe fluid and gaseous distention of the entire stomach and small bowel including the distal esophagus. Findings are overall concerning for colonic neoplasm. Recommend GI and surgical consultation. 2.Scattered enlarged retroperitoneal lymph nodes. These are presumably metastatic if the colon mass proves to be malignant. 3.Marked hydropic distention of the gallbladder containing gallstones. No CT evidence of cholecystitis. Recommend correlation with LFTs. US GALLBLADDER RUQ    Result Date: 10/21/2022  NO PRIOR REPORT AVAILABLE Exam: ULTRASOUND OF THE ABDOMEN LIMITED, RIGHT UPPER QUADRANT Clinical data: Elevated LFTs, sepsis, lactic acidosis. The patient is on ventilator. Technique: Real-time grayscale imaging of the right upper quadrant was performed. Images supplemented with color Doppler technique. Prior studies: CT scan of abdomen and pelvis dated 10/20/22. Findings: Limited exam due to poor acoustic penetration. Diffuse increased echogenicity of the liver parenchyma. A shadowing focus//calcification in the right lobe measuring up 1.1 cm. No intrahepatic biliary distention. The gallbladder is distended with several small stones. The gallbladder wall thickness measures 0.8 cm. No sludge. The common bile duct is normal in caliber measuring 0.4cm. The right kidney [id number] x 6.6 x 5.4 cm. Normal echogenicity and cortical thickness are preserved. No evidence of renal masses, no calculi and no hydronephrosis Diffuse increased echogenicity of the pancreas. Imaged portion of the aorta demonstrates no acute pathology. Visualized portion of the inferior vena cava is unremarkable. 1.  Limited exam for reasons discussed above. 2.  Diffuse fatty infiltration of the liver with a 1.1 cm calcification in the right hepatic lobe. 3.  Distended gallbladder with several small stones and mild diffuse gallbladder wall thickening. 4.  No other significant abnormality. Recommendation: Follow up as clinically indicated. Electronically Signed by Patsy Dumont MD at 21-Oct-2022 11:06:19 PM EST             XR CHEST PORTABLE    Result Date: 10/20/2022  NO PRIOR REPORT AVAILABLE Exam: X-RAY OF [facility] Clinical data:Confirmation of course of NG/OG/NE tube and location of tip of tube. Technique:Single view of the chest. Prior studies: Radiograph of the chest done on same day. Findings: The lungs are grossly clear; noevidence of acute infiltrate or pleural effusion. Cardiac silhouette is mildly enlarged. No acute osseous abnormality is detected. The NG tube can be traced to the level of distal esophagus. Scattered nonspecific gas-filled loops of bowel in the upper abdomen. The NG tube can be traced to the level of distal esophagus. Suggest advancing accordingly, with a follow-up radiograph. Recommendation: As above. Electronically Signed by Myron Engle MD at 20-Oct-2022 06:15:09 PM EST             XR CHEST PORTABLE    Result Date: 10/20/2022  NO PRIOR REPORT AVAILABLE Exam: X-RAY OF THECHEST Clinical data:Abdominal pain, vomiting. Technique:Single view of the chest. Prior studies: No prior studies submitted. Findings: The trachea is midline. The heart is enlarged. Mediastinal and pulmonary vascular shadows are normal.  There is no focal consolidation or effusion. The osseous structures are intact. Cardiomegaly. Recommendation: Follow up as clinically indicated.  Electronically Signed by Jamar Robertson MD at 20-Oct-2022 03:26:36 PM EST                Assessment     SBO   S/P right hemicolectomy for obstructing colon cancer  Surgical pathology --noted at least stage III, further staging as outpatient per oncology  Advance diet as per surgery    Septic shock due to peritonitis with anastomotic leak  ---Improved  - Status post repair of anastomotic leak, washout and diverting ileostomy  --Weaned off vasopressor support  - Continue meropenem for 7-day duration postop per infectious diseases    Hypovolemia with increased output from TRISTAN drain in setting of third spacing/liver disease  -- Continue volume resuscitation, additional boluses today  - Continue maintenance fluid at 150 cc/hour    Paroxysmal A. fib intermittently in RVR  - Continue Lopressor, fairly well controlled at this time  - Digoxin level elevated, will hold medication for now    Acute encephalopathy, multifactorial with sepsis and liver disease, along with some hospital delirium  - Monitor for alcohol withdrawal, should be out of typical window for alcohol withdrawal however still possible  -- Supportive care, reorient as able, give thiamine  -- Limit sedating meds    Transaminitis, worsening hyperbilirubinemia in setting of alcoholic liver disease  - Follow-up abdominal ultrasound, eval for ductal dilatation    SONIA, prerenal, hypovolemia  Metabolic acidosis-resolved  - Improved with IV fluid    Acute hypoxemic respiratory failure requiring intubation/mechanical ventilation  --Resolved  -Extubated 10/26  --Monitor SPO2, supplemental oxygen as needed     Monitor and replete electrolytes as needed    Malnutrition  -- Nutritional support, advance diet per surgery      DVT prophylaxis SCDs, okay for prophylactic Lovenox as long as platelet count stays above 50,000        Jared Bosch MD

## 2022-11-01 NOTE — CONSULTS
**Physician Signature**  This document was electronically signed by: Tatiana Arellano DO  10/31/2022   10:17 PM    **Consult Cover Page**  FROM: Natalia Su[address], [phone]  Call Back Number: [phone]  SUBJECT: Consult Recommendations  Date and Time of Report: 10/31/2022 10:17 PM CT  Items Contained in this Document: Critical Care [facility]    **Consult Information**  Member Facility: [address] [place] [address] Drive MRN: [id number]  Visit/Encounter Number: [id number]  Consult ID: [id number]  Facility Time Zone: CT  Date and Time of Request: 10/31/2022 06:57 PM  CT  Requesting Clinician: Reva Duane DO  Patient Name: Seward Saint  YOB: 1955  Gender: Male  Patient identity was confirmed at the beginning of the consult with the   patient/family/staff using two personal identifiers: Patient name and       **Reason for Consult**  Reason for Consult: [facility]    **Admission**  Admission Date: 10-  Chief reason for ICU admission: SBO w/ colon mass  Secondary reasons for ICU admission: Sepsis    **Core Metrics**  General orienting sentence for patient: Pt is a 78 y/o M with a PMH of A fib. On 10/20, pt was found to have a SBO due to colon mass and gallstones. A   right hemicolectomy was done with G tube placement. On 10/22, pt was   extubated. He became agitated and started on Precedex. On 10/23, pt pulled   out his G tube. GI placed a PEG tube. On 10/24, a garcia was placed for   urinary retention. On 10/25, pt with 's in a fib, Temp 106.9. Repeat   CT was done which a large amount intraperitoneal fluid consistent with an   anastomic leak. On 10/26, pt went for repair of the leak and diverting   colostomy. He was hypotensive and received 3L of fluid bolus and then placed   on laurie gtt @ 90 mcg. He is on meropenem. improved. CBG in low 60's. Getting   D10 gtt.   Chief physiologic deterioration: hypoglycemia, increased RR  Is the patient on DVT prophylaxis?: Yes  Prophylaxis type: Pharmacological  DVT Prophylaxis Comments: lovenox  Is the patient on GI prophylaxis?: Yes  Gi Prophylaxis Comments: pepcid  Has this patient reached their nutritional goal?: Yes  Nutritional Goals Details: tpn d/c'd.   Now eating  Are there current issues with pain management in this patient?:   No  Pain management notes: Dilaudid PRN  Are there issues with skin integrity?: No  Skin Integrity Details: DTI Left upper butt check, R nostril DTI, surgical   wounds  Are there issues with delirium?: No  Has the patient been mobilized?: No  Is this patient currently intubated?: No  Are there ethical or care philosophy or family issues?: No  Do you recommend an in depth evaluation?: No  Disposition Recommendations: Continue ICU level of care    **Inserted/Removed Devices**  Device Name: Central venous catheter  Site of insertion: Interjugular - Right  Date of insertion: Unknown  Date of device removal: 10-  Device Name: Arterial Line  Site of insertion: Radial - Right  Date of insertion: Unknown  Date of device removal: 10-  Device Name: Acevedo Catheter  Site of insertion: Urethra  Date of insertion: Unknown  Device Name: Colostomy  Site of insertion: Abdominal Wall  Date of insertion: Unknown  Comment: ileostomy  Device Name: PEG Tube  Site of insertion: Abdominal Wall  Date of insertion: 10-  Device Name: [facility])  Site of insertion: Abdominal Wall  Date of insertion: 10-  Device Name: PICC  Site of insertion: Antecubital - Left  Date of insertion: Unknown    **Physician Signature**  This document was electronically signed by: Oscar Mccarthy DO  10/31/2022   10:17 PM

## 2022-11-01 NOTE — PROGRESS NOTES
Comprehensive Nutrition Assessment    Type and Reason for Visit:  Reassess    Nutrition Recommendations/Plan:   Continue to follow for advancing diet and ONS tolerance     Malnutrition Assessment:  Malnutrition Status: Moderate malnutrition (11/01/22 7051)    Context:  Chronic Illness     Findings of the 6 clinical characteristics of malnutrition:  Energy Intake:  Mild decrease in energy intake (Comment)  Weight Loss:  Greater than 10% over 6 months     Body Fat Loss:  Mild body fat loss Orbital   Muscle Mass Loss:  No significant muscle mass loss    Fluid Accumulation:  Mild Generalized   Strength:  Not Performed    Nutrition Assessment:    PN has been discontinued. Die thas been advanced to Full liquid with Ensure Surgery at all meals. PO intake is improving with intake ranging %. Accuchek's     Nutrition Related Findings:    Colostomy, PEG Wound Type: Surgical Incision, Multiple, Deep Tissue Injury       Current Nutrition Intake & Therapies:    Average Meal Intake: 51-75%, %  Average Supplements Intake: 1-25%, 26-50%, 51-75%  ADULT DIET; Full Liquid  ADULT ORAL NUTRITION SUPPLEMENT; Breakfast, Lunch, Dinner; Wound Healing Oral Supplement    Anthropometric Measures:  Height: 5' 10\" (177.8 cm)  Ideal Body Weight (IBW): 166 lbs (75 kg)    Admission Body Weight: 230 lb (104.3 kg) (stated)  Current Body Weight: 176 lb 4.8 oz (80 kg), 106.2 % IBW. Weight Source: Bed Scale  Current BMI (kg/m2): 25.3  Usual Body Weight: 238 lb (108 kg) (4/2022)  % Weight Change (Calculated): -16.3  Weight Adjustment For: No Adjustment    BMI Categories: Overweight (BMI 25.0-29. 9)    Estimated Daily Nutrient Needs:  Energy Requirements Based On: Kcal/kg     Energy (kcal/day): 7018-1494 kcals (20-25 kcals/kg)  Weight Used for Protein Requirements: Ideal  Protein (g/day): 77=760m  Method Used for Fluid Requirements: 1 ml/kcal  Fluid (ml/day): 9481-2066 ml    Nutrition Diagnosis:   Inadequate oral intake, Increased nutrient needs related to acute injury/trauma, increase demand for energy/nutrients as evidenced by wounds, weight loss, weight loss greater than or equal to 2% in 1 week    Nutrition Interventions:   Food and/or Nutrient Delivery: Continue Current Diet, Continue Oral Nutrition Supplement  Nutrition Education/Counseling: No recommendation at this time  Coordination of Nutrition Care: Continue to monitor while inpatient       Goals:  Previous Goal Met: Progressing toward Goal(s)  Goals: Meet at least 75% of estimated needs, Initiate nutrition support       Nutrition Monitoring and Evaluation:   Behavioral-Environmental Outcomes: None Identified  Food/Nutrient Intake Outcomes: Diet Advancement/Tolerance, Food and Nutrient Intake, Supplement Intake  Physical Signs/Symptoms Outcomes: Biochemical Data, Fluid Status or Edema, Weight, Skin    Discharge Planning:     Too soon to determine     Denia Zeng MS, RD, LD  Contact: [phone]

## 2022-11-01 NOTE — PROGRESS NOTES
This  was asked to assist pt in completing an AD/LW. Pt was unable last week. This is a follow up visit. Pt is able to complete and clearly said, \"I want my sister Dallas Cleary as primary. \" He then named his niece Radha Lao as an alternate and then his daughter Leanne Lainez also as an alternate. Pt is already a DNR and he made correlating advance care planning decisions. Pt initialed and signed the document and this  witnessed and then notarized the document. Original and copies were placed in pt's room, a copy went into pt's soft chart, and a copy was emailed to [facility] to be scanned into pt's EMR. Also provided spiritual care with sustaining presence, support, mediation, and prayer. Pt expressed gratitude for spiritual care.     Electronically signed by Akin Henry on 11/1/2022 at 10:37 AM

## 2022-11-01 NOTE — PROGRESS NOTES
[facility] Pharmacokinetic Monitoring Service - Vancomycin     Charbel Mosley is a 77 y.o. male starting on vancomycin therapy for sepsis. Pharmacy consulted by Dr. Kae Hollingsworth for monitoring and adjustment. Target Concentration: Dosing based on anticipated concentration <15 mg/L due to renal impairment/insufficiency    Additional Antimicrobials: Merrem    Pertinent Laboratory Values: Wt Readings from Last 1 Encounters:   10/31/22 186 lb 8 oz (84.6 kg)     Temp Readings from Last 1 Encounters:   10/31/22 97.3 °F (36.3 °C) (Temporal)     Estimated Creatinine Clearance: 44 mL/min (A) (based on SCr of 1.7 mg/dL (H)). Recent Labs     10/31/22  0232 10/31/22  2235   CREATININE 1.0 1.7*   WBC 13.1* 20.9*       Pertinent Cultures:  Culture Date Source Results   10/25/22 Blood No growth   10/31/22 Urine No result   MRSA Nasal Swab: N/A. Non-respiratory infection.     Plan:  Concentration-guided dosing due to renal impairment/insufficiency  Start vancomycin 2000 mg IV x 1 dose  Renal labs as indicated   Vancomycin concentration ordered for 11/2 @ [facility] will continue to monitor patient and adjust therapy as indicated    Thank you for the consult,  Rizwana Chavis, [facility], [facility]  11/1/2022 1:04 AM

## 2022-11-01 NOTE — PROGRESS NOTES
Palliative Care Progress Note  11/1/2022 8:25 AM    Patient:  Cely Murray  YOB: 1955  Primary Care Physician: No primary care provider on file. Advance Directive: DNR  Admit Date: 10/20/2022       Hospital Day: 12  Portions of this note have been copied forward, however, changed to reflect the most current clinical status of this patient. CHIEF COMPLAINT/REASON FOR CONSULTATION Goals of care, family support, Code status, symptom management     SUBJECTIVE:  Mr. Oralia Garnica is alert and resting in bed. Denies pain when asked. No s/s distress noted. BP is stable. HPI: The patient is a 77 y.o. male with PMH HTN, afib, and non-ischemic cardiomyopathy who presented to [facility] ED 10/20/2022 complaining of abdominal pain, distention, and n/v x2 days. CT abdomen/pelvis in ED revealed a colon mass at the hepatic flexure causing obstruction. He was admitted under hospitalist services and initiated on NGT therapy for decompression. GS was consulted in ED and pt underwent right hemicolectomy with gastrostomy tube placement 10/21/2022 with hard mass adherent to liver and small bowel posteriorly. He remained intubated and sedated post op due to concern for trauma to the nasopharynx and pharynx due to a false track created by an NG tube. He underwent scoping with ENT 10/22/2022 to evaluate for safety to extubate and was found to be patent bilaterally with trauma to to the right but no no contraindication to wean to extubation and positive pressure if needed. He was liberated from the ventilator 10/23/2022 and demonstrated increased agitation with need for intermittent precedex/ativan for concern of ETOH withdrawal. He required endoscopic Gtube replacement with GI 10/23/2022 after self removing Gtube once extubated. Oncology has evaluated with elevated CEA and pathology pending with plans for further staging workup to be completed outpatient.     Review of Systems:   14 point review of systems is negative except as specifically addressed above. Objective:   VITALS:  /72   Pulse 85   Temp 97.7 °F (36.5 °C) (Temporal)   Resp 24   Ht 5' 10\" (1.778 m)   Wt 176 lb 4.8 oz (80 kg)   SpO2 95%   BMI 25.30 kg/m²   24HR INTAKE/OUTPUT:    Intake/Output Summary (Last 24 hours) at 11/1/2022 0825  Last data filed at 11/1/2022 0700  Gross per 24 hour   Intake 3806.53 ml   Output 4954 ml   Net -1147.47 ml       General appearance: 78 yo male, ill appearing, no acute distress  Head: Normocephalic, without obvious abnormality, atraumatic  Eyes: Icteric. Pupils sluggish  Ears: normal external ears and nose  Neck: no JVD, supple, symmetrical, trachea midline   Lungs: diminished to auscultation bilaterally, unlabored on room air  Heart: irregularly irregular rhythm, S1, S2 normal  Abdomen: Rounded,soft, hypoactive bowel sounds, no grimacing to palpation, G-tube clamped. Ostomy with liquid stool.  TRISTAN with serous output  Extremities: BLE 1+ edema,  No erythema, no tenderness to palpation  Skin: Warm, dry, jaundiced, abdominal incision WNL  Neurologic: Alert, oriented x4, follows commands, speech fluent, generalized weakness    Medications:      dextrose 5% in lactated ringers 100 mL/hr at 11/01/22 0753    sodium chloride      phenylephrine (MARIE-SYNEPHRINE) 50mg/250mL infusion Stopped (11/01/22 0552)    sodium chloride 5 mL/hr at 10/29/22 1147    dextrose Stopped (10/31/22 8660)      meropenem  1,000 mg IntraVENous Q12H    [Held by provider] digoxin  125 mcg IntraVENous Daily    metoprolol  5 mg IntraVENous Q6H    vitamin D  50,000 Units Oral Weekly    famotidine  20 mg Oral BID    thiamine mononitrate  100 mg Oral Daily    sodium chloride flush  5-40 mL IntraVENous 2 times per day    enoxaparin  40 mg SubCUTAneous Daily    BPCO  1 Dose Apply externally BID    sodium chloride flush  5-40 mL IntraVENous 2 times per day     sodium phosphate IVPB, [DISCONTINUED] HYDROmorphone **OR** HYDROmorphone **OR** HYDROmorphone, LORazepam **OR** LORazepam **OR** LORazepam **OR** LORazepam **OR** LORazepam **OR** LORazepam **OR** LORazepam **OR** LORazepam, sodium chloride flush, sodium chloride, metoprolol, sodium phosphate IVPB **OR** sodium phosphate IVPB **OR** sodium phosphate IVPB, sodium chloride flush, sodium chloride, glucose, dextrose bolus **OR** dextrose bolus, glucagon (rDNA), dextrose, potassium chloride **OR** potassium alternative oral replacement **OR** potassium chloride, ondansetron **OR** ondansetron, magnesium hydroxide  ADULT DIET; Full Liquid  ADULT ORAL NUTRITION SUPPLEMENT; Breakfast, Lunch, Dinner; Wound Healing Oral Supplement     Lab and other Data:     Recent Labs     10/31/22  0232 10/31/22  2235 11/01/22  0539   WBC 13.1* 20.9* 14.2*   HGB 13.0* 13.6* 11.6*   PLT 89* 85* 80*       Recent Labs     10/31/22  0232 10/31/22  2225 10/31/22  2235 11/01/22  0213 11/01/22  0539   *  --  130*  --  131*   K 5.0   < > 5.1* 4.9 4.5     --  99  --  100   CO2 23  --  19*  --  21*   BUN 46*  --  59*  --  54*   CREATININE 1.0  --  1.7*  --  1.1   GLUCOSE 85  --  105  --  147*    < > = values in this interval not displayed. Recent Labs     10/31/22  0232 10/31/22  2235 11/01/22  0539   AST 91* 79* 63*   ALT 55* 43* 34   BILITOT 6.5* 8.8* 9.7*   ALKPHOS 101 87 74       RAD:   CT ABDOMEN PELVIS W IV CONTRAST Additional Contrast? None  Result Date: 10/20/2022  1. Irregular mass at the hepatic flexure of the colon resulting in high-grade small bowel and proximal large bowel obstruction. Severe fluid and gaseous distention of the entire stomach and small bowel including the distal esophagus. Findings are overall concerning for colonic neoplasm. Recommend GI and surgical consultation. 2.Scattered enlarged retroperitoneal lymph nodes. These are presumably metastatic if the colon mass proves to be malignant. 3.Marked hydropic distention of the gallbladder containing gallstones. No CT evidence of cholecystitis. Recommend correlation with LFTs. US GALLBLADDER RUQ  Result Date: 10/21/2022  1. Limited exam for reasons discussed above. 2.  Diffuse fatty infiltration of the liver with a 1.1 cm calcification in the right hepatic lobe. 3.  Distended gallbladder with several small stones and mild diffuse gallbladder wall thickening. 4.  No other significant abnormality. Recommendation: Follow up as clinically indicated. Electronically Signed by Angely Alvarez MD at 21-Oct-2022 11:06:19 PM EST             XR CHEST PORTABLE  Result Date: 10/20/2022  The NG tube can be traced to the level of distal esophagus. Suggest advancing accordingly, with a follow-up radiograph. Recommendation: As above. Electronically Signed by Aftab Syed MD at 20-Oct-2022 06:15:09 PM EST             XR CHEST PORTABLE  Result Date: 10/20/2022  Cardiomegaly. Recommendation: Follow up as clinically indicated. Electronically Signed by Jovanna Sheridan MD at 20-Oct-2022 03:26:36 PM EST             Assessment/Plan   Principal Problem:    SBO (small bowel obstruction) ([address]  Active Problems:    Septic shock ([address]    Peritonitis ([address]    Essential hypertension    Hypothyroidism    Hyperglycemia    Thrombocytopenia (HCC)    Palliative care patient    Agitation    Moderate malnutrition (HCC)    Ileocolic anastomotic leak    Non-ischemic cardiomyopathy (HCC)    Paroxysmal atrial fibrillation (HCC)    Colonic mass    Ileus, postoperative [facility])    Accident  Resolved Problems:    * No resolved hospital problems. *      Visit Summary:  Chart reviewed. Mr. Anaya Barbour is seen at bedside in ICU this morning. Report obtained from RN. Attempted yesterday to downgrade pt from ICU however he became confused with hypotension and was readmitted back to ICU shortly after transfer for pressor support and fluid replacement. He is now off pressors, alert, and communicative. TF through peg was not initiated per GS. Copious serous drainage reported from TRISTAN site continues. Ostomy is producing.  Pt remains jaundiced with increase in bilirubin again today. I called his daughter, Leni Hsieh, to update on pt status and plan of care. We discussed pt completed living will today naming his sister, Alivia Carrillo, as primary decision maker and Leni Hsieh and pts niece as secondary. Leni Hsieh verbalized understanding. Family seems to be working together to provide support to pt and assist with decision making. Opportunity for questions and emotional support provided. Will follow. Candidate for SCOP: To be determined based on hospital course     Recommendations:      Palliative Care- GOC prolong life, continue all medical treatments/workup and monitor for improvement. D/c planning based on hospital course. Code status- DNR  SBO 2/2 hepatic flexure colonic mass- 10/21/22 right hemicolectomy with gastrostomy placement by Dr. Pee Melendez. G-tube replaced 10/23/2022 per GI endoscopically after patient self removed once extubated, now clamped and potentially initiated gently TF today. Reglan continued. Baseline CEA 11.0 on 10/21/2022 and postop CEA 6.9. Pathology at least stage III, will need outpt follow up with oncology for further staging workup. Septic shock 2/2 anastomotic leak- 10/26/22 laparotomy, repair of leak, washout, and creation of diverting ileostomy by Dr. Nakita Sainz. Weaned from pressor support. Abx continue. Monitor TRISTAN and ostomy output  Acute hypoxemic respiratory failure postop- originally extubated 10/23/22 and required ventilation postop but successful extubation again 10/26/22. Stable on room air. Elevated bilirubin- GI following. Follow LFTs. Bili is 9.7 today. Bedside RUQ ultrasound 10/25/22 shows cholelithiasis without acute cholecystitis or CBD dilation. Liver US pending  SONIA- renal function improving. IVF replacement for hypovolemia   Agitation- concern 2/2 EtOH withdrawal. Intermittent agitation improving without overt signs of alcohol withdrawal. Restraints as warranted.    A. fib with RVR- cardiology following. Cardizem gtt stopped. Lopressor and digoxin IV scheduled. Rate more controlled postop  Nonischemic cardiomyopathy- noted. Mgmt per hospitalist  Malnutrition- TPN stopped, Gentle TF not initiated as of now per  recs. Diet advanced. Dietician following. Thank you for consulting Palliative Care and allowing us to participate in the care of this patient.    Time Spent Counseling > 50%:  YES                                   Total Time Spent with patient/family counseling, workup/treatment review, counseling and placement of orders/preparation of this note: 29 minutes    Electronically signed by ALLISON Copeland CNP on 11/1/2022 at 8:25 AM    (Please note that portions of this note were completed with a voice recognition program.  Marylee Jaksch made to edit the dictations but occasionally words are mis-transcribed.)

## 2022-11-01 NOTE — PROGRESS NOTES
11/01/22 1100   Subjective   Subjective No TX per ELKIN Malave . Patient is scheduled to go to [address] [facility], by bed.    PT Plan of Care   Tuesday   (ATT.)         Electronically signed by Glenn Tanner PTA on 11/1/2022 at 11:13 AM

## 2022-11-01 NOTE — PROGRESS NOTES
Subjective:  Patient was transferred to the floor very briefly yesterday. Had tachycardia and tremor, WBC up, lactic acid up. Transferred back to ICU. Feeling some better today. Objective:  /61   Pulse 95   Temp 97.7 °F (36.5 °C) (Temporal)   Resp 30   Ht 5' 10\" (1.778 m)   Wt 176 lb 4.8 oz (80 kg)   SpO2 95%   BMI 25.30 kg/m²   Date 11/01/22 0000 - 11/01/22 2359   Shift 9732-6310 7105-9828 9724-2969 24 Hour Total   INTAKE   I.V.(mL/kg) 1285. 8(16.1) 834. 6(10.4)  2120.4(26.5)   IV Piggyback(mL/kg) 1257. 8(15.7) 764.2(9.6)  4837(37.0)   Shift Total(mL/kg) 1704. 6(31.8) [id number]  4142. 4(51.8)   OUTPUT   Urine(mL/kg/hr) 594(0.9) 350  944   Emesis/NG output(mL/kg) 0(0) 0(0)  0(0)   Drains(mL/kg) 720(9)   720(9)   Stool(mL/kg) 0152(18.2) 300(3.8)  7760(28)   Shift Total(mL/kg) 5013(08.3) 650(8.1)  3024(37.8)   Weight (kg) 80 80 80 80     General: NAD, awake and alert  Chest: regular, non-labored  Abdomen: Soft, ND, serous output in drain, serous drainage from inferior portion of incision    CBC:   Lab Results   Component Value Date/Time    WBC 14.2 11/01/2022 05:39 AM    RBC 3.70 11/01/2022 05:39 AM    HGB 11.6 11/01/2022 05:39 AM    HCT 33.5 11/01/2022 05:39 AM    MCV 90.5 11/01/2022 05:39 AM    MCH 31.4 11/01/2022 05:39 AM    MCHC 34.6 11/01/2022 05:39 AM    RDW 16.1 11/01/2022 05:39 AM    PLT 80 11/01/2022 05:39 AM    MPV 12.7 11/01/2022 05:39 AM     CMP:    Lab Results   Component Value Date/Time     11/01/2022 05:39 AM    K 4.5 11/01/2022 05:39 AM    K 4.9 11/01/2022 02:13 AM     11/01/2022 05:39 AM    CO2 21 11/01/2022 05:39 AM    BUN 54 11/01/2022 05:39 AM    CREATININE 1.1 11/01/2022 05:39 AM    GFRAA >59 04/15/2022 11:37 AM    LABGLOM >60 11/01/2022 05:39 AM    GLUCOSE 147 11/01/2022 05:39 AM    PROT 5.4 11/01/2022 05:39 AM    LABALBU 2.9 11/01/2022 05:39 AM    CALCIUM 8.3 11/01/2022 05:39 AM    BILITOT 9.7 11/01/2022 05:39 AM    ALKPHOS 74 11/01/2022 05:39 AM    AST 63 11/01/2022 05:39 AM    ALT 34 11/01/2022 05:39 AM     Assessment and plan:  77year old male s/p right hemicolectomy, s/p washout and diverting ileostomy  Patient with underlying alcoholism, newly diagnosed hepatitis C, cirrhosis contributing to fluid losses. Patient likely needs continued more aggressive fluids. I do not recommend dropping his IVF rate, will give another bolus. US liver was repeated again. Imaging done right before I rounded, no results yet. If there is ductal dilatation, would recommend re consulting GI. Patient and family brining up goals of care. I discussed with them going ahead with resuscitation for today, following up US read, and seeing how he is doing tomorrow before making any further decisions.

## 2022-11-01 NOTE — PROGRESS NOTES
HEMATOLOGY/ONCOLOGY PROGRESS NOTE           Patient name: Ishaan Mcclendon  Patient : 1955  6:17 AM  ROOM 146    Portions of this note have been copied forward, however, changed to reflect the most current clinical status of this patient. Subjective: POD #11 Colectomy with gastrostomy placement. POD #5 laparotomy, repair of anastomotic leak and creation of diverting ileostomy. Transferred to floor yesterday but came right back to ICU due to hypotension. Was on pressors through the night but weaned this morning. He has been having hypoglycemic issues. Leukocytosis last evening, vancomycin added to Merrem. Resting better this morning. Urine output was low all day yesterday, improved after albumin, fluid boluses. HISTORY OF PRESENT ILLNESS:   Sheridan Clemente was last seen by Zachary Bowers on 10/1/2022. The patient has a history of hypertension, atrial fibrillation. He presented ER department with complaints of worsening abdominal pain with associated nausea and vomiting. 10/20/2022-CT abdomen pelvis showed diffuse fluid and air distention of the the entire small bowel and proximal large bowel measuring up to 4.3 cm in the left abdomen and up to 7.6 cm of the proximal colon. Transitional point seen at a site of masslike thickening of the hepatic flexure measuring 3.2 x 3.9 cm (series 2 image 35). Multiple prominent and enlarged retroperitoneal lymph nodes. Largest is 1.5 cm in the left periaortic region (series 2 image 33).      10/21/2022: Colectomy with gastrostomy placement by Dr. Marley Tristan   10/26/2022: laparotomy, repair of anastomotic leak and creation of diverting ileostomy by Dr. Paulo Smith    OBJECTIVE:  VITALS: /73   Pulse 88   Temp 97.9 °F (36.6 °C) (Temporal)   Resp 30   Ht 5' 10\" (1.778 m)   Wt 186 lb 8 oz (84.6 kg)   SpO2 97%   BMI 26.76 kg/m²   I&O:   Intake/Output Summary (Last 24 hours) at 2022 0617  Last data filed at 2022 0200  Gross per 24 hour Intake 1936.11 ml   Output 3669 ml   Net -1732.89 ml     PHYSICAL EXAM:  CONSTITUTIONAL: Resting okay-in no acute distress  NECK: Supple, no masses. No JVD  CHEST/LUNGS: Diminished bases, otherwise clear  CARDIOVASCULAR: HR 86  ABDOMEN: Midline abdominal dressing with mild drainage-serosanguineous. Diverting ileostomy with dark liquid drainage. Drains noted  EXTREMITIES: warm, trace edema  SKIN: warm, dry with no rashes or lesions  NEUROLOGIC: Resting      BMP:   Recent Labs     10/31/22  2235 11/01/22  0213 11/01/22  0539   *  --  131*   K 5.1* 4.9 4.5   CL 99  --  100   CO2 19*  --  21*   BUN 59*  --  54*   CREATININE 1.7*  --  1.1   GLUCOSE 105  --  147*   CALCIUM 8.2*  --  8.3*     Recent Labs     11/01/22 0539 10/31/22  2235 10/31/22  0232   WBC 14.2* 20.9* 13.1*   HGB 11.6* 13.6* 13.0*   HCT 33.5* 40.0* 37.9*   MCV 90.5 92.2 89.6   PLT 80* 85* 89*     CMP:    Recent Labs     10/31/22  2235 11/01/22  0213 11/01/22  0539   *  --  131*   K 5.1* 4.9 4.5   CL 99  --  100   CO2 19*  --  21*   BUN 59*  --  54*   CREATININE 1.7*  --  1.1   LABGLOM 44*  --  >60   GLUCOSE 105  --  147*   PROT 5.3*  --  5.4*   LABALBU 2.3*  --  2.9*   CALCIUM 8.2*  --  8.3*   BILITOT 8.8*  --  9.7*   ALKPHOS 87  --  74   AST 79*  --  63*   ALT 43*  --  34     30Day lookback of cultures:    Blood Culture Recent:   Recent Labs     10/25/22  1720   BC No growth after 5 days of incubation. Gram Stain Recent: No results for input(s): LABGRAM in the last 720 hours. Resp Culture Recent: No results for input(s): CULTRESP in the last 720 hours. Body Fluid Recent : No results for input(s): BFCX in the last 720 hours. MRSA Recent : No results for input(s): [facility] in the last 720 hours. Urine Culture Recent : No results for input(s): LABURIN in the last 720 hours. Organism Recent : No results for input(s): ORG in the last 720 hours.      Radiology:   CT ABDOMEN PELVIS W IV CONTRAST Additional Contrast? None    Result Date: 10/20/2022  CLINICAL HISTORY: 48-hour history of diffuse abdominal pain distention nausea vomiting some diarrhea no prior history COMPARISON: No comparison TECHNIQUE: Axial images of the abdomen and pelvis were obtained after the administration of IV contrast.Coronal and sagittal multiplanar reconstructions were performed. One or more of the following dose reduction techniques were used: Automated exposure control, adjustment of the mA and/or kV according to patient size, and/or use of iterative reconstruction technique. COMPARISON: None. FINDINGS: Statements: None. Lower Chest: Unremarkable. Hepatobiliary: Hepatic steatosis. Calcified granulomas throughout the liver. No focal lesion is identified. Marked distention of the gallbladder containing gallstones. No CT evidence of cholecystitis however. No biliary ductal dilatation is evident. Pancreas: The pancreatic parenchyma is unremarkable and there is no main duct dilatation. Spleen: The spleen is nonenlarged. Calcified granulomas throughout the spleen. Adrenals: No adrenal glands are symmetric. Genitourinary: The kidneys are symmetric and enhance appropriately. No suspicious focal parenchymal abnormality is identified in either kidney. There is no collecting system dilatation. The bladder is unremarkable, as imaged. The prostate gland appears unremarkable. Gastrointestinal: Diffuse fluid and air distention of the the entire small bowel and proximal large bowel measuring up to 4.3 cm in the left abdomen and up to 7.6 cm of the proximal colon. Transitional point seen at a site of masslike thickening of the hepatic flexure measuring 3.2 x 3.9 cm (series 2 image 35). Colonic diverticulosis without diverticulitis. The appendix appears unremarkable. Lymphatics: Multiple prominent and enlarged retroperitoneal lymph nodes. Largest is 1.5 cm in the left periaortic region (series 2 image 33). Vascular: Moderate calcific atherosclerosis. The abdominal aorta is normal in caliber.  MSK/Body Wall: No concerning bony lesion is identified. Small fat-containing right inguinal hernia. Peritoneum/Other: There is no free fluid or abnormal collection. No free gas. 1.Irregular mass at the hepatic flexure of the colon resulting in high-grade small bowel and proximal large bowel obstruction. Severe fluid and gaseous distention of the entire stomach and small bowel including the distal esophagus. Findings are overall concerning for colonic neoplasm. Recommend GI and surgical consultation. 2.Scattered enlarged retroperitoneal lymph nodes. These are presumably metastatic if the colon mass proves to be malignant. 3.Marked hydropic distention of the gallbladder containing gallstones. No CT evidence of cholecystitis. Recommend correlation with LFTs. US GALLBLADDER RUQ    Result Date: 10/21/2022  NO PRIOR REPORT AVAILABLE Exam: ULTRASOUND OF THE ABDOMEN LIMITED, RIGHT UPPER QUADRANT Clinical data: Elevated LFTs, sepsis, lactic acidosis. The patient is on ventilator. Technique: Real-time grayscale imaging of the right upper quadrant was performed. Images supplemented with color Doppler technique. Prior studies: CT scan of abdomen and pelvis dated 10/20/22. Findings: Limited exam due to poor acoustic penetration. Diffuse increased echogenicity of the liver parenchyma. A shadowing focus//calcification in the right lobe measuring up 1.1 cm. No intrahepatic biliary distention. The gallbladder is distended with several small stones. The gallbladder wall thickness measures 0.8 cm. No sludge. The common bile duct is normal in caliber measuring 0.4cm. The right kidney [id number] x 6.6 x 5.4 cm. Normal echogenicity and cortical thickness are preserved. No evidence of renal masses, no calculi and no hydronephrosis Diffuse increased echogenicity of the pancreas. Imaged portion of the aorta demonstrates no acute pathology. Visualized portion of the inferior vena cava is unremarkable.     1.  Limited exam for reasons discussed above. 2.  Diffuse fatty infiltration of the liver with a 1.1 cm calcification in the right hepatic lobe. 3.  Distended gallbladder with several small stones and mild diffuse gallbladder wall thickening. 4.  No other significant abnormality. Recommendation: Follow up as clinically indicated. Electronically Signed by Jossie Bagley MD at 21-Oct-2022 11:06:19 PM EST             XR CHEST PORTABLE    Result Date: 10/20/2022  NO PRIOR REPORT AVAILABLE Exam: X-RAY OF THECHEST Clinical data:Confirmation of course of NG/OG/NE tube and location of tip of tube. Technique:Single view of the chest. Prior studies: Radiograph of the chest done on same day. Findings: The lungs are grossly clear; noevidence of acute infiltrate or pleural effusion. Cardiac silhouette is mildly enlarged. No acute osseous abnormality is detected. The NG tube can be traced to the level of distal esophagus. Scattered nonspecific gas-filled loops of bowel in the upper abdomen. The NG tube can be traced to the level of distal esophagus. Suggest advancing accordingly, with a follow-up radiograph. Recommendation: As above. Electronically Signed by Mya Rodarte MD at 20-Oct-2022 06:15:09 PM EST             XR CHEST PORTABLE    Result Date: 10/20/2022  NO PRIOR REPORT AVAILABLE Exam: X-RAY OF THECHEST Clinical data:Abdominal pain, vomiting. Technique:Single view of the chest. Prior studies: No prior studies submitted. Findings: The trachea is midline. The heart is enlarged. Mediastinal and pulmonary vascular shadows are normal.  There is no focal consolidation or effusion. The osseous structures are intact. Cardiomegaly. Recommendation: Follow up as clinically indicated.  Electronically Signed by Nasrin Bruno MD at 20-Oct-2022 03:26:36 PM EST               Medications  Current Facility-Administered Medications   Medication Dose Route Frequency Provider Last Rate Last Admin    meropenem (MERREM) 1000 mg in sodium chloride 0.9% 50 mL (duplex)  1,000 mg IntraVENous Q12H Elizabeth Coughlin MD        LORazepam (ATIVAN) tablet 1 mg  1 mg Oral Q1H PRN Suze Pineda MD        Or    LORazepam (ATIVAN) injection 1 mg  1 mg IntraVENous Q1H PRN Suze Pineda MD        Or    LORazepam (ATIVAN) tablet 2 mg  2 mg Oral Q1H PRN Suze Pineda MD        Or    LORazepam (ATIVAN) injection 2 mg  2 mg IntraVENous Q1H PRN Suze Pineda MD        Or    LORazepam (ATIVAN) tablet 3 mg  3 mg Oral Q1H PRN Suze Pineda MD        Or    LORazepam (ATIVAN) injection 3 mg  3 mg IntraVENous Q1H PRN Suze Pineda MD        Or    LORazepam (ATIVAN) tablet 4 mg  4 mg Oral Q1H PRN Suze Pineda MD        Or    LORazepam (ATIVAN) injection 4 mg  4 mg IntraVENous Q1H PRN Suze Pineda MD        metoprolol (LOPRESSOR) injection 5 mg  5 mg IntraVENous Q6H Suze Pineda MD        dexmedetomidine (PRECEDEX) 400 mcg in sodium chloride 0.9 % 100 mL infusion  0.1-1.5 mcg/kg/hr IntraVENous Continuous Suze Pineda MD        dextrose 5 % in lactated ringers infusion   IntraVENous Continuous Navin Vang MD 50 mL/hr at 10/31/22 2244 New Bag at 10/31/22 2244    vitamin D (ERGOCALCIFEROL) capsule 50,000 Units  50,000 Units Oral Weekly Navin Vang MD        vancomycin Pawan Vital) intermittent dosing (placeholder)   Other RX Placeholder Navin Vang MD        morphine (PF) injection 1 mg  1 mg IntraVENous Q2H PRN Navin Vang MD        famotidine (PEPCID) tablet 20 mg  20 mg Oral BID Rosario Nichols MD   20 mg at 10/31/22 2114    thiamine mononitrate tablet 100 mg  100 mg Oral Daily Rosario Nichols MD   100 mg at 10/31/22 1001    sodium chloride flush 0.9 % injection 5-40 mL  5-40 mL IntraVENous 2 times per day Suze Pineda MD   10 mL at 10/31/22 1001    sodium chloride flush 0.9 % injection 5-40 mL  5-40 mL IntraVENous PRN Suze Pineda MD        0.9 % sodium chloride infusion  25 mL IntraVENous PRN Mary Bare MD Serene        metoprolol (LOPRESSOR) injection 5 mg  5 mg IntraVENous Q6H PRN Jessica Vizcaino MD   5 mg at 10/31/22 2120    enoxaparin (LOVENOX) injection 40 mg  40 mg SubCUTAneous Daily Cy MD Triston   40 mg at 10/30/22 0744    phenylephrine (MARIE-SYNEPHRINE) 50 mg in dextrose 5 % 250 mL infusion   mcg/min IntraVENous Continuous Hannah Miller MD   Stopped at 11/01/22 0552    BPCO OINT 1 Dose  1 Dose Apply externally BID Elaine Frazier MD   1 Dose at 10/31/22 2142    sodium phosphate 33.33 mmol in sodium chloride 0.9 % 250 mL IVPB  0.32 mmol/kg IntraVENous PRN Cony Araya, DO        sodium phosphate 10 mmol in sodium chloride 0.9 % 250 mL IVPB  10 mmol IntraVENous PRN Cony Araya, DO        Or    sodium phosphate 15 mmol in sodium chloride 0.9 % 250 mL IVPB  15 mmol IntraVENous PRN Cony Araya, DO        Or    sodium phosphate 20 mmol in sodium chloride 0.9 % 500 mL IVPB  20 mmol IntraVENous PRN Cony Araya, DO        HYDROmorphone HCl PF (DILAUDID) injection 2 mg  2 mg IntraVENous Q2H PRN Cony Araya, DO   2 mg at 10/27/22 1333    Or    HYDROmorphone HCl PF (DILAUDID) injection 1 mg  1 mg IntraVENous Q2H PRN Cony Araya, DO   1 mg at 10/31/22 1325    Or    HYDROmorphone HCl PF (DILAUDID) injection 0.5 mg  0.5 mg IntraVENous Q2H PRN Cony Araya, DO   0.5 mg at 10/31/22 1534    sodium chloride flush 0.9 % injection 5-40 mL  5-40 mL IntraVENous 2 times per day Cony Araya, DO   10 mL at 10/31/22 1002    sodium chloride flush 0.9 % injection 5-40 mL  5-40 mL IntraVENous PRN Cony Araya, DO        0.9 % sodium chloride infusion   IntraVENous PRN Cony Araya, DO 5 mL/hr at 10/29/22 1147 New Bag at 10/29/22 1147    glucose chewable tablet 16 g  4 tablet Oral PRN Cony Araya, DO        dextrose bolus 10% 125 mL  125 mL IntraVENous PRN Cony Araya, DO   Stopped at 10/31/22 2142    Or    dextrose bolus 10% 250 mL  250 mL IntraVENous PRN Jodene Bridges, DO        glucagon (rDNA) injection 1 mg  1 mg SubCUTAneous PRN Jodene Bridges, DO        dextrose 10 % infusion   IntraVENous Continuous PRN Jodene Bridges, DO   Stopped at 10/31/22 2239    potassium chloride (KLOR-CON M) extended release tablet 40 mEq  40 mEq Oral PRN Jodene Bridges, DO   40 mEq at 10/29/22 0320    Or    potassium bicarb-citric acid (EFFER-K) effervescent tablet 40 mEq  40 mEq Oral PRN Jodene Bridges, DO   40 mEq at 10/30/22 1630    Or    potassium chloride 10 mEq/100 mL IVPB (Peripheral Line)  10 mEq IntraVENous PRN Jodene Bridges, DO        ondansetron (ZOFRAN-ODT) disintegrating tablet 4 mg  4 mg Oral Q8H PRN Jodene Bridges, DO        Or    ondansetron [facility]) injection 4 mg  4 mg IntraVENous Q6H PRN Jodene Bridges, DO        magnesium hydroxide (MILK OF MAGNESIA) 400 MG/5ML suspension 30 mL  30 mL Oral Daily PRN Jodene Bridges, DO   30 mL at 10/24/22 1609     Allergies  Patient has no known allergies. 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 Severe fluid and gaseous distention of the entire stomach and small bowel including the distal esophagus. Scattered enlarged retroperitoneal lymph nodes. These are presumably metastatic if the colon mass proves to be malignant. Marked hydropic distention of the gallbladder containing gallstones. No CT evidence of cholecystitis. Hepatic steatosis. Calcified granulomas throughout the liver  Spleen non enlarged and calcified granulomas throughout the spleen.    -Colectomy with gastrostomy placement by Dr. Bubba Wells on 10/21/2022: Operative note indicated hard mass adherent to the liver and small bowel posteriorly.       Pathology:  Colon, right hemicolectomy:   1. Invasive moderately differentiated colonic adenocarcinoma, measuring 6.1 cm in greatest dimension. 2.  Tumor invades through the muscularis propria into the pericolonic   adipose tissue but does not reach the serosal surface. 3.  Surgical excision margins are negative for evidence of carcinoma and adenomatous change. 4.  Sections of terminal ileum, negative for evidence of malignancy. 5.  Sections of the appendix, negative for evidence of malignancy. 6.  6 out of 32 lymph nodes, positive for metastatic adenocarcinoma. AJCC STAGE: pT3, pN2a, pMx     -Baseline CEA 11.0 on 10/21/2022  -Postop CEA 6.9 on 10/23/2022  -Pathology  (AJCC STAGE: pT3, pN2a, pMx) was discussed with family by ALLISON Joiner, on 10/28/2022    #Thrombocytopenia-suspect multifactorial to include liver dysfunction, long term alcohol use, consumption from infection    Recent Labs     11/01/22  0539 10/31/22  2235 10/31/22  0232   WBC 14.2* 20.9* 13.1*   HGB 11.6* 13.6* 13.0*   HCT 33.5* 40.0* 37.9*   MCV 90.5 92.2 89.6   PLT 80* 85* 89*        PLT improved to 80,000 -stable without any bleeding or oozing      #Elevated LFTs  CT scan of the abdomen pelvis on 10/20/2022 reported Hepatic steatosis. Calcified granulomas throughout the liver. Spleen non enlarged and calcified granulomas throughout the spleen. Ultrasound gallbladder 10/21/2022:   1. Limited exam.  2.  Diffuse fatty infiltration of the liver with a 1.1 cm calcification in the right hepatic lobe. 3.  Distended gallbladder with several small stones and mild diffuse gallbladder wall thickening.     As per GI/general surgery- not a candidate for cholecystectomy, gallstones felt to be incidental    Hepatitis C reactive, 10/26/2022  History of EtOH          Followed by GI    #SONIA, improved      Renal function improved again with D5 LR    #sepsis, anastomotic leak  Status post laparotomy, repair of anastomotic leak and creation of diverting ileostomy 10/26/2022  Blood cultures 10/25/2022 x2: No growth at 5 days    Afebrile 115/83    Meropenem 1 g IV every 8 hours continues  S/p vancomycin 2 g x 1-pharmacy to follow    #malnutrition  TPN completed. Now on clear liquids. PLAN:  POD# 11 colectomy with gastrostomy placement-per Dr. Dalila Mark 10/21/2022  POD# 5 laparotomy, repair of anastomotic leak and creation of diverting ileostomy by Dr. Keshav Bran 10/27/2022        Current pathology is at least stage III, will need further staging as outpatient    Continue current support    Discussed with Shania Sánchez PA-C    11/01/22  6:17 AM  Physician's attestation/substantial contribution:  I, Dr Giovani Call, independently performed an evaluation on [facility]. I have reviewed relevant medical information/data to include but not limited to medication list, relevant appropriate labs and imaging when applicable. I reviewed other physician's notes, ancillary services and nurses assessment. I have reviewed the above documentation completed by the Nurse Practitioner or Physician Assistant. Please see my additional contributions to the history of present illness, physical examination, and assessment/medical decision-making that reflect my findings and impressions. I have seen and examined the patient and the key elements of all parts of the encounter have been performed by me. I agree with the assessment and plan as outlined by the ARNP/PA. Subjective-postop day 11.   Objective-as above  Assessment/plan:  Continue current supportive care  We will arrange follow-up in clinic  Giovani Call MD

## 2022-11-01 NOTE — PROGRESS NOTES
Received back to ICU from 5th. Placed back on monitors. VSS. Family accompanied pt down. Pt opens eyes to verbal. Obeys commands.

## 2022-11-01 NOTE — PROGRESS NOTES
Dr Marisela Cheadle here. Talked with sis primary decisionmaker and mother and niece regarding pt condition. Plan to give additional fluids today and await ultrasound results. Pt remains stable. Urine output more than adequate. VSS. Pt is alert and participated in conversation.

## 2022-11-02 NOTE — PLAN OF CARE
Problem: Nutrition Deficit:  Goal: Optimize nutritional status  11/2/2022 1030 by Francisco Albarran, MS, RD, LD  Outcome: Progressing  Flowsheets (Taken 11/2/2022 8631)  Nutrient intake appropriate for improving, restoring, or maintaining nutritional needs:   Assess nutritional status and recommend course of action   Monitor oral intake, labs, and treatment plans   Recommend appropriate diets, oral nutritional supplements, and vitamin/mineral supplements   Recommend, monitor, and adjust tube feedings and TPN/PPN based on assessed needs  11/1/2022 2320 by Artur Pineda RN  Outcome: Progressing

## 2022-11-02 NOTE — PROGRESS NOTES
Hospitalist Progress Note    Patient:  Jose Santana  YOB: 1955  Date of Service: 11/2/2022  MRN: [id number]   Acct: [de-identified]   Primary Care Physician: No primary care provider on file. Advance Directive: DNR  Admit Date: 10/20/2022       Hospital Day: 13  Referring Provider: Tj Daily DO    Patient Seen, Chart, Consults, Notes, Labs, Radiology studies reviewed. Subjective:  Jose Santana is a 77 y.o. male  whom we are following for respiratory failure, status post right hemicolectomy, nonischemic cardiomyopathy. He developed an anastomotic leak and had to go back to the OR on 10/25. He has made good progress since then. He is now up in the chair. His mental status is also improved. He still has quite a bit of output from his TRISTAN drain. Allergies:  Patient has no known allergies.     Medicines:  Current Facility-Administered Medications   Medication Dose Route Frequency Provider Last Rate Last Admin    albumin human 25 % IV solution 50 g  50 g IntraVENous Q6H Tj Daily DO   Stopped at 11/02/22 1035    metoclopramide (REGLAN) injection 10 mg  10 mg IntraVENous Q6H Vira Chavarria MD   10 mg at 11/02/22 1315    meropenem (MERREM) 1000 mg in sodium chloride 0.9% 50 mL (duplex)  1,000 mg IntraVENous Q12H Genoveva Fernandez MD 16.7 mL/hr at 11/02/22 0918 1,000 mg at 11/02/22 9291    sodium phosphate 25.59 mmol in sodium chloride 0.9 % 250 mL IVPB  0.32 mmol/kg IntraVENous PRN Tj Daily DO        HYDROmorphone HCl PF (DILAUDID) injection 1 mg  1 mg IntraVENous Q4H PRN Naomi López MD        Or    HYDROmorphone HCl PF (DILAUDID) injection 0.5 mg  0.5 mg IntraVENous Q4H PRN Naomi López MD   0.5 mg at 11/02/22 2081    [Held by provider] digoxin (LANOXIN) injection 125 mcg  125 mcg IntraVENous Daily Naomi López MD        LORazepam (ATIVAN) tablet 1 mg  1 mg Oral Q1H PRN Naomi López MD        Or    LORazepam (ATIVAN) injection 1 mg  1 mg IntraVENous Q1H PRN Tomas Álvarez MD        Or    LORazepam (ATIVAN) tablet 2 mg  2 mg Oral Q1H PRN Tomas Álvarez MD        Or    LORazepam (ATIVAN) injection 2 mg  2 mg IntraVENous Q1H PRN Tomas Álvarez MD        Or    LORazepam (ATIVAN) tablet 3 mg  3 mg Oral Q1H PRN Tomas Álvarez MD        Or    LORazepam (ATIVAN) injection 3 mg  3 mg IntraVENous Q1H PRN Tomas Álvarez MD        Or    LORazepam (ATIVAN) tablet 4 mg  4 mg Oral Q1H PRN Tomas Álvarez MD        Or    LORazepam (ATIVAN) injection 4 mg  4 mg IntraVENous Q1H PRN Tomas Álvarez MD        metoprolol (LOPRESSOR) injection 5 mg  5 mg IntraVENous Q6H Tomas Álvarez MD   5 mg at 11/02/22 1130    dextrose 5 % in lactated ringers infusion   IntraVENous Continuous Tomas Álvarez  mL/hr at 11/02/22 1230 New Bag at 11/02/22 1230    vitamin D (ERGOCALCIFEROL) capsule 50,000 Units  50,000 Units Oral Weekly Dario Ny MD   50,000 Units at 11/01/22 0805    famotidine (PEPCID) tablet 20 mg  20 mg Oral BID Mina Gutierrez MD   20 mg at 11/02/22 9907    thiamine mononitrate tablet 100 mg  100 mg Oral Daily Mina Gutierrez MD   100 mg at 11/02/22 7856    sodium chloride flush 0.9 % injection 5-40 mL  5-40 mL IntraVENous 2 times per day Tomas Álvarez MD   10 mL at 11/01/22 0801    sodium chloride flush 0.9 % injection 5-40 mL  5-40 mL IntraVENous PRN Tomas Álvarez MD        0.9 % sodium chloride infusion  25 mL IntraVENous PRN Tomas Álvarez MD        metoprolol (LOPRESSOR) injection 5 mg  5 mg IntraVENous Q6H PRN Tomas Álvarez MD   5 mg at 10/31/22 2120    enoxaparin (LOVENOX) injection 40 mg  40 mg SubCUTAneous Daily Neelam Garcia MD   40 mg at 10/30/22 0744    phenylephrine (MARIE-SYNEPHRINE) 50 mg in dextrose 5 % 250 mL infusion   mcg/min IntraVENous Continuous Anniadalid Blanco MD   Stopped at 11/01/22 0552    BPCO OINT 1 Dose  1 Dose Apply externally BID Alix Munguia MD   1 Dose at 11/02/22 8753    sodium phosphate 10 mmol in sodium chloride 0.9 % 250 mL IVPB  10 mmol IntraVENous PRN Dwayne Slocumb, DO        Or    sodium phosphate 15 mmol in sodium chloride 0.9 % 250 mL IVPB  15 mmol IntraVENous PRN Dwayne Slocumb, DO        Or    sodium phosphate 20 mmol in sodium chloride 0.9 % 500 mL IVPB  20 mmol IntraVENous PRN Dwayne Slocumb, DO        sodium chloride flush 0.9 % injection 5-40 mL  5-40 mL IntraVENous 2 times per day Dwayne Slocumb, DO   10 mL at 11/01/22 0801    sodium chloride flush 0.9 % injection 5-40 mL  5-40 mL IntraVENous PRN Dwayne Slocumb, DO        0.9 % sodium chloride infusion   IntraVENous PRN Dwayne Slocumb, DO 5 mL/hr at 10/29/22 1147 New Bag at 10/29/22 1147    glucose chewable tablet 16 g  4 tablet Oral PRN Dwayne Slocumb, DO        dextrose bolus 10% 125 mL  125 mL IntraVENous PRN Dwayne Slocumb, DO   Stopped at 10/31/22 2142    Or    dextrose bolus 10% 250 mL  250 mL IntraVENous PRN Dwayne Slocumb, DO        glucagon (rDNA) injection 1 mg  1 mg SubCUTAneous PRN Dwayne Slocumb, DO        dextrose 10 % infusion   IntraVENous Continuous PRN Dwayne Slocumb, DO   Stopped at 10/31/22 2239    potassium chloride (KLOR-CON M) extended release tablet 40 mEq  40 mEq Oral PRN Dwayne Slocumb, DO   40 mEq at 10/29/22 0320    Or    potassium bicarb-citric acid (EFFER-K) effervescent tablet 40 mEq  40 mEq Oral PRN Dwayne Slocumb, DO   40 mEq at 10/30/22 1630    Or    potassium chloride 10 mEq/100 mL IVPB (Peripheral Line)  10 mEq IntraVENous PRN Dwayne Slocumb, DO        ondansetron (ZOFRAN-ODT) disintegrating tablet 4 mg  4 mg Oral Q8H PRN Dwayne Slocumb, DO        Or    ondansetron [facility]) injection 4 mg  4 mg IntraVENous Q6H PRN Dwayne Slocumb, DO        magnesium hydroxide (MILK OF MAGNESIA) 400 MG/5ML suspension 30 mL  30 mL Oral Daily PRN Dwayne Slocumb, DO   30 mL at 10/24/22 1609       Past Medical History:  Past Medical History:   Diagnosis Date    Ex-cigarette smoker 10/03/2016    Hypertension     Non-ischemic cardiomyopathy ([facility] 10/03/2016    2016  Echo  EF 25% 10/3/16  Cath  Mild CAD, EF 10%     Palliative care patient 10/24/2022    Paroxysmal atrial fibrillation ([facility] 2018       Past Surgical History:  Past Surgical History:   Procedure Laterality Date    FINGER AMPUTATION Left     partial left thumb amputation following trauma    HEMICOLECTOMY N/A 10/21/2022    RIGHT HEMICOLECTOMY WITH GASTROSTOMY PLACEMENT performed by Alba Cowden, MD at [address] N/A 10/25/2022    LAPAROTOMY, REPAIR OF ANASTAMOSAL LEAK, CREATION OF DIVERTING ILEOSTOMY performed by Emery Bangura MD at [facility] OR    TONSILLECTOMY      UPPER GASTROINTESTINAL ENDOSCOPY N/A 10/23/2022    Dr Elsa Martínez w/peg placement       Family History  Family History   Problem Relation Age of Onset    High Blood Pressure Mother     Cancer Father         Stomach    Heart Disease Maternal Grandmother     Diabetes Maternal Uncle        Social History  Social History     Socioeconomic History    Marital status:      Spouse name: Not on file    Number of children: Not on file    Years of education: Not on file    Highest education level: Not on file   Occupational History    Not on file   Tobacco Use    Smoking status: Former     Packs/day: 1.00     Years: 2.00     Pack years: 2.00     Types: Cigarettes     Start date:      Quit date:      Years since quittin.8    Smokeless tobacco: Never    Tobacco comments:     light smoking history - socially   Vaping Use    Vaping Use: Never used   Substance and Sexual Activity    Alcohol use: Yes    Drug use: No    Sexual activity: Not on file   Other Topics Concern    Not on file   Social History Narrative    Not on file     Social Determinants of Health     Financial Resource Strain: Not on file   Food Insecurity: Not on file   Transportation Needs: Not on file   Physical Activity: Not on file   Stress: Not on file   Social Connections: Not on file   Intimate Partner Violence: Not on file   Housing Stability: Not on file         Review of Systems:    Review of Systems   Constitutional:  Positive for activity change. Negative for fever. HENT:  Negative for congestion and voice change. Eyes:  Negative for visual disturbance. Respiratory:  Negative for shortness of breath. Cardiovascular:  Negative for chest pain and leg swelling. Gastrointestinal:  Negative for constipation, diarrhea, nausea and vomiting. Endocrine: Negative for polyuria. Genitourinary:  Negative for difficulty urinating and dysuria. Musculoskeletal:  Positive for gait problem. Negative for back pain and neck pain. Skin:  Negative for color change. Allergic/Immunologic: Negative for immunocompromised state. Neurological:  Positive for weakness. Negative for dizziness and light-headedness. Psychiatric/Behavioral:  The patient is not nervous/anxious. Objective:  Blood pressure 116/79, pulse 96, temperature 97.6 °F (36.4 °C), temperature source Temporal, resp. rate 21, height 5' 10\" (1.778 m), weight 186 lb 1.6 oz (84.4 kg), SpO2 95 %. Intake/Output Summary (Last 24 hours) at 11/2/2022 1342  Last data filed at 11/2/2022 1000  Gross per 24 hour   Intake 5304.39 ml   Output 5310 ml   Net -5.61 ml       Physical Exam  Vitals and nursing note reviewed. HENT:      Head: Normocephalic and atraumatic. Right Ear: External ear normal.      Left Ear: External ear normal.      Nose: Nose normal.      Mouth/Throat:      Mouth: Mucous membranes are moist.   Eyes:      Conjunctiva/sclera: Conjunctivae normal.      Pupils: Pupils are equal, round, and reactive to light. Cardiovascular:      Rate and Rhythm: Normal rate and regular rhythm. Heart sounds: Normal heart sounds. Pulmonary:      Effort: Pulmonary effort is normal.      Breath sounds: Normal breath sounds.    Abdominal: General: Abdomen is flat. Palpations: Abdomen is soft. Musculoskeletal:         General: Swelling present. Cervical back: Neck supple. No rigidity. Right lower leg: Edema present. Left lower leg: Edema present. Skin:     General: Skin is warm and dry. Neurological:      Mental Status: He is oriented to person, place, and time. Psychiatric:         Mood and Affect: Mood normal.         Thought Content: Thought content normal.       Labs:  BMP:   Recent Labs     10/31/22  0232 10/31/22  2225 10/31/22  2235 11/01/22  0213 11/01/22  0539 11/02/22  0353   *  --  130*  --  131* 135*   K 5.0   < > 5.1* 4.9 4.5 4.4     --  99  --  100 105   CO2 23  --  19*  --  21* 23   PHOS 3.7  --   --   --   --  2.9   BUN 46*  --  59*  --  54* 44*   CREATININE 1.0  --  1.7*  --  1.1 0.5   CALCIUM 7.7*  --  8.2*  --  8.3* 8.1*    < > = values in this interval not displayed. CBC:   Recent Labs     10/31/22  2235 11/01/22  0539 11/02/22  0353   WBC 20.9* 14.2* 13.4*   HGB 13.6* 11.6* 11.4*   HCT 40.0* 33.5* 32.7*   MCV 92.2 90.5 90.8   PLT 85* 80* 70*     LIVER PROFILE:   Recent Labs     10/31/22  2235 11/01/22  0539 11/02/22  0353   AST 79* 63* 62*   ALT 43* 34 30   BILIDIR  --   --  7.7*   BILITOT 8.8* 9.7* 10.0*   ALKPHOS 87 74 87     PT/INR: No results for input(s): PROTIME, INR in the last 72 hours. APTT: No results for input(s): APTT in the last 72 hours. BNP:  No results for input(s): BNP in the last 72 hours. Ionized Calcium:No results for input(s): IONCA in the last 72 hours. Magnesium:  Recent Labs     10/31/22  0232 10/31/22  2235 11/02/22  0353   MG 2.0 2.1 1.7     Phosphorus:  Recent Labs     10/31/22  0232 11/02/22  0353   PHOS 3.7 2.9     HgbA1C: No results for input(s): LABA1C in the last 72 hours.   Hepatic:   Recent Labs     10/31/22  2235 11/01/22  0539 11/02/22 0353   ALKPHOS 87 74 87   ALT 43* 34 30   AST 79* 63* 62*   PROT 5.3* 5.4* 4.9*   BILITOT 8.8* 9.7* 10.0* BILIDIR  --   --  7.7*   LABALBU 2.3* 2.9* 2.5*     Lactic Acid:   Recent Labs     10/31/22  2235   LACTA 4.3*     Troponin: No results for input(s): CKTOTAL, CKMB, TROPONINT in the last 72 hours. ABGs: No results for input(s): PH, PCO2, PO2, HCO3, O2SAT in the last 72 hours. CRP:  No results for input(s): CRP in the last 72 hours. Sed Rate:  No results for input(s): SEDRATE in the last 72 hours. Cultures:   No results for input(s): CULTURE in the last 72 hours. No results for input(s): BC, [facility] in the last 72 hours. No results for input(s): CXSURG in the last 72 hours. Radiology reports as per the Radiologist  Radiology: CT ABDOMEN PELVIS W IV CONTRAST Additional Contrast? None    Result Date: 10/20/2022  CLINICAL HISTORY: 48-hour history of diffuse abdominal pain distention nausea vomiting some diarrhea no prior history COMPARISON: No comparison TECHNIQUE: Axial images of the abdomen and pelvis were obtained after the administration of IV contrast.Coronal and sagittal multiplanar reconstructions were performed. One or more of the following dose reduction techniques were used: Automated exposure control, adjustment of the mA and/or kV according to patient size, and/or use of iterative reconstruction technique. COMPARISON: None. FINDINGS: Statements: None. Lower Chest: Unremarkable. Hepatobiliary: Hepatic steatosis. Calcified granulomas throughout the liver. No focal lesion is identified. Marked distention of the gallbladder containing gallstones. No CT evidence of cholecystitis however. No biliary ductal dilatation is evident. Pancreas: The pancreatic parenchyma is unremarkable and there is no main duct dilatation. Spleen: The spleen is nonenlarged. Calcified granulomas throughout the spleen. Adrenals: No adrenal glands are symmetric. Genitourinary: The kidneys are symmetric and enhance appropriately. No suspicious focal parenchymal abnormality is identified in either kidney. There is no collecting system dilatation. The bladder is unremarkable, as imaged. The prostate gland appears unremarkable. Gastrointestinal: Diffuse fluid and air distention of the the entire small bowel and proximal large bowel measuring up to 4.3 cm in the left abdomen and up to 7.6 cm of the proximal colon. Transitional point seen at a site of masslike thickening of the hepatic flexure measuring 3.2 x 3.9 cm (series 2 image 35). Colonic diverticulosis without diverticulitis. The appendix appears unremarkable. Lymphatics: Multiple prominent and enlarged retroperitoneal lymph nodes. Largest is 1.5 cm in the left periaortic region (series 2 image 33). Vascular: Moderate calcific atherosclerosis. The abdominal aorta is normal in caliber. MSK/Body Wall: No concerning bony lesion is identified. Small fat-containing right inguinal hernia. Peritoneum/Other: There is no free fluid or abnormal collection. No free gas. 1.Irregular mass at the hepatic flexure of the colon resulting in high-grade small bowel and proximal large bowel obstruction. Severe fluid and gaseous distention of the entire stomach and small bowel including the distal esophagus. Findings are overall concerning for colonic neoplasm. Recommend GI and surgical consultation. 2.Scattered enlarged retroperitoneal lymph nodes. These are presumably metastatic if the colon mass proves to be malignant. 3.Marked hydropic distention of the gallbladder containing gallstones. No CT evidence of cholecystitis. Recommend correlation with LFTs. US GALLBLADDER RUQ    Result Date: 10/21/2022  NO PRIOR REPORT AVAILABLE Exam: ULTRASOUND OF THE ABDOMEN LIMITED, RIGHT UPPER QUADRANT Clinical data: Elevated LFTs, sepsis, lactic acidosis. The patient is on ventilator. Technique: Real-time grayscale imaging of the right upper quadrant was performed. Images supplemented with color Doppler technique. Prior studies: CT scan of abdomen and pelvis dated 10/20/22. Findings: Limited exam due to poor acoustic penetration. Diffuse increased echogenicity of the liver parenchyma. A shadowing focus//calcification in the right lobe measuring up 1.1 cm. No intrahepatic biliary distention. The gallbladder is distended with several small stones. The gallbladder wall thickness measures 0.8 cm. No sludge. The common bile duct is normal in caliber measuring 0.4cm. The right kidney rdioraxe13.7 x 6.6 x 5.4 cm. Normal echogenicity and cortical thickness are preserved. No evidence of renal masses, no calculi and no hydronephrosis Diffuse increased echogenicity of the pancreas. Imaged portion of the aorta demonstrates no acute pathology. Visualized portion of the inferior vena cava is unremarkable. 1.  Limited exam for reasons discussed above. 2.  Diffuse fatty infiltration of the liver with a 1.1 cm calcification in the right hepatic lobe. 3.  Distended gallbladder with several small stones and mild diffuse gallbladder wall thickening. 4.  No other significant abnormality. Recommendation: Follow up as clinically indicated. Electronically Signed by Clayton Linda MD at 21-Oct-2022 11:06:19 PM EST             XR CHEST PORTABLE    Result Date: 10/20/2022  NO PRIOR REPORT AVAILABLE Exam: X-RAY OF [facility] Clinical data:Confirmation of course of NG/OG/NE tube and location of tip of tube. Technique:Single view of the chest. Prior studies: Radiograph of the chest done on same day. Findings: The lungs are grossly clear; noevidence of acute infiltrate or pleural effusion. Cardiac silhouette is mildly enlarged. No acute osseous abnormality is detected. The NG tube can be traced to the level of distal esophagus. Scattered nonspecific gas-filled loops of bowel in the upper abdomen. The NG tube can be traced to the level of distal esophagus. Suggest advancing accordingly, with a follow-up radiograph. Recommendation: As above.  Electronically Signed by Gabriella Gold MD at 20-Oct-2022 06:15:09 PM EST             XR CHEST PORTABLE    Result Date: 10/20/2022  NO PRIOR REPORT AVAILABLE Exam: X-RAY OF [facility] Clinical data:Abdominal pain, vomiting. Technique:Single view of the chest. Prior studies: No prior studies submitted. Findings: The trachea is midline. The heart is enlarged. Mediastinal and pulmonary vascular shadows are normal.  There is no focal consolidation or effusion. The osseous structures are intact. Cardiomegaly. Recommendation: Follow up as clinically indicated. Electronically Signed by Deidre Sarkar MD at 20-Oct-2022 03:26:36 PM EST                Assessment     SBO (small bowel obstruction). S/P right hemicolectomy. Status post laparotomy with repair of anastomotic leak and diverting ileostomy. Essential hypertension. Control medical management. Non-ischemic cardiomyopathy. Medical management. Paroxysmal atrial fibrillation. Continue beta blocker. Adenocarcinoma of the colon  pT3, pN2A, pMX    Hypoxemic respiratory failure. Resolved. Please document 47 minutes of critical care time for patient assessment, chart review, discussion with staff, .       Khloe Isaac DO

## 2022-11-02 NOTE — PROGRESS NOTES
Patient up to chair daily,No appetite, poor oral intake, normal bowel sounds, advanced cirrhosis with hypoalbuminemia causing large ascites drained by TRISTAN putting out > 2 liters a day,  prior episodes of low urine output and hypotension better, PEG TF initiated, added Reglan IV for gastric residuals, rising total bilirubin to 10 with direct 7.7, known gallstones in GB, CBD 8 mm, Dr Teena Levi consulted to consider EUS and/or ERCP. Start Aldactone 25 mg po daily and salt poor Albumin 25% 50 ml IV prn low BP. Complete Merrem and Vancomycin, continue Digoxin for AF rate controlled, LVEF 25 %, doubt passive live congestion, no hepatic vein thrombosis.

## 2022-11-02 NOTE — PLAN OF CARE
Problem: Chronic Conditions and Co-morbidities  Goal: Patient's chronic conditions and co-morbidity symptoms are monitored and maintained or improved  Outcome: Progressing  Flowsheets (Taken 11/1/2022 2000)  Care Plan - Patient's Chronic Conditions and Co-Morbidity Symptoms are Monitored and Maintained or Improved:   Monitor and assess patient's chronic conditions and comorbid symptoms for stability, deterioration, or improvement   Collaborate with multidisciplinary team to address chronic and comorbid conditions and prevent exacerbation or deterioration   Update acute care plan with appropriate goals if chronic or comorbid symptoms are exacerbated and prevent overall improvement and discharge     Problem: Skin/Tissue Integrity  Goal: Absence of new skin breakdown  Description: 1. Monitor for areas of redness and/or skin breakdown  2. Assess vascular access sites hourly  3. Every 4-6 hours minimum:  Change oxygen saturation probe site  4. Every 4-6 hours:  If on nasal continuous positive airway pressure, respiratory therapy assess nares and determine need for appliance change or resting period. Outcome: Progressing     Problem: Safety - Medical Restraint  Goal: Remains free of injury from restraints (Restraint for Interference with Medical Device)  Description: INTERVENTIONS:  1. Determine that other, less restrictive measures have been tried or would not be effective before applying the restraint  2. Evaluate the patient's condition at the time of restraint application  3. Inform patient/family regarding the reason for restraint  4.  Q2H: Monitor safety, psychosocial status, comfort, nutrition and hydration  Outcome: Progressing     Problem: Nutrition Deficit:  Goal: Optimize nutritional status  Outcome: Progressing     Problem: ABCDS Injury Assessment  Goal: Absence of physical injury  Outcome: Progressing

## 2022-11-02 NOTE — PROGRESS NOTES
Cleared with Dr Jeniffer Washington that it is ok to give Lovenox.  Low platelets most likely from, Liver disease

## 2022-11-02 NOTE — PROGRESS NOTES
Messaged Dr Joselito Colin to look at [phone] [address] from this am. Due to CBD . dilitation of .0,8

## 2022-11-02 NOTE — PROGRESS NOTES
Physical Therapy  Name: Gildardo Almendarez  MRN:  [id number]  Date of service:  11/2/2022 11/02/22 2759   Restrictions/Precautions   Restrictions/Precautions Fall Risk   Required Braces or Orthoses? No   Position Activity Restriction   Other position/activity restrictions TRISTAN drain, mulitple IVs, garcia, G tube, telemetry, BP cuff. Gait belt precautions due to abdominal tubes and surgical wounds. Subjective   Subjective Pt agreed to therapy and nurse okayed therapy. Pain Assessment   Pain Assessment Dunn-Baker FACES   Dunn-Baker Pain Rating 4   Pain Location Abdomen   Pain Descriptors Sore   Functional Pain Assessment Prevents or interferes some active activities and ADLs   Pain Type Acute pain   Pain Frequency Continuous   Non-Pharmaceutical Pain Intervention(s) Ambulation/Increased Activity;Repositioned; Rest   Response to Pain Intervention Patient satisfied   Bed Mobility   Supine to Sit Maximal assistance  (x2)   Comment once sitting EOB pt able to maintain sitting balance   Transfers   Sit to Stand Minimal Assistance; Moderate Assistance;2 Person Assistance   Stand to Sit Minimal Assistance;2 Person Assistance   Bed to Chair Dependent/Total  (SS)   Comment stood in SS multiple times with cueing for bring hips fwd and come to full stand   Other Activities   Comment stood from raised bed height, nursing placed lift pad on chair for BTB if needed   Patient Goals    Patient Goals  not stated   Short Term Goals   Time Frame for Short Term Goals 2 wks   Short Term Goal 1 supine to sit indep   Short Term Goal 2 sit to stand indep   Short Term Goal 3 amb. 200' with RW SBA   Short Term Goal 4 bed to chair SBA   Conditions Requiring Skilled Therapeutic Intervention   Body Structures, Functions, Activity Limitations Requiring Skilled Therapeutic Intervention Decreased functional mobility ; Decreased strength;Decreased balance; Increased pain;Decreased posture;Decreased ROM; Decreased body mechanics; Decreased tolerance

## 2022-11-02 NOTE — PROGRESS NOTES
Comprehensive Nutrition Assessment    Type and Reason for Visit:  Reassess    Nutrition Recommendations/Plan:   Start EN to help with meeting pt's nutritional needs. Malnutrition Assessment:  Malnutrition Status: Moderate malnutrition (11/02/22 1010)    Context:  Chronic Illness     Findings of the 6 clinical characteristics of malnutrition:  Energy Intake:  Mild decrease in energy intake (Comment)  Weight Loss:  Greater than 10% over 6 months     Body Fat Loss:  Mild body fat loss Orbital   Muscle Mass Loss:  No significant muscle mass loss    Fluid Accumulation:  Mild Generalized   Strength:  Not Performed    Nutrition Assessment:    PO intake has decreased with intake usually less than 50%. TF has been restarted through PEG. EN is Vital HIgh Protein with supplemental goal rate of 25ml/hr. Will increase rate if po intake remains decreased or decreases even more. Nutrition Related Findings:    Colostomy, PEG Wound Type: Surgical Incision, Multiple, Deep Tissue Injury       Current Nutrition Intake & Therapies:    Average Meal Intake: 26-50%  Average Supplements Intake: 1-25%, 26-50%  Current Tube Feeding (TF) Orders:  Feeding Route: PEG  Formula: Peptide Based High Protein  Schedule: Continuous  Feeding Regimen: Vital HighProtein 25ml/hr with 20ml free water flush hourly  Additives/Modulars: None  Water Flushes: 20ml hourly  Current TF & Flush Orders Provides: Vital High Protein 10ml/hr = 240 kcals with 20g [id number] CHO and 20ml free water from formula, 480ml free water from fllush  Goal TF & Flush Orders Provides: Vital High Protein @ 25ml/hr = 600 kcals with 52g protein, 66g CHO and 501 ml free water from formula and 480ml free water from flush    Anthropometric Measures:  Height: 5' 10\" (177.8 cm)  Ideal Body Weight (IBW): 166 lbs (75 kg)    Admission Body Weight: 230 lb (104.3 kg) (stated)  Current Body Weight: 186 lb 1.6 oz (84.4 kg), 112.1 % IBW.  Weight Source: Bed Scale  Current BMI (kg/m2): 26.7  Usual Body Weight: 238 lb (108 kg) (4/2022)  % Weight Change (Calculated): -16.3  Weight Adjustment For: No Adjustment    BMI Categories: Overweight (BMI 25.0-29. 9)    Estimated Daily Nutrient Needs:  Energy Requirements Based On: Kcal/kg     Energy (kcal/day): 6294-5930 kcals (20-25 kcals/kg)  Weight Used for Protein Requirements: Ideal  Protein (g/day): 44=653d  Method Used for Fluid Requirements: 1 ml/kcal  Fluid (ml/day): 5619-3249 ml    Nutrition Diagnosis:   Inadequate oral intake, Increased nutrient needs related to acute injury/trauma, increase demand for energy/nutrients as evidenced by wounds, weight loss, weight loss greater than or equal to 2% in 1 week, intake 0-25%, intake 26-50%, nutrition support - enteral nutrition    Nutrition Interventions:   Food and/or Nutrient Delivery: Continue Current Diet, Continue Oral Nutrition Supplement, Start Tube Feeding  Nutrition Education/Counseling: No recommendation at this time  Coordination of Nutrition Care: Continue to monitor while inpatient  Plan of Care discussed with: nursing    Goals:  Previous Goal Met: No Progress toward Goal(s)  Goals: Meet at least 75% of estimated needs, PO intake 50% or greater, Initiate nutrition support       Nutrition Monitoring and Evaluation:   Behavioral-Environmental Outcomes: None Identified  Food/Nutrient Intake Outcomes: Diet Advancement/Tolerance  Physical Signs/Symptoms Outcomes: Biochemical Data, Fluid Status or Edema, Weight, Skin    Discharge Planning:     Too soon to determine     Mario Francis, MS, RD, LD  Contact: [phone]

## 2022-11-02 NOTE — PROGRESS NOTES
Palliative Care Progress Note  11/2/2022 8:34 AM    Patient:  Gildardo Almendarez  YOB: 1955  Primary Care Physician: No primary care provider on file. Advance Directive: DNR  Admit Date: 10/20/2022       Hospital Day: 15  Portions of this note have been copied forward, however, changed to reflect the most current clinical status of this patient. CHIEF COMPLAINT/REASON FOR CONSULTATION Goals of care, family support, Code status, symptom management     SUBJECTIVE:  Mr. Sophia Rodgers is resting comfortably up in chair. Wakes to voice and denies pain. No s/s distress noted    HPI: The patient is a 77 y.o. male with PMH HTN, afib, and non-ischemic cardiomyopathy who presented to [facility] ED 10/20/2022 complaining of abdominal pain, distention, and n/v x2 days. CT abdomen/pelvis in ED revealed a colon mass at the hepatic flexure causing obstruction. He was admitted under hospitalist services and initiated on NGT therapy for decompression. GS was consulted in ED and pt underwent right hemicolectomy with gastrostomy tube placement 10/21/2022 with hard mass adherent to liver and small bowel posteriorly. He remained intubated and sedated post op due to concern for trauma to the nasopharynx and pharynx due to a false track created by an NG tube. He underwent scoping with ENT 10/22/2022 to evaluate for safety to extubate and was found to be patent bilaterally with trauma to to the right but no no contraindication to wean to extubation and positive pressure if needed. He was liberated from the ventilator 10/23/2022 and demonstrated increased agitation with need for intermittent precedex/ativan for concern of ETOH withdrawal. He required endoscopic Gtube replacement with GI 10/23/2022 after self removing Gtube once extubated. Oncology has evaluated with elevated CEA and pathology pending with plans for further staging workup to be completed outpatient.     Review of Systems:   14 point review of systems is negative except as specifically addressed above. Objective:   VITALS:  /77   Pulse 96   Temp 97.6 °F (36.4 °C) (Temporal)   Resp 18   Ht 5' 10\" (1.778 m)   Wt 186 lb 1.6 oz (84.4 kg)   SpO2 97%   BMI 26.70 kg/m²   24HR INTAKE/OUTPUT:    Intake/Output Summary (Last 24 hours) at 11/2/2022 0834  Last data filed at 11/2/2022 0600  Gross per 24 hour   Intake 6518.36 ml   Output 5060 ml   Net 1458.36 ml       General appearance: 78 yo male, ill appearing, NAD  Head: Normocephalic, without obvious abnormality, atraumatic  Eyes: Icteric. PEERL. Glasses in place  Ears: normal external ears and nose  Neck: no JVD, supple, symmetrical, trachea midline   Lungs: diminished to auscultation bilaterally, unlabored on room air  Heart: irregularly irregular rhythm, S1, S2 normal  Abdomen: Rounded,soft, hypoactive bowel sounds, no grimacing to palpation, G-tube with trickle TF initiated. Ostomy with liquid stool.  TRISTAN with serous output  Extremities: BLE 1+ edema,  No erythema, no tenderness to palpation  Skin: Warm, dry, jaundiced, abdominal incision WNL  Neurologic: Wakes to voice but falls back to sleep easily, oriented x4, follows commands, speech fluent, generalized weakness    Medications:      dextrose 5% in lactated ringers 150 mL/hr at 11/01/22 1000    sodium chloride      phenylephrine (MARIE-SYNEPHRINE) 50mg/250mL infusion Stopped (11/01/22 0552)    sodium chloride 5 mL/hr at 10/29/22 1147    dextrose Stopped (10/31/22 5799)      meropenem  1,000 mg IntraVENous Q12H    [Held by provider] digoxin  125 mcg IntraVENous Daily    metoprolol  5 mg IntraVENous Q6H    vitamin D  50,000 Units Oral Weekly    famotidine  20 mg Oral BID    thiamine mononitrate  100 mg Oral Daily    sodium chloride flush  5-40 mL IntraVENous 2 times per day    enoxaparin  40 mg SubCUTAneous Daily    BPCO  1 Dose Apply externally BID    sodium chloride flush  5-40 mL IntraVENous 2 times per day     sodium phosphate IVPB, [DISCONTINUED] HYDROmorphone **OR** HYDROmorphone **OR** HYDROmorphone, LORazepam **OR** LORazepam **OR** LORazepam **OR** LORazepam **OR** LORazepam **OR** LORazepam **OR** LORazepam **OR** LORazepam, sodium chloride flush, sodium chloride, metoprolol, sodium phosphate IVPB **OR** sodium phosphate IVPB **OR** sodium phosphate IVPB, sodium chloride flush, sodium chloride, glucose, dextrose bolus **OR** dextrose bolus, glucagon (rDNA), dextrose, potassium chloride **OR** potassium alternative oral replacement **OR** potassium chloride, ondansetron **OR** ondansetron, magnesium hydroxide  ADULT DIET; Full Liquid  ADULT ORAL NUTRITION SUPPLEMENT; Breakfast, Lunch, Dinner; Wound Healing Oral Supplement  ADULT TUBE FEEDING; PEG; Peptide Based High Protein; Continuous; 20; Yes; 10; Q 4 hours; 50; 20; Q 1 hour; Protein; bid     Lab and other Data:     Recent Labs     10/31/22  2235 11/01/22  0539 11/02/22  0353   WBC 20.9* 14.2* 13.4*   HGB 13.6* 11.6* 11.4*   PLT 85* 80* 70*       Recent Labs     10/31/22  2235 11/01/22  0213 11/01/22  0539 11/02/22  0353   *  --  131* 135*   K 5.1* 4.9 4.5 4.4   CL 99  --  100 105   CO2 19*  --  21* 23   BUN 59*  --  54* 44*   CREATININE 1.7*  --  1.1 0.5   GLUCOSE 105  --  147* 119*       Recent Labs     10/31/22  2235 11/01/22  0539 11/02/22  0353   AST 79* 63* 62*   ALT 43* 34 30   BILITOT 8.8* 9.7* 10.0*   ALKPHOS 87 74 87       RAD:   CT ABDOMEN PELVIS W IV CONTRAST Additional Contrast? None  Result Date: 10/20/2022  1. Irregular mass at the hepatic flexure of the colon resulting in high-grade small bowel and proximal large bowel obstruction. Severe fluid and gaseous distention of the entire stomach and small bowel including the distal esophagus. Findings are overall concerning for colonic neoplasm. Recommend GI and surgical consultation. 2.Scattered enlarged retroperitoneal lymph nodes. These are presumably metastatic if the colon mass proves to be malignant.  3.Marked hydropic distention of the gallbladder containing gallstones. No CT evidence of cholecystitis. Recommend correlation with LFTs. US GALLBLADDER RUQ  Result Date: 10/21/2022  1. Limited exam for reasons discussed above. 2.  Diffuse fatty infiltration of the liver with a 1.1 cm calcification in the right hepatic lobe. 3.  Distended gallbladder with several small stones and mild diffuse gallbladder wall thickening. 4.  No other significant abnormality. Recommendation: Follow up as clinically indicated. Electronically Signed by Raeann Castle MD at 21-Oct-2022 11:06:19 PM EST             XR CHEST PORTABLE  Result Date: 10/20/2022  The NG tube can be traced to the level of distal esophagus. Suggest advancing accordingly, with a follow-up radiograph. Recommendation: As above. Electronically Signed by Edelmira Primrose MD at 20-Oct-2022 06:15:09 PM EST             XR CHEST PORTABLE  Result Date: 10/20/2022  Cardiomegaly. Recommendation: Follow up as clinically indicated. Electronically Signed by Monse Valadez MD at 20-Oct-2022 03:26:36 PM EST             Assessment/Plan   Principal Problem:    SBO (small bowel obstruction) ([address]  Active Problems:    Septic shock ([address]    Peritonitis ([address]    Essential hypertension    Hypothyroidism    Hyperglycemia    Thrombocytopenia (HCC)    Palliative care patient    Agitation    Moderate malnutrition (HCC)    Ileocolic anastomotic leak    Non-ischemic cardiomyopathy (HCC)    Paroxysmal atrial fibrillation (HCC)    Colonic mass    Ileus, postoperative [facility])    Accident  Resolved Problems:    * No resolved hospital problems. *      Visit Summary:  Chart reviewed. Mr. lCifton Connell is seen at bedside this morning with report obtained from RN and no acute events overnight. He is mobilizing and up in chair today. He remains jaundiced with elevated bili. Large serous TRISTAN drainage continues. Weaned from pressor support. Trickle TF initiated last night per RN with large residual reported this morning.  GI to re evaluate today for concern of CBD dilation and gallstones. I attempted to call his sister/HCS, Partha Young, with no answer. VM left with my contact information. Will follow. Candidate for SCOP: To be determined based on hospital course     Recommendations:      Palliative Care- GOC prolong life, continue all medical treatments/workup and monitor for improvement. D/c planning based on hospital course. Code status- DNR  SBO 2/2 hepatic flexure colonic mass- 10/21/22 right hemicolectomy with gastrostomy placement by Dr. Sahil Sargent. G-tube replaced 10/23/2022 per GI endoscopically after patient self removed once extubated, now clamped and potentially initiated gently TF today. Reglan continued. Baseline CEA 11.0 on 10/21/2022 and postop CEA 6.9. Pathology at least stage III, will need outpt follow up with oncology for further staging workup. Septic shock 2/2 anastomotic leak- 10/26/22 laparotomy, repair of leak, washout, and creation of diverting ileostomy by Dr. Gustavo Torres. Weaned from pressor support. Abx continue. Monitor TRISTAN and ostomy output  Acute hypoxemic respiratory failure postop- originally extubated 10/23/22 and required ventilation postop but successful extubation again 10/26/22. Stable on room air. Elevated bilirubin- GI following. Follow LFTs. Bili is 9.7 today. Bedside RUQ ultrasound 10/25/22 shows cholelithiasis without acute cholecystitis or CBD dilation. Liver US 11/1/22 Cirrhotic morphology of the liver, moderate ascites, with multiple gallstones and gallbladder thickening, and pericholecystic fluid seen concerning for acute cholecystitis. GI reconsulted and considering EUs and/or ERCP  SONIA- renal function improving. IVF replacement for hypovolemia   Agitation- concern 2/2 EtOH withdrawal. Intermittent agitation improving without overt signs of alcohol withdrawal. Restraints as warranted. A. fib with RVR- cardiology following. Cardizem gtt stopped. Lopressor and digoxin IV scheduled.  Rate more controlled postop  Nonischemic cardiomyopathy- noted. Mgmt per hospitalist  Malnutrition- TPN stopped, Trickle TF nutrition initiated 11/1/22. Diet advanced. Dietician following. Thank you for consulting Palliative Care and allowing us to participate in the care of this patient.    Time Spent Counseling > 50%:  YES                                   Total Time Spent with patient/family counseling, workup/treatment review, counseling and placement of orders/preparation of this note: 24 minutes    Electronically signed by ALLISON Preciado CNP on 11/2/2022 at 8:34 AM    (Please note that portions of this note were completed with a voice recognition program.  Cody Patient made to edit the dictations but occasionally words are mis-transcribed.)

## 2022-11-02 NOTE — PROGRESS NOTES
Subjective:  No acute events or changes. Taking a little PO, tolerating TF. Reports a little abdominal pain. Objective:  /64   Pulse 88   Temp 97.6 °F (36.4 °C) (Temporal)   Resp 20   Ht 5' 10\" (1.778 m)   Wt 186 lb 1.6 oz (84.4 kg)   SpO2 96%   BMI 26.70 kg/m²   Date 11/02/22 0000 - 11/02/22 2359   Shift 1293-0247 7634-2845 6409-0187 24 Hour Total   INTAKE   I.V.(mL/kg) 1451. 1(17.2) 299.9(3.6)  1751(20.7)   NG/GT(mL/kg) 40(0.5) 85(1)  125(1.5)   IV Piggyback(mL/kg) 1036. 9(12.3) 0(0)  1036. 9(12.3)   Shift Total(mL/kg) 3479(41.8) 384. 9(4.6)  2912. 9(34.5)   OUTPUT   Urine(mL/kg/hr) 385(0.6) 200(0.3)  585   Emesis/NG output(mL/kg) 300(3.6) 600(7.1)  900(10.7)   Drains(mL/kg) 800(9.5)   800(9.5)   Stool(mL/kg) 300(3.6) 200(2.4)  500(5.9)   Shift Total(mL/kg) 1785(21.1) 1000(11.8)  2785(33)   Weight (kg) 84.4 84.4 84.4 84.4     General: NAD, jaundiced, awake and alert  Chest: regular, non-labored  Abdomen: Soft, ND, ATTP, incision with serous drainage at inferior portion, TRISTAN with serosang, ostomy functioning    CBC:   Lab Results   Component Value Date/Time    WBC 13.4 11/02/2022 03:53 AM    RBC 3.60 11/02/2022 03:53 AM    HGB 11.4 11/02/2022 03:53 AM    HCT 32.7 11/02/2022 03:53 AM    MCV 90.8 11/02/2022 03:53 AM    MCH 31.7 11/02/2022 03:53 AM    MCHC 34.9 11/02/2022 03:53 AM    RDW 17.2 11/02/2022 03:53 AM    PLT 70 11/02/2022 03:53 AM    MPV 12.7 11/02/2022 03:53 AM     CMP:    Lab Results   Component Value Date/Time     11/02/2022 03:53 AM    K 4.4 11/02/2022 03:53 AM    K 4.5 11/01/2022 05:39 AM     11/02/2022 03:53 AM    CO2 23 11/02/2022 03:53 AM    BUN 44 11/02/2022 03:53 AM    CREATININE 0.5 11/02/2022 03:53 AM    GFRAA >59 04/15/2022 11:37 AM    LABGLOM >60 11/02/2022 03:53 AM    GLUCOSE 119 11/02/2022 03:53 AM    PROT 4.9 11/02/2022 03:53 AM    LABALBU 2.5 11/02/2022 03:53 AM    CALCIUM 8.1 11/02/2022 03:53 AM    BILITOT 10.0 11/02/2022 03:53 AM    ALKPHOS 87 11/02/2022 03:53 AM

## 2022-11-03 PROBLEM — K91.89 ILEUS, POSTOPERATIVE (HCC): Status: RESOLVED | Noted: 2022-01-01 | Resolved: 2022-01-01

## 2022-11-03 PROBLEM — K56.609 SBO (SMALL BOWEL OBSTRUCTION) (HCC): Status: RESOLVED | Noted: 2022-01-01 | Resolved: 2022-01-01

## 2022-11-03 PROBLEM — D69.6 THROMBOCYTOPENIA (HCC): Status: RESOLVED | Noted: 2022-01-01 | Resolved: 2022-01-01

## 2022-11-03 PROBLEM — A41.9 SEPTIC SHOCK (HCC): Status: RESOLVED | Noted: 2022-10-27 | Resolved: 2022-11-03

## 2022-11-03 PROBLEM — Z51.5 PALLIATIVE CARE PATIENT: Status: RESOLVED | Noted: 2022-01-01 | Resolved: 2022-01-01
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
## 2022-11-03 PROBLEM — K65.9 PERITONITIS (HCC): Status: RESOLVED | Noted: 2022-01-01 | Resolved: 2022-01-01

## 2022-11-03 PROBLEM — R65.21 SEPTIC SHOCK (HCC): Status: RESOLVED | Noted: 2022-01-01 | Resolved: 2022-01-01

## 2022-11-03 PROBLEM — R73.9 HYPERGLYCEMIA: Status: RESOLVED | Noted: 2022-01-01 | Resolved: 2022-01-01

## 2022-11-03 PROBLEM — K56.7 ILEUS, POSTOPERATIVE (HCC): Status: RESOLVED | Noted: 2022-01-01 | Resolved: 2022-01-01

## 2022-11-03 PROBLEM — E44.0 MODERATE MALNUTRITION (HCC): Status: RESOLVED | Noted: 2022-10-26 | Resolved: 2022-11-03

## 2022-11-03 PROBLEM — I48.0 PAROXYSMAL ATRIAL FIBRILLATION (HCC): Status: RESOLVED | Noted: 2018-03-16 | Resolved: 2022-01-01

## 2022-11-03 PROBLEM — K91.89 ILEOCOLIC ANASTOMOTIC LEAK: Status: RESOLVED | Noted: 2022-01-01 | Resolved: 2022-01-01

## 2022-11-03 PROBLEM — R45.1 AGITATION: Status: RESOLVED | Noted: 2022-10-24 | Resolved: 2022-11-03

## 2022-11-03 PROBLEM — I25.10 DISEASE OF CARDIOVASCULAR SYSTEM: Status: ACTIVE | Noted: 2022-01-01

## 2022-11-03 NOTE — PROGRESS NOTES
I was asked to review chart due to hyperbilirubinemia noted. Patient with elevated bilirubin that has worsened since admission    Complicated hospitalization with R colon rescection for CRC and then anastomotic leak, PEG tube placement. Bili elevated but notably alk phos remains normal and AST/ALT elevated at 2:1.    Clinical concern for ETOH withdrawal per notes. Currently bilirubin elevated. CBD only mildy abnormal at 8mm. Plan  - bilirubin elevation could easily be multifactorial (DILI from antibiotics, TF, bactereremia and possibly due to CBD stones)  although I would expect worsening symptoms and elevation in alk phos if retained stone was present causing such a rapid rise in bilirubin. I would not proceed directly to ERCP in this case. Non-invasive diagnostic testing of bile duct would be appropriate.      If CBD stone needs to be ruled out, I would suggest an MRCP prior to proceeding with Endoscopic procedure/anesthesia etc.

## 2022-11-03 NOTE — PLAN OF CARE
Problem: Chronic Conditions and Co-morbidities  Goal: Patient's chronic conditions and co-morbidity symptoms are monitored and maintained or improved  Outcome: Not Progressing     Problem: Skin/Tissue Integrity  Goal: Absence of new skin breakdown  Description: 1. Monitor for areas of redness and/or skin breakdown  2. Assess vascular access sites hourly  3. Every 4-6 hours minimum:  Change oxygen saturation probe site  4. Every 4-6 hours:  If on nasal continuous positive airway pressure, respiratory therapy assess nares and determine need for appliance change or resting period. Outcome: Not Progressing     Problem: Safety - Medical Restraint  Goal: Remains free of injury from restraints (Restraint for Interference with Medical Device)  Description: INTERVENTIONS:  1. Determine that other, less restrictive measures have been tried or would not be effective before applying the restraint  2. Evaluate the patient's condition at the time of restraint application  3. Inform patient/family regarding the reason for restraint  4.  Q2H: Monitor safety, psychosocial status, comfort, nutrition and hydration  Outcome: Not Progressing     Problem: Nutrition Deficit:  Goal: Optimize nutritional status  Outcome: Not Progressing     Problem: ABCDS Injury Assessment  Goal: Absence of physical injury  Outcome: Not Progressing

## 2022-11-03 NOTE — PROGRESS NOTES
Went to round on patient and found that he had chosen to go with hospice. Had spoken to Jewell Villela earlier in the day about the ostomy essentially breaking down, due to low protein from liver disease. Patient's continued liver disease, worsening to failure. I do feel hospice is an appropriate choice. Will be available if needed.

## 2022-11-03 NOTE — PROGRESS NOTES
Alert, up to chair daily, oral feeding still poor without explanation, unable to advance PEG tube feeding rate due to 300 ml gastric residual despite Reglan, no further hypotension episodes since maintained on IV salt poor albumin, high volume output of clear ascites fluid continues from TRISTAN, renal function does not indicate prerenal azotemia, urine output good. Plan:  decrease IV fluid from 15 ml/hr to 75 ml/hr, gradually increase Aldactone and Lasix as tolerated for diuresis of ascites maintaining systolic BP then clamp TRISTAN. Case discussed with Dr Rui Barba, consulted concerning progressive hyperbilirubinemia and 8 mm CBD.

## 2022-11-03 NOTE — PROGRESS NOTES
Palliative Care Progress Note  11/3/2022 8:09 AM    Patient:  Brian Hutchison  YOB: 1955  Primary Care Physician: No primary care provider on file. Advance Directive: DNR  Admit Date: 10/20/2022       Hospital Day: 14  Portions of this note have been copied forward, however, changed to reflect the most current clinical status of this patient. CHIEF COMPLAINT/REASON FOR CONSULTATION Goals of care, family support, Code status, symptom management     SUBJECTIVE:  Mr. Chirag Miller is resting in bed. Wakes easily to voice and remains oriented and communicative. He is c/o abdominal pain and requesting pain medication. No new complaints    HPI: The patient is a 77 y.o. male with PMH HTN, afib, and non-ischemic cardiomyopathy who presented to [facility] ED 10/20/2022 complaining of abdominal pain, distention, and n/v x2 days. CT abdomen/pelvis in ED revealed a colon mass at the hepatic flexure causing obstruction. He was admitted under hospitalist services and initiated on NGT therapy for decompression. GS was consulted in ED and pt underwent right hemicolectomy with gastrostomy tube placement 10/21/2022 with hard mass adherent to liver and small bowel posteriorly. He remained intubated and sedated post op due to concern for trauma to the nasopharynx and pharynx due to a false track created by an NG tube. He underwent scoping with ENT 10/22/2022 to evaluate for safety to extubate and was found to be patent bilaterally with trauma to to the right but no no contraindication to wean to extubation and positive pressure if needed. He was liberated from the ventilator 10/23/2022 and demonstrated increased agitation with need for intermittent precedex/ativan for concern of ETOH withdrawal. He required endoscopic Gtube replacement with GI 10/23/2022 after self removing Gtube once extubated.  Oncology has evaluated with elevated CEA and pathology pending with plans for further staging workup to be completed outpatient. Required laparotomy, repair of anastomic leak, washout, and creation of diverting ileostomy by Dr. Odilon Aranda 10/26/2022. Review of Systems:   14 point review of systems is negative except as specifically addressed above. Objective:   VITALS:  /71   Pulse 90   Temp 98 °F (36.7 °C) (Temporal)   Resp 13   Ht 5' 10\" (1.778 m)   Wt 193 lb 9.6 oz (87.8 kg)   SpO2 98%   BMI 27.78 kg/m²   24HR INTAKE/OUTPUT:    Intake/Output Summary (Last 24 hours) at 11/3/2022 0809  Last data filed at 11/3/2022 0400  Gross per 24 hour   Intake 3874.06 ml   Output 5950 ml   Net -2075.94 ml       General appearance: 76 yo male, ill appearing, no acute distress  Head: Normocephalic, without obvious abnormality, atraumatic  Eyes: Icteric. PEERL. Glasses in place  Ears: normal external ears and nose  Neck: no JVD, supple, symmetrical, trachea midline   Lungs: clear and diminished in bases to auscultation bilaterally, NC in place  Heart: irregularly irregular rhythm, S1, S2 normal, murmur heard  Abdomen: Rounded,soft, hypoactive bowel sounds, minimally TTP, G-tube with trickle TF initiated. Ostomy with liquid stool.  TRISTAN with serous output  Extremities: BLE 1+ edema,  No erythema, no tenderness to palpation  Skin: Warm, dry, jaundiced, abdominal incision WNL  Neurologic: Wakes to voice and oriented x4, follows commands, speech fluent, generalized weakness    Medications:      dextrose 5% in lactated ringers 150 mL/hr at 11/03/22 0326    sodium chloride      phenylephrine (MARIE-SYNEPHRINE) 50mg/250mL infusion Stopped (11/01/22 0524)    sodium chloride 5 mL/hr at 10/29/22 1147    dextrose Stopped (10/31/22 8569)      metoclopramide  10 mg IntraVENous Q6H    digoxin  125 mcg IntraVENous Every Other Day    metoprolol  5 mg IntraVENous Q6H    vitamin D  50,000 Units Oral Weekly    famotidine  20 mg Oral BID    thiamine mononitrate  100 mg Oral Daily    sodium chloride flush  5-40 mL IntraVENous 2 times per day    enoxaparin neoplasm. Recommend GI and surgical consultation. 2.Scattered enlarged retroperitoneal lymph nodes. These are presumably metastatic if the colon mass proves to be malignant. 3.Marked hydropic distention of the gallbladder containing gallstones. No CT evidence of cholecystitis. Recommend correlation with LFTs. US GALLBLADDER RUQ  Result Date: 10/21/2022  1. Limited exam for reasons discussed above. 2.  Diffuse fatty infiltration of the liver with a 1.1 cm calcification in the right hepatic lobe. 3.  Distended gallbladder with several small stones and mild diffuse gallbladder wall thickening. 4.  No other significant abnormality. Recommendation: Follow up as clinically indicated. Electronically Signed by Anabelle Oleary MD at 21-Oct-2022 11:06:19 PM EST             XR CHEST PORTABLE  Result Date: 10/20/2022  The NG tube can be traced to the level of distal esophagus. Suggest advancing accordingly, with a follow-up radiograph. Recommendation: As above. Electronically Signed by Carmen Disla MD at 20-Oct-2022 06:15:09 PM EST             XR CHEST PORTABLE  Result Date: 10/20/2022  Cardiomegaly. Recommendation: Follow up as clinically indicated. Electronically Signed by Olivia Edwards MD at 20-Oct-2022 03:26:36 PM EST             Assessment/Plan   Principal Problem:    SBO (small bowel obstruction) ([address]  Active Problems:    Septic shock ([address]    Peritonitis ([address]    Essential hypertension    Hypothyroidism    Hyperglycemia    Thrombocytopenia (HCC)    Palliative care patient    Agitation    Moderate malnutrition (HCC)    Ileocolic anastomotic leak    Non-ischemic cardiomyopathy (HCC)    Paroxysmal atrial fibrillation (HCC)    Colonic mass    Ileus, postoperative [facility])    Accident  Resolved Problems:    * No resolved hospital problems. *      Visit Summary:  Chart reviewed. Report obtained from RN with no acute events overnight. Mr. Pepito Morales is seen at bedside this morning and is requesting pain medications. Discusses with RN to administer prn dilaudid. He states that abdominal pain has increased with initiation of TF nutrition. He continues to tolerate diet with poor appetite. Getting OOB to chair with nursing staff. I rounded this afternoon once pts family present at bedside. Pts sister/HCS, Brandeerakel Thomas, and mother present with his niece over telephone. We reviewed his current status, labs, medication regimen, and recommendations per GI for potential MRCP to assess for increase in bilirubin. Pt states that he no longer wishes to continue aggressive workup and treatments. Pt/family request additional information regarding hospice care. I provided education on inpatient vs outpatient hospice. We discussed HS services, billing, goals of care, and care team. Pt/family wish to have pt evaluated for possible inpatient level of hospice care at the [facility]. If pt is not approved he will need SNF placement with outpatient hospice. Requested SW visit with pt family to provide additional information on requirements for medicaid application as they feel he will qualify and could potentially go to [facility] medicaid pending. Reviewed pt with hospice MD and general surgery. TRISTAN drain is to remain in place to assist with managing ascites and potentially removed if there is leakage around and replaced with an ironstone appliance to collect drainage. GS agreeable to provide support to hospice team at [place] [address]. regarding TRISTAN drain if needed. Pt is approved for [place] [address]. pending negative rapid COVID test. I informed pt/family of this and further discussed plan once he was moved to [place] [address].. I explained that at times IVF/TF can be continued for short term but as these are weaned off pt may deteriorate rapidly due to him not being able to maintain his fluid status with copious output from drains and poor intake/nutritional status.  I also explained that they should continue to work on [employer] application for [employer] pending status so pt can discharge to SNF with hospice if he were to stabilize at [facility].. They verbalized understanding. I encouraged them to complete POA documents while pt was still of sound mind. Living will on file listing pts sister, Partha Young, as HCS. They are agreeable to transfer and I informed them HS consents would need to be completed before move. Discussed with hospitalist, RN, and Daryl Reilly MD. I called his daughter, Taylor Atkinson, to update on above. She is tearful but verbalizes understanding. Opportunity for questions and emotional support provided. Candidate for SCOP: Pt/family have decided to pursue hospice care     Recommendations:      Palliative Care- [address] pt/family wish to stop aggressive treatments and pursue comfort care with hospice. Approved for admission to [place] [address]. pending negative COVID test. Code status- DNR  SBO 2/2 hepatic flexure colonic mass- 10/21/22 right hemicolectomy with gastrostomy placement by Dr. Sahil Sargent. G-tube replaced 10/23/2022 per GI endoscopically after patient self removed once extubated, now clamped and potentially initiated gently TF today. Reglan continued. Baseline CEA 11.0 on 10/21/2022 and postop CEA 6.9. Pathology at least stage III. Pt has declined further workup and does not want to receive anticancer therapy. Focus on comfort care/symptom management. Septic shock 2/2 anastomotic leak- 10/26/22 laparotomy, repair of leak, washout, and creation of diverting ileostomy by Dr. Gustavo Torres. Weaned from pressor support. Monitor TRISTAN and ostomy output. Pari Davis continued for now with hospice care, GS recs above for line mgmt  Acute hypoxemic respiratory failure postop- originally extubated 10/23/22 and required ventilation postop but successful extubation again 10/26/22. NC O2 for comfort as needed  Elevated bilirubin- GI following. Follow LFTs. Bili is 11.7 today. Bedside RUQ ultrasound 10/25/22 shows cholelithiasis without acute cholecystitis or CBD dilation.  Liver US 11/1/22 Cirrhotic morphology of the liver, moderate ascites, with multiple gallstones and gallbladder thickening, and pericholecystic fluid seen concerning for acute cholecystitis. GI reconsulted and considering EUs and/or ERCP. Dr. Hernán Sullivan recommending less invasive MRCP at this time but pt declines. Pursuing hospice treatment. SONIA- renal labs stable. Adequate uop with continuous IVF and fluid/albumen boluses for support. Pt/family understands IVF will be weaned with hospice care and wishes to focus on comfort. Agitation- concern 2/2 EtOH withdrawal but symptoms now improved. A. fib with RVR- cardiology evaluated but has since signed off. Lopressor and digoxin IV scheduled. Rate remains controlled   Nonischemic cardiomyopathy- noted. Mgmt per hospitalist. Echo 10/25/2022 mild MR, mild AS, mild TR, EF 30%,   Malnutrition- TPN stopped, Trickle TF nutrition initiated 11/1/22 with high residuals reported. Diet advanced. Dietician following. Abdominal pain- Dilaudid IVP prn regimen in place. Will initiate oxycodone 5mg Q4H prn and monitor for symptom control/weaning from IV pain meds     Thank you for consulting Palliative Care and allowing us to participate in the care of this patient.    Time Spent Counseling > 50%:  YES                                   Total Time Spent with patient/family counseling, workup/treatment review, counseling and placement of orders/preparation of this note: 38 minutes plus an additional 33 minutes of ACP/goals conversations with pt/family at bedside and over telephone    Electronically signed by ALLISON Ibarra CNP on 11/3/2022 at 8:09 AM    (Please note that portions of this note were completed with a voice recognition program.  Marylen Angel made to edit the dictations but occasionally words are mis-transcribed.)

## 2022-11-03 NOTE — PROGRESS NOTES
[facility]  [facility]  Follow-up Progress Note       NAME:  Ying Stanford  MEDICAL RECORD NUMBER:  [id number]  AGE:  77 y.o.    GENDER:  male  :  1955  TODAY'S DATE:  11/3/2022    Subjective:   Wound Identification:  Wound Type: pressure  Contributing Factors: decreased mobility and malnutrition        Patient Goal of Care:  [] Wound Healing  [] Odor Control  [] Palliative Care  [] Pain Control   [] Other:     Objective:    BP (!) 103/52   Pulse 97   Temp 98.7 °F (37.1 °C) (Temporal)   Resp 16   Ht 5' 10\" (1.778 m)   Wt 193 lb 9.6 oz (87.8 kg)   SpO2 97%   BMI 27.78 kg/m²   Salvador Risk Score: Salvador Scale Score: 13  Assessment:   Measurements:  Puncture 10/23/22 Abdomen (Active)   Wound Assessment Other (Comment) 22 1200   Michelle-wound Assessment Fragile 22 1200   Drainage Amount Moderate 22 1200   Drainage Description Serous 22 1200   Dressing/Treatment Adhesive bandage 22 1200   Dressing Changed Changed/New 22 1200   Dressing Status Old drainage noted 22 1200   Dressing Change Due 22 1200   Dressing/Treatment Adhesive bandage 22 1200   Number of days: 11       Wound 10/23/22 Buttocks Left;Upper (Active)   Wound Image   22 1208   Wound Etiology Pressure Stage 2 22 1208   Dressing Status New dressing applied 22 1208   Wound Cleansed Soap and water 22 1208   Dressing/Treatment Pharmaceutical agent (see MAR) 22 1208   Dressing Change Due 22 1208   Wound Length (cm) 1 cm 22 1208   Wound Width (cm) 2 cm 22 1208   Wound Depth (cm) 0.1 cm 22 1208   Wound Surface Area (cm^2) 2 cm^2 22 1208   Change in Wound Size % (l*w) -4900 22 1208   Wound Volume (cm^3) 0.2 cm^3 22 1208   Wound Assessment Bleeding;Pink/red 22 1208   Drainage Amount Scant 11/03/22 1208   Drainage Description Sanguinous 11/03/22 1208   Odor None 11/03/22 1208   Michelle-wound Assessment Blanchable erythema 11/03/22 1208   Margins Attached edges 11/03/22 1208   Number of days: 11       Wound 10/25/22 Nose Right Right Nare/septum & top of nose (Active)   Wound Image   11/03/22 1208   Wound Etiology Pressure Unstageable 11/03/22 1208   Dressing Status New dressing applied 11/03/22 1208   Wound Cleansed Soap and water 11/03/22 1208   Dressing/Treatment Pharmaceutical agent (see MAR) 11/03/22 1208   Dressing Change Due 11/03/22 11/03/22 1208   Wound Length (cm) 0.5 cm 11/03/22 1208   Wound Width (cm) 0.5 cm 11/03/22 1208   Wound Depth (cm) 0 cm 10/25/22 0938   Wound Surface Area (cm^2) 0.25 cm^2 11/03/22 1208   Change in Wound Size % (l*w) 30.56 11/03/22 1208   Wound Volume (cm^3) 0 cm^3 10/25/22 0938   Wound Assessment Eschar dry 11/03/22 1208   Drainage Amount None 11/03/22 1208   Odor None 11/03/22 1208   Michelle-wound Assessment Intact 11/03/22 1208   Margins Attached edges 11/03/22 1208   Number of days: 9       Response to treatment:  Well tolerated by patient. Pain Assessment:  Severity:  0 / 10  Quality of pain: N/A  Wound Pain Timing/Severity: none  Premedicated: N/A  Plan:   Plan of Care: Puncture 10/23/22 Abdomen-Dressing/Treatment: Adhesive bandage  Wound 10/23/22 Buttocks Left;Upper-Dressing/Treatment: Pharmaceutical agent (see MAR)  Wound 10/25/22 Nose Right Right Nare/septum & top of nose-Dressing/Treatment: Pharmaceutical agent (see MAR)    Specialty Bed Required : Yes   [x] Low Air Loss (on Dolphin mattress)  [] Pressure Redistribution  [] Fluid Immersion  [] Bariatric  [] Total Pressure Relief  [] Other:     Current Diet: ADULT TUBE FEEDING; PEG; Peptide Based High Protein; Continuous; 10; Yes; 5; Q 6 hours; 25; 25; Q 1 hour  ADULT DIET; Dysphagia - Soft and Bite Sized; Low Sodium (2 gm)  ADULT ORAL NUTRITION SUPPLEMENT; Breakfast, Lunch, Dinner;  Wound Healing Oral Supplement  Dietician consult:  Yes    Discharge Plan:  Placement for patient upon discharge: TBD   Patient appropriate for Outpatient [address] [facility] [address]: TBD    Referrals:  []   [] [facility]  [] Supplies  [] Other    Patient/Caregiver Teaching:  Level of patient/caregiver understanding able to:   [] Indicates understanding       [] Needs reinforcement  [] Unsuccessful      [] Verbal Understanding  [] Demonstrated understanding       [] No evidence of learning  [] Refused teaching         [x] N/A       The deep tissue injury on the left buttock is now a partial thickness open wound, stage 2 pressure injury. The deep tissue injury at the right nare is now unstageable with dry eschar. Patient underwent urgent ileostomy on 10/26/22 and now has a ileostomy stoma in RLQ with bridge. The stoma is approximately 45 mm with mucocutaneous separation noted 6-9 oclock. Continue BPCO wound dressing to left buttock and right nare. Will follow for ostomy education/care.     Electronically signed by Soumya Ruano RN, [facility] on 11/3/2022 at 12:12 PM

## 2022-11-03 NOTE — CARE COORDINATION
SW visited again with Pt, mother, and sister, at bedside; answered questions re: requirements and situation for SNF/Medicaid pending if/when he goes;  Pt likely for [facility]. today; GAMAL asked SNFs to follow peripherally  Electronically signed by ANDRESSA Raygoza on 11/3/2022 at 4:48 PM

## 2022-11-03 NOTE — PROGRESS NOTES
Comprehensive Nutrition Assessment    Type and Reason for Visit:  Reassess    Nutrition Recommendations/Plan:   Continue to monitor TF residuals/tolerance and po intake     Malnutrition Assessment:  Malnutrition Status: Moderate malnutrition (11/03/22 1031)    Context:  Chronic Illness     Findings of the 6 clinical characteristics of malnutrition:  Energy Intake:  Mild decrease in energy intake (Comment)  Weight Loss:  Greater than 10% over 6 months     Body Fat Loss:  Mild body fat loss Orbital   Muscle Mass Loss:  No significant muscle mass loss    Fluid Accumulation:  Mild Generalized   Strength:  Not Performed    Nutrition Assessment:    Diet has been advanced to Soft and Bite sized 2gm. PO intake remains decreased. Is taking Ensure Surgery but to try chocolate to see if likes better. TF is at 10ml/hr at time of visit. Aware of increased residuals  200-300ml. Had not been checked yet by RN.   If needed will decrease to 5ml, if residuals the same or less shall continue TF at 10ml    Nutrition Related Findings:    Colostomy, PEG Wound Type: Surgical Incision, Multiple, Deep Tissue Injury       Current Nutrition Intake & Therapies:    Average Meal Intake: 1-25%  Average Supplements Intake: 26-50%, 51-75%  Current Tube Feeding (TF) Orders:  Feeding Route: PEG  Formula: Peptide Based High Protein  Schedule: Continuous  Feeding Regimen: Vital HighProtein 25ml/hr with 20ml free water flush hourly  Additives/Modulars: None  Water Flushes: 20ml hourly  Current TF & Flush Orders Provides: Vital High Protein 10ml/hr = 240 kcals with 20g [id number] CHO and 20ml free water from formula, 480ml free water from fllush  Goal TF & Flush Orders Provides: Vital High Protein @ 25ml/hr = 600 kcals with 52g protein, 66g CHO and 501 ml free water from formula and 480ml free water from flush    Anthropometric Measures:  Height: 5' 10\" (177.8 cm)  Ideal Body Weight (IBW): 166 lbs (75 kg)    Admission Body Weight: 230 lb (104.3 kg) (stated)  Current Body Weight: 193 lb 9.6 oz (87.8 kg), 116.6 % IBW. Weight Source: Bed Scale  Current BMI (kg/m2): 27.8  Usual Body Weight: 238 lb (108 kg) (4/2022)  % Weight Change (Calculated): -16.3  Weight Adjustment For: No Adjustment  BMI Categories: Overweight (BMI 25.0-29. 9)    Estimated Daily Nutrient Needs:  Energy Requirements Based On: Kcal/kg     Energy (kcal/day): 9804-6502 kcals (20-25 kcals/kg)  Weight Used for Protein Requirements: Ideal  Protein (g/day): 21=979a  Method Used for Fluid Requirements: 1 ml/kcal  Fluid (ml/day): 5617-1037 ml    Nutrition Diagnosis:   Inadequate oral intake, Increased nutrient needs related to acute injury/trauma, early satiety, increase demand for energy/nutrients as evidenced by wounds, intake 0-25%, nutrition support - enteral nutrition, weight loss    Nutrition Interventions:   Food and/or Nutrient Delivery: Continue Current Diet, Continue Oral Nutrition Supplement, Continue Current Tube Feeding  Nutrition Education/Counseling: No recommendation at this time  Coordination of Nutrition Care: Continue to monitor while inpatient  Plan of Care discussed with: nursing    Goals:  Previous Goal Met: Progressing toward Goal(s)  Goals: Meet at least 75% of estimated needs, PO intake 50% or greater, Initiate nutrition support       Nutrition Monitoring and Evaluation:   Behavioral-Environmental Outcomes: None Identified  Food/Nutrient Intake Outcomes: Diet Advancement/Tolerance, Food and Nutrient Intake  Physical Signs/Symptoms Outcomes: Biochemical Data, Fluid Status or Edema, Weight, Skin    Discharge Planning:     Too soon to determine     Francisco Albarran MS, RD, LD  Contact: [phone]

## 2022-11-03 NOTE — DISCHARGE SUMMARY
Discharge Summary    Zhane Barone  :  1955  MRN:  [id number]    Admit date:  10/20/2022  Discharge date: 11/3/2022     Admitting Physician:  Lavon Mortensen DO    Advance Directive: DNR    Consults: hematology/oncology, general surgery, infectious disease, ENT, GI, cardiology, palliative care and hospice    Primary Care Physician:  No primary care provider on file. Discharge Diagnoses:  Principal Problem (Resolved):    SBO (small bowel obstruction) (HCC)  Active Problems:    Non-ischemic cardiomyopathy (HCC)    Colonic mass  Resolved Problems:    Septic shock (HCC)    Peritonitis ([address]    Essential hypertension    Hypothyroidism    Hyperglycemia    Thrombocytopenia (HCC)    Palliative care patient    Agitation    Moderate malnutrition (HCC)    Ileocolic anastomotic leak    Paroxysmal atrial fibrillation (HCC)    Ileus, postoperative ([address]    Accident      Significant Diagnostic Studies:   CT ABDOMEN PELVIS W IV CONTRAST Additional Contrast? None    Result Date: 10/20/2022  CLINICAL HISTORY: 48-hour history of diffuse abdominal pain distention nausea vomiting some diarrhea no prior history COMPARISON: No comparison TECHNIQUE: Axial images of the abdomen and pelvis were obtained after the administration of IV contrast.Coronal and sagittal multiplanar reconstructions were performed. One or more of the following dose reduction techniques were used: Automated exposure control, adjustment of the mA and/or kV according to patient size, and/or use of iterative reconstruction technique. COMPARISON: None. FINDINGS: Statements: None. Lower Chest: Unremarkable. Hepatobiliary: Hepatic steatosis. Calcified granulomas throughout the liver. No focal lesion is identified. Marked distention of the gallbladder containing gallstones. No CT evidence of cholecystitis however. No biliary ductal dilatation is evident. Pancreas: The pancreatic parenchyma is unremarkable and there is no main duct dilatation. Spleen:  The spleen is nonenlarged. Calcified granulomas throughout the spleen. Adrenals: No adrenal glands are symmetric. Genitourinary: The kidneys are symmetric and enhance appropriately. No suspicious focal parenchymal abnormality is identified in either kidney. There is no collecting system dilatation. The bladder is unremarkable, as imaged. The prostate gland appears unremarkable. Gastrointestinal: Diffuse fluid and air distention of the the entire small bowel and proximal large bowel measuring up to 4.3 cm in the left abdomen and up to 7.6 cm of the proximal colon. Transitional point seen at a site of masslike thickening of the hepatic flexure measuring 3.2 x 3.9 cm (series 2 image 35). Colonic diverticulosis without diverticulitis. The appendix appears unremarkable. Lymphatics: Multiple prominent and enlarged retroperitoneal lymph nodes. Largest is 1.5 cm in the left periaortic region (series 2 image 33). Vascular: Moderate calcific atherosclerosis. The abdominal aorta is normal in caliber. MSK/Body Wall: No concerning bony lesion is identified. Small fat-containing right inguinal hernia. Peritoneum/Other: There is no free fluid or abnormal collection. No free gas. 1.Irregular mass at the hepatic flexure of the colon resulting in high-grade small bowel and proximal large bowel obstruction. Severe fluid and gaseous distention of the entire stomach and small bowel including the distal esophagus. Findings are overall concerning for colonic neoplasm. Recommend GI and surgical consultation. 2.Scattered enlarged retroperitoneal lymph nodes. These are presumably metastatic if the colon mass proves to be malignant. 3.Marked hydropic distention of the gallbladder containing gallstones. No CT evidence of cholecystitis. Recommend correlation with LFTs. US GALLBLADDER RUQ    Result Date: 10/21/2022  NO PRIOR REPORT AVAILABLE Exam: ULTRASOUND OF THE ABDOMEN LIMITED, RIGHT UPPER QUADRANT Clinical data: Elevated LFTs, sepsis, lactic acidosis.   The patient is on ventilator. Technique: Real-time grayscale imaging of the right upper quadrant was performed. Images supplemented with color Doppler technique. Prior studies: CT scan of abdomen and pelvis dated 10/20/22. Findings: Limited exam due to poor acoustic penetration. Diffuse increased echogenicity of the liver parenchyma. A shadowing focus//calcification in the right lobe measuring up 1.1 cm. No intrahepatic biliary distention. The gallbladder is distended with several small stones. The gallbladder wall thickness measures 0.8 cm. No sludge. The common bile duct is normal in caliber measuring 0.4cm. The right kidney sizsusxn17.7 x 6.6 x 5.4 cm. Normal echogenicity and cortical thickness are preserved. No evidence of renal masses, no calculi and no hydronephrosis Diffuse increased echogenicity of the pancreas. Imaged portion of the aorta demonstrates no acute pathology. Visualized portion of the inferior vena cava is unremarkable. 1.  Limited exam for reasons discussed above. 2.  Diffuse fatty infiltration of the liver with a 1.1 cm calcification in the right hepatic lobe. 3.  Distended gallbladder with several small stones and mild diffuse gallbladder wall thickening. 4.  No other significant abnormality. Recommendation: Follow up as clinically indicated. Electronically Signed by Catherine Rendon MD at 21-Oct-2022 11:06:19 PM EST             XR CHEST PORTABLE    Result Date: 10/20/2022  NO PRIOR REPORT AVAILABLE Exam: X-RAY OF [facility] Clinical data:Confirmation of course of NG/OG/NE tube and location of tip of tube. Technique:Single view of the chest. Prior studies: Radiograph of the chest done on same day. Findings: The lungs are grossly clear; noevidence of acute infiltrate or pleural effusion. Cardiac silhouette is mildly enlarged. No acute osseous abnormality is detected. The NG tube can be traced to the level of distal esophagus. Scattered nonspecific gas-filled loops of bowel in the upper abdomen. The NG tube can be traced to the level of distal esophagus. Suggest advancing accordingly, with a follow-up radiograph. Recommendation: As above. Electronically Signed by Tammy Carbajal MD at 20-Oct-2022 06:15:09 PM EST             XR CHEST PORTABLE    Result Date: 10/20/2022  NO PRIOR REPORT AVAILABLE Exam: X-RAY OF [facility] Clinical data:Abdominal pain, vomiting. Technique:Single view of the chest. Prior studies: No prior studies submitted. Findings: The trachea is midline. The heart is enlarged. Mediastinal and pulmonary vascular shadows are normal.  There is no focal consolidation or effusion. The osseous structures are intact. Cardiomegaly. Recommendation: Follow up as clinically indicated. Electronically Signed by Fabian Caballero MD at 20-Oct-2022 03:26:36 PM EST               CBC:   Recent Labs     11/01/22  0539 11/02/22  0353 11/03/22  0300   WBC 14.2* 13.4* 9.3   HGB 11.6* 11.4* 10.1*   PLT 80* 70* 56*     BMP:    Recent Labs     11/01/22  0539 11/02/22  0353 11/03/22  0300   * 135* 137   K 4.5 4.4 4.1    105 105   CO2 21* 23 22   BUN 54* 44* 33*   CREATININE 1.1 0.5 0.3*   GLUCOSE 147* 119* 110*     INR: No results for input(s): INR in the last 72 hours. Lipids: No results for input(s): CHOL, HDL in the last 72 hours. Invalid input(s): LDLCALCU  ABGs:  Recent Labs     10/31/22  2225 11/01/22  0213   PHART 7.500* 7.480*   XOP3HZK 22.0* 27.0*   PO2ART 58.0* 72.0*   HFT8KOD 17.2* 20.1*   BEART -4.0* -2.3*   HGBAE 13.3* 11.6*   H7QNSXWC 91.2 94.6   CARBOXHGBART 1.3 1.3   02THERAPY Unknown Unknown     HgBA1c:  No results for input(s): LABA1C in the last 72 hours. Procedures: Right hemicolectomy, repair of anastomotic leak, PEG tube placement, central venous access placement  Hospital Course: Mr. Barbra Reddy was admitted on 10/20 because of abdominal pain and distention. CT scan was done which showed evidence of a large colon mass at the hepatic flexure.   Consultation was obtained with general surgery. He was taken to the operating room for right hemicolectomy and gastrostomy placement on 10/21. Pathology revealed adenocarcinoma of the colon. Consultation was obtained with oncology. Postoperatively he developed paroxysmal atrial fibrillation and was seen by cardiology. He developed encephalopathy and pulled out his gastrostomy tube. Consultation was obtained with GI and he underwent placement of the PEG tube on 10/23. On 10/25 he spiked a fever and developed a picture of sepsis. CT scan of the abdomen was done and he was shown to have an anastomotic leak. He was taken back to the OR on 10/26 and had repair of this leak and creation of a ileostomy. Postoperatively he continued to have persistent elevation of his liver enzymes. Palliative care worked very closely with the patient and family. He has now declined any further chemo or further intervention. He will be transitioned to inpatient hospice this evening. Physical Exam:  Vital Signs: /72   Pulse 83   Temp 98.7 °F (37.1 °C) (Temporal)   Resp 15   Ht 5' 10\" (1.778 m)   Wt 193 lb 9.6 oz (87.8 kg)   SpO2 97%   BMI 27.78 kg/m²   General appearance:. Alert and Cooperative   HEENT: Normocephalic. Chest: clear to auscultation bilaterally without wheezes or rhonchi. Cardiac: Normal heart tones with regular rate and rhythm, S1, S2 normal. No murmurs, gallops, or rubs auscultated. Abdomen:soft, non-tender; normal bowel sounds, no masses, no organomegaly. Extremities: No clubbing or cyanosis. No peripheral edema. Peripheral pulses palpable. Neurologic: Grossly intact.     Discharge Medications:         Medication List        ASK your doctor about these medications      apixaban 5 MG Tabs tablet  Commonly known as: Eliquis  Take 1 tablet by mouth 2 times daily     carvedilol 25 MG tablet  Commonly known as: COREG  Take 1 tablet by mouth 2 times daily     Entresto  MG per tablet  Generic drug: sacubitril-valsartan  Take 1 tablet by mouth twice daily     furosemide 40 MG tablet  Commonly known as: LASIX  Take 1 tablet by mouth once daily     levothyroxine 88 MCG tablet  Commonly known as: SYNTHROID  TAKE ONE TABLET BY MOUTH ONCE DAILY     spironolactone 25 MG tablet  Commonly known as: ALDACTONE  TAKE ONE TABLET BY MOUTH ONCE DAILY              Disposition: Patient will be transferred to inpatient hospice. Time spent on discharge 40 minutes.     Signed:  Martell Clark DO

## 2022-11-04 PROBLEM — C18.9 ADENOCARCINOMA, COLON (HCC): Status: ACTIVE | Noted: 2022-01-01

## 2022-11-04 PROBLEM — K70.30 ALCOHOLIC CIRRHOSIS (HCC): Status: ACTIVE | Noted: 2022-11-04

## 2022-11-04 PROBLEM — E80.6 HYPERBILIRUBINEMIA: Status: ACTIVE | Noted: 2022-11-04

## 2022-11-04 PROBLEM — K80.10 CHOLECYSTITIS WITH CHOLELITHIASIS: Status: ACTIVE | Noted: 2022-01-01

## 2022-11-04 PROBLEM — Z93.1 GASTROSTOMY TUBE IN PLACE (HCC): Status: ACTIVE | Noted: 2022-01-01

## 2022-11-04 PROBLEM — K70.31 ASCITES DUE TO ALCOHOLIC CIRRHOSIS (HCC): Status: ACTIVE | Noted: 2022-01-01

## 2024-03-27 NOTE — PROGRESS NOTES
[address] [place], [address] [address]  Phone: [phone]  Fax: [phone]    OFFICE VISIT:  2019    Ryan Almonte - : 1955    Reason For Visit:  Anaya Katz is a 61 y.o. male who is here for 6 Month Follow-Up (no cardiac symptoms); Hypertension; Atrial Fibrillation; and Cardiomyopathy  Heart catheterization in  showed mild CAD with EF 10%. Repeat Echo 2017 showed normal LV systolic function with EF 55-60%. Mild diastolic dysfunction. No significant valvular disease    He returns to day for follow up and anticoagulation management  He has been active and doing well  He is working in his garden. No swelling, LOVETT or CP  He denies bleeding with the coumadin    He has not checked his BP at home      Catrachito Maldonado denies exertional chest pain, shortness of breath, orthopnea, paroxysmal nocturnal dyspnea, syncope, presyncope, arrhythmia, edema and fatigue. The patient denies numbness or weakness to suggest cerebrovascular accident or transient ischemic attack. Maggie Cook DO is PCP - .   [facility] Almonte [facility] the [facility]:    Patient Active Problem List    Diagnosis Date Noted    Acute renal failure ([address] 10/02/2016     Priority: High    CKD (chronic kidney disease), stage III ([address] 10/02/2016     Priority: Medium    Shortness of breath 2016     Priority: Medium    Essential hypertension 2016     Priority: Medium    Hypothyroidism 2016     Priority: Medium    Paroxysmal atrial fibrillation ([address] 2018    Hypokalemia 10/04/2016    Non-ischemic cardiomyopathy ([address] 10/03/2016    Ex-cigarette smoker 10/03/2016    Obesity (BMI 35.0-39.9 without comorbidity) 2016     Past Medical History:   Diagnosis Date    Ex-cigarette smoker 10/3/2016    Hypertension     Non-ischemic cardiomyopathy ([address] 10/3/2016    2016  Echo  EF 25% 10/3/16  Cath  Mild CAD, EF 10%     Paroxysmal [facility] fibrillation ([facility] 3/16/2018     Past Surgical History:   Procedure Laterality Date    FINGER AMPUTATION Left     partial left thumb amputation following trauma    TONSILLECTOMY       Family History   Problem Relation Age of Onset    High Blood Pressure Mother     Cancer Father         Stomach    Heart Disease Maternal Grandmother     Diabetes Maternal Uncle      Social History     Tobacco Use    Smoking status: Former Smoker    Smokeless tobacco: Never Used    Tobacco comment: light smoking history - socially   Substance Use Topics    Alcohol use: Yes      Current Outpatient Medications   Medication Sig Dispense Refill    warfarin (COUMADIN) 10 MG tablet TAKE 1 TABLET BY MOUTH ONCE DAILY AS  INSTRUCTED 30 tablet 11    furosemide (LASIX) 40 MG tablet TAKE ONE TABLET BY MOUTH ONCE DAILY 30 tablet 11    levothyroxine (SYNTHROID) 88 MCG tablet TAKE ONE TABLET BY MOUTH ONCE DAILY 30 tablet 5    carvedilol (COREG) 12.5 MG tablet TAKE ONE TABLET BY MOUTH TWICE DAILY WITH MEALS 180 tablet 3    ENTRESTO  MG per tablet TAKE ONE TABLET BY MOUTH TWICE DAILY 180 tablet 3    spironolactone (ALDACTONE) 25 MG tablet TAKE ONE TABLET BY MOUTH ONCE DAILY 30 tablet 11     No current facility-administered medications for this visit. Allergies: Patient has no known allergies. Review of Systems  Constitutional - no significant activity change, appetite change, or unexpected weight change. No fever, chills or diaphoresis. No fatigue. HEENT - no significant rhinorrhea or epistaxis. No tinnitus or significant hearing loss. Eyes - no sudden vision change or amaurosis. Respiratory - no significant wheezing, stridor, apnea or cough. No dyspnea on exertion or shortness of breath. Cardiovascular - no exertional chest pain, orthopnea or PND. No sensation of arrhythmia or slow heart rate. No claudication or leg edema. Gastrointestinal - no abdominal swelling or pain. No blood in stool.  No severe constipation, diarrhea, nausea, or vomiting. Genitourinary - no difficulty urinating, dysuria, frequency, or urgency. No flank pain or hematuria. Musculoskeletal - no back pain, gait disturbance, or myalgia. Skin - no color change or rash. No pallor. No new surgical incision. Neurologic - no speech difficulty, facial asymmetry or lateralizing weakness. No seizures, presyncope, syncope, or significant dizziness. Hematologic - no easy bruising or excessive bleeding. Psychiatric - no severe anxiety or insomnia. No confusion. All other review of systems are negative. Objective  Vital Signs - BP (!) 152/90   Ht 5' 10\" (1.778 m)   Wt 251 lb (113.9 kg)   BMI 36.01 kg/m²   General - Rambo is alert, cooperative, and pleasant. Well groomed. No acute distress. Body habitus is overweight. HEENT - The head is normocephalic. No circumoral cyanosis. Dentition - some missing teeth  EYES -  No Xanthelasma, no arcus senilis, no conjunctival hemorrhages or discharge. Neck - Supple, without increased jugular venous pressures. No carotid bruits. No mass. Respiratory - Lungs are clear bilaterally. No wheezes or rales. Normal effort without use of accessory muscles. Cardiovascular - Heart has regular rhythm and rate. No murmurs, rubs or gallops. + pedal pulses and no varicosities. Abdominal -  Soft, nontender, nondistended. Bowel sounds are intact. Extremities - No clubbing, cyanosis, or  edema. Musculoskeletal -  No clubbing . No Osler's nodes. Gait normal .  No kyphosis or scoliosis. Skin -  no statis ulcers or dermatitis. Neurological - No focal signs are identified. Oriented to person, place and time. Psychiatric -  Appropriate affect and mood. Assessment:     Diagnosis Orders   1. Non-ischemic cardiomyopathy ([address]     2. Essential hypertension     3.  Paroxysmal atrial fibrillation (HCC)  POCT INR    ENROLLMENT FOR ANTICOAGULATION MONITORING     Data:  BP Readings from Last 3 Encounters:   06/11/19 (!) 152/90   10/18/18 138/88   04/24/18 126/74    Pulse Readings from Last 3 Encounters:   10/18/18 55   04/24/18 63   03/16/18 60        Wt Readings from Last 3 Encounters:   06/11/19 251 lb (113.9 kg)   10/18/18 264 lb (119.7 kg)   04/24/18 257 lb (116.6 kg)     BP elevated today- not checked at home  On BB, Entresto 97/103 and aldactone  On coumadin for anticoagulation  Lab Results   Component Value Date    INR 3.1 06/11/2019    INR 1.6 12/28/2018    INR 3.0 11/15/2018    PROTIME 2.0 03/16/2018    PROTIME 14.5 01/08/2018    PROTIME 20.0 (H) 12/29/2017       BP has been OK in past. He does not have BP cuff at home  Will recheck with INR in 4 weeks and make adjustments to meds if needed at that  time. States taking medications as prescribed  Stable cardiovascular status. No evidence of overt heart failure, angina or dysrhythmia. Plan  Take 5 mg tonight, then resume normal dosing and recheck in 1 month  Monitor BP at home- goal is < 130/80  Follow up in 4 weeks for INR and BP   Call with any questions or concerns  Follow up with Tiffanie Sun DO for non cardiac problems  Report any new problems  Cardiovascular Fitness-Exercise as tolerated. Strive for 30 minutes of exercise most days of the week. Cardiac / Healthy Diet  Continue current medications as directed  Continue plan of treatment  It is always recommended that you bring your medications bottles with you to each visit - this is for your safety! ALLISON Rico dragon/transcription disclaimer: Much of this encounter note is electronic transcription/translation of spoken language to printed tach. Electronic translation of spoken language may be erroneous, or at times, nonsensical words or phrases may be inadvertently transcribed.  Although, I have reviewed the note for such errors, some may still exist. intact

## (undated) DEVICE — RESERVOIR,SUCTION,100CC,SILICONE: Brand: MEDLINE

## (undated) DEVICE — TUBE ET 7.5MM NSL ORAL BASIC CUF INTMED MURPHY EYE RADPQ

## (undated) DEVICE — LARYNGOSCOPE BLDE MAC HNDL M SZ 35 ST CURAPLEX CURAVIEW LED

## (undated) DEVICE — Device: Brand: SENSURA MIO

## (undated) DEVICE — E-Z CLEAN, NON-STICK, PTFE COATED, ELECTROSURGICAL BLADE ELECTRODE, MODIFIED EXTENDED INSULATION, 6.5 INCH (16.5 CM): Brand: MEGADYNE

## (undated) DEVICE — GOWN,PREVENTION PLUS,XL,ST,24/CS: Brand: MEDLINE

## (undated) DEVICE — YANKAUER,TAPERED BULBOUS TIP,W/O VENT: Brand: MEDLINE

## (undated) DEVICE — SUTURE ETHLN SZ 2-0 L30IN NONABSORBABLE BLK L36MM FSLX 3/8 1674H

## (undated) DEVICE — YANKAUER,POOLE TIP,STERILE,50/CS: Brand: MEDLINE

## (undated) DEVICE — SOLUTION IV IRRIG POUR BRL 0.9% SODIUM CHL 2F7124

## (undated) DEVICE — ROYAL SILK SURGICAL GOWN, XXL: Brand: CONVERTORS

## (undated) DEVICE — DRAIN JACKSON PRATT ROUND 15FR: Brand: CARDINAL HEALTH

## (undated) DEVICE — SUTURE PERMAHAND SZ 0 L30IN NONABSORBABLE BLK SILK BRAID A306H

## (undated) DEVICE — SUTURE VCRL SZ 0 L54IN ABSRB UD LIGAPAK REEL NDL J287G

## (undated) DEVICE — SPONGE LAP W18XL18IN WHT COT 4 PLY FLD STRUNG RADPQ DISP ST

## (undated) DEVICE — DUAL LUMEN STOMACH TUBE: Brand: SALEM SUMP

## (undated) DEVICE — MAJOR CDS

## (undated) DEVICE — SUTURE PERMAHAND SZ 2-0 L18IN NONABSORBABLE BLK L26MM FS 685G

## (undated) DEVICE — TOTAL TRAY, 16FR 10ML SIL FOLEY, URN: Brand: MEDLINE

## (undated) DEVICE — SUTURE PERMAHAND SZ 3-0 L18IN NONABSORBABLE BLK L26MM SH C013D

## (undated) DEVICE — SUTURE VCRL SZ 3-0 L27IN ABSRB UD L26MM SH 1/2 CIR J416H

## (undated) DEVICE — GLOVE SURG SZ 7 CRM LTX FREE POLYISOPRENE POLYMER BEAD ANTI

## (undated) DEVICE — GLIDESCOPE BFLEX 3.8 BRONCHOSCOPE

## (undated) DEVICE — DRAPE,UTILITY,XL,4/PK,STERILE: Brand: MEDLINE

## (undated) DEVICE — SUTURE PERMAHAND SZ 0 L30IN NONABSORBABLE BLK FSL L30MM 3/8 680H

## (undated) DEVICE — RELOAD STPL H1.5X3.6XL60MM REG TISS BLU B FRM AUTO RET PIN

## (undated) DEVICE — GASTROSTOMY TUBE WITH ENFIT CONNECTOR, 20FR: Brand: GASTROSTOMY TUBE WITH ENFIT CONNECTOR

## (undated) DEVICE — STAPLER INT L60MM REG TISS BLU B FRM 8 FIRING 2 ROW AUTO

## (undated) DEVICE — TUBE GASTROSTMY DIA20FR STD STR REPL ENDOVIVE

## (undated) DEVICE — STERILE POLYISOPRENE POWDER-FREE SURGICAL GLOVES: Brand: PROTEXIS

## (undated) DEVICE — ROD OST L65MM LOOP FLNG FOR SURFIT NATURA AUTOLOK GENTLE

## (undated) DEVICE — RELOAD STPL L75MM OPN H3.8MM CLS 1.5MM WIRE DIA0.2MM REG

## (undated) DEVICE — DRAPE SLUSH DISC W44XL66IN ST FOR RND BSIN HUSH SLUSH SYS

## (undated) DEVICE — ENDO KIT,LOURDES HOSPITAL: Brand: MEDLINE INDUSTRIES, INC.

## (undated) DEVICE — Device

## (undated) DEVICE — DRAIN SURG 15FR 100% SIL RND END PERF

## (undated) DEVICE — SUTURE PDS + SZ 1 L96IN ABSRB VLT L65MM TP-1 1/2 CIR PDP880G

## (undated) DEVICE — DRESSING MEPILEX BORDER FLEX LITE 5X12.5CM

## (undated) DEVICE — SUTURE VCRL SZ 3-0 L18IN ABSRB UD L26MM SH 1/2 CIR J864D

## (undated) DEVICE — TIBURON LAPAROTOMY DRAPE: Brand: CONVERTORS

## (undated) DEVICE — TOWEL,OR,DSP,ST,BLUE,DLX,4/PK,20PK/CS: Brand: MEDLINE

## (undated) DEVICE — SYRINGE, LUER LOCK, 10ML: Brand: MEDLINE

## (undated) DEVICE — PUMP SUC IRR TBNG L10FT W/ HNDPC ASSEMB STRYKEFLOW 2

## (undated) DEVICE — SOLUTION IV IRRIG 1000ML POUR BTL 2F7114